# Patient Record
Sex: FEMALE | Race: WHITE | NOT HISPANIC OR LATINO | Employment: FULL TIME | URBAN - METROPOLITAN AREA
[De-identification: names, ages, dates, MRNs, and addresses within clinical notes are randomized per-mention and may not be internally consistent; named-entity substitution may affect disease eponyms.]

---

## 2017-01-09 ENCOUNTER — THERAPY VISIT (OUTPATIENT)
Dept: SLEEP MEDICINE | Facility: CLINIC | Age: 27
End: 2017-01-09
Payer: COMMERCIAL

## 2017-01-09 DIAGNOSIS — G47.10 ORGANIC HYPERSOMNIA: ICD-10-CM

## 2017-01-09 DIAGNOSIS — G47.33 OSA (OBSTRUCTIVE SLEEP APNEA): Primary | Chronic | ICD-10-CM

## 2017-01-09 PROCEDURE — 95810 POLYSOM 6/> YRS 4/> PARAM: CPT | Performed by: INTERNAL MEDICINE

## 2017-01-10 NOTE — PROGRESS NOTES
Patient arrived at [facility].  Diagnostic PSG completed per provider order.  Patient did not meet criteria for PAP therapy.

## 2017-01-11 NOTE — PROCEDURES
PHYSICIAN INTERPRETATION  Home Sleep Schedule-Actigraphy      Patient:  Alina Thompson  MRN:  [id number]    YOB: 1990  Referring Physician:  Aubrey Parkinson Md      Dates of recording: from 12/27/16 to 1/8/17                  Quality:good    Sleep diary:   correlates well for the most part. Large amount of activity on 1/3/17 when sleep is recorded    Sleep onset typically/or range at   10 pm -1 am        Sleep onset delay typically/or range <30 minutes    Sleep offset typically/or range at   7-10 am     Total sleep time estimated is 7.6 hrs, with shortest sleep period time of 3-4 and longest sleep period time of 10.    Sleep periods are more interrupted by movement during second week as opposed to first week. Most notable  Saturday 12/31/16, Sunday 1/1/17, and Tuesday 1/3/17    Light exposure periods are appropriately timed to sleep schedule    Napping is not noted.    Interpretation: Sleep wake pattern is consistent with       Mildly irregular sleep pattern with episodic insufficient sleep    Interpreted by:  Mack Segovia

## 2017-01-16 PROBLEM — G47.33 OSA (OBSTRUCTIVE SLEEP APNEA): Chronic | Status: ACTIVE | Noted: 2017-01-16

## 2017-01-16 NOTE — PROCEDURES
" SLEEP STUDY INTERPRETATION  POLYSOMNOGRAPHY REPORT      Patient: Alina Thompson  YOB: 1990  Study Date: 1/09/2017  MRN: [id number]  Ordering Provider: JAVID Gonzales      Indications for Polysomnography: The patient is a 26 y old Female who is 5' 11\" and weighs 300.0 lbs.  Her BMI is 42.0, McGregor sleepiness scale 6.0 and neck size is 44.0.  A diagnostic polysomnogram was performed to evaluate for fatigue, history of narcolepsy or idiopathic hypersomnolence..      Polysomnogram Data:  A full night polysomnogram recorded the standard physiologic parameters including EEG, EOG, EMG, ECG, nasal and oral airflow.  Respiratory parameters of chest and abdominal movements were recorded with respiratory inductance plethysmography.  Oxygen saturation was recorded by pulse oximetry.          Sleep Architecture:  The total recording time of the polysomnogram was 450.4 minutes.  The total sleep time was 403.0 minutes.  Sleep latency was normal at 11.4 minutes without the use of a sleep aid.  REM latency was 65.5 minutes.  Arousal index was 20.5 arousals per hour.  Sleep efficiency was normal at 89.5%.  Wake after sleep onset was 30.0 minutes.  The patient spent 6.7% of total sleep time in Stage N1, 53.3% in Stage N2, 15.5% in Stages N3, and 24.4% in REM.  Time in REM supine was 38.5 minutes.    Respiration:    Events - The polysomnogram revealed a presence of 7 obstructive, 2 central, and 0 mixed apneas resulting in an apnea index of 1.3 events per hour.  There were 73 hypopneas resulting in a hypopnea index of 10.9 events per hour.  The combined apnea/hypopnea index was 12.2 events per hour.  The REM AHI was 32.3 events per hour.  The supine AHI was 51.3 events per hour.  The RERA index was 2.1 events per hour.   The RDI was 14.3 events per hour.    Snoring - was reported as moderate and intermittent.    Respiratory rate and pattern - was notable for normal respiratory rate and pattern.    Sustained Sleep " Associated Hypoventilation - Transcutaneous carbon dioxide monitoring was not used    Sleep Associated Hypoxemia - (Greater than 5 minutes O2 sat below 89%) was not present.  Baseline oxygen saturation was 93.4%. Lowest oxygen saturation was 70.1%.  Time spent less than or equal to 88% was 4.7 minutes.  Time spent less than or equal to 89% was 6.8 minutes.  14.3 2.1 12.2     Movement Activity:     Periodic Limb Activity - There were 35 PLMs during the entire study. The PLM index was 5.2 movements per hour.  The PLM Arousal Index was 1.8 per hour.    REM EMG Activity - Excessive transient / sustained muscle activity was not present.    Nocturnal Behavior - Abnormal sleep related behaviors were not noted     Bruxism - None apparent.    Cardiac Summary: Arrhythmias were not noted.          Assessment:     Mild obstructive sleep apnea, principally supine-REM related when it is severe    Periodic Limb Movements of Sleep    Recommendations:    Based on the presence of mild obstructive sleep apnea and excessive daytime sleepiness, treatment could be empirically initiated with Auto-titrating PAP therapy with a range of 5 - 18 cmH2O. Recommend clinical follow up with sleep management team.    Positional therapy could be considered    Patient may be a candidate for dental appliance through referral to Sleep Dentistry for the treatment of obstructive sleep apnea and/or socially disruptive snoring.    If devices are not acceptable or effective, patient may benefit from evaluation of possible surgical options.  If she is interested, would recommend referral to specialized ENT-Sleep provider.    Advise regarding the risks of drowsy driving.    Suggest optimizing sleep schedule and avoiding sleep deprivation.    Weight management (if BMI > 30).    Pharmacologic therapy should be used for management of restless legs syndrome only if present and clinically indicated and not based on the presence of periodic limb movements  alone.        _____________________________________   Mack Svee, MD             Range(%) Time in range (min) Time in range (%) Time in or below range (min) Time in or below range (%)   0.0 - 89.0 6.8 1.5% 6.8 1.5%   0.0 - 88.0 4.7 1.0% 4.7 1.0%      12.2 51.3 32.3

## 2017-01-27 ENCOUNTER — OFFICE VISIT (OUTPATIENT)
Dept: SLEEP MEDICINE | Facility: CLINIC | Age: 27
End: 2017-01-27
Payer: COMMERCIAL

## 2017-01-27 VITALS
HEIGHT: 71 IN | SYSTOLIC BLOOD PRESSURE: 133 MMHG | OXYGEN SATURATION: 95 % | BODY MASS INDEX: 41.02 KG/M2 | WEIGHT: 293 LBS | HEART RATE: 84 BPM | DIASTOLIC BLOOD PRESSURE: 71 MMHG

## 2017-01-27 DIAGNOSIS — G47.33 OBSTRUCTIVE SLEEP APNEA: Primary | ICD-10-CM

## 2017-01-27 DIAGNOSIS — R53.83 MALAISE AND FATIGUE: ICD-10-CM

## 2017-01-27 DIAGNOSIS — R53.81 MALAISE AND FATIGUE: ICD-10-CM

## 2017-01-27 PROCEDURE — 99214 OFFICE O/P EST MOD 30 MIN: CPT | Performed by: PHYSICIAN ASSISTANT

## 2017-01-27 NOTE — PROGRESS NOTES
Sleep Study Follow-Up Visit:    Date on this visit: 1/27/2017    Alina Thompson comes in today for follow-up of her actigraphy and sleep study done on 1/9/2017 at the [facility] for for fatigue, history of narcolepsy or idiopathic hypersomnolence. Sleep studies interpreted by Dr. Segovia.     Actigraphy-  Dates of recording: from 12/27/16 to 1/8/17                 Quality:good  Sleep diary:   correlates well for the most part. Large amount of activity on 1/3/17 when sleep is recorded  Sleep onset typically/or range at   10 pm -1 am      Sleep onset delay typically/or range <30 minutes  Sleep offset typically/or range at   7-10 am   Total sleep time estimated is 7.6 hrs, with shortest sleep period time of 3-4 and longest sleep period time of 10.  Sleep periods are more interrupted by movement during second week as opposed to first week. Most notable  Saturday 12/31/16, Sunday 1/1/17, and Tuesday 1/3/17  Light exposure periods are appropriately timed to sleep schedule  Napping is not noted.  Interpretation: Sleep wake pattern is consistent with Mildly irregular sleep pattern with episodic insufficient sleep    Polysomnogram-  Sleep Architecture:  The total recording time of the polysomnogram was 450.4 minutes.  The total sleep time was 403.0 minutes.  Sleep latency was normal at 11.4 minutes without the use of a sleep aid.  REM latency was 65.5 minutes.  Arousal index was 20.5 arousals per hour.  Sleep efficiency was normal at 89.5%.  Wake after sleep onset was 30.0 minutes.  The patient spent 6.7% of total sleep time in Stage N1, 53.3% in Stage N2, 15.5% in Stages N3, and 24.4% in REM.  Time in REM supine was 38.5 minutes.  Respiration:    Events - The polysomnogram revealed a presence of 7 obstructive, 2 central, and 0 mixed apneas resulting in an apnea index of 1.3 events per hour.  There were 73 hypopneas resulting in a hypopnea index of 10.9 events per hour.  The combined apnea/hypopnea  index was 12.2 events per hour.  The REM AHI was 32.3 events per hour.  The supine AHI was 51.3 events per hour.  The RERA index was 2.1 events per hour.   The RDI was 14.3 events per hour.    Snoring - was reported as moderate and intermittent.    Respiratory rate and pattern - was notable for normal respiratory rate and pattern.    Sustained Sleep Associated Hypoventilation - Transcutaneous carbon dioxide monitoring was not used  Sleep Associated Hypoxemia - (Greater than 5 minutes O2 sat below 89%) was not present.  Baseline oxygen saturation was 93.4%. Lowest oxygen saturation was 70.1%.  Time spent less than or equal to 88% was 4.7 minutes.  Time spent less than or equal to 89% was 6.8 minutes.  Movement Activity:     Periodic Limb Activity - There were 35 PLMs during the entire study. The PLM index was 5.2 movements per hour.  The PLM Arousal Index was 1.8 per hour.    REM EMG Activity - Excessive transient / sustained muscle activity was not present.    Nocturnal Behavior - Abnormal sleep related behaviors were not noted      Bruxism - None apparent.    Cardiac Summary: Arrhythmias were not noted.    These findings were reviewed with patient. A copy of the sleep study is given to the patient for her records.     Past medical/surgical history, family history, social history, medications and allergies were reviewed.      Problem List:  Patient Active Problem List    Diagnosis Date Noted     SANDRA (obstructive sleep apnea)- mild (AHI 12) 01/16/2017     Priority: Medium     Study Date: 1/09/2017- (300.0 lbs) apnea/hypopnea index was 12.2 events per hour.  The REM AHI was 32.3 events per hour.  The supine AHI was 51.3 events per hour.  The RERA index was 2.1 events per hour.   The RDI was 14.3 events per hour. . Lowest oxygen saturation was 70.1%.  Time spent less than or equal to 88% was 4.7 minutes. The PLM index was 5.2 movements per hour.        Morbid obesity due to excess calories (H) 11/14/2016     Priority:  "Medium     Idiopathic hypersomnia 11/11/2016     Priority: Medium     2007 MSL of 10.4 with REM present on 3 of the naps  10/8/2012. She was off all stimulant medications. Actigraphy x1 week- Adequate total sleep. Polysomnogram-Total sleep time 434. Sleep efficiency was 79.7%. Latency to sleep 13.5 minutes. Latency to .0 minutes. No significant sleep disordered breathing with only rare hypopneas, RDI was 0.7 per hour, oxygen remained above 92%, No CO2 retention.   MSLT revealed a mean latency of 8.75 minutes on the first 4 naps with sleep present in nap 1, 2 and 4 and REM sleep present on nap 4. The fifth nap was 20 minutes and was not included in the MSL.      /71 mmHg  Pulse 84  Ht 1.803 m (5' 11\")  Wt 139.164 kg (306 lb 12.8 oz)  BMI 42.81 kg/m2  SpO2 95%    Impression/Plan:    1. Mild obstructive sleep apnea, principally supine-REM related when it is severe-  Counseled the patient that mild sleep apnea is not felt to significantly increase long-term cardiovascular risk, but can contribute to excessive daytime sleepiness.    Treatment options discussed today including  auto-CPAP oral appliance therapy or polysomnography with full night PAP titration.  Elected treatment of sleep apnea and excessive daytime sleep apnea with CPAP 5-15 cm    2.Patient had elevated periodic limp movements during the study. The majority of these were not associated with cortical arousal. Patient denies wakeful motor restlessness. Will monitor    3. Patient is interested in FMLA for 10-15 minute tardiness ~3/5 days due to difficulty waking up in the mornings. Will look raina it.     She will follow up with me in about 7 week(s).     Twenty-five minutes spent with patient, all of which were spent face-to-face counseling, consulting, coordinating plan of care.      Ryan Ortiz PA-C    CC: Other Clinic, [facility]       "

## 2017-01-27 NOTE — NURSING NOTE
"Chief Complaint   Patient presents with     RECHECK     PSG results       Initial /71 mmHg  Pulse 84  Ht 1.803 m (5' 11\")  Wt 139.164 kg (306 lb 12.8 oz)  BMI 42.81 kg/m2  SpO2 95% Estimated body mass index is 42.81 kg/(m^2) as calculated from the following:    Height as of this encounter: 1.803 m (5' 11\").    Weight as of this encounter: 139.164 kg (306 lb 12.8 oz).  BP completed using cuff size: john paul Linda CMA      "

## 2017-01-27 NOTE — MR AVS SNAPSHOT
After Visit Summary   1/27/2017    Alina Thompson    MRN: [id number]           Patient Information     Date Of Birth          1990        Visit Information        Provider Department      1/27/2017 2:00 PM Ryan Ortiz PA [facility]        Today's Diagnoses     Obstructive sleep apnea    -  1     Malaise and fatigue           Care Instructions      Your BMI is Body mass index is 42.81 kg/(m^2).  Weight management is a personal decision.  If you are interested in exploring weight loss strategies, the following discussion covers the approaches that may be successful. Body mass index (BMI) is one way to tell whether you are at a healthy weight, overweight, or obese. It measures your weight in relation to your height.  A BMI of 18.5 to 24.9 is in the healthy range. A person with a BMI of 25 to 29.9 is considered overweight, and someone with a BMI of 30 or greater is considered obese. More than two-thirds of American adults are considered overweight or obese.  Being overweight or obese increases the risk for further weight gain. Excess weight may lead to heart disease and diabetes.  Creating and following plans for healthy eating and physical activity may help you improve your health.  Weight control is part of healthy lifestyle and includes exercise, emotional health, and healthy eating habits. Careful eating habits lifelong are the mainstay of weight control. Though there are significant health benefits from weight loss, long-term weight loss with diet alone may be very difficult to achieve- studies show long-term success with dietary management in less than 10% of people. Attaining a healthy weight may be especially difficult to achieve in those with severe obesity. In some cases, medications, devices and surgical management might be considered.  What can you do?  If you are overweight or obese and are interested in methods for weight loss, you should discuss this with your  provider.     Consider reducing daily calorie intake by 500 calories.     Keep a food journal.     Avoiding skipping meals, consider cutting portions instead.    Diet combined with exercise helps maintain muscle while optimizing fat loss. Strength training is particularly important for building and maintaining muscle mass. Exercise helps reduce stress, increase energy, and improves fitness. Increasing exercise without diet control, however, may not burn enough calories to loose weight.       Start walking three days a week 10-20 minutes at a time    Work towards walking thirty minutes five days a week     Eventually, increase the speed of your walking for 1-2 minutes at time    In addition, we recommend that you review healthy lifestyles and methods for weight loss available through the National Institutes of Health patient information sites:  http://win.niddk.nih.gov/publications/index.htm    And look into health and wellness programs that may be available through your health insurance provider, employer, local community center, or [employer]weta club.    Weight management plan: Patient was referred to their PCP to discuss a diet and exercise plan.            Follow-ups after your visit        Follow-up notes from your care team     Return in about 7 weeks (around 3/17/2017).      Your next 10 appointments already scheduled     Feb 03, 2017  1:00 PM   PAP SETUP with JUSTINE SHEA   [facility]    [address] [place] [id number]   [phone]            Mar 24, 2017  9:00 AM   Return Sleep Patient with JAVID Romero   [facility]    [address] [place] [id number]   [phone]              Who to contact     If you have questions or need follow up information about today's clinic visit or your schedule please contact [facility] directly  "at [phone].  Normal or non-critical lab and imaging results will be communicated to you by MyChart, letter or phone within 4 business days after the clinic has received the results. If you do not hear from us within 7 days, please contact the clinic through [employer] or phone. If you have a critical or abnormal lab result, we will notify you by phone as soon as possible.  Submit refill requests through [employer] or call your pharmacy and they will forward the refill request to us. Please allow 3 business days for your refill to be completed.          Additional Information About Your Visit        [employer] Information     [employer] lets you send messages to your doctor, view your test results, renew your prescriptions, schedule appointments and more. To sign up, go to [email or web address] . Click on \"Log in\" on the left side of the screen, which will take you to the Welcome page. Then click on \"Sign up Now\" on the right side of the page.     You will be asked to enter the access code listed below, as well as some personal information. Please follow the directions to create your username and password.     Your access code is: [email or web address]  Expires: 2017  2:14 PM     Your access code will  in 90 days. If you need help or a new code, please call your [place] clinic or [phone].        Care EveryWhere ID     This is your Care EveryWhere ID. This could be used by other organizations to access your [place] medical records  [id number]        Your Vitals Were     Pulse Height BMI (Body Mass Index) Pulse Oximetry          84 1.803 m (5' 11\") 42.81 kg/m2 95%         Blood Pressure from Last 3 Encounters:   17 133/71   16 121/82   11 134/90    Weight from Last 3 Encounters:   17 139.164 kg (306 lb 12.8 oz)   16 136.079 kg (300 lb)   11 116.393 kg (256 lb 9.6 oz)              We Performed the Following     Comprehensive DME        Primary Care Provider    [facility]    "             Thank you!     Thank you for choosing [facility]  for your care. Our goal is always to provide you with excellent care. Hearing back from our patients is one way we can continue to improve our services. Please take a few minutes to complete the written survey that you may receive in the mail after your visit with us. Thank you!             Your Updated Medication List - Protect others around you: Learn how to safely use, store and throw away your medicines at www.disposemymeds.org.          This list is accurate as of: 1/27/17  2:39 PM.  Always use your most recent med list.                   Brand Name Dispense Instructions for use    fluconazole 150 MG tablet    DIFLUCAN     Take by mouth once a week       NASACORT AQ NA          NEXPLANON 68 MG Impl   Generic drug:  etonogestrel      1 each by Subdermal route once       TYLENOL PO

## 2017-01-27 NOTE — PATIENT INSTRUCTIONS

## 2017-02-03 ENCOUNTER — DOCUMENTATION ONLY (OUTPATIENT)
Dept: SLEEP MEDICINE | Facility: CLINIC | Age: 27
End: 2017-02-03
Payer: COMMERCIAL

## 2017-02-03 DIAGNOSIS — G47.33 OSA (OBSTRUCTIVE SLEEP APNEA): Primary | Chronic | ICD-10-CM

## 2017-02-03 DIAGNOSIS — G47.19 EXCESSIVE DAYTIME SLEEPINESS: ICD-10-CM

## 2017-02-03 PROCEDURE — 99207 ZZC NO BILLABLE SERVICE THIS VISIT: CPT

## 2017-02-03 NOTE — PROGRESS NOTES
Patient was offered choice of vendor and chose [facility].  Patient Alina Thompson was set up at [place] on February 3, 2017. Patient received a Resmed AirSense 10 Auto. Pressures were set at 5-15 cm H2O.   Patient s ramp is 5 cm H2O for Off and FLEX/EPR is EPR, 2.  Patient received a Resmed Mask name: Quattro Air  Full Face mask Size Large, heated tubing and heated humidifier.  Patient is enrolled in the STM Program and does not need to meet compliance. Patient has a follow up on 3/24/17 with JAVID Craig.    Dena Solo

## 2017-02-06 ENCOUNTER — DOCUMENTATION ONLY (OUTPATIENT)
Dept: SLEEP MEDICINE | Facility: CLINIC | Age: 27
End: 2017-02-06

## 2017-02-06 NOTE — PROGRESS NOTES
3 DAY STM VISIT    Patient contacted for 3 day STM visit  Message left for patient to return call    Current settings:  EPAP Min Auto CPAP: 5.0 (The minimum allowable pressure in cmH2O)       EPAP Max Auto CPAP: 15.0 (The maximum allowable pressure in cmH2O)       Assessment:  Pt is using nightly  Action plan: Pt to have f/u 14 day  STM visit.

## 2017-02-20 ENCOUNTER — DOCUMENTATION ONLY (OUTPATIENT)
Dept: SLEEP MEDICINE | Facility: CLINIC | Age: 27
End: 2017-02-20

## 2017-02-20 NOTE — PROGRESS NOTES
14 DAY STM VISIT    Subjective measures:  Pt states that she stopped using her CPAP because the mask that she has caused blisters on her nose.  She would like to try a different mask.  She will check her schedule and call to set-up a mask fit appointment.    Assessment: Pt not meeting objective benchmarks for compliance  Patient failing following subjective benchmarks: mask discomfort  Action plan: Schedule mask fit appointment and Pt to have 30 day STM visit.   Device settings:    EPAP Min Auto CPAP: 5.0 (The minimum allowable pressure in cmH2O)   EPAP Max Auto CPAP: 15.0 (The maximum allowable pressure in cmH2O)      Objective measures: 14 day rolling measure     No use since 1/30/17      Compliance   (Goal >70%)  --% compliance greater than four hours rolling average 14 days: 14.2 %      Leak  (Goal < 24 lpm)  --No Data Recorded lpm last data upload         AHI  (Goal < 5)  --No Data Recorded  last data upload         Usage  (Goal >240)  --Time mask on face 14 day average: 61 min

## 2017-02-28 ENCOUNTER — DOCUMENTATION ONLY (OUTPATIENT)
Dept: SLEEP MEDICINE | Facility: CLINIC | Age: 27
End: 2017-02-28

## 2017-02-28 NOTE — PROGRESS NOTES
Pt came in because she's struggling to use her CPAP.  Her FFM caused a blister on her nose so she stopped using. She was fit with a MirNetLex Fx standard nasal mask today and she thought this mask was much more comfortable.  She will call and let us know if this mask is better.  If she's not able to wear CPAP, she is interested in alternative therapy.

## 2017-03-06 NOTE — PROGRESS NOTES
30 DAY STM VISIT    Message left for patient to return call     Assessment: Pt not meeting objective benchmarks for compliance     Action plan: Waiting for patient to return call and 2 week STM recheck appt scheduled  Patient has a follow up visit with JAVID Craig on 3/24/17.   Device settings:    EPAP Min Auto CPAP: 5.0 (The minimum allowable pressure in cmH2O)    EPAP Max Auto CPAP: 15.0 (The maximum allowable pressure in cmH2O)    Target IPAP (95% of Target) 14 day average (Resmed): 5.1cm H20    Objective measures: 14 day rolling measures     Compliance   (Goal >70%)  % compliance greater than four hours rolling average 14 days: 14.2%      Leak   (Goal < 24 lpm) 95% OF Leak in litres Rolling Average 14 Days: 1.2 lpm  last data upload      AHI  (Goal < 5)  AHI Rolling Average 14 Day: 0.13 last data upload        Usage  (Goal >240)  Time mask on face 14 day average: 37 min

## 2017-03-07 ENCOUNTER — DOCUMENTATION ONLY (OUTPATIENT)
Dept: SLEEP MEDICINE | Facility: CLINIC | Age: 27
End: 2017-03-07

## 2017-03-24 ENCOUNTER — OFFICE VISIT (OUTPATIENT)
Dept: SLEEP MEDICINE | Facility: CLINIC | Age: 27
End: 2017-03-24
Payer: COMMERCIAL

## 2017-03-24 VITALS
BODY MASS INDEX: 41.02 KG/M2 | SYSTOLIC BLOOD PRESSURE: 133 MMHG | HEART RATE: 84 BPM | WEIGHT: 293 LBS | HEIGHT: 71 IN | OXYGEN SATURATION: 92 % | DIASTOLIC BLOOD PRESSURE: 85 MMHG

## 2017-03-24 DIAGNOSIS — G47.33 OBSTRUCTIVE SLEEP APNEA: Primary | ICD-10-CM

## 2017-03-24 PROCEDURE — 99214 OFFICE O/P EST MOD 30 MIN: CPT | Performed by: PHYSICIAN ASSISTANT

## 2017-03-24 NOTE — PROGRESS NOTES
Obstructive Sleep Apnea- PAP Follow-Up Visit:    Chief Complaint   Patient presents with     RECHECK     CPAP f/u       Alina Thompson comes in today for follow-up of their mild sleep apnea, managed with CPAP.     Overall, the patient rates their experience with PAP as 5 (0 poor, 10 great). The mask is comfortable. The mask is leaking, 2 nights per week. They are not snoring with the mask on. They are having gasp arousals.  They are not having significant oral/nasal dryness. The pressure settings are not comfortable and feel low.     Patient uses nasal mask.    Bedtime is typically 10 pm. Usually it takes about <5 minutes to fall asleep with the mask on. Wake time is typically 6:30 am.  Patient is using PAP therapy 1-2 hours per night. The patient is usually getting 8 hours of sleep per night.    Patient does not feel rested in the morning.    Douglass Sleepiness Scale: 11/24    ResMed   Auto-PAP 5-15 cmH2O download:  5 total days of use. 25 nonuse days. 7% days with >4 hours use.  Average use 3 hours 8 minutes per day. Median Leak 0.0 L/min. 95%ile Leak 0.7 L/min. CPAP 95% pressure 6.1cm. AHI 0.1    Current problems with PAP use include:  1. Mask issues, resolved.  2. Feeling of not getting enough air.       Past medical/surgical history, family history, social history, medications and allergies were reviewed.      Problem List:  Patient Active Problem List    Diagnosis Date Noted     SANDRA (obstructive sleep apnea)- mild (AHI 12) 01/16/2017     Priority: Medium     Study Date: 1/09/2017- (300.0 lbs) apnea/hypopnea index was 12.2 events per hour.  The REM AHI was 32.3 events per hour.  The supine AHI was 51.3 events per hour.  The RERA index was 2.1 events per hour.   The RDI was 14.3 events per hour. . Lowest oxygen saturation was 70.1%.  Time spent less than or equal to 88% was 4.7 minutes. The PLM index was 5.2 movements per hour.        Morbid obesity due to excess calories (H) 11/14/2016     Priority: Medium      "Idiopathic hypersomnia 11/11/2016     Priority: Medium     2007 MSL of 10.4 with REM present on 3 of the naps  10/8/2012. She was off all stimulant medications. Actigraphy x1 week- Adequate total sleep. Polysomnogram-Total sleep time 434. Sleep efficiency was 79.7%. Latency to sleep 13.5 minutes. Latency to .0 minutes. No significant sleep disordered breathing with only rare hypopneas, RDI was 0.7 per hour, oxygen remained above 92%, No CO2 retention.   MSLT revealed a mean latency of 8.75 minutes on the first 4 naps with sleep present in nap 1, 2 and 4 and REM sleep present on nap 4. The fifth nap was 20 minutes and was not included in the MSL.          /85  Pulse 84  Ht 1.803 m (5' 11\")  Wt (!) 136.9 kg (301 lb 12.8 oz)  SpO2 92%  BMI 42.09 kg/m2        Impression/Plan:  1. Mild Obstructive sleep apnea-  The patient is showing a low degree of compliance and is not receiving a reduction in their daytime sleepiness at this point in time.   Increase CPAP pressures to 7-15 cm/H20  Re-enroll into [facility]   Patient reminded of risks associated with untreated SANDRA. The importance of weight management/loss discussed. Reminded not drive or engage in potentially dangerous activities if feeling sleepy.  Follow up in about 6 months.      Twenty-five minutes spent with patient, all of which were spent face-to-face counseling, consulting, coordinating plan of care regarding SANDRA.      Ryan Ortiz PA-C      CC:  [facility]    "

## 2017-03-24 NOTE — PATIENT INSTRUCTIONS

## 2017-03-24 NOTE — MR AVS SNAPSHOT
After Visit Summary   3/24/2017    Alina Thompson    MRN: [id number]           Patient Information     Date Of Birth          1990        Visit Information        Provider Department      3/24/2017 9:00 AM Ryan Ortiz PA [facility]        Today's Diagnoses     Obstructive sleep apnea    -  1      Care Instructions      Your BMI is Body mass index is 42.09 kg/(m^2).  Weight management is a personal decision.  If you are interested in exploring weight loss strategies, the following discussion covers the approaches that may be successful. Body mass index (BMI) is one way to tell whether you are at a healthy weight, overweight, or obese. It measures your weight in relation to your height.  A BMI of 18.5 to 24.9 is in the healthy range. A person with a BMI of 25 to 29.9 is considered overweight, and someone with a BMI of 30 or greater is considered obese. More than two-thirds of American adults are considered overweight or obese.  Being overweight or obese increases the risk for further weight gain. Excess weight may lead to heart disease and diabetes.  Creating and following plans for healthy eating and physical activity may help you improve your health.  Weight control is part of healthy lifestyle and includes exercise, emotional health, and healthy eating habits. Careful eating habits lifelong are the mainstay of weight control. Though there are significant health benefits from weight loss, long-term weight loss with diet alone may be very difficult to achieve- studies show long-term success with dietary management in less than 10% of people. Attaining a healthy weight may be especially difficult to achieve in those with severe obesity. In some cases, medications, devices and surgical management might be considered.  What can you do?  If you are overweight or obese and are interested in methods for weight loss, you should discuss this with your provider.     Consider reducing  daily calorie intake by 500 calories.     Keep a food journal.     Avoiding skipping meals, consider cutting portions instead.    Diet combined with exercise helps maintain muscle while optimizing fat loss. Strength training is particularly important for building and maintaining muscle mass. Exercise helps reduce stress, increase energy, and improves fitness. Increasing exercise without diet control, however, may not burn enough calories to loose weight.       Start walking three days a week 10-20 minutes at a time    Work towards walking thirty minutes five days a week     Eventually, increase the speed of your walking for 1-2 minutes at time    In addition, we recommend that you review healthy lifestyles and methods for weight loss available through the National Institutes of Health patient information sites:  http://win.niddk.nih.gov/publications/index.htm    And look into health and wellness programs that may be available through your health insurance provider, employer, local community center, or shweta club.    Weight management plan: Patient was referred to their PCP to discuss a diet and exercise plan.            Follow-ups after your visit        Follow-up notes from your care team     Return in about 6 months (around 9/24/2017).      Who to contact     If you have questions or need follow up information about today's clinic visit or your schedule please contact [facility] directly at [phone].  Normal or non-critical lab and imaging results will be communicated to you by MyChart, letter or phone within 4 business days after the clinic has received the results. If you do not hear from us within 7 days, please contact the clinic through MyChart or phone. If you have a critical or abnormal lab result, we will notify you by phone as soon as possible.  Submit refill requests through [employer] or call your pharmacy and they will forward the refill request to us. Please allow 3 business days for  "your refill to be completed.          Additional Information About Your Visit        [employer] Information     [facility] lets you send messages to your doctor, view your test results, renew your prescriptions, schedule appointments and more. To sign up, go to [email or web address] . Click on \"Log in\" on the left side of the screen, which will take you to the Welcome page. Then click on \"Sign up Now\" on the right side of the page.     You will be asked to enter the access code listed below, as well as some personal information. Please follow the directions to create your username and password.     Your access code is: [email or web address]  Expires: 2017  9:44 AM     Your access code will  in 90 days. If you need help or a new code, please call your [place] clinic or [phone].        Care EveryWhere ID     This is your Care EveryWhere ID. This could be used by other organizations to access your [place] medical records  [id number]        Your Vitals Were     Pulse Height Pulse Oximetry BMI (Body Mass Index)          84 1.803 m (5' 11\") 92% 42.09 kg/m2         Blood Pressure from Last 3 Encounters:   17 133/85   17 133/71   16 121/82    Weight from Last 3 Encounters:   17 (!) 136.9 kg (301 lb 12.8 oz)   17 (!) 139.2 kg (306 lb 12.8 oz)   16 136.1 kg (300 lb)              We Performed the Following     Comprehensive DME        Primary Care Provider    [facility]                Thank you!     Thank you for choosing [facility]  for your care. Our goal is always to provide you with excellent care. Hearing back from our patients is one way we can continue to improve our services. Please take a few minutes to complete the written survey that you may receive in the mail after your visit with us. Thank you!             Your Updated Medication List - Protect others around you: Learn how to safely use, store and throw away your medicines at www.disposemymeds.org. "          This list is accurate as of: 3/24/17  9:46 AM.  Always use your most recent med list.                   Brand Name Dispense Instructions for use    NEXPLANON 68 MG Impl   Generic drug:  etonogestrel      1 each by Subdermal route once       order for DME      Equipment ordered: RESMED Auto PAP Mask type: Full face  Settings: 5-15 cm H2O       TYLENOL PO

## 2017-03-24 NOTE — NURSING NOTE
"Chief Complaint   Patient presents with     RECHECK     CPAP f/u       Initial /85  Pulse 84  Ht 1.803 m (5' 11\")  Wt (!) 136.9 kg (301 lb 12.8 oz)  SpO2 92%  BMI 42.09 kg/m2 Estimated body mass index is 42.09 kg/(m^2) as calculated from the following:    Height as of this encounter: 1.803 m (5' 11\").    Weight as of this encounter: 136.9 kg (301 lb 12.8 oz).  Medication Reconciliation: complete   ESS 11  Felicity Linda, CONI      "

## 2017-08-23 ENCOUNTER — RECORDS - HEALTHEAST (OUTPATIENT)
Dept: LAB | Facility: CLINIC | Age: 27
End: 2017-08-23

## 2017-08-23 LAB — HIV 1+2 AB+HIV1 P24 AG SERPL QL IA: NEGATIVE

## 2017-08-24 LAB — SYPHILIS RPR SCREEN - HISTORICAL: NORMAL

## 2017-09-22 ENCOUNTER — OFFICE VISIT (OUTPATIENT)
Dept: SLEEP MEDICINE | Facility: CLINIC | Age: 27
End: 2017-09-22
Payer: COMMERCIAL

## 2017-09-22 VITALS
DIASTOLIC BLOOD PRESSURE: 89 MMHG | HEIGHT: 71 IN | OXYGEN SATURATION: 98 % | SYSTOLIC BLOOD PRESSURE: 129 MMHG | HEART RATE: 92 BPM | WEIGHT: 293 LBS | BODY MASS INDEX: 41.02 KG/M2

## 2017-09-22 DIAGNOSIS — G47.10 HYPERSOMNIA: ICD-10-CM

## 2017-09-22 DIAGNOSIS — G47.33 OSA (OBSTRUCTIVE SLEEP APNEA): Primary | ICD-10-CM

## 2017-09-22 PROCEDURE — 99214 OFFICE O/P EST MOD 30 MIN: CPT | Performed by: PHYSICIAN ASSISTANT

## 2017-09-22 NOTE — MR AVS SNAPSHOT
After Visit Summary   9/22/2017    Alina Thompson    MRN: [id number]           Patient Information     Date Of Birth          1990        Visit Information        Provider Department      9/22/2017 1:00 PM Ryan Ortiz PA [facility]        Today's Diagnoses     SANDRA (obstructive sleep apnea)    -  1    Hypersomnia          Care Instructions      Your BMI is Body mass index is 42.26 kg/(m^2).  Weight management is a personal decision.  If you are interested in exploring weight loss strategies, the following discussion covers the approaches that may be successful. Body mass index (BMI) is one way to tell whether you are at a healthy weight, overweight, or obese. It measures your weight in relation to your height.  A BMI of 18.5 to 24.9 is in the healthy range. A person with a BMI of 25 to 29.9 is considered overweight, and someone with a BMI of 30 or greater is considered obese. More than two-thirds of American adults are considered overweight or obese.  Being overweight or obese increases the risk for further weight gain. Excess weight may lead to heart disease and diabetes.  Creating and following plans for healthy eating and physical activity may help you improve your health.  Weight control is part of healthy lifestyle and includes exercise, emotional health, and healthy eating habits. Careful eating habits lifelong are the mainstay of weight control. Though there are significant health benefits from weight loss, long-term weight loss with diet alone may be very difficult to achieve- studies show long-term success with dietary management in less than 10% of people. Attaining a healthy weight may be especially difficult to achieve in those with severe obesity. In some cases, medications, devices and surgical management might be considered.  What can you do?  If you are overweight or obese and are interested in methods for weight loss, you should discuss this with your  provider.     Consider reducing daily calorie intake by 500 calories.     Keep a food journal.     Avoiding skipping meals, consider cutting portions instead.    Diet combined with exercise helps maintain muscle while optimizing fat loss. Strength training is particularly important for building and maintaining muscle mass. Exercise helps reduce stress, increase energy, and improves fitness. Increasing exercise without diet control, however, may not burn enough calories to loose weight.       Start walking three days a week 10-20 minutes at a time    Work towards walking thirty minutes five days a week     Eventually, increase the speed of your walking for 1-2 minutes at time    In addition, we recommend that you review healthy lifestyles and methods for weight loss available through the National Institutes of Health patient information sites:  http://win.niddk.nih.gov/publications/index.htm    And look into health and wellness programs that may be available through your health insurance provider, employer, local community center, or shweta club.    Weight management plan: Patient was referred to their PCP to discuss a diet and exercise plan.        Your Body mass index is 42.26 kg/(m^2).  Weight management is a personal decision.  If you are interested in exploring weight loss strategies, the following discussion covers the approaches that may be successful. Body mass index (BMI) is one way to tell whether you are at a healthy weight, overweight, or obese. It measures your weight in relation to your height.  A BMI of 18.5 to 24.9 is in the healthy range. A person with a BMI of 25 to 29.9 is considered overweight, and someone with a BMI of 30 or greater is considered obese. More than two-thirds of American adults are considered overweight or obese.  Being overweight or obese increases the risk for further weight gain. Excess weight may lead to heart disease and diabetes.  Creating and following plans for healthy  eating and physical activity may help you improve your health.  Weight control is part of healthy lifestyle and includes exercise, emotional health, and healthy eating habits. Careful eating habits lifelong are the mainstay of weight control. Though there are significant health benefits from weight loss, long-term weight loss with diet alone may be very difficult to achieve- studies show long-term success with dietary management in less than 10% of people. Attaining a healthy weight may be especially difficult to achieve in those with severe obesity. In some cases, medications, devices and surgical management might be considered.  What can you do?  If you are overweight or obese and are interested in methods for weight loss, you should discuss this with your provider.     Consider reducing daily calorie intake by 500 calories.     Keep a food journal.     Avoiding skipping meals, consider cutting portions instead.    Diet combined with exercise helps maintain muscle while optimizing fat loss. Strength training is particularly important for building and maintaining muscle mass. Exercise helps reduce stress, increase energy, and improves fitness. Increasing exercise without diet control, however, may not burn enough calories to loose weight.       Start walking three days a week 10-20 minutes at a time    Work towards walking thirty minutes five days a week     Eventually, increase the speed of your walking for 1-2 minutes at time    In addition, we recommend that you review healthy lifestyles and methods for weight loss available through the National Institutes of Health patient information sites:  http://win.niddk.nih.gov/publications/index.htm    And look into health and wellness programs that may be available through your health insurance provider, employer, local community center, or shweta club.    Weight management plan: Patient was referred to their PCP to discuss a diet and exercise plan.            Follow-ups  after your visit        Additional Services     SLEEP DENTAL REFERRAL       Dental appliance resources recommended by [facility]     Below is a list of dental appliance resources recommended by [facility]   If you wish to choose your own dental sleep dentist, you may identify a provider close to you: [email or web address]    [facility]   Sleep Medicine [facility]   Real Lam DDS, MS   [facility]  [address] [place] [address] [address]  [place] [address]   Appointments: [phone]  Fax: [phone]   Email: [email or web address]   Dental and Oral Surgery Clinic   Christian Che, LESLEY Acosta DDS   [address]   [place] [address]   Appointments: [phone]   Website: [email or web address]    Snoring and Sleep Apnea   [facility]   BANDAR Crawford DDS  [address]   [place] [address]   Appointments: [phone]   Fax: [phone]   Email: [email or web address]  Website: [employer]     [facility]   Office 1   Zaid Prakash DDS - [DME Medicare]  [address], [address]   [place] [address]  Appointments: [phone]  Fax: [phone]  Website: [employer]    [facility]   Office 2  Zaid Prakash DDS - [DME Medicare]  [address], [address]  [place] [address]  Appointments: [phone]  Fax: [phone]  Website: [employer]    [facility]   [facility]  Adan Kirk DDS  [address] [place] [address]  Appointments: [phone]    [facility]   [place] Office   Luis Acosta DDS   [facility]   [address]   [place] [address]   Appointments: [phone]   Fax: [phone]   Website: [employer]     [place]  [facility]   [place] Office   Keyon Young DDS, MS - [DME Medicare]  [facility]   [address], [address]   [place] [address]   Appointments: [phone]   Fax: [phone]   Website: [facility]     [facility]  Davis Grace, DMD, MSD - [DME Medicare]  [address], [address]  [place] [address]  Appointments [phone]  Fax: [phone]  Website: [email or web address]    [facility]   [place] Office   Leesa Webber DDS, PhD, [facility]   [address], [address]   [place] [address]   Appointments: [phone]   Fax: [phone]   Website: [employer]     [facility]   [place] Office   Leesa Webber DDS, PhD, MS   [facility] [address]   [place] [address]   Appointments: [phone]   Fax: [phone]   Website: [employer]     [facility]  Emma Epps DDS  [facility]  [address] [place] [address]  Appointments: [phone]   Fax: [phone]    If you wish to choose your own dental sleep dentist, you may identify a provider close to you: [email or web address]      AHI: 12 PSG DATE: 1/19/2017     Return to clinic in 3 months for Home Sleep Test to confirm adequacy of Treatment.    Other information regarding this referral: Mandibular repositioning appliance for SANDRA. If your insurance asks for a CPT code, it is .    Please be aware that coverage of these services is subject to the terms and limitations of your health insurance plan.  Call member services at your health plan with any benefit or coverage questions.      Please bring the following to your appointment:    >>   List of current medications   >>   This referral request   >>   Any documents/labs given to you for this referral                  Who to contact     If  "you have questions or need follow up information about today's clinic visit or your schedule please contact [facility] directly at [phone].  Normal or non-critical lab and imaging results will be communicated to you by MyChart, letter or phone within 4 business days after the clinic has received the results. If you do not hear from us within 7 days, please contact the clinic through [employer] or phone. If you have a critical or abnormal lab result, we will notify you by phone as soon as possible.  Submit refill requests through [employer] or call your pharmacy and they will forward the refill request to us. Please allow 3 business days for your refill to be completed.          Additional Information About Your Visit        Tagoodieshar[employer]teryon Information     [employer] lets you send messages to your doctor, view your test results, renew your prescriptions, schedule appointments and more. To sign up, go to [email or web address] . Click on \"Log in\" on the left side of the screen, which will take you to the Welcome page. Then click on \"Sign up Now\" on the right side of the page.     You will be asked to enter the access code listed below, as well as some personal information. Please follow the directions to create your username and password.     Your access code is: [email or web address]  Expires: 2017  1:24 PM     Your access code will  in 90 days. If you need help or a new code, please call your [place] clinic or [phone].        Care EveryWhere ID     This is your Care EveryWhere ID. This could be used by other organizations to access your [place] medical records  [id number]        Your Vitals Were     Pulse Height Pulse Oximetry BMI (Body Mass Index)          92 1.803 m (5' 11\") 98% 42.26 kg/m2         Blood Pressure from Last 3 Encounters:   17 129/89   17 133/85   17 133/71    Weight from Last 3 Encounters:   17 (!) 137.4 kg (303 lb)   17 (!) 136.9 kg (301 lb 12.8 oz) "   01/27/17 (!) 139.2 kg (306 lb 12.8 oz)              We Performed the Following     SLEEP DENTAL REFERRAL        Primary Care Provider    None Specified       No primary provider on file.        Equal Access to Services     MODESTA MCCLOUD : Hadii aad ku hadlucibrendon Kamara, truda bunnyqadaha, susanta kashahriarda leonor, lenore duartein hayaagina multanijuancho lei beltran ochoa. So [facility] [phone].    ATENCIÓN: Si habla español, tiene a sarmiento disposición servicios gratuitos de asistencia lingüística. Llame al [phone].    We comply with applicable federal civil rights laws and [place] laws. We do not discriminate on the basis of race, color, national origin, age, disability sex, sexual orientation or gender identity.            Thank you!     Thank you for choosing [facility]  for your care. Our goal is always to provide you with excellent care. Hearing back from our patients is one way we can continue to improve our services. Please take a few minutes to complete the written survey that you may receive in the mail after your visit with us. Thank you!             Your Updated Medication List - Protect others around you: Learn how to safely use, store and throw away your medicines at www.disposemymeds.org.          This list is accurate as of: 9/22/17  1:24 PM.  Always use your most recent med list.                   Brand Name Dispense Instructions for use Diagnosis    NEXPLANON 68 MG Impl   Generic drug:  etonogestrel      1 each by Subdermal route once        OMEPRAZOLE PO           order for DME      Equipment ordered: RESMED Auto PAP Mask type: Full face  Settings: 5-15 cm H2O        TYLENOL PO

## 2017-09-22 NOTE — NURSING NOTE
"Chief Complaint   Patient presents with     CPAP Follow Up       Initial There were no vitals taken for this visit. Estimated body mass index is 42.09 kg/(m^2) as calculated from the following:    Height as of 3/24/17: 1.803 m (5' 11\").    Weight as of 3/24/17: 136.9 kg (301 lb 12.8 oz).  Medication Reconciliation: complete    "

## 2017-09-22 NOTE — PROGRESS NOTES
"Obstructive Sleep Apnea- PAP Follow-Up Visit:    Chief Complaint   Patient presents with     CPAP Follow Up       Alina Thompson comes in today for follow-up of their mild sleep apnea, managed with CPAP.     In review:   Patient reports that she was initially diagnosed with narcolepsy by Dr. Persaud at [facility] after repeatedly falling sleep in class. Interpretation of the study (done in 2007) was acquired and showed no significant sleep disordered breathing. MSLT the following day showed MSL of 10.4 with REM present on 3 of the naps.      Alina had a repeat sleep study on 10/8/2012. She was off all stimulant medications.   Summary of the sleep study interpreted by Dr. Torey Persaud:  Weight: 257#  Actigraphy x1 week-  Adequate total sleep prior to PSG  Polysomnogram-  Total sleep time 434. Sleep efficiency was 79.7%. Latency to sleep 13.5 minutes. Latency to .0 minutes. N1 was 6.2%, N2 64.3%, N3 7.6%, N4 21.9%. No significant sleep disordered breathing with only rare hypopneas, RDI was 0.7 per hour, oxygen remained above 92%, No CO2 retention.   MSLT revealed a mean latency of 8.75 minutes on the first 4 naps with sleep present in nap1, 2 and 4 and REM sleep present on nap 4. The fifth nap was 20 minutes and was not included in the MSL.      The patient has been on Methylphenidate 10 mg and Concerta 18 and 54 mg both in the morning. Both helpful but left her with increased anxiety. She stopped taking stimulants in 2014.    Study Date: 1/09/2017- (300.0 lbs) apnea/hypopnea index was 12.2 events per hour.  The REM AHI was 32.3 events per hour.  The supine AHI was 51.3 events per hour.  The RERA index was 2.1 events per hour.   The RDI was 14.3 events per hour. . Lowest oxygen saturation was 70.1%.  Time spent less than or equal to 88% was 4.7 minutes. The PLM index was 5.2 movements per hour.    Today:  She stopped using CPAP, because she \"hates it\". She is interested in an alternative therapy. She " "is not interested in stimulants.     Bedtime is typically 10 PM. Usually it takes about 5 minutes to fall asleep. Wake time is typically 6:30 AM.  The patient is usually getting 7.5-8 hours of sleep per night.    She does not feel rested in the morning.    Total score - [place]: 11 (3/24/2017  9:00 AM). No difficulty with driving.     Past medical/surgical history, family history, social history, medications and allergies were reviewed.      Problem List:  Patient Active Problem List    Diagnosis Date Noted     SANDRA (obstructive sleep apnea)- mild (AHI 12) 01/16/2017     Priority: Medium     Study Date: 1/09/2017- (300.0 lbs) apnea/hypopnea index was 12.2 events per hour.  The REM AHI was 32.3 events per hour.  The supine AHI was 51.3 events per hour.  The RERA index was 2.1 events per hour.   The RDI was 14.3 events per hour. . Lowest oxygen saturation was 70.1%.  Time spent less than or equal to 88% was 4.7 minutes. The PLM index was 5.2 movements per hour.        Morbid obesity due to excess calories (H) 11/14/2016     Priority: Medium     Idiopathic hypersomnia 11/11/2016     Priority: Medium     2007 MSL of 10.4 with REM present on 3 of the naps  10/8/2012. She was off all stimulant medications. Actigraphy x1 week- Adequate total sleep. Polysomnogram-Total sleep time 434. Sleep efficiency was 79.7%. Latency to sleep 13.5 minutes. Latency to .0 minutes. No significant sleep disordered breathing with only rare hypopneas, RDI was 0.7 per hour, oxygen remained above 92%, No CO2 retention.   MSLT revealed a mean latency of 8.75 minutes on the first 4 naps with sleep present in nap 1, 2 and 4 and REM sleep present on nap 4. The fifth nap was 20 minutes and was not included in the MSL.          /89  Pulse 92  Ht 1.803 m (5' 11\")  Wt (!) 137.4 kg (303 lb)  SpO2 98%  BMI 42.26 kg/m2        Impression/Plan:    1. Mild Sleep apnea-   Stopped using CPAP.   Initial concerns of excessive daytime " sleepiness.  We discussed treatment options for mild obstructive sleep apnea with daytime sleepiness. She is interested in mandibular advancement device and sleep dental referral placed.     2. Patient is interested in FMLA for 10-15 minute tardiness 1-3 days/week due to difficulty waking up in the mornings. Forms completed.     Alina Thompson will follow up after mandibular advancement device is constructed so that we may reassess SANDRA and daytime sleepiness.     Twenty-five minutes spent with patient, all of which were spent face-to-face counseling, consulting, coordinating plan of care regarding SANDRA/hypersomnia.      Ryan Ortiz PA-C      CC:  No primary care provider on file.

## 2017-09-22 NOTE — PATIENT INSTRUCTIONS

## 2017-09-27 ENCOUNTER — RECORDS - HEALTHEAST (OUTPATIENT)
Dept: LAB | Facility: CLINIC | Age: 27
End: 2017-09-27

## 2017-09-27 LAB
CHOLEST SERPL-MCNC: 167 MG/DL
FASTING STATUS PATIENT QL REPORTED: NO
HDLC SERPL-MCNC: 48 MG/DL
HIV 1+2 AB+HIV1 P24 AG SERPL QL IA: NEGATIVE
LDLC SERPL CALC-MCNC: 101 MG/DL
TRIGL SERPL-MCNC: 89 MG/DL

## 2018-02-19 ENCOUNTER — RECORDS - HEALTHEAST (OUTPATIENT)
Dept: LAB | Facility: CLINIC | Age: 28
End: 2018-02-19

## 2018-02-19 LAB
ALBUMIN SERPL-MCNC: 3.3 G/DL (ref 3.5–5)
ALP SERPL-CCNC: 86 U/L (ref 45–120)
ALT SERPL W P-5'-P-CCNC: 16 U/L (ref 0–45)
ANION GAP SERPL CALCULATED.3IONS-SCNC: 9 MMOL/L (ref 5–18)
AST SERPL W P-5'-P-CCNC: 11 U/L (ref 0–40)
BILIRUB SERPL-MCNC: 0.3 MG/DL (ref 0–1)
BUN SERPL-MCNC: 8 MG/DL (ref 8–22)
CALCIUM SERPL-MCNC: 9.1 MG/DL (ref 8.5–10.5)
CHLORIDE BLD-SCNC: 107 MMOL/L (ref 98–107)
CHOLEST SERPL-MCNC: 161 MG/DL
CO2 SERPL-SCNC: 22 MMOL/L (ref 22–31)
CREAT SERPL-MCNC: 0.66 MG/DL (ref 0.6–1.1)
FASTING STATUS PATIENT QL REPORTED: NO
GFR SERPL CREATININE-BSD FRML MDRD: >60 ML/MIN/1.73M2
GLUCOSE BLD-MCNC: 227 MG/DL (ref 70–125)
HDLC SERPL-MCNC: 52 MG/DL
LDLC SERPL CALC-MCNC: 91 MG/DL
POTASSIUM BLD-SCNC: 3.8 MMOL/L (ref 3.5–5)
PROT SERPL-MCNC: 6.5 G/DL (ref 6–8)
SODIUM SERPL-SCNC: 138 MMOL/L (ref 136–145)
TRIGL SERPL-MCNC: 88 MG/DL
TSH SERPL DL<=0.005 MIU/L-ACNC: 2.19 UIU/ML (ref 0.3–5)

## 2018-05-24 ENCOUNTER — TRANSFERRED RECORDS (OUTPATIENT)
Dept: HEALTH INFORMATION MANAGEMENT | Facility: CLINIC | Age: 28
End: 2018-05-24

## 2018-05-31 ENCOUNTER — OFFICE VISIT (OUTPATIENT)
Dept: SLEEP MEDICINE | Facility: CLINIC | Age: 28
End: 2018-05-31
Payer: COMMERCIAL

## 2018-05-31 VITALS
HEART RATE: 93 BPM | DIASTOLIC BLOOD PRESSURE: 88 MMHG | WEIGHT: 280 LBS | SYSTOLIC BLOOD PRESSURE: 130 MMHG | HEIGHT: 71 IN | BODY MASS INDEX: 39.2 KG/M2 | OXYGEN SATURATION: 96 %

## 2018-05-31 DIAGNOSIS — G47.33 OSA (OBSTRUCTIVE SLEEP APNEA): Primary | ICD-10-CM

## 2018-05-31 PROCEDURE — 99214 OFFICE O/P EST MOD 30 MIN: CPT | Performed by: PHYSICIAN ASSISTANT

## 2018-05-31 RX ORDER — CETIRIZINE HYDROCHLORIDE 10 MG/1
10 TABLET ORAL DAILY
COMMUNITY
End: 2019-08-01

## 2018-05-31 RX ORDER — DESOGESTREL AND ETHINYL ESTRADIOL 0.15-0.03
1 KIT ORAL DAILY
Refills: 3 | COMMUNITY
Start: 2018-04-03 | End: 2019-03-22

## 2018-05-31 NOTE — MR AVS SNAPSHOT
After Visit Summary   5/31/2018    Alina Thompson    MRN: [id number]           Patient Information     Date Of Birth          1990        Visit Information        Provider Department      5/31/2018 10:00 AM Ryan Ortiz PA [facility]        Care Instructions      Your BMI is Body mass index is 39.05 kg/(m^2).  Weight management is a personal decision.  If you are interested in exploring weight loss strategies, the following discussion covers the approaches that may be successful. Body mass index (BMI) is one way to tell whether you are at a healthy weight, overweight, or obese. It measures your weight in relation to your height.  A BMI of 18.5 to 24.9 is in the healthy range. A person with a BMI of 25 to 29.9 is considered overweight, and someone with a BMI of 30 or greater is considered obese. More than two-thirds of American adults are considered overweight or obese.  Being overweight or obese increases the risk for further weight gain. Excess weight may lead to heart disease and diabetes.  Creating and following plans for healthy eating and physical activity may help you improve your health.  Weight control is part of healthy lifestyle and includes exercise, emotional health, and healthy eating habits. Careful eating habits lifelong are the mainstay of weight control. Though there are significant health benefits from weight loss, long-term weight loss with diet alone may be very difficult to achieve- studies show long-term success with dietary management in less than 10% of people. Attaining a healthy weight may be especially difficult to achieve in those with severe obesity. In some cases, medications, devices and surgical management might be considered.  What can you do?  If you are overweight or obese and are interested in methods for weight loss, you should discuss this with your provider.     Consider reducing daily calorie intake by 500 calories.     Keep a food  journal.     Avoiding skipping meals, consider cutting portions instead.    Diet combined with exercise helps maintain muscle while optimizing fat loss. Strength training is particularly important for building and maintaining muscle mass. Exercise helps reduce stress, increase energy, and improves fitness. Increasing exercise without diet control, however, may not burn enough calories to loose weight.       Start walking three days a week 10-20 minutes at a time    Work towards walking thirty minutes five days a week     Eventually, increase the speed of your walking for 1-2 minutes at time    In addition, we recommend that you review healthy lifestyles and methods for weight loss available through the National Institutes of Health patient information sites:  http://win.niddk.nih.gov/publications/index.htm    And look into health and wellness programs that may be available through your health insurance provider, employer, local community center, or shweta club.    Weight management plan: Patient was referred to their PCP to discuss a diet and exercise plan.              Follow-ups after your visit        Follow-up notes from your care team     Return in about 6 months (around 11/30/2018).      Your next 10 appointments already scheduled     Nov 30, 2018  9:30 AM CST   Return Sleep Patient with JAVID Romero   [facility] ([facility] [place]    [address] [place] [id number] [phone]              Who to contact     If you have questions or need follow up information about today's clinic visit or your schedule please contact [facility] directly at [phone].  Normal or non-critical lab and imaging results will be communicated to you by MyChart, letter or phone within 4 business days after the clinic has received the results. If you do not hear from us within 7 days, please contact the clinic through MyChart or phone. If you  "have a critical or abnormal lab result, we will notify you by phone as soon as possible.  Submit refill requests through Know[employer] or call your pharmacy and they will forward the refill request to us. Please allow 3 business days for your refill to be completed.          Additional Information About Your Visit        Zadyhart Information     Knowmia lets you send messages to your doctor, view your test results, renew your prescriptions, schedule appointments and more. To sign up, go to [email or web address] . Click on \"Log in\" on the left side of the screen, which will take you to the Welcome page. Then click on \"Sign up Now\" on the right side of the page.     You will be asked to enter the access code listed below, as well as some personal information. Please follow the directions to create your username and password.     Your access code is: [email or web address]  Expires: 2018 10:36 AM     Your access code will  in 90 days. If you need help or a new code, please call your [facility] clinic or [phone].        Care EveryWhere ID     This is your Care EveryWhere ID. This could be used by other organizations to access your [facility] medical records  [id number]        Your Vitals Were     Pulse Height Pulse Oximetry BMI (Body Mass Index)          93 1.803 m (5' 11\") 96% 39.05 kg/m2         Blood Pressure from Last 3 Encounters:   18 (!) 145/100   17 129/89   17 133/85    Weight from Last 3 Encounters:   18 127 kg (280 lb)   17 (!) 137.4 kg (303 lb)   17 (!) 136.9 kg (301 lb 12.8 oz)              Today, you had the following     No orders found for display       Primary Care Provider Fax #    Physician No Ref-Primary [phone]       No address on file        Equal Access to Services     MODESTA MCCLOUD : Eric Kamara, adilia norman, lenore cope . [facility] [phone].    ATENCIÓN: Si sharri melvin " a sarmiento disposición servicios gratuitos de asistencia lingüística. Linda jaeger [phone].    We comply with applicable federal civil rights laws and [place] laws. We do not discriminate on the basis of race, color, national origin, age, disability, sex, sexual orientation, or gender identity.            Thank you!     Thank you for choosing [facility]  for your care. Our goal is always to provide you with excellent care. Hearing back from our patients is one way we can continue to improve our services. Please take a few minutes to complete the written survey that you may receive in the mail after your visit with us. Thank you!             Your Updated Medication List - Protect others around you: Learn how to safely use, store and throw away your medicines at www.disposemymeds.org.          This list is accurate as of 5/31/18 10:36 AM.  Always use your most recent med list.                   Brand Name Dispense Instructions for use Diagnosis    APRI 0.15-30 MG-MCG per tablet   Generic drug:  desogestrel-ethinyl estradiol      Take 1 tablet by mouth daily        cetirizine 10 MG tablet    zyrTEC     Take 10 mg by mouth daily        NASACORT ALLERGY 24HR NA           OMEPRAZOLE PO           order for DME      Equipment ordered: RESMED Auto PAP Mask type: Full face  Settings: 5-15 cm H2O        TYLENOL PO

## 2018-05-31 NOTE — PATIENT INSTRUCTIONS

## 2018-05-31 NOTE — PROGRESS NOTES
Obstructive Sleep Apnea- PAP Follow-Up Visit:    Chief Complaint   Patient presents with     RECHECK     FMLA paperwork       Alina Thompson comes in today for follow-up of their mild sleep apnea, managed with mandibular advancement device.     In review:   Patient reports that she was initially diagnosed with narcolepsy by Dr. Persaud at [facility] after repeatedly falling sleep in class. Interpretation of the study (done in 2007) was acquired and showed no significant sleep disordered breathing. MSLT the following day showed MSL of 10.4 with REM present on 3 of the naps.      Alina had a repeat sleep study on 10/8/2012. She was off all stimulant medications.   Summary of the sleep study interpreted by Dr. Torey Persaud:  Weight: 257#  Actigraphy x1 week-  Adequate total sleep prior to PSG  Polysomnogram-  Total sleep time 434. Sleep efficiency was 79.7%. Latency to sleep 13.5 minutes. Latency to .0 minutes. N1 was 6.2%, N2 64.3%, N3 7.6%, N4 21.9%. No significant sleep disordered breathing with only rare hypopneas, RDI was 0.7 per hour, oxygen remained above 92%, No CO2 retention.   MSLT revealed a mean latency of 8.75 minutes on the first 4 naps with sleep present in nap1, 2 and 4 and REM sleep present on nap 4. The fifth nap was 20 minutes and was not included in the MSL.      The patient has been on Methylphenidate 10 mg and Concerta 18 and 54 mg both in the morning. Both helpful but left her with increased anxiety. She stopped taking stimulants in 2014.    Study Date: 1/09/2017- (300.0 lbs) apnea/hypopnea index was 12.2 events per hour.  The REM AHI was 32.3 events per hour.  The supine AHI was 51.3 events per hour.  The RERA index was 2.1 events per hour.   The RDI was 14.3 events per hour. . Lowest oxygen saturation was 70.1%.  Time spent less than or equal to 88% was 4.7 minutes. The PLM index was 5.2 movements per hour.     She tried CPAP and hated it and was referred for construction of  "mandibular advancement device. She is in the process of having one made.     Bedtime is typically 9-11 PM. Usually it takes about 10 minutes to fall asleep. Wake time is typically 6:30-7 AM. The patient is usually getting 8+ hours of sleep per night.    She does not feel rested in the morning.    Total score - [place]: 12 (5/31/2018 10:13 AM). No difficulty driving.           Past medical/surgical history, family history, social history, medications and allergies were reviewed.      Problem List:  Patient Active Problem List    Diagnosis Date Noted     SANDRA (obstructive sleep apnea)- mild (AHI 12) 01/16/2017     Priority: Medium     Study Date: 1/09/2017- (300.0 lbs) apnea/hypopnea index was 12.2 events per hour.  The REM AHI was 32.3 events per hour.  The supine AHI was 51.3 events per hour.  The RERA index was 2.1 events per hour.   The RDI was 14.3 events per hour. . Lowest oxygen saturation was 70.1%.  Time spent less than or equal to 88% was 4.7 minutes. The PLM index was 5.2 movements per hour.        Morbid obesity due to excess calories (H) 11/14/2016     Priority: Medium     Idiopathic hypersomnia 11/11/2016     Priority: Medium     2007 MSL of 10.4 with REM present on 3 of the naps  10/8/2012. She was off all stimulant medications. Actigraphy x1 week- Adequate total sleep. Polysomnogram-Total sleep time 434. Sleep efficiency was 79.7%. Latency to sleep 13.5 minutes. Latency to .0 minutes. No significant sleep disordered breathing with only rare hypopneas, RDI was 0.7 per hour, oxygen remained above 92%, No CO2 retention.   MSLT revealed a mean latency of 8.75 minutes on the first 4 naps with sleep present in nap 1, 2 and 4 and REM sleep present on nap 4. The fifth nap was 20 minutes and was not included in the MSL.          /88  Pulse 93  Ht 1.803 m (5' 11\")  Wt 127 kg (280 lb)  SpO2 96%  BMI 39.05 kg/m2    Impression/Plan:  1. Mild Sleep apnea.   In the process of construction of " mandibular advancement device.  ENMA almanza( late 15 minutes 2 days a week) completed.          Alina SANJAY Thompson will follow up in about 6 months to evaluate daytime sleepiness.     Twenty-five minutes spent with patient, all of which were spent face-to-face counseling, consulting, coordinating plan of care regarding SANDRA and hypersoomnia.      Ryan Ortiz PA-C      CC:  No Ref-Primary, Physician

## 2018-05-31 NOTE — NURSING NOTE
"Chief Complaint   Patient presents with     RECHECK     FMLA paperwork       Initial BP (!) 145/100  Pulse 93  Ht 1.803 m (5' 11\")  Wt 127 kg (280 lb)  SpO2 96%  BMI 39.05 kg/m2 Estimated body mass index is 39.05 kg/(m^2) as calculated from the following:    Height as of this encounter: 1.803 m (5' 11\").    Weight as of this encounter: 127 kg (280 lb).    Medication Reconciliation: complete      "

## 2019-03-22 ENCOUNTER — OFFICE VISIT (OUTPATIENT)
Dept: URGENT CARE | Facility: URGENT CARE | Age: 29
End: 2019-03-22
Payer: COMMERCIAL

## 2019-03-22 VITALS
SYSTOLIC BLOOD PRESSURE: 132 MMHG | DIASTOLIC BLOOD PRESSURE: 80 MMHG | TEMPERATURE: 98.1 F | OXYGEN SATURATION: 99 % | HEART RATE: 84 BPM | WEIGHT: 293 LBS | BODY MASS INDEX: 41.7 KG/M2

## 2019-03-22 DIAGNOSIS — N89.8 VAGINAL DISCHARGE: Primary | ICD-10-CM

## 2019-03-22 DIAGNOSIS — R35.0 URINARY FREQUENCY: ICD-10-CM

## 2019-03-22 DIAGNOSIS — B37.31 CANDIDIASIS OF VAGINA: ICD-10-CM

## 2019-03-22 LAB
ALBUMIN UR-MCNC: NEGATIVE MG/DL
APPEARANCE UR: ABNORMAL
BACTERIA #/AREA URNS HPF: ABNORMAL /HPF
BILIRUB UR QL STRIP: NEGATIVE
COLOR UR AUTO: YELLOW
GLUCOSE UR STRIP-MCNC: NEGATIVE MG/DL
HGB UR QL STRIP: NEGATIVE
KETONES UR STRIP-MCNC: NEGATIVE MG/DL
LEUKOCYTE ESTERASE UR QL STRIP: ABNORMAL
NITRATE UR QL: NEGATIVE
NON-SQ EPI CELLS #/AREA URNS LPF: ABNORMAL /LPF
PH UR STRIP: 6.5 PH (ref 5–7)
RBC #/AREA URNS AUTO: ABNORMAL /HPF
SOURCE: ABNORMAL
SP GR UR STRIP: 1.02 (ref 1–1.03)
SPECIMEN SOURCE: ABNORMAL
UROBILINOGEN UR STRIP-ACNC: 0.2 EU/DL (ref 0.2–1)
WBC #/AREA URNS AUTO: ABNORMAL /HPF
WET PREP SPEC: ABNORMAL

## 2019-03-22 PROCEDURE — 87210 SMEAR WET MOUNT SALINE/INK: CPT | Performed by: NURSE PRACTITIONER

## 2019-03-22 PROCEDURE — 99203 OFFICE O/P NEW LOW 30 MIN: CPT | Performed by: NURSE PRACTITIONER

## 2019-03-22 PROCEDURE — 81001 URINALYSIS AUTO W/SCOPE: CPT | Performed by: NURSE PRACTITIONER

## 2019-03-22 RX ORDER — FLUCONAZOLE 150 MG/1
150 TABLET ORAL ONCE
Qty: 2 TABLET | Refills: 0 | Status: SHIPPED | OUTPATIENT
Start: 2019-03-22 | End: 2019-04-05

## 2019-03-22 ASSESSMENT — ENCOUNTER SYMPTOMS
NEUROLOGICAL NEGATIVE: 1
DYSURIA: 1
FREQUENCY: 1
VOMITING: 0
CONSTITUTIONAL NEGATIVE: 1
CARDIOVASCULAR NEGATIVE: 1
ABDOMINAL PAIN: 0
GASTROINTESTINAL NEGATIVE: 1
NAUSEA: 0

## 2019-03-23 NOTE — PROGRESS NOTES
HPI  Alina Thompson 28 year old presents with CHIEF COMPLAINT of vaginal discharge and irritation. Also notes some urinary frequency w/o hematuria.     Past Medical History:   Diagnosis Date     DM (diabetes mellitus), type 2 (H)      Obesity         Patient Active Problem List   Diagnosis     Idiopathic hypersomnia     Morbid obesity due to excess calories (H)     SANDRA (obstructive sleep apnea)- mild (AHI 12)      No past surgical history on file.  Allergies   Allergen Reactions     Sulfa Drugs      Wellbutrin [Bupropion] Other (See Comments)     Dizziness     Current Outpatient Medications   Medication     Acetaminophen (TYLENOL PO)     cetirizine (ZYRTEC) 10 MG tablet     fluconazole (DIFLUCAN) 150 MG tablet     OMEPRAZOLE PO     order for DME     Triamcinolone Acetonide (NASACORT ALLERGY 24HR NA)     No current facility-administered medications for this visit.          Review of Systems   Constitutional: Negative.    Cardiovascular: Negative.    Gastrointestinal: Negative.  Negative for abdominal pain, nausea and vomiting.   Genitourinary: Positive for dysuria and frequency.        Vaginal discharge     Skin: Negative for rash.   Neurological: Negative.    Endo/Heme/Allergies: Negative.    All other systems reviewed and are negative.        Physical Exam   Constitutional: She is oriented to person, place, and time. She appears well-developed. No distress.   /80   Pulse 84   Temp 98.1  F (36.7  C) (Oral)   Wt 135.6 kg (299 lb)   SpO2 99%   BMI 41.70 kg/m       HENT:   Head: Normocephalic.   Cardiovascular: Normal rate.   Pulmonary/Chest: Effort normal.   Abdominal: Soft. There is no tenderness. There is no CVA tenderness.   Genitourinary:   Genitourinary Comments: Patient report of vaginal discharge. Self-collect wet prep.    Musculoskeletal: Normal range of motion.   Neurological: She is alert and oriented to person, place, and time.   Skin: Skin is warm and dry.   Nursing note and vitals  reviewed.    Results for orders placed or performed in visit on 03/22/19   UA reflex to Microscopic and Culture   Result Value Ref Range    Color Urine Yellow     Appearance Urine Slightly Cloudy     Glucose Urine Negative NEG^Negative mg/dL    Bilirubin Urine Negative NEG^Negative    Ketones Urine Negative NEG^Negative mg/dL    Specific Gravity Urine 1.020 1.003 - 1.035    Blood Urine Negative NEG^Negative    pH Urine 6.5 5.0 - 7.0 pH    Protein Albumin Urine Negative NEG^Negative mg/dL    Urobilinogen Urine 0.2 0.2 - 1.0 EU/dL    Nitrite Urine Negative NEG^Negative    Leukocyte Esterase Urine Small (A) NEG^Negative    Source Midstream Urine    Urine Microscopic   Result Value Ref Range    WBC Urine 0 - 5 OTO5^0 - 5 /HPF    RBC Urine O - 2 OTO2^O - 2 /HPF    Squamous Epithelial /LPF Urine Few FEW^Few /LPF    Bacteria Urine Few (A) NEG^Negative /HPF   Wet prep   Result Value Ref Range    Specimen Description Vagina     Wet Prep Yeast seen (A)     Wet Prep No Trichomonas seen     Wet Prep No clue cells seen      Assessment:  1. Vaginal discharge    2. Urinary frequency    3. Candidiasis of vagina        Plan:  Orders Placed This Encounter     UA reflex to Microscopic and Culture     Urine Microscopic     fluconazole (DIFLUCAN) 150 MG tablet     Instructions regarding self-care of patient/child reviewed.   Written instructions provided in after visit summary and reviewed.  Patient instructed to see primary care provider for new or persistent symptoms.   Red flag symptoms reviewed and patient has been instructed to seek emergent   Care at the closest emergency room for evaluation and treatment.   Please contact pharmacy for medication questions.  Patient instructed to take medications as directed on package.        Breanna Abrams, APRN, CNP

## 2019-03-25 ENCOUNTER — TELEPHONE (OUTPATIENT)
Dept: SURGERY | Facility: CLINIC | Age: 29
End: 2019-03-25

## 2019-03-25 NOTE — TELEPHONE ENCOUNTER
Reason for call:  Other   Patient called regarding (reason for call): call back  Additional comments: Patient would like a call back to set up her initial bariatric consult. She said she has already attended an information seminar.    Phone number to reach patient:  Home number on file [phone] (home)    Best Time:  Anytime    Can we leave a detailed message on this number?  YES

## 2019-03-26 NOTE — TELEPHONE ENCOUNTER
I called the patient back and received her voicemail. I left her a message to call me to set up appointments.

## 2019-04-04 ENCOUNTER — MYC MEDICAL ADVICE (OUTPATIENT)
Dept: OBGYN | Facility: CLINIC | Age: 29
End: 2019-04-04

## 2019-04-05 ENCOUNTER — OFFICE VISIT (OUTPATIENT)
Dept: OBGYN | Facility: CLINIC | Age: 29
End: 2019-04-05
Payer: COMMERCIAL

## 2019-04-05 VITALS
BODY MASS INDEX: 41.02 KG/M2 | DIASTOLIC BLOOD PRESSURE: 93 MMHG | SYSTOLIC BLOOD PRESSURE: 152 MMHG | HEART RATE: 92 BPM | OXYGEN SATURATION: 96 % | TEMPERATURE: 98.3 F | WEIGHT: 293 LBS | HEIGHT: 71 IN

## 2019-04-05 DIAGNOSIS — E11.9 TYPE 2 DIABETES MELLITUS WITHOUT COMPLICATION, WITHOUT LONG-TERM CURRENT USE OF INSULIN (H): ICD-10-CM

## 2019-04-05 DIAGNOSIS — N76.0 RECURRENT VAGINITIS: ICD-10-CM

## 2019-04-05 DIAGNOSIS — Z13.6 CARDIOVASCULAR SCREENING; LDL GOAL LESS THAN 130: ICD-10-CM

## 2019-04-05 DIAGNOSIS — Z01.419 ENCOUNTER FOR GYNECOLOGICAL EXAMINATION WITHOUT ABNORMAL FINDING: Primary | ICD-10-CM

## 2019-04-05 DIAGNOSIS — Z11.3 SCREEN FOR STD (SEXUALLY TRANSMITTED DISEASE): ICD-10-CM

## 2019-04-05 DIAGNOSIS — Z30.09 BIRTH CONTROL COUNSELING: ICD-10-CM

## 2019-04-05 LAB
ALBUMIN SERPL-MCNC: 4 G/DL (ref 3.4–5)
ALP SERPL-CCNC: 107 U/L (ref 40–150)
ALT SERPL W P-5'-P-CCNC: 51 U/L (ref 0–50)
ANION GAP SERPL CALCULATED.3IONS-SCNC: 9 MMOL/L (ref 3–14)
AST SERPL W P-5'-P-CCNC: 21 U/L (ref 0–45)
BILIRUB SERPL-MCNC: 0.2 MG/DL (ref 0.2–1.3)
BUN SERPL-MCNC: 10 MG/DL (ref 7–30)
CALCIUM SERPL-MCNC: 9.5 MG/DL (ref 8.5–10.1)
CHLORIDE SERPL-SCNC: 102 MMOL/L (ref 94–109)
CO2 SERPL-SCNC: 27 MMOL/L (ref 20–32)
CREAT SERPL-MCNC: 0.59 MG/DL (ref 0.52–1.04)
GFR SERPL CREATININE-BSD FRML MDRD: >90 ML/MIN/{1.73_M2}
GLUCOSE SERPL-MCNC: 182 MG/DL (ref 70–99)
HBA1C MFR BLD: 7.6 % (ref 0–5.6)
POTASSIUM SERPL-SCNC: 3.9 MMOL/L (ref 3.4–5.3)
PROT SERPL-MCNC: 7.8 G/DL (ref 6.8–8.8)
SODIUM SERPL-SCNC: 138 MMOL/L (ref 133–144)
SPECIMEN SOURCE: NORMAL
WET PREP SPEC: NORMAL

## 2019-04-05 PROCEDURE — 87210 SMEAR WET MOUNT SALINE/INK: CPT | Performed by: NURSE PRACTITIONER

## 2019-04-05 PROCEDURE — 0064U ANTB TP TOTAL&RPR IA QUAL: CPT | Performed by: NURSE PRACTITIONER

## 2019-04-05 PROCEDURE — 87389 HIV-1 AG W/HIV-1&-2 AB AG IA: CPT | Performed by: NURSE PRACTITIONER

## 2019-04-05 PROCEDURE — 87340 HEPATITIS B SURFACE AG IA: CPT | Performed by: NURSE PRACTITIONER

## 2019-04-05 PROCEDURE — 99385 PREV VISIT NEW AGE 18-39: CPT | Performed by: NURSE PRACTITIONER

## 2019-04-05 PROCEDURE — 80053 COMPREHEN METABOLIC PANEL: CPT | Performed by: NURSE PRACTITIONER

## 2019-04-05 PROCEDURE — 87491 CHLMYD TRACH DNA AMP PROBE: CPT | Performed by: NURSE PRACTITIONER

## 2019-04-05 PROCEDURE — 86803 HEPATITIS C AB TEST: CPT | Performed by: NURSE PRACTITIONER

## 2019-04-05 PROCEDURE — G0145 SCR C/V CYTO,THINLAYER,RESCR: HCPCS | Performed by: NURSE PRACTITIONER

## 2019-04-05 PROCEDURE — 83036 HEMOGLOBIN GLYCOSYLATED A1C: CPT | Performed by: NURSE PRACTITIONER

## 2019-04-05 PROCEDURE — 87591 N.GONORRHOEAE DNA AMP PROB: CPT | Performed by: NURSE PRACTITIONER

## 2019-04-05 PROCEDURE — 36415 COLL VENOUS BLD VENIPUNCTURE: CPT | Performed by: NURSE PRACTITIONER

## 2019-04-05 RX ORDER — FLUTICASONE PROPIONATE 50 MCG
2 SPRAY, SUSPENSION (ML) NASAL
COMMUNITY
End: 2019-05-06

## 2019-04-05 RX ORDER — DESOGESTREL AND ETHINYL ESTRADIOL 0.15-0.03
1 KIT ORAL DAILY
Refills: 1 | Status: CANCELLED | OUTPATIENT
Start: 2019-04-05

## 2019-04-05 RX ORDER — DESOGESTREL AND ETHINYL ESTRADIOL 0.15-0.03
1 KIT ORAL DAILY
Refills: 1 | COMMUNITY
Start: 2018-11-08 | End: 2019-04-05

## 2019-04-05 ASSESSMENT — MIFFLIN-ST. JEOR: SCORE: 2154.02

## 2019-04-05 ASSESSMENT — PAIN SCALES - GENERAL: PAINLEVEL: NO PAIN (0)
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
## 2019-04-05 NOTE — Clinical Note
Please abstract the following data from this visit with this patient into the appropriate field in Epic:Pap smear done on this date: 10/24/16 (approximately), by this group: [facility], results were normal.

## 2019-04-06 LAB — T PALLIDUM AB SER QL: NONREACTIVE

## 2019-04-07 LAB
C TRACH DNA SPEC QL NAA+PROBE: NEGATIVE
N GONORRHOEA DNA SPEC QL NAA+PROBE: NEGATIVE
SPECIMEN SOURCE: NORMAL
SPECIMEN SOURCE: NORMAL

## 2019-04-08 LAB
HBV SURFACE AG SERPL QL IA: NONREACTIVE
HCV AB SERPL QL IA: NONREACTIVE
HIV 1+2 AB+HIV1 P24 AG SERPL QL IA: NONREACTIVE

## 2019-04-10 LAB
COPATH REPORT: NORMAL
PAP: NORMAL

## 2019-04-12 ENCOUNTER — OFFICE VISIT (OUTPATIENT)
Dept: SURGERY | Facility: CLINIC | Age: 29
End: 2019-04-12
Payer: COMMERCIAL

## 2019-04-12 VITALS
SYSTOLIC BLOOD PRESSURE: 132 MMHG | WEIGHT: 293 LBS | HEART RATE: 86 BPM | OXYGEN SATURATION: 96 % | BODY MASS INDEX: 41.02 KG/M2 | DIASTOLIC BLOOD PRESSURE: 90 MMHG | HEIGHT: 71 IN

## 2019-04-12 VITALS — BODY MASS INDEX: 41.02 KG/M2 | HEIGHT: 71 IN | WEIGHT: 293 LBS

## 2019-04-12 DIAGNOSIS — E66.01 MORBID OBESITY DUE TO EXCESS CALORIES (H): Chronic | ICD-10-CM

## 2019-04-12 DIAGNOSIS — E55.9 VITAMIN D DEFICIENCY: ICD-10-CM

## 2019-04-12 DIAGNOSIS — E66.01 MORBID OBESITY WITH BMI OF 40.0-44.9, ADULT (H): Primary | ICD-10-CM

## 2019-04-12 DIAGNOSIS — Z86.79 HISTORY OF ELEVATED BLOOD PRESSURE WHILE IN HOSPITAL: ICD-10-CM

## 2019-04-12 DIAGNOSIS — Z13.0 SCREENING FOR IRON DEFICIENCY ANEMIA: ICD-10-CM

## 2019-04-12 DIAGNOSIS — E11.8 TYPE 2 DIABETES MELLITUS WITH COMPLICATION, WITHOUT LONG-TERM CURRENT USE OF INSULIN (H): ICD-10-CM

## 2019-04-12 DIAGNOSIS — G47.33 OSA (OBSTRUCTIVE SLEEP APNEA): Chronic | ICD-10-CM

## 2019-04-12 DIAGNOSIS — R12 HEARTBURN: ICD-10-CM

## 2019-04-12 PROBLEM — E11.9 TYPE 2 DIABETES MELLITUS, WITHOUT LONG-TERM CURRENT USE OF INSULIN (H): Status: ACTIVE | Noted: 2019-04-12

## 2019-04-12 PROCEDURE — 99204 OFFICE O/P NEW MOD 45 MIN: CPT | Performed by: PHYSICIAN ASSISTANT

## 2019-04-12 PROCEDURE — 97802 MEDICAL NUTRITION INDIV IN: CPT | Performed by: DIETITIAN, REGISTERED

## 2019-04-12 ASSESSMENT — MIFFLIN-ST. JEOR
SCORE: 2173.76
SCORE: 2173.76

## 2019-04-12 NOTE — PROGRESS NOTES
"New Bariatric Nutrition Consultation Note    Reason For Visit: Nutrition Assessment    Alina Thompson is a 28 year old presenting today for new bariatric nutrition consult.  Pt is interested in laparoscopic sleeve gastrectomy.  Patient is accompanied by self.    Support System Reviewed With Patient 3/29/2019   Who do you have in your support network that can be available to help you for the first 2 weeks after surgery? My boyfriend   Who can you count on for support throughout your weight loss surgery journey? My boyfriend, my mother, and other family and friends       ANTHROPOMETRICS:  Estimated body mass index is 41.59 kg/m  as calculated from the following:    Height as of this encounter: 1.803 m (5' 11\").    Weight as of this encounter: 135.3 kg (298 lb 3.2 oz).    Required weight loss goal pre-op: 5 lbs from initial consult weight (goal weight 293.2 lbs or less before surgery)       3/29/2019   I have tried the following methods to lose weight Watching portions or calories, Exercise, Weight Watchers, Atkins type diet (low carb/high protein)       Weight Loss Questions Reviewed With Patient 3/29/2019   How long have you been overweight? Since early childhood         NUTRITION HISTORY:  Recall Diet Questions Reviewed With Patient 3/29/2019   Describe what you typically consume for breakfast (typical or most recent): yogurt, eggs, granola bar   Describe what you typically consume for lunch (typical or most recent): leftovers, mostly chicken, soup, chili, pizza   Describe what you typically consume for supper (typical or most recent): pizza, chicken, eat out a lot   Describe what you typically consume as snacks (typical or most recent): chips, cookies, packaged cakes   How many ounces of water, or other low calorie drinks, do you drink daily (8 oz=1 glass)? 16 oz   How many ounces of caffeine (coffee, tea, pop) do you drink daily (8 oz=1 glass)? 8 oz   How many ounces of carbonated (pop, beer, sparkling water) " drinks do you drinky daily (8 oz=1 glass)? 8 oz   How many ounces of juice, pop, sweet tea, sports drinks, protein drinks, other sweetened drinks, do you drink daily (8 oz=1 glass)? 8 oz   How often do you drink alcohol? Monthly or less   If you do drink alcohol, how many drinks might you have in a day? (one drink = 5 oz. wine, 1 can/bottle of beer, 1 shot liquor) 1 or 2       Eating Habits 3/29/2019   Do you have any dietary restrictions? No   Do you currently binge eat (eat a large amount of food in a short time)? No   Are you an emotional eater? Yes   Do you get up to eat after falling asleep? No   What foods do you crave? salty and sweet, cheese       ADDITIONAL INFORMATION:  Pt's mother had surgery many years ago. Pt had many appropriate questions today. Believes biggest challenge will be avoiding sweets.     Dining Out History Reviewed With Patient 3/29/2019   How often do you dine out? A couple of times a week.   Where do you dine out? (select all that apply) sit-down restaurants, fast food chains, take out   What types of food do you order when you dine out? chinese, Greenlandic, gyros       Physical Activity Reviewed With Patient 3/29/2019   How often do you exercise? Less than 1 time per week   What is the duration of your exercise (in minutes)? I do not exercise.   What types of exercise do you do? I don't exercise   What keeps you from being more active?  I should be more active but I just have not gotten around to it, Lack of Time, Too tired       NUTRITION DIAGNOSIS:  Obesity r/t long history of self-monitoring deficit and excessive energy intake aeb BMI >30 kg/m2.    INTERVENTION:  Intervention Provided/Education Provided on post-op diet guidelines, vitamins/minerals essential post-operatively, GI anatomy of bariatric surgeries, ways to help prepare for post-op diet guidelines pre-operatively, portion/calorie-control, mindful eating.  Provided pt with list of goals RD contact information.      Questions  Reviewed With Patient 3/29/2019   How ready are you to make changes regarding your weight? Number 1 = Not ready at all to make changes up to 10 = very ready. 10   How confident are you that you can change? 1 = Not confident that you will be successful making changes up to 10 = very confident. 9       Patient Understanding: good  Expected Compliance: good    GOALS:  Begin taking multivitamin, calcium and vitamin D  Limit pop to every other day  Continue to take 20-30 minutes to eat your meals + chew to applesauce consistency    Follow-Up:   Recommend 2-3 follow up visits to assist with lifestyle changes or per insurance.      Time spent with patient: 55 minutes.    Diya Najera RD, LD  Clinical Dietitian

## 2019-04-12 NOTE — PROGRESS NOTES
"New Bariatric Surgery Consultation Note    2019    RE: Alina Thompson  MR#: [id number]  : 1990      Referring provider:       3/29/2019   Who referred you? Francheska Sampson       Chief Complaint/Reason for visit: evaluation for possible weight loss surgery    Dear No Ref-Primary, Physician (General),    I had the pleasure of seeing your patient, Alina Thompson, to evaluate her obesity and consider her for possible weight loss surgery. As you know, Alina Thompson is 28 year old.  She has a height of 5' 11\", a weight of 298 lbs 3.2 oz, and calculated Body mass index is 41.59 kg/m .        HISTORY OF PRESENT ILLNESS:  Weight Loss History Reviewed with Patient 3/29/2019   How long have you been overweight? Since early childhood   What is the most that you have ever weighed? 306   What is the most weight you have lost? 30   I have tried the following methods to lose weight Watching portions or calories, Exercise, Weight Watchers, Atkins type diet (low carb/high protein)   I have tried the following weight loss medications? (Check all that apply) None   Have you ever had weight loss surgery? No       CO-MORBIDITIES OF OBESITY INCLUDE:     3/29/2019   I have the following co-morbidities associated with obesity: Type II Diabetes, Sleep Apnea, GERD (Reflux)   What was your most recent hemoglobin a1c  5.8   How many years have you had diabetes? 1.5   Do you use a CPAP? No   Are you taking daily medication for heartburn, acid reflux, or GERD (acid reflux disease)? No     Diagnosed over a year ago with Diabetes II.  Has never used medications.  Last A1C7.6 3/2019.    Has SANDRA - mild that only shows up when she sleeps on her back.  Went to sleep and snoring clinic in [place] and has a mandibular.        PAST MEDICAL HISTORY:  Past Medical History:   Diagnosis Date     Depression      DM (diabetes mellitus), type 2 (H)      Obesity      Recurrent vaginitis     BV and Yeast     Sleep apnea      Was diagnosed " with idiopathic hypersomnia as a teenager. Not on meds    PAST SURGICAL HISTORY:  Past Surgical History:   Procedure Laterality Date     CHOLECYSTECTOMY       ENT SURGERY       TONSILLECTOMY       No personal or family history of blood clots or anesthesia concerns      FAMILY HISTORY:   Family History   Problem Relation Age of Onset     Sleep Apnea Other         Grandmother     Obesity Mother   Bypass over 20 years ago     Obesity Father      Diabetes Maternal Grandmother      Heart Disease Maternal Grandmother      Obesity Maternal Grandmother      Obesity Maternal Grandfather      Diabetes Paternal Grandmother      Genetic Disorder Paternal Grandmother      Obesity Paternal Grandmother      Liver Cancer Paternal Grandfather      Obesity Sister        SOCIAL HISTORY:   Social History Questions Reviewed With Patient 3/29/2019   Which best describes your employment status (select all that apply) I work full-time   If you work, what is your occupation?    Which best describes your marital status: in a relationship   Do you have children? No   Who do you have in your support network that can be available to help you for the first 2 weeks after surgery? My boyfriend   Who can you count on for support throughout your weight loss surgery journey? My boyfriend, my mother, and other family and friends   Can you afford 3 meals a day?  Yes   Can you afford 50-60 dollars a month for vitamins? Yes       HABITS:     3/29/2019   How often do you drink alcohol? Monthly or less   If you do drink alcohol, how many drinks might you have in a day? (one drink = 5 oz. wine, 1 can/bottle of beer, 1 shot liquor) 1 or 2   Have you or are you currently using street drugs or prescription strength medication for which you do not have a prescription for? No   Do you have a history of chemical dependency (alcohol or drug abuse)? No       PSYCHOLOGICAL HISTORY:   Psychological History Reviewed With Patient 3/29/2019   Have you  "ever attempted suicide? Never.   Have you had thoughts of suicide in the past year? No   Have you ever been hospitalized for mental illness or a suicide attempt? Never.   Do you have a history of chronic pain? No   Have you ever been diagnosed with fibromyalgia? No   Are you currently seeing a therapist or counselor?  No   Are you currently seeing a psychiatrist? No     More than 5 years ago had a short stent of depression.  Took medications for a short time.      ROS:     3/29/2019   Skin:  None of the above   HEENT: Headaches   Musculoskeletal: Back pain   Cardiovascular: Shortness of breath with activity   Pulmonary: Shortness of breath with activity, Snoring, Excessively sleep during the day   Gastrointestinal: Heartburn, Diarrhea   Genitourinary: None of the above   Hematological: None of the above   Neurological: Migraine headaches   Female only: None of the above     Gets headaches often but manageable.  Might take excedrin, tylenol or ibuprofen    EATING BEHAVIORS:     3/29/2019   Have you or anyone else thought that you had an eating disorder? No   Do you currently binge eat (eat a large amount of food in a short time)? No   Are you an emotional eater? Yes   Do you get up to eat after falling asleep? No       EXERCISE:     3/29/2019   How often do you exercise? Less than 1 time per week   What is the duration of your exercise (in minutes)? I do not exercise.   What types of exercise do you do? I don't exercise   What keeps you from being more active?  I should be more active but I just have not gotten around to it, Lack of Time, Too tired     Patient feels \"Lazy\" and tired so does not work out.  She has new job in the past 6 months.    MEDICATIONS:  Current Outpatient Medications   Medication Sig Dispense Refill     Acetaminophen (TYLENOL PO)        cetirizine (ZYRTEC) 10 MG tablet Take 10 mg by mouth daily       diphenhydrAMINE HCl (BENADRYL ALLERGY PO)        Triamcinolone Acetonide (NASACORT ALLERGY 24HR " "NA)        fluticasone (FLONASE) 50 MCG/ACT nasal spray Spray 2 sprays in nostril         ALLERGIES:  Allergies   Allergen Reactions     Sulfa Drugs      Wellbutrin [Bupropion] Other (See Comments)     Dizziness       LABS/IMAGING/MEDICAL RECORDS REVIEW: CMP and A1C done 4/2019.  Hemoglobin A1C = 7.6    PHYSICAL EXAM:  /90   Pulse 86   Ht 5' 11\" (1.803 m)   Wt 298 lb 3.2 oz (135.3 kg)   LMP 04/01/2019 (Exact Date)   SpO2 96%   BMI 41.59 kg/m      GENERAL:  Good development and normal affect in no acute distress. Patient is alert and orientated x 3 and answers all questions appropriately.  HEENT: Head is normocephalic, nontraumatic. Pupils equal and round without conjunctival injection. Neck is supple without lymphadenopathy, thyroidmegaly, or mass. Trachea midline. Dentition WNL. No missing teeth  CARDIOVASCULAR:  Regular rate and rhythm without murmurs, rubs, or gallops.  RESPIRATORY: Lungs are clear to auscultation bilaterally with good breath sounds.  GASTROINTESTINAL: Abdomen is obese, nondistended, soft, nontender, without organomegaly or masses. No bruits.  LOWER EXTREMITIES: No LE edema bilaterally, no cyanosis, ulceration, or chronic venous stasis noted.  MUSCULOSKELETAL:  Moves all 4 extremities equal and strong. Has a normal gait.   NEUROLOGIC: Cranial nerves II-XII grossly intact.  SKIN: No intertriginous irritation or rash.       In summary, Alina Thompson has Morbid obesity who's Body mass index is 41.59 kg/m .  and the comorbidities stated above. She completed an informational seminar and is a candidate for the laparoscopic gastric sleeve.  She will have to complete the following pre-requisites:    Lose 5 lbs prior to surgery.  Goal Weight: 293 lbs  Have preoperative laboratory tests drawn. - Reviewed vitamin D and CBC. Ordered Vitamin D  Psychological Evaluation with MMPI and clearance for weight loss surgery.   Achieve clearance from dietitian to see surgeon.  Establish with primary " who must discuss blood pressure and diabetes  Letter of compliance from sleep center about mandibular device     NEXT MONTH WILL DISCUSS BIRTH CONTROL     Today in the office we discussed patients medical history.  She asked really good questions and is considering the gastric sleeve surgery.  Today preoperative, perioperative, and postoperative processes, management, and follow up were reviewed  Next month she will return to discuss the gastric sleeve along with its risks and benefits. All questions were answered.  A goal sheet and support group handout were given to the patient.        Sincerely,     ePlon Miller PA-C    I spent a total of 45 minutes face to face with the patient during today's office visit. Over 50% of this time was spent counseling the patient and/or coordinating care.

## 2019-04-12 NOTE — LETTER
Alina Thompson  April 12, 2019        Bariatric Task List      Lose 5 lbs prior to surgery.  Goal Weight: 293 lbs    Have preoperative laboratory tests drawn. - Order vitamin D and CBC    Psychological Evaluation with MMPI and clearance for weight loss surgery.     Achieve clearance from dietitian to see surgeon.    Establish with primary who must discuss blood pressure and diabetes    Letter of compliance from sleep center about mandibular device

## 2019-05-06 ENCOUNTER — OFFICE VISIT (OUTPATIENT)
Dept: FAMILY MEDICINE | Facility: CLINIC | Age: 29
End: 2019-05-06
Payer: COMMERCIAL

## 2019-05-06 VITALS
TEMPERATURE: 98.1 F | DIASTOLIC BLOOD PRESSURE: 88 MMHG | HEART RATE: 85 BPM | WEIGHT: 293 LBS | HEIGHT: 71 IN | SYSTOLIC BLOOD PRESSURE: 130 MMHG | RESPIRATION RATE: 20 BRPM | OXYGEN SATURATION: 97 % | BODY MASS INDEX: 41.02 KG/M2

## 2019-05-06 DIAGNOSIS — E66.01 MORBID OBESITY WITH BMI OF 40.0-44.9, ADULT (H): ICD-10-CM

## 2019-05-06 DIAGNOSIS — Z13.0 SCREENING FOR IRON DEFICIENCY ANEMIA: ICD-10-CM

## 2019-05-06 DIAGNOSIS — Z13.6 CARDIOVASCULAR SCREENING; LDL GOAL LESS THAN 100: ICD-10-CM

## 2019-05-06 DIAGNOSIS — E66.01 MORBID OBESITY DUE TO EXCESS CALORIES (H): Primary | Chronic | ICD-10-CM

## 2019-05-06 DIAGNOSIS — Z13.21 ENCOUNTER FOR VITAMIN DEFICIENCY SCREENING: ICD-10-CM

## 2019-05-06 DIAGNOSIS — E11.9 TYPE 2 DIABETES MELLITUS WITHOUT COMPLICATION, WITHOUT LONG-TERM CURRENT USE OF INSULIN (H): Primary | ICD-10-CM

## 2019-05-06 DIAGNOSIS — E66.01 MORBID OBESITY DUE TO EXCESS CALORIES (H): ICD-10-CM

## 2019-05-06 LAB
CHOLEST SERPL-MCNC: 180 MG/DL
CREAT UR-MCNC: 198 MG/DL
DEPRECATED CALCIDIOL+CALCIFEROL SERPL-MC: 15 UG/L (ref 20–75)
ERYTHROCYTE [DISTWIDTH] IN BLOOD BY AUTOMATED COUNT: 12.6 % (ref 10–15)
HCT VFR BLD AUTO: 44.6 % (ref 35–47)
HDLC SERPL-MCNC: 56 MG/DL
HGB BLD-MCNC: 15.2 G/DL (ref 11.7–15.7)
LDLC SERPL CALC-MCNC: 106 MG/DL
MCH RBC QN AUTO: 30.4 PG (ref 26.5–33)
MCHC RBC AUTO-ENTMCNC: 34.1 G/DL (ref 31.5–36.5)
MCV RBC AUTO: 89 FL (ref 78–100)
MICROALBUMIN UR-MCNC: 14 MG/L
MICROALBUMIN/CREAT UR: 6.92 MG/G CR (ref 0–25)
NONHDLC SERPL-MCNC: 124 MG/DL
PLATELET # BLD AUTO: 309 10E9/L (ref 150–450)
RBC # BLD AUTO: 5 10E12/L (ref 3.8–5.2)
TRIGL SERPL-MCNC: 92 MG/DL
WBC # BLD AUTO: 8.9 10E9/L (ref 4–11)

## 2019-05-06 PROCEDURE — 82306 VITAMIN D 25 HYDROXY: CPT | Performed by: PHYSICIAN ASSISTANT

## 2019-05-06 PROCEDURE — 82043 UR ALBUMIN QUANTITATIVE: CPT | Performed by: FAMILY MEDICINE

## 2019-05-06 PROCEDURE — 80061 LIPID PANEL: CPT | Performed by: FAMILY MEDICINE

## 2019-05-06 PROCEDURE — 85027 COMPLETE CBC AUTOMATED: CPT | Performed by: PHYSICIAN ASSISTANT

## 2019-05-06 PROCEDURE — 36415 COLL VENOUS BLD VENIPUNCTURE: CPT | Performed by: FAMILY MEDICINE

## 2019-05-06 PROCEDURE — 99214 OFFICE O/P EST MOD 30 MIN: CPT | Performed by: FAMILY MEDICINE

## 2019-05-06 ASSESSMENT — MIFFLIN-ST. JEOR: SCORE: 2168.31

## 2019-05-06 NOTE — NURSING NOTE
"Chief Complaint   Patient presents with     Establish Care     Diabetes       Initial /88   Pulse 85   Temp 98.1  F (36.7  C) (Oral)   Resp 20   Ht 1.803 m (5' 11\")   Wt 134.7 kg (297 lb)   LMP 04/27/2019 (Exact Date)   SpO2 97%   BMI 41.42 kg/m   Estimated body mass index is 41.42 kg/m  as calculated from the following:    Height as of this encounter: 1.803 m (5' 11\").    Weight as of this encounter: 134.7 kg (297 lb).    Celi Martínez, [facility]    "

## 2019-05-06 NOTE — PROGRESS NOTES
"SUBJECTIVE:  Alina Thompson, a 28 year old female scheduled an appointment to discuss the following issues:  History dm/morbid obesity and htn. Recent hgba1c =7.6.  Seen dm ed in past.   No metformin. Patient interested bariatic surgery. Family history of dm.   Needs more exercise - doing p.t..   No chest pain or shortness of breath.   No blood pressure meds in past and not interested.   Not interested in kids at this point. Emotionally doing.   Dieting for bariatic. No weight loss meds in past.   Eye exam in past year - ok. No feet changes.   Fasting. Normal tsh last year.    Medical, social, surgical, and family histories reviewed.    ROS:  All other ROS negative.     OBJECTIVE:  /88   Pulse 85   Temp 98.1  F (36.7  C) (Oral)   Resp 20   Ht 1.803 m (5' 11\")   Wt 134.7 kg (297 lb)   LMP 04/27/2019 (Exact Date)   SpO2 97%   BMI 41.42 kg/m    EXAM:  GENERAL APPEARANCE: healthy, alert and no distress  EYES: EOMI,  PERRL  NECK: no adenopathy, no asymmetry, masses, or scars and thyroid normal to palpation  RESP: lungs clear to auscultation - no rales, rhonchi or wheezes  CV: regular rates and rhythm, normal S1 S2, no S3 or S4 and no murmur, click or rub -  ABDOMEN:  soft, nontender, no HSM or masses and bowel sounds normal  MS: extremities normal- no gross deformities noted, no evidence of inflammation in joints, FROM in all extremities.  NEURO: Normal strength and tone, sensory exam grossly normal, mentation intact and speech normal  PSYCH: mentation appears normal and affect normal/bright    ASSESSMENT / PLAN:  (E11.9) Type 2 diabetes mellitus without complication, without long-term current use of insulin (H)  (primary encounter diagnosis)  Comment: needs help  Plan: Lipid Profile (Chol, Trig, HDL, LDL calc),         Albumin Random Urine Quantitative with Creat         Ratio        Patient not interested in medication because likely getting bariatric surgery in summer. Metformin discussed. Continue " diet/exercise and weight loss. Recheck in 6 months      (Z13.6) CARDIOVASCULAR SCREENING; LDL GOAL LESS THAN 100  Comment: fasting  Plan: Lipid Profile (Chol, Trig, HDL, LDL calc)        Patient not interested in cholesterol or blood pressure medications. Continue diet/exercise and weight loss. Recheck in 6 months  Chest pain or shortness of breath to er    (E66.01) Morbid obesity due to excess calories (H)  Plan: per bariatric surgery. Patient not interested in medications.     Emiliano Becerra MD

## 2019-05-07 ENCOUNTER — MYC MEDICAL ADVICE (OUTPATIENT)
Dept: FAMILY MEDICINE | Facility: CLINIC | Age: 29
End: 2019-05-07

## 2019-05-07 DIAGNOSIS — R06.83 SNORING: ICD-10-CM

## 2019-05-07 DIAGNOSIS — R68.84 JAW PAIN: ICD-10-CM

## 2019-05-07 DIAGNOSIS — E66.01 MORBID OBESITY DUE TO EXCESS CALORIES (H): Chronic | ICD-10-CM

## 2019-05-07 DIAGNOSIS — E11.8 TYPE 2 DIABETES MELLITUS WITH COMPLICATION, WITHOUT LONG-TERM CURRENT USE OF INSULIN (H): Primary | ICD-10-CM

## 2019-05-07 NOTE — TELEPHONE ENCOUNTER
Please advise patient I'm already overwhelmed with the most diabetes in the clinic and I'd suggest seeing internal medicine. Office visits can only address so many issues at once. Ok for referrals - please help cue up an I'll sign. Emiliano Becerra MD

## 2019-05-27 ENCOUNTER — NURSE TRIAGE (OUTPATIENT)
Dept: NURSING | Facility: CLINIC | Age: 29
End: 2019-05-27

## 2019-06-19 ENCOUNTER — MYC MEDICAL ADVICE (OUTPATIENT)
Dept: FAMILY MEDICINE | Facility: CLINIC | Age: 29
End: 2019-06-19

## 2019-06-19 DIAGNOSIS — E11.8 TYPE 2 DIABETES MELLITUS WITH COMPLICATION, WITHOUT LONG-TERM CURRENT USE OF INSULIN (H): Primary | ICD-10-CM

## 2019-07-10 ENCOUNTER — OFFICE VISIT (OUTPATIENT)
Dept: OBGYN | Facility: CLINIC | Age: 29
End: 2019-07-10
Payer: COMMERCIAL

## 2019-07-10 VITALS
SYSTOLIC BLOOD PRESSURE: 135 MMHG | BODY MASS INDEX: 40.32 KG/M2 | DIASTOLIC BLOOD PRESSURE: 84 MMHG | HEART RATE: 74 BPM | WEIGHT: 289.13 LBS

## 2019-07-10 DIAGNOSIS — Z30.41 SURVEILLANCE OF PREVIOUSLY PRESCRIBED CONTRACEPTIVE PILL: ICD-10-CM

## 2019-07-10 DIAGNOSIS — R82.90 NONSPECIFIC FINDING ON EXAMINATION OF URINE: ICD-10-CM

## 2019-07-10 DIAGNOSIS — R30.0 DYSURIA: Primary | ICD-10-CM

## 2019-07-10 LAB
ALBUMIN UR-MCNC: 100 MG/DL
APPEARANCE UR: ABNORMAL
BACTERIA #/AREA URNS HPF: ABNORMAL /HPF
BILIRUB UR QL STRIP: ABNORMAL
COLOR UR AUTO: YELLOW
GLUCOSE UR STRIP-MCNC: NEGATIVE MG/DL
HGB UR QL STRIP: ABNORMAL
KETONES UR STRIP-MCNC: ABNORMAL MG/DL
LEUKOCYTE ESTERASE UR QL STRIP: ABNORMAL
NITRATE UR QL: NEGATIVE
NON-SQ EPI CELLS #/AREA URNS LPF: ABNORMAL /LPF
PH UR STRIP: 6 PH (ref 5–7)
RBC #/AREA URNS AUTO: ABNORMAL /HPF
SOURCE: ABNORMAL
SP GR UR STRIP: 1.02 (ref 1–1.03)
UROBILINOGEN UR STRIP-ACNC: 0.2 EU/DL (ref 0.2–1)
WBC #/AREA URNS AUTO: ABNORMAL /HPF

## 2019-07-10 PROCEDURE — 81001 URINALYSIS AUTO W/SCOPE: CPT | Performed by: ADVANCED PRACTICE MIDWIFE

## 2019-07-10 PROCEDURE — 99213 OFFICE O/P EST LOW 20 MIN: CPT | Performed by: ADVANCED PRACTICE MIDWIFE

## 2019-07-10 PROCEDURE — 87086 URINE CULTURE/COLONY COUNT: CPT | Performed by: ADVANCED PRACTICE MIDWIFE

## 2019-07-10 PROCEDURE — 87186 SC STD MICRODIL/AGAR DIL: CPT | Performed by: ADVANCED PRACTICE MIDWIFE

## 2019-07-10 PROCEDURE — 87088 URINE BACTERIA CULTURE: CPT | Performed by: ADVANCED PRACTICE MIDWIFE

## 2019-07-10 RX ORDER — NITROFURANTOIN 25; 75 MG/1; MG/1
100 CAPSULE ORAL 2 TIMES DAILY
Qty: 10 CAPSULE | Refills: 0 | Status: SHIPPED | OUTPATIENT
Start: 2019-07-10 | End: 2019-08-01

## 2019-07-10 RX ORDER — DESOGESTREL AND ETHINYL ESTRADIOL 0.15-0.03
1 KIT ORAL DAILY
Qty: 84 TABLET | Refills: 3 | Status: SHIPPED | OUTPATIENT
Start: 2019-07-10 | End: 2019-09-17

## 2019-07-10 RX ORDER — DESOGESTREL AND ETHINYL ESTRADIOL 0.15-0.03
1 KIT ORAL DAILY
COMMUNITY
End: 2019-07-10

## 2019-07-10 NOTE — NURSING NOTE
"Chief Complaint   Patient presents with     Contraception     renew birth control     Dysuria     check for UTI       Initial /84 (BP Location: Right arm, Patient Position: Sitting, Cuff Size: Adult Large)   Pulse 74   Wt 131.1 kg (289 lb 2 oz)   LMP 06/27/2019 (Exact Date)   BMI 40.32 kg/m   Estimated body mass index is 40.32 kg/m  as calculated from the following:    Height as of 5/6/19: 1.803 m (5' 11\").    Weight as of this encounter: 131.1 kg (289 lb 2 oz).  Medication Reconciliation: complete    Alexa Kapoor CMA    "

## 2019-07-10 NOTE — PROGRESS NOTES
SUBJECTIVE:    Alina Thompson is a 28 year old female who presents today with 2 day(s) of dysuria, frequency and hesitancy.     UTI HX: frequent. But not for several years  ADDITIONAL concerns:  Needs a refill of her birth control    Patient Active Problem List   Diagnosis     Idiopathic hypersomnia     Morbid obesity due to excess calories (H)     SANDRA (obstructive sleep apnea)- mild (AHI 12)     History of elevated blood pressure while in hospital     Type 2 diabetes mellitus, without long-term current use of insulin (H)     Past Medical History:   Diagnosis Date     Depression      DM (diabetes mellitus), type 2 (H)      Obesity      Recurrent vaginitis     BV and Yeast     Sleep apnea      Past Surgical History:   Procedure Laterality Date     CHOLECYSTECTOMY       ENT SURGERY       TONSILLECTOMY       Current Outpatient Medications   Medication Sig Dispense Refill     Acetaminophen (TYLENOL PO)        cetirizine (ZYRTEC) 10 MG tablet Take 10 mg by mouth daily       desogestrel-ethinyl estradiol (APRI) 0.15-30 MG-MCG tablet Take 1 tablet by mouth daily 84 tablet 3     diphenhydrAMINE HCl (BENADRYL ALLERGY PO)        nitroFURantoin macrocrystal-monohydrate (MACROBID) 100 MG capsule Take 1 capsule (100 mg) by mouth 2 times daily for 5 days 10 capsule 0     Triamcinolone Acetonide (NASACORT ALLERGY 24HR NA)        Allergies   Allergen Reactions     Sulfa Drugs      Wellbutrin [Bupropion] Other (See Comments)     Dizziness         ROS:   ROS: 10 point ROS neg other than the symptoms noted above in the HPI.    EXAM  /84 (BP Location: Right arm, Patient Position: Sitting, Cuff Size: Adult Large)   Pulse 74   Wt 131.1 kg (289 lb 2 oz)   LMP 06/27/2019 (Exact Date)   BMI 40.32 kg/m    Patient appears well, afebrile.   .   Urine dip shows   Color Urine (no units)   Date Value   07/10/2019 Yellow     Appearance Urine (no units)   Date Value   07/10/2019 Slightly Cloudy     Glucose Urine (mg/dL)   Date Value    07/10/2019 Negative     Bilirubin Urine (no units)   Date Value   07/10/2019 Small (A)     Ketones Urine (mg/dL)   Date Value   07/10/2019 Trace (A)     Specific Gravity Urine (no units)   Date Value   07/10/2019 1.025     pH Urine (pH)   Date Value   07/10/2019 6.0     Protein Albumin Urine (mg/dL)   Date Value   07/10/2019 100 (A)     Urobilinogen Urine (EU/dL)   Date Value   07/10/2019 0.2     Nitrite Urine (no units)   Date Value   07/10/2019 Negative     Leukocyte Esterase Urine (no units)   Date Value   07/10/2019 Moderate (A)     microscopic exam: 10-25 WBC's per HPF, 25-50 RBC's per HPF, moderate Epi cells/HPF and few+ bacteria.      ASSESSMENT: uncomplicated UTI    PLAN:   (R30.0) Dysuria  (primary encounter diagnosis)  Comment:   Plan: *UA reflex to Microscopic and Culture ([place]         and [facility] (except [place] and         [place]), Urine Microscopic, nitroFURantoin         macrocrystal-monohydrate (MACROBID) 100 MG         capsule            (Z30.41) Surveillance of previously prescribed contraceptive pill  Comment:   Plan: desogestrel-ethinyl estradiol (APRI) 0.15-30         MG-MCG tablet            (R82.90) Nonspecific finding on examination of urine  Comment:   Plan: Urine Culture Aerobic Bacterial              Urine was sent for culture.   Push fluids    Medications per orders in [facility].  May use Uristat or other OTC med for dysuria  Reinforced the importance of completing this course of antibiotics   RTC with continued symptoms or concerns.  Reviewed methods to decrease incidence of UTI      The use of the oral contraceptive pill has been fully discussed with the patient. This includes the proper method to initiate  and continue the pill, the need for regular compliance to ensure adequate contraceptive effect, the physiology which makes the pill effective, the instructions for what to do in event of a missed pill, and warnings about anticipated minor side effects such as  breakthrough spotting, nausea, breast tenderness, weight changes, acne, headaches, etc. She was informed of the irregular bleeding pattern that can occur when the pill is first started or a new form is changed over for the first 2-3 months.  She has been told of the more serious potential side effects such as MI, stroke, and deep vein thrombosis, all of which are very unlikely.  She has been asked to report any signs of such serious problems immediately.   She understands and wishes to take the medication as prescribed.

## 2019-07-13 LAB
BACTERIA SPEC CULT: ABNORMAL
BACTERIA SPEC CULT: ABNORMAL
SPECIMEN SOURCE: ABNORMAL

## 2019-07-16 ENCOUNTER — TRANSFERRED RECORDS (OUTPATIENT)
Dept: HEALTH INFORMATION MANAGEMENT | Facility: CLINIC | Age: 29
End: 2019-07-16

## 2019-08-01 ENCOUNTER — OFFICE VISIT (OUTPATIENT)
Dept: FAMILY MEDICINE | Facility: CLINIC | Age: 29
End: 2019-08-01
Payer: COMMERCIAL

## 2019-08-01 VITALS
SYSTOLIC BLOOD PRESSURE: 149 MMHG | HEART RATE: 82 BPM | WEIGHT: 286 LBS | OXYGEN SATURATION: 98 % | BODY MASS INDEX: 39.89 KG/M2 | TEMPERATURE: 99.2 F | DIASTOLIC BLOOD PRESSURE: 101 MMHG

## 2019-08-01 DIAGNOSIS — J06.9 UPPER RESPIRATORY TRACT INFECTION, UNSPECIFIED TYPE: ICD-10-CM

## 2019-08-01 DIAGNOSIS — R07.0 THROAT PAIN: Primary | ICD-10-CM

## 2019-08-01 LAB
DEPRECATED S PYO AG THROAT QL EIA: NORMAL
SPECIMEN SOURCE: NORMAL

## 2019-08-01 PROCEDURE — 87081 CULTURE SCREEN ONLY: CPT | Performed by: FAMILY MEDICINE

## 2019-08-01 PROCEDURE — 99213 OFFICE O/P EST LOW 20 MIN: CPT | Performed by: FAMILY MEDICINE

## 2019-08-01 PROCEDURE — 87880 STREP A ASSAY W/OPTIC: CPT | Performed by: FAMILY MEDICINE

## 2019-08-01 RX ORDER — BENZONATATE 200 MG/1
200 CAPSULE ORAL 3 TIMES DAILY PRN
Qty: 21 CAPSULE | Refills: 0 | Status: SHIPPED | OUTPATIENT
Start: 2019-08-01 | End: 2019-09-11

## 2019-08-01 NOTE — PATIENT INSTRUCTIONS
1. The rapid strep test is negative. If the culture is positive, you will be contacted about treatment with antibiotics.    2. Otherwise I think you have a viral illness which should get better with time.    3. Tessalon for cough as needed - don't leave in mouth too long as it can cause mouth numbness.    4. If your symptoms persist or get worse, return to the clinic for re-evaluation.    5. Your blood pressure is high today - please follow with primary provider for that.

## 2019-08-01 NOTE — PROGRESS NOTES
HPI:    Alina Thompson is an 28 year old female who presents for evaluation of:    ENT and Respiratory symptoms - present since 7/24/19. Symptoms are runny nose, congestion, cough productive of non bloody sputum without shortness of breath. Sore throat and ear discomfort started 2 days ago. No fevers. The patient is a non smoker.  Evaluation and treatment:   Strep test is negative.    I believe the dx is a viral URI.   I explained this should be self limited and does not require antibiotics.   Tessalon prn for cough.   We discussed symptoms that would warrant repeat clinic visit or ED visit.    I asked her to see her PCP for high BP.    ROS:    Per HPI    Exam:    BP (!) 149/101 (Cuff Size: Adult Large)   Pulse 82   Temp 99.2  F (37.3  C) (Oral)   Wt 129.7 kg (286 lb)   SpO2 98%   Breastfeeding? No   BMI 39.89 kg/m      Gen: Healthy appearing female in no acute distress  ENT: TM's retracted with loss of landmarks. Otherwise clear. Oropharynx with erythema but no tonsillar enlargement or exudates. Oral mucosa moist without lesions.  Eyes: Conjunctiva and sclera normal. Pupils react normally to light. No nystagmus.  Neck: No enlarged lymph nodes, thyromegally or other masses.  Lungs: Good air movement and otherwise clear.  CV: Heart RRR with no murmurs.     Assessment and Plan - Decision Making    1. Throat pain    Per HPI    - Strep, Rapid Screen  - Beta strep group A culture    2. Upper respiratory tract infection, unspecified type    Per HPI    - benzonatate (TESSALON) 200 MG capsule; Take 1 capsule (200 mg) by mouth 3 times daily as needed for cough  Dispense: 21 capsule; Refill: 0      Written instructions given as follows:    Patient Instructions   1. The rapid strep test is negative. If the culture is positive, you will be contacted about treatment with antibiotics.    2. Otherwise I think you have a viral illness which should get better with time.    3. Tessalon for cough as needed - don't leave in  mouth too long as it can cause mouth numbness.    4. If your symptoms persist or get worse, return to the clinic for re-evaluation.    5. Your blood pressure is high today - please follow with primary provider for that.

## 2019-08-02 LAB
BACTERIA SPEC CULT: NORMAL
SPECIMEN SOURCE: NORMAL

## 2019-08-26 ENCOUNTER — TELEPHONE (OUTPATIENT)
Dept: SURGERY | Facility: CLINIC | Age: 29
End: 2019-08-26

## 2019-08-26 ENCOUNTER — TELEPHONE (OUTPATIENT)
Dept: FAMILY MEDICINE | Facility: CLINIC | Age: 29
End: 2019-08-26

## 2019-08-26 NOTE — TELEPHONE ENCOUNTER
Reason for Call: Request for an order or referral:    Order or referral being requested: patient is calling to speak with TC that works with Dr. Becerra the referrals were mailed to her home but insurance still hasn't gotten these. PT that should go to [facility] for [facility], sleep eval where she sees Dr. Ortiz, the one that goes Dr. Deleon at [facility] in [place] and the referral for her to change pcp to Dr. Genaro Vu are the ones that need to be sent to insurance.     Date needed: as soon as possible    Has the patient been seen by the PCP for this problem? YES    Additional comments:     Phone number Patient can be reached at:  [phone]    Best Time:      Can we leave a detailed message on this number?  YES    Call taken on 8/26/2019 at 1:30 PM by Eileen Campos

## 2019-08-26 NOTE — TELEPHONE ENCOUNTER
Reason for call:  Other   Patient called regarding (reason for call): call back  Additional comments: PT WOULD LIKE TO SPEAK WITH ISMAEL ABOUT CONTINUING THE OUMAR PROGRAM    Phone number to reach patient:  Cell number on file:    Telephone Information:   Mobile [phone]       Best Time:  ANYTIME    Can we leave a detailed message on this number?  YES

## 2019-08-26 NOTE — TELEPHONE ENCOUNTER
Referral department. Can you please help with these? These were done as far as I can tell.Nemo Barrow MA/LORENA

## 2019-08-27 NOTE — TELEPHONE ENCOUNTER
I called home and left message for patient to call back for updates on insurance referrals. For clarification:    [facility] - Approved by [employer] for dates 05/01/19-12/31/19 x99    Sleep Eval for Dr. Ortiz - no insurance referral needed, since this is internal with [facility]    [facility] Dr. Deleon - insurance referral submitted today to [facility] for 05/1/19-12/31/19 x99, waiting for response    Dr. Genaro Vu - no insurance referral needed, since this is internal with [facility]    If patient would like to change her primary provider she will have to do that with her insurance.

## 2019-09-11 ENCOUNTER — OFFICE VISIT (OUTPATIENT)
Dept: INTERNAL MEDICINE | Facility: CLINIC | Age: 29
End: 2019-09-11
Payer: COMMERCIAL

## 2019-09-11 VITALS
TEMPERATURE: 98 F | WEIGHT: 280 LBS | DIASTOLIC BLOOD PRESSURE: 89 MMHG | SYSTOLIC BLOOD PRESSURE: 132 MMHG | RESPIRATION RATE: 16 BRPM | BODY MASS INDEX: 39.2 KG/M2 | HEIGHT: 71 IN

## 2019-09-11 DIAGNOSIS — G47.33 OSA (OBSTRUCTIVE SLEEP APNEA): Chronic | ICD-10-CM

## 2019-09-11 DIAGNOSIS — E66.01 MORBID OBESITY DUE TO EXCESS CALORIES (H): Primary | Chronic | ICD-10-CM

## 2019-09-11 DIAGNOSIS — E11.9 TYPE 2 DIABETES MELLITUS WITHOUT COMPLICATION, WITHOUT LONG-TERM CURRENT USE OF INSULIN (H): ICD-10-CM

## 2019-09-11 LAB — HBA1C MFR BLD: 6.6 % (ref 0–5.6)

## 2019-09-11 PROCEDURE — 84443 ASSAY THYROID STIM HORMONE: CPT | Performed by: INTERNAL MEDICINE

## 2019-09-11 PROCEDURE — 36415 COLL VENOUS BLD VENIPUNCTURE: CPT | Performed by: INTERNAL MEDICINE

## 2019-09-11 PROCEDURE — 99215 OFFICE O/P EST HI 40 MIN: CPT | Performed by: INTERNAL MEDICINE

## 2019-09-11 PROCEDURE — 83036 HEMOGLOBIN GLYCOSYLATED A1C: CPT | Performed by: INTERNAL MEDICINE

## 2019-09-11 SDOH — HEALTH STABILITY: MENTAL HEALTH: HOW OFTEN DO YOU HAVE A DRINK CONTAINING ALCOHOL?: 2-4 TIMES A MONTH

## 2019-09-11 SDOH — HEALTH STABILITY: MENTAL HEALTH: HOW MANY STANDARD DRINKS CONTAINING ALCOHOL DO YOU HAVE ON A TYPICAL DAY?: 1 OR 2

## 2019-09-11 ASSESSMENT — MIFFLIN-ST. JEOR: SCORE: 2091.2

## 2019-09-11 NOTE — PROGRESS NOTES
Subjective     Alina Thompson is a 28 year old female who presents to clinic today for the following health issues:    HPI   New Patient/Transfer of Care    Social History     Social History Narrative    From [place]    Mother in [place]    Works at [employer] in [place] [facility]    Enjoys movies, outdoor activities, animal rescue, board games       Diabetes Follow-up      How often are you checking your blood sugar? Not at all    What time of day are you checking your blood sugars (select all that apply)?  NA        What symptoms do you notice when your blood sugar is low?  None    What concerns do you have today about your diabetes? None stated but is concerned that she has diabetes     Do you have any of these symptoms? (Select all that apply)  No numbness or tingling in feet.  No redness, sores or blisters on feet.  No complaints of excessive thirst.  No reports of blurry vision.  No significant changes to weight.     Have you had a diabetic eye exam in the last 12 months? No    BP Readings from Last 2 Encounters:   09/11/19 132/89   08/01/19 (!) 149/101     Hemoglobin A1C (%)   Date Value   04/05/2019 7.6 (H)     LDL Cholesterol Calculated (mg/dL)   Date Value   05/06/2019 106 (H)       Diabetes Management Resources      How many servings of fruits and vegetables do you eat daily?  0-1    On average, how many sweetened beverages do you drink each day (soda, juice, sweet tea, etc)?   1    How many days per week do you miss taking your medication? 0        Concern - weight problem  Onset: ongoing    Description:   Diagnosed with DM 3 years ago, severe heartburn.  Heaviest 306 and has been down to 270.  Sleep apnea and hypersomnia and IBS.  Also having chest pain usually when very stressed and ongoing diarrhea    Intensity: moderate    Progression of Symptoms:  improving    Previous history of similar problem:   Has been consulting with [facility] weight loss clinic.      Therapies  "Tried and outcome: has tried different diets      1. Morbid obesity due to excess calories (H)    2. Type 2 diabetes mellitus without complication, without long-term current use of insulin (H)    3. SANDRA (obstructive sleep apnea)- mild (AHI 12)        PMH: Updated and/or reviewed in chart.    ROS:  Constitutional, HEENT, cardiovascular, pulmonary, gi and gu systems are otherwise negative.    Objective   OBJECTIVE:                                                    /89   Temp 98  F (36.7  C) (Tympanic)   Resp 16   Ht 1.803 m (5' 11\")   Wt 127 kg (280 lb)   LMP 08/28/2019 (Approximate)   BMI 39.05 kg/m      GEN: No acute distress, morbidly obese woman  EYES: No conjunctival injection or icterus, EOMI grossly intact  RESP: Unlabored, regular  NEURO: Normal gait, MAEx4, light touch sensation grossly intact  PSYCH: Normal mood and affect      Results for orders placed or performed in visit on 09/11/19   TSH WITH FREE T4 REFLEX   Result Value Ref Range    TSH 1.86 0.40 - 4.00 mU/L   Hemoglobin A1c   Result Value Ref Range    Hemoglobin A1C 6.6 (H) 0 - 5.6 %      Assessment & Plan   ASSESSMENT/PLAN:                                                    1. Morbid obesity due to excess calories (H)  2. Type 2 diabetes mellitus without complication, without long-term current use of insulin (H)  High lifestyle modification motivation especially from concerning family medical history -- with well controlled diabetes at present, excellent opportunity to not proceed with weight management surgery but intensify nutrition education and possibly medical weight loss (GLP1, Belviq?).  Significant counseling provided.    - TSH WITH FREE T4 REFLEX  - Hemoglobin A1c    3. SANDRA (obstructive sleep apnea)- mild (AHI 12)  Expect to improve with weight       Orders Placed This Encounter     TSH WITH FREE T4 REFLEX     Hemoglobin A1c      I spent a total of 40 minutes with the patient of which greater than 50% were devoted to counseling " and coordination of care: morbid obesity.      Return in about 3 months (around 12/11/2019) for Diabetes follow-up.    Genaro Vu MD

## 2019-09-11 NOTE — PROGRESS NOTES
"Subjective     Alina Thompson is a 28 year old female who presents to clinic today for the following health issues:    HPI   STD screening    New partner since her last screening. No known exposure of specific concern. Denies abnormal urinary symptoms, vaginal symptoms, pelvic pain, fever, nausea or visible lesions. No other concerns today.    Patient Active Problem List   Diagnosis     Idiopathic hypersomnia     Morbid obesity due to excess calories (H)     SANDRA (obstructive sleep apnea)- mild (AHI 12)     History of elevated blood pressure while in hospital     Type 2 diabetes mellitus, without long-term current use of insulin (H)     Past Surgical History:   Procedure Laterality Date     CHOLECYSTECTOMY       ENT SURGERY      ear tubes     TONSILLECTOMY         Social History     Tobacco Use     Smoking status: Never Smoker     Smokeless tobacco: Never Used   Substance Use Topics     Alcohol use: Yes     Frequency: 2-4 times a month     Drinks per session: 1 or 2     Family History   Problem Relation Age of Onset     Sleep Apnea Other         Grandmother     Obesity Mother      Obesity Father      Diabetes Maternal Grandmother      Heart Disease Maternal Grandmother      Obesity Maternal Grandmother      Obesity Maternal Grandfather      Diabetes Paternal Grandmother      Genetic Disorder Paternal Grandmother      Obesity Paternal Grandmother      Liver Cancer Paternal Grandfather      Obesity Sister          Reviewed and updated as needed this visit by Provider         Review of Systems   ROS COMP: Constitutional, HEENT, cardiovascular, pulmonary, gi and gu systems are negative, except as otherwise noted.      Objective    BP (!) 130/90 (BP Location: Right arm, Patient Position: Sitting, Cuff Size: Adult Large)   Pulse 89   Temp 98.6  F (37  C) (Oral)   Ht 1.803 m (5' 11\")   Wt 127.1 kg (280 lb 3.2 oz)   LMP 08/28/2019 (Approximate)   SpO2 99%   BMI 39.08 kg/m    Body mass index is 39.08 " kg/m .  Physical Exam   GENERAL: healthy, alert and no distress  ABDOMEN: soft, nontender, no hepatosplenomegaly, no masses and bowel sounds normal   (female): normal female external genitalia, normal urethral meatus, vaginal mucosa, normal cervix/adnexa/uterus without masses or discharge  MS: no gross musculoskeletal defects noted, no edema  SKIN: no suspicious lesions or rashes  PSYCH: mentation appears normal, affect normal/bright    Assessment & Plan     1. Screen for STD (sexually transmitted disease)  Reviewed available testing and patient would like the below labs. Encouraged safe sexual practices. Will follow up with patient when results available.  - Chlamydia trachomatis PCR  - Neisseria gonorrhoeae PCR  - Hepatitis B surface antigen  - Hepatitis C antibody  - HIV Antigen Antibody Combo  - Treponema Abs w Reflex to RPR and Titer     COLIN [facility]

## 2019-09-12 LAB — TSH SERPL DL<=0.005 MIU/L-ACNC: 1.86 MU/L (ref 0.4–4)

## 2019-09-13 ENCOUNTER — OFFICE VISIT (OUTPATIENT)
Dept: OBGYN | Facility: CLINIC | Age: 29
End: 2019-09-13
Payer: COMMERCIAL

## 2019-09-13 VITALS
WEIGHT: 280.2 LBS | BODY MASS INDEX: 39.23 KG/M2 | SYSTOLIC BLOOD PRESSURE: 130 MMHG | HEIGHT: 71 IN | DIASTOLIC BLOOD PRESSURE: 90 MMHG | TEMPERATURE: 98.6 F | HEART RATE: 89 BPM | OXYGEN SATURATION: 99 %

## 2019-09-13 DIAGNOSIS — Z11.3 SCREEN FOR STD (SEXUALLY TRANSMITTED DISEASE): Primary | ICD-10-CM

## 2019-09-13 PROCEDURE — 87389 HIV-1 AG W/HIV-1&-2 AB AG IA: CPT | Performed by: NURSE PRACTITIONER

## 2019-09-13 PROCEDURE — 86803 HEPATITIS C AB TEST: CPT | Performed by: NURSE PRACTITIONER

## 2019-09-13 PROCEDURE — 99213 OFFICE O/P EST LOW 20 MIN: CPT | Performed by: NURSE PRACTITIONER

## 2019-09-13 PROCEDURE — 87340 HEPATITIS B SURFACE AG IA: CPT | Performed by: NURSE PRACTITIONER

## 2019-09-13 PROCEDURE — 36415 COLL VENOUS BLD VENIPUNCTURE: CPT | Performed by: NURSE PRACTITIONER

## 2019-09-13 PROCEDURE — 87591 N.GONORRHOEAE DNA AMP PROB: CPT | Performed by: NURSE PRACTITIONER

## 2019-09-13 PROCEDURE — 87491 CHLMYD TRACH DNA AMP PROBE: CPT | Performed by: NURSE PRACTITIONER

## 2019-09-13 PROCEDURE — 86780 TREPONEMA PALLIDUM: CPT | Performed by: NURSE PRACTITIONER

## 2019-09-13 ASSESSMENT — MIFFLIN-ST. JEOR: SCORE: 2092.11

## 2019-09-13 ASSESSMENT — PAIN SCALES - GENERAL: PAINLEVEL: NO PAIN (0)

## 2019-09-14 LAB — T PALLIDUM AB SER QL: NONREACTIVE

## 2019-09-17 ENCOUNTER — OFFICE VISIT (OUTPATIENT)
Dept: OBGYN | Facility: CLINIC | Age: 29
End: 2019-09-17
Payer: COMMERCIAL

## 2019-09-17 ENCOUNTER — OFFICE VISIT (OUTPATIENT)
Dept: SLEEP MEDICINE | Facility: CLINIC | Age: 29
End: 2019-09-17
Payer: COMMERCIAL

## 2019-09-17 VITALS
HEIGHT: 71 IN | DIASTOLIC BLOOD PRESSURE: 76 MMHG | OXYGEN SATURATION: 98 % | WEIGHT: 279 LBS | HEART RATE: 68 BPM | BODY MASS INDEX: 39.06 KG/M2 | SYSTOLIC BLOOD PRESSURE: 124 MMHG

## 2019-09-17 VITALS
WEIGHT: 280.2 LBS | SYSTOLIC BLOOD PRESSURE: 130 MMHG | TEMPERATURE: 98.7 F | OXYGEN SATURATION: 96 % | DIASTOLIC BLOOD PRESSURE: 80 MMHG | HEIGHT: 71 IN | BODY MASS INDEX: 39.23 KG/M2 | HEART RATE: 85 BPM

## 2019-09-17 DIAGNOSIS — N30.00 ACUTE CYSTITIS WITHOUT HEMATURIA: ICD-10-CM

## 2019-09-17 DIAGNOSIS — R30.0 DYSURIA: Primary | ICD-10-CM

## 2019-09-17 DIAGNOSIS — G47.33 OBSTRUCTIVE SLEEP APNEA: Primary | ICD-10-CM

## 2019-09-17 DIAGNOSIS — Z30.41 SURVEILLANCE OF PREVIOUSLY PRESCRIBED CONTRACEPTIVE PILL: ICD-10-CM

## 2019-09-17 LAB
ALBUMIN UR-MCNC: ABNORMAL MG/DL
APPEARANCE UR: CLEAR
BACTERIA #/AREA URNS HPF: ABNORMAL /HPF
BILIRUB UR QL STRIP: ABNORMAL
COLOR UR AUTO: YELLOW
GLUCOSE UR STRIP-MCNC: NEGATIVE MG/DL
HGB UR QL STRIP: NEGATIVE
KETONES UR STRIP-MCNC: ABNORMAL MG/DL
LEUKOCYTE ESTERASE UR QL STRIP: ABNORMAL
NITRATE UR QL: NEGATIVE
NON-SQ EPI CELLS #/AREA URNS LPF: ABNORMAL /LPF
PH UR STRIP: 5.5 PH (ref 5–7)
RBC #/AREA URNS AUTO: ABNORMAL /HPF
SOURCE: ABNORMAL
SP GR UR STRIP: >1.03 (ref 1–1.03)
UROBILINOGEN UR STRIP-ACNC: 1 EU/DL (ref 0.2–1)
WBC #/AREA URNS AUTO: ABNORMAL /HPF

## 2019-09-17 PROCEDURE — 99213 OFFICE O/P EST LOW 20 MIN: CPT | Performed by: NURSE PRACTITIONER

## 2019-09-17 PROCEDURE — 81001 URINALYSIS AUTO W/SCOPE: CPT | Performed by: NURSE PRACTITIONER

## 2019-09-17 PROCEDURE — 87086 URINE CULTURE/COLONY COUNT: CPT | Performed by: NURSE PRACTITIONER

## 2019-09-17 PROCEDURE — 99214 OFFICE O/P EST MOD 30 MIN: CPT | Performed by: PHYSICIAN ASSISTANT

## 2019-09-17 RX ORDER — DESOGESTREL AND ETHINYL ESTRADIOL 0.15-0.03
1 KIT ORAL DAILY
Qty: 84 TABLET | Refills: 2 | Status: SHIPPED | OUTPATIENT
Start: 2019-09-17 | End: 2020-08-10

## 2019-09-17 RX ORDER — NITROFURANTOIN 25; 75 MG/1; MG/1
100 CAPSULE ORAL 2 TIMES DAILY
Qty: 10 CAPSULE | Refills: 0 | Status: SHIPPED | OUTPATIENT
Start: 2019-09-17 | End: 2019-10-15

## 2019-09-17 ASSESSMENT — MIFFLIN-ST. JEOR
SCORE: 2092.11
SCORE: 2086.67

## 2019-09-17 ASSESSMENT — PAIN SCALES - GENERAL: PAINLEVEL: NO PAIN (0)

## 2019-09-17 NOTE — PROGRESS NOTES
Subjective     Alina Thompson is a 28 year old female who presents to clinic today for the following health issues:    HPI   URINARY TRACT SYMPTOMS  Onset: 09/16/2019    Description:   Painful urination (Dysuria): YES           Frequency: YES  Blood in urine (Hematuria): no   Delay in urine (Hesitency): YES    Intensity: severe    Progression of Symptoms:  worsening    Accompanying Signs & Symptoms:  Fever/chills: YES  Flank pain YES  Nausea and vomiting: no   Any vaginal symptoms: none  Abdominal/Pelvic Pain: no     History:   History of frequent UTI's: YES  History of kidney stones: no   Sexually Active: YES  Possibility of pregnancy: No    Precipitating factors:   none    Therapies Tried and outcome: none    Patient with a history of urinary tract infections. Symptoms began yesterday. Includes: dysuria, urinary frequency, urgency, feeling of incomplete emptying and darker color. Denies fever, nausea, pelvic pain, hematuria or low back pain. Was seen last week for STI screening and labs negative. No changes in products. Denies abnormal vaginal symptoms. Was sexually active over the weekend, questions if this triggered.    Patient Active Problem List   Diagnosis     Idiopathic hypersomnia     Morbid obesity due to excess calories (H)     SANDRA (obstructive sleep apnea)- mild (AHI 12)     History of elevated blood pressure while in hospital     Type 2 diabetes mellitus, without long-term current use of insulin (H)     Past Surgical History:   Procedure Laterality Date     CHOLECYSTECTOMY       ENT SURGERY      ear tubes     TONSILLECTOMY         Social History     Tobacco Use     Smoking status: Never Smoker     Smokeless tobacco: Never Used   Substance Use Topics     Alcohol use: Yes     Frequency: 2-4 times a month     Drinks per session: 1 or 2     Family History   Problem Relation Age of Onset     Sleep Apnea Other         Grandmother     Obesity Mother      Obesity Father      Diabetes Maternal Grandmother   "    Heart Disease Maternal Grandmother      Obesity Maternal Grandmother      Obesity Maternal Grandfather      Diabetes Paternal Grandmother      Genetic Disorder Paternal Grandmother      Obesity Paternal Grandmother      Liver Cancer Paternal Grandfather      Obesity Sister          Reviewed and updated as needed this visit by Provider         Review of Systems   ROS COMP: Constitutional, HEENT, cardiovascular, pulmonary, gi and gu systems are negative, except as otherwise noted.      Objective    /80 (BP Location: Right arm, Patient Position: Sitting, Cuff Size: Adult Large)   Pulse 85   Temp 98.7  F (37.1  C) (Oral)   Ht 1.803 m (5' 11\")   Wt 127.1 kg (280 lb 3.2 oz)   LMP 08/28/2019 (Approximate)   SpO2 96%   BMI 39.08 kg/m    Body mass index is 39.08 kg/m .  Physical Exam   GENERAL: healthy, alert and no distress  ABDOMEN: soft, nontender, no hepatosplenomegaly, no masses and bowel sounds normal  MS: no gross musculoskeletal defects noted, no edema  SKIN: no suspicious lesions or rashes  BACK: no CVA tenderness, no paralumbar tenderness  PSYCH: mentation appears normal, affect normal/bright    Diagnostic Test Results:  Labs reviewed in Epic  Results for orders placed or performed in visit on 09/17/19 (from the past 24 hour(s))   UA with Microscopic   Result Value Ref Range    Color Urine Yellow     Appearance Urine Clear     Glucose Urine Negative NEG^Negative mg/dL    Bilirubin Urine Small (A) NEG^Negative    Ketones Urine Trace (A) NEG^Negative mg/dL    Specific Gravity Urine >1.030 1.003 - 1.035    pH Urine 5.5 5.0 - 7.0 pH    Protein Albumin Urine Trace (A) NEG^Negative mg/dL    Urobilinogen Urine 1.0 0.2 - 1.0 EU/dL    Nitrite Urine Negative NEG^Negative    Blood Urine Negative NEG^Negative    Leukocyte Esterase Urine Moderate (A) NEG^Negative    Source Midstream Urine     WBC Urine 10-25 (A) OTO5^0 - 5 /HPF    RBC Urine O - 2 OTO2^O - 2 /HPF    Squamous Epithelial /LPF Urine Many (A) " FEW^Few /LPF    Bacteria Urine Few (A) NEG^Negative /HPF           Assessment & Plan     1. Dysuria  - Urine Culture Aerobic Bacterial  - UA with Microscopic    2. Acute cystitis without hematuria  Patient is given prescription for Macrobid x 5 days and we will notify her of her urine culture results when available and if a change in medication is needed. Discussed antibiotics may lower effectiveness of her contraception and back up method discussed. Also push fluids and avoid bladder irritants.  Call or return to clinic prn if these symptoms worsen or fail to improve as anticipated.  - nitroFURantoin macrocrystal-monohydrate (MACROBID) 100 MG capsule; Take 1 capsule (100 mg) by mouth 2 times daily  Dispense: 10 capsule; Refill: 0    3. Surveillance of previously prescribed contraceptive pill  Patient needing refill of her oral contraceptive pill, instead of going to other pharmacy, patient requests new prescription be sent to clinic pharmacy to  while here and this is done.  - desogestrel-ethinyl estradiol (APRI) 0.15-30 MG-MCG tablet; Take 1 tablet by mouth daily  Dispense: 84 tablet; Refill: 2     COLIN [facility]

## 2019-09-17 NOTE — PATIENT INSTRUCTIONS
Your BMI is Body mass index is 38.91 kg/m .  Weight management is a personal decision.  If you are interested in exploring weight loss strategies, the following discussion covers the approaches that may be successful. Body mass index (BMI) is one way to tell whether you are at a healthy weight, overweight, or obese. It measures your weight in relation to your height.  A BMI of 18.5 to 24.9 is in the healthy range. A person with a BMI of 25 to 29.9 is considered overweight, and someone with a BMI of 30 or greater is considered obese. More than two-thirds of American adults are considered overweight or obese.  Being overweight or obese increases the risk for further weight gain. Excess weight may lead to heart disease and diabetes.  Creating and following plans for healthy eating and physical activity may help you improve your health.  Weight control is part of healthy lifestyle and includes exercise, emotional health, and healthy eating habits. Careful eating habits lifelong are the mainstay of weight control. Though there are significant health benefits from weight loss, long-term weight loss with diet alone may be very difficult to achieve- studies show long-term success with dietary management in less than 10% of people. Attaining a healthy weight may be especially difficult to achieve in those with severe obesity. In some cases, medications, devices and surgical management might be considered.  What can you do?  If you are overweight or obese and are interested in methods for weight loss, you should discuss this with your provider.     Consider reducing daily calorie intake by 500 calories.     Keep a food journal.     Avoiding skipping meals, consider cutting portions instead.    Diet combined with exercise helps maintain muscle while optimizing fat loss. Strength training is particularly important for building and maintaining muscle mass. Exercise helps reduce stress, increase energy, and improves fitness.  Increasing exercise without diet control, however, may not burn enough calories to loose weight.       Start walking three days a week 10-20 minutes at a time    Work towards walking thirty minutes five days a week     Eventually, increase the speed of your walking for 1-2 minutes at time    In addition, we recommend that you review healthy lifestyles and methods for weight loss available through the National Institutes of Health patient information sites:  http://win.niddk.nih.gov/publications/index.htm    And look into health and wellness programs that may be available through your health insurance provider, employer, local community center, or [employer] club.    Weight management plan: Patient was referred to their PCP to discuss a diet and exercise plan.

## 2019-09-17 NOTE — NURSING NOTE
"Chief Complaint   Patient presents with     Sleep Problem     f/u mouth guard       Initial /76   Pulse 68   Ht 1.803 m (5' 11\")   Wt 126.6 kg (279 lb)   LMP 08/28/2019 (Approximate)   SpO2 98%   BMI 38.91 kg/m   Estimated body mass index is 38.91 kg/m  as calculated from the following:    Height as of this encounter: 1.803 m (5' 11\").    Weight as of this encounter: 126.6 kg (279 lb).    Medication Reconciliation: complete      "

## 2019-09-17 NOTE — PROGRESS NOTES
Obstructive Sleep Apnea - Follow-Up Visit:    Chief Complaint   Patient presents with     Sleep Problem     f/u mouth guard       Alina Thompson comes in today for follow-up of their mild sleep apnea, managed with mandibular advancement device. She does not use MAD due to jaw pain. Patient is contemplating weight loss surgery.     Patient reports that she was initially diagnosed with narcolepsy by Dr. Persaud at [facility] after repeatedly falling sleep in class. Interpretation of the study (done in 2007) was acquired and showed no significant sleep disordered breathing. MSLT the following day showed MSL of 10.4 with REM present on 3 of the naps.      Alina had a repeat sleep study on 10/8/2012. She was off all stimulant medications.   Summary of the sleep study interpreted by Dr. Torey Persaud:  Weight: 257#  Actigraphy x1 week-  Adequate total sleep prior to PSG  Polysomnogram-  Total sleep time 434. Sleep efficiency was 79.7%. Latency to sleep 13.5 minutes. Latency to .0 minutes. N1 was 6.2%, N2 64.3%, N3 7.6%, N4 21.9%. No significant sleep disordered breathing with only rare hypopneas, RDI was 0.7 per hour, oxygen remained above 92%, No CO2 retention.   MSLT revealed a mean latency of 8.75 minutes on the first 4 naps with sleep present in nap1, 2 and 4 and REM sleep present on nap 4. The fifth nap was 20 minutes and was not included in the MSL.      The patient has been on Methylphenidate 10 mg and Concerta 18 and 54 mg both in the morning. Both helpful but left her with increased anxiety. She stopped taking stimulants in 2014.    Study Date: 1/09/2017- (300.0 lbs) apnea/hypopnea index was 12.2 events per hour. The REM AHI was 32.3 events per hour. The supine AHI was 51.3 events per hour. The RERA index was 2.1 events per hour. The RDI was 14.3 events per hour. . Lowest oxygen saturation was 70.1%. Time spent less than or equal to 88% was 4.7 minutes. The PLM index was 5.2 movements per  "hour.    She tried CPAP and hated it and was referred for construction of mandibular advancement device.    She has lost ~20 lbs since her last sleep study.    She does not feel rested in the morning.    Total score - [place]: 8 (9/17/2019  9:00 AM)    ANNIE Total Score: 7    Past medical/surgical history, family history, social history, medications and allergies were reviewed.      Problem List:  Patient Active Problem List    Diagnosis Date Noted     History of elevated blood pressure while in hospital 04/12/2019     Priority: Medium     Type 2 diabetes mellitus, without long-term current use of insulin (H) 04/12/2019     Priority: Medium     SANDRA (obstructive sleep apnea)- mild (AHI 12) 01/16/2017     Priority: Medium     Study Date: 1/09/2017- (300.0 lbs) apnea/hypopnea index was 12.2 events per hour.  The REM AHI was 32.3 events per hour.  The supine AHI was 51.3 events per hour.  The RERA index was 2.1 events per hour.   The RDI was 14.3 events per hour. . Lowest oxygen saturation was 70.1%.  Time spent less than or equal to 88% was 4.7 minutes. The PLM index was 5.2 movements per hour.        Morbid obesity due to excess calories (H) 11/14/2016     Priority: Medium     Idiopathic hypersomnia 11/11/2016     Priority: Medium     2007 MSL of 10.4 with REM present on 3 of the naps  10/8/2012. She was off all stimulant medications. Actigraphy x1 week- Adequate total sleep. Polysomnogram-Total sleep time 434. Sleep efficiency was 79.7%. Latency to sleep 13.5 minutes. Latency to .0 minutes. No significant sleep disordered breathing with only rare hypopneas, RDI was 0.7 per hour, oxygen remained above 92%, No CO2 retention.   MSLT revealed a mean latency of 8.75 minutes on the first 4 naps with sleep present in nap 1, 2 and 4 and REM sleep present on nap 4. The fifth nap was 20 minutes and was not included in the MSL.          /76   Pulse 68   Ht 1.803 m (5' 11\")   Wt 126.6 kg (279 lb)   LMP 08/28/2019 " (Approximate)   SpO2 98%   BMI 38.91 kg/m      Impression/Plan:  Mild Sleep apnea. Not tolerating mandibular advancement device well. Daytime symptoms are stable.   Options reviewed.  Will re-initiate auto-CPAP 6-15 cm/H20. Mask fitting and coaching.     Alina Thompson will follow up in about 6 weeks    Twenty-five minutes spent with patient, all of which were spent face-to-face counseling, consulting, coordinating plan of care regarding SANDRA.      Ryan Ortiz PA-C

## 2019-09-18 LAB
BACTERIA SPEC CULT: NORMAL
SPECIMEN SOURCE: NORMAL

## 2019-09-20 ENCOUNTER — OFFICE VISIT (OUTPATIENT)
Dept: SURGERY | Facility: CLINIC | Age: 29
End: 2019-09-20
Payer: COMMERCIAL

## 2019-09-20 VITALS
WEIGHT: 280.2 LBS | BODY MASS INDEX: 39.23 KG/M2 | OXYGEN SATURATION: 98 % | HEART RATE: 80 BPM | DIASTOLIC BLOOD PRESSURE: 84 MMHG | HEIGHT: 71 IN | SYSTOLIC BLOOD PRESSURE: 129 MMHG

## 2019-09-20 DIAGNOSIS — Z71.89 ENCOUNTER FOR PRE-BARIATRIC SURGERY COUNSELING AND EDUCATION: ICD-10-CM

## 2019-09-20 DIAGNOSIS — E11.9 TYPE 2 DIABETES MELLITUS WITHOUT COMPLICATION, WITHOUT LONG-TERM CURRENT USE OF INSULIN (H): ICD-10-CM

## 2019-09-20 DIAGNOSIS — G47.33 OSA (OBSTRUCTIVE SLEEP APNEA): Chronic | ICD-10-CM

## 2019-09-20 DIAGNOSIS — E66.812 CLASS 2 SEVERE OBESITY DUE TO EXCESS CALORIES WITH SERIOUS COMORBIDITY AND BODY MASS INDEX (BMI) OF 39.0 TO 39.9 IN ADULT (H): Primary | ICD-10-CM

## 2019-09-20 DIAGNOSIS — E66.01 CLASS 2 SEVERE OBESITY DUE TO EXCESS CALORIES WITH SERIOUS COMORBIDITY AND BODY MASS INDEX (BMI) OF 39.0 TO 39.9 IN ADULT (H): Primary | ICD-10-CM

## 2019-09-20 PROCEDURE — 99214 OFFICE O/P EST MOD 30 MIN: CPT | Performed by: PHYSICIAN ASSISTANT

## 2019-09-20 ASSESSMENT — MIFFLIN-ST. JEOR: SCORE: 2092.11

## 2019-09-20 NOTE — PROGRESS NOTES
"Pre OP Bariatric Surgery Consultation Note       2019       RE: Alina Thompson  MR#: [id number]  : 1990        Patient here today to restart the process for bariatric surgery.  She was initially seen on 2019.  Life circumstances happened - she is no longer with her boyfriend -and she is here today ready to continue.  First reviewed her visit notes from April  Then reviewed task list:      Lose 5 lbs prior to surgery.  Goal Weight: 293 lbs - Completed - today she is 280 lbs  Have preoperative laboratory tests drawn. - Reviewed vitamin D and CBC - On 2019 A1C=6.6. Reordered Vitamin D  Psychological Evaluation with MMPI and clearance for weight loss surgery. - Cleared by Yadira 2019  Achieve clearance from dietitian to see surgeon.  Establish with primary who must discuss blood pressure and diabetes - Dr Vu 2019  Letter of compliance from sleep center about mandibular device - Saw sleep center 2019.  Will re-initiate CPAP.  Has not done so     NEXT MONTH WILL DISCUSS BIRTH CONTROL - Taking Apri      Vital signs:      BP: 129/84 Pulse: 80     SpO2: 98 %     Height: 5' 11\" (180.3 cm) Weight: 280 lb 3.2 oz (127.1 kg)  Estimated body mass index is 39.08 kg/m  as calculated from the following:    Height as of this encounter: 5' 11\" (1.803 m).    Weight as of this encounter: 280 lb 3.2 oz (127.1 kg).    GENERAL:  Good development and normal affect in no acute distress. Patient is alert and orientated x 3 and answers all questions appropriately.  HEENT: Head is normocephalic, nontraumatic. Pupils equal and round without conjunctival injection. Neck is supple without lymphadenopathy, thyroidmegaly, or mass. Trachea midline. Dentition WNL. No missing teeth  CARDIOVASCULAR:  Regular rate and rhythm without murmurs, rubs, or gallops.  RESPIRATORY: Lungs are clear to auscultation bilaterally with good breath sounds.  GASTROINTESTINAL: Abdomen is obese, nondistended, soft, " nontender, without organomegaly or masses. No bruits.  LOWER EXTREMITIES: No LE edema bilaterally, no cyanosis, ulceration, or chronic venous stasis noted.  MUSCULOSKELETAL:  Moves all 4 extremities equal and strong. Has a normal gait.       Today reviewed patients medical history.  Reviewed task list items.  Patient has continued working on task list even when she was not active in the clinic.  Reviewed preoperative, perioperative and postoperative processes and follow up.  Patient is interested in the gastric sleeve procedure.  She will return next month to review the surgery as well as risks and benefits and lifestyle changes.  She appears to be a motivated candidate.    Pelon Miller MS, PA-C    This was a 25 minute visit with greater than 50% spent on counseling.

## 2019-09-24 ENCOUNTER — TELEPHONE (OUTPATIENT)
Dept: SLEEP MEDICINE | Facility: CLINIC | Age: 29
End: 2019-09-24

## 2019-09-24 NOTE — TELEPHONE ENCOUNTER
Called and spoke with patient to let her know that RX received and pressures have been updated. I also let her know that we have RX for supplies and asked if she would like to order at this time. Per patient, she is not currently using her machine and was told to make a mask fitting appointment. I let her know I could schedule the appt for her, but she stated she was at work and did not have time right now. She has the phone number and will call back to make mask fitting appt.

## 2019-09-27 ENCOUNTER — TELEPHONE (OUTPATIENT)
Dept: INTERNAL MEDICINE | Facility: CLINIC | Age: 29
End: 2019-09-27

## 2019-10-02 ENCOUNTER — OFFICE VISIT (OUTPATIENT)
Dept: SURGERY | Facility: CLINIC | Age: 29
End: 2019-10-02
Payer: COMMERCIAL

## 2019-10-02 VITALS
SYSTOLIC BLOOD PRESSURE: 132 MMHG | DIASTOLIC BLOOD PRESSURE: 88 MMHG | HEART RATE: 85 BPM | BODY MASS INDEX: 38.92 KG/M2 | WEIGHT: 278 LBS | HEIGHT: 71 IN | OXYGEN SATURATION: 97 %

## 2019-10-02 DIAGNOSIS — E66.812 CLASS 2 DRUG-INDUCED OBESITY WITH SERIOUS COMORBIDITY AND BODY MASS INDEX (BMI) OF 38.0 TO 38.9 IN ADULT: Primary | ICD-10-CM

## 2019-10-02 DIAGNOSIS — G47.33 OSA (OBSTRUCTIVE SLEEP APNEA): Chronic | ICD-10-CM

## 2019-10-02 DIAGNOSIS — E66.1 CLASS 2 DRUG-INDUCED OBESITY WITH SERIOUS COMORBIDITY AND BODY MASS INDEX (BMI) OF 38.0 TO 38.9 IN ADULT: Primary | ICD-10-CM

## 2019-10-02 DIAGNOSIS — E11.9 TYPE 2 DIABETES MELLITUS WITHOUT COMPLICATION, WITHOUT LONG-TERM CURRENT USE OF INSULIN (H): ICD-10-CM

## 2019-10-02 PROCEDURE — 97803 MED NUTRITION INDIV SUBSEQ: CPT | Performed by: DIETITIAN, REGISTERED

## 2019-10-02 PROCEDURE — 99213 OFFICE O/P EST LOW 20 MIN: CPT | Performed by: PHYSICIAN ASSISTANT

## 2019-10-02 ASSESSMENT — MIFFLIN-ST. JEOR: SCORE: 2082.13

## 2019-10-02 NOTE — PROGRESS NOTES
"   Pre OP Bariatric Surgery Consultation Note        2019          RE: Alina Thompson  MR#: [id number]  : 1990           Patient here today for continued bariatric education. She was initially seen on 2019 and restarted the program 2019.  She has continued to do well.  Her weight is still coming down due to changes in her lifestyle.  Then reviewed task list:        Lose 5 lbs prior to surgery.  Goal Weight: 293 lbs - Completed - today she is 278 lbs  Have preoperative laboratory tests drawn. - Reviewed vitamin D and CBC - On 2019 A1C=6.6. Reordered Vitamin D  Psychological Evaluation with MMPI and clearance for weight loss surgery. - Cleared by [place] 2019  Achieve clearance from dietitian to see surgeon.  Establish with primary who must discuss blood pressure and diabetes - Dr Vu 2019  Letter of compliance from sleep center about mandibular device - Saw sleep center 2019. Patient needs to get fitted for a new mask.  Sleep Center will not clear until she is wearing with new mask   BIRTH CONTROL - Taking Apri        /88 (BP Location: Left arm, Patient Position: Sitting, Cuff Size: Adult Large)   Pulse 85   Ht 5' 11\" (1.803 m)   Wt 278 lb (126.1 kg)   SpO2 97%   BMI 38.77 kg/m        Today in the office we discussed gastric sleeve surgery.  Risks and benefits were outlined including the risk of death, PE, DVT, ulcer, worsening GERD, N/V, stricture, hernia, wound infection, weight regain, and vitamin deficiencies. I emphasized exercise and activity along with appropriate food choice as the main foundation for weight loss with surgery providing surgical reinforcement of this.  A goal sheet and support group handout were given to the patient. Patient contract was signed.    Patient is very motivated.  Understands the sleep center letter might take a few months.  Otherwise she is close to clearance for surgeon consult.       Pelon Miller MS, PA-  This was a " 20 minute visit with greater than 50% spent on counseling.

## 2019-10-02 NOTE — PROGRESS NOTES
"PRE SURGICAL WEIGHT LOSS NUTRITION APPOINTMENT    Alina Thompson  1990  female  [id number]  28 year old    ASSESSMENT    Desired Surgical Procedure: gastric sleeve    REASON FOR VISIT:  Alina Thompson is a 28 year old year old female presents today for a pre-surgical weight loss follow-up appointment. Patient accompanied by self.    DIAGNOSIS:  Weight Status Obesity Grade II BMI 35-39.9    ANTHROPOMETRICS:  Height: 5'11\"   Initial Weight: 298.2 lb     Current weight:  278 lb  BMI: 38.77   kg/(m^2).    VITAMINS AND MINERALS:  1 Multivitamin with Minerals-morning  1 (not sure of dose) International units Vitamin D      NUTRITION HISTORY:  Breakfast: Greek yogurt  Lunch: Asian noodle soup with pork or peanut butter and jelly sandwich  Supper: Going out to eat most nights-3 slices of pizza   Snacks: trail mix or Cheese [employer]'s or grapes- at work  Fluids Consumed: Water, coffee + 1/2 and 1/2 + sugar, sweet tea, 1 can of diet Coke per week, ETOH-2 drinks per month  Eating slower: Yes  Chewing foods thoroughly: Yes  Take 20-30 minutes to consume each meal: Yes  Fluids and meals separate by at least 30 minutes: Yes  Carbonation: yes  Caffeine: yes  Additional Information: Last RD visit was ~ 5.5 month ago. Pt shared she broke up with her s/o of 9 years, sold a house and purchased a house (moved in 4 weeks ago). For ~ 9 years s/o was doing all of the cooking. Patient says she knows how to cook, but just need to get back into the habit of cooking.    PHYSICAL ACTIVITY:  Type: none X 1      DIAGNOSIS:  Previous Nutrition Diagnosis: Obesity related to long history of self- monitoring deficit and excessive energy intake evidenced by BMI of 41.59 kg/m2  No change, modified below    Previous goals: (April 2019)  Begin taking multivitamin, calcium and vitamin D-partially met/ did not start calcium  Limit pop to every other day-exceeded  Continue to take 20-30 minutes to eat your meals + Chew to applesauce " consistency-met       Current Nutrition Diagnosis: Obesity related to long history of self-monitoring deficit and excessive energy intake as evidenced by BMI of 38.77 kg/m2.    INTERVENTION:  Nutrition Prescription: Recommended energy/nutrient modification.    GOALS:  Start 600 mg calcium citrate BID (pt prefers twice daily dosing)  Verify amount of vitamin D  Exercise 2X per week for 40 minutes    Intervention:  -Congratulated pt on weight loss  - Discussed progress towards previous goals.  - Reinforced importance of making behavior changes in preparation for bariatric surgery.   - Assessed learning needs and learning preferences       NUTRITION MONITORING AND EVALUATION:  Anticipated compliance: fair-good  Patient demonstrated fair-good understanding.   *Patient has met pre bariatric surgery diet requirements    Follow up: Continue to monitor patient closely regarding weight loss and diet.  # of visits needed: 1-2  Cleared by RD: No     TIME SPENT WITH PATIENT: 30 minutes  Rufino Saldana RD, LD  [facility]  [phone]

## 2019-10-15 ENCOUNTER — OFFICE VISIT (OUTPATIENT)
Dept: FAMILY MEDICINE | Facility: CLINIC | Age: 29
End: 2019-10-15
Payer: COMMERCIAL

## 2019-10-15 VITALS
RESPIRATION RATE: 18 BRPM | HEART RATE: 76 BPM | DIASTOLIC BLOOD PRESSURE: 80 MMHG | BODY MASS INDEX: 39.19 KG/M2 | TEMPERATURE: 98.4 F | WEIGHT: 281 LBS | OXYGEN SATURATION: 98 % | SYSTOLIC BLOOD PRESSURE: 128 MMHG

## 2019-10-15 DIAGNOSIS — N30.00 ACUTE CYSTITIS WITHOUT HEMATURIA: Primary | ICD-10-CM

## 2019-10-15 DIAGNOSIS — R30.0 DYSURIA: ICD-10-CM

## 2019-10-15 LAB
ALBUMIN UR-MCNC: NEGATIVE MG/DL
APPEARANCE UR: CLEAR
BACTERIA #/AREA URNS HPF: ABNORMAL /HPF
BILIRUB UR QL STRIP: NEGATIVE
COLOR UR AUTO: YELLOW
GLUCOSE UR STRIP-MCNC: NEGATIVE MG/DL
HGB UR QL STRIP: NEGATIVE
KETONES UR STRIP-MCNC: NEGATIVE MG/DL
LEUKOCYTE ESTERASE UR QL STRIP: ABNORMAL
NITRATE UR QL: NEGATIVE
NON-SQ EPI CELLS #/AREA URNS LPF: ABNORMAL /LPF
PH UR STRIP: 5.5 PH (ref 5–7)
RBC #/AREA URNS AUTO: ABNORMAL /HPF
SOURCE: ABNORMAL
SP GR UR STRIP: 1.02 (ref 1–1.03)
UROBILINOGEN UR STRIP-ACNC: 0.2 EU/DL (ref 0.2–1)
WBC #/AREA URNS AUTO: ABNORMAL /HPF

## 2019-10-15 PROCEDURE — 99213 OFFICE O/P EST LOW 20 MIN: CPT | Performed by: FAMILY MEDICINE

## 2019-10-15 PROCEDURE — 87086 URINE CULTURE/COLONY COUNT: CPT | Performed by: FAMILY MEDICINE

## 2019-10-15 PROCEDURE — 81001 URINALYSIS AUTO W/SCOPE: CPT | Performed by: FAMILY MEDICINE

## 2019-10-15 RX ORDER — CIPROFLOXACIN 500 MG/1
500 TABLET, FILM COATED ORAL 2 TIMES DAILY
Qty: 6 TABLET | Refills: 0 | Status: SHIPPED | OUTPATIENT
Start: 2019-10-15 | End: 2020-01-21

## 2019-10-15 RX ORDER — CIPROFLOXACIN 500 MG/1
500 TABLET, FILM COATED ORAL 2 TIMES DAILY
Qty: 6 TABLET | Refills: 0 | Status: SHIPPED | OUTPATIENT
Start: 2019-10-15 | End: 2019-10-15

## 2019-10-15 RX ORDER — NITROFURANTOIN 25; 75 MG/1; MG/1
100 CAPSULE ORAL 2 TIMES DAILY
Qty: 14 CAPSULE | Refills: 0 | Status: CANCELLED | OUTPATIENT
Start: 2019-10-15 | End: 2019-10-22

## 2019-10-15 NOTE — PATIENT INSTRUCTIONS
Patient Education     Understanding Urinary Tract Infections (UTIs)  Most UTIs are caused by bacteria, although they may also be caused by viruses or fungi. Bacteria from the bowel are the most common source of infection. The infection may start because of any of the following:    Sexual activity. During sex, bacteria can travel from the penis, vagina, or rectum into the urethra.     Bacteria on the skin outside the rectum may travel into the urethra. This is more common in women since the rectum and urethra are closer to each other than in men. Wiping from front to back after using the toilet and keeping the area clean can help prevent germs from getting to the urethra.    Blockage of urine flow through the urinary tract. If urine sits too long, germs may start to grow out of control.      Parts of the urinary tract  The infection can occur in any part of the urinary tract.    The kidneys collect and store urine.    The ureters carry urine from the kidneys to the bladder.    The bladder holds urine until you are ready to let it out.    The urethra carries urine from the bladder out of the body. It is shorter in women, so bacteria can move through it more easily. The urethra is longer in men, so a UTI is less likely to reach the bladder or kidneys in men.  Date Last Reviewed: 1/1/2017 2000-2018 [employer]. [address] [place] [address], [place] [address]. All rights reserved. This information is not intended as a substitute for professional medical care. Always follow your healthcare professional's instructions.

## 2019-10-15 NOTE — PROGRESS NOTES
Subjective     Alina Thompson is a 28 year old female who presents to clinic today for the following health issues:    HPI       URINARY TRACT SYMPTOMS  Onset: today    Description:   Painful urination (Dysuria): YES.  Burns  after urination            Frequency: YES  Blood in urine (Hematuria): no   Delay in urine (Hesitency): no     Intensity: moderate    Progression of Symptoms:  same    Accompanying Signs & Symptoms:  Fever/chills: no   Flank pain no   Nausea and vomiting: no   Any vaginal symptoms: none  Abdominal/Pelvic Pain: no     History:   History of frequent UTI's: YES  History of kidney stones: no   Sexually Active: YES  Possibility of pregnancy: No    Precipitating factors:   none    Therapies Tried and outcome: none      Reports that she recently had UTI-like symptoms last month, urine culture was unremarkable.  Treated with a 5 day course of Macrobid.     Possibility of pregnancy: No- states that her last menstrual period was a couple of weeks ago and is currently on OCPs.    Patient Active Problem List   Diagnosis     Idiopathic hypersomnia     Morbid obesity due to excess calories (H)     SANDRA (obstructive sleep apnea)- mild (AHI 12)     History of elevated blood pressure while in hospital     Type 2 diabetes mellitus, without long-term current use of insulin (H)     Past Surgical History:   Procedure Laterality Date     CHOLECYSTECTOMY       ENT SURGERY      ear tubes     TONSILLECTOMY         Social History     Tobacco Use     Smoking status: Never Smoker     Smokeless tobacco: Never Used   Substance Use Topics     Alcohol use: Yes     Frequency: 2-4 times a month     Drinks per session: 1 or 2     Family History   Problem Relation Age of Onset     Sleep Apnea Other         Grandmother     Obesity Mother      Obesity Father      Diabetes Maternal Grandmother      Heart Disease Maternal Grandmother      Obesity Maternal Grandmother      Obesity Maternal Grandfather      Diabetes Paternal  Grandmother      Genetic Disorder Paternal Grandmother      Obesity Paternal Grandmother      Liver Cancer Paternal Grandfather      Obesity Sister          Current Outpatient Medications   Medication Sig Dispense Refill     ciprofloxacin (CIPRO) 500 MG tablet Take 1 tablet (500 mg) by mouth 2 times daily 6 tablet 0     desogestrel-ethinyl estradiol (APRI) 0.15-30 MG-MCG tablet Take 1 tablet by mouth daily 84 tablet 2     Allergies   Allergen Reactions     Sulfa Drugs      Wellbutrin [Bupropion] Other (See Comments)     Dizziness         Reviewed and updated as needed this visit by Provider         Review of Systems   ROS COMP: Constitutional, HEENT, cardiovascular, pulmonary, gi and gu systems are negative, except as otherwise noted.      Objective    /80   Pulse 76   Temp 98.4  F (36.9  C) (Tympanic)   Resp 18   Wt 127.5 kg (281 lb)   SpO2 98%   Breastfeeding? No   BMI 39.19 kg/m    Body mass index is 39.19 kg/m .  Physical Exam   GENERAL: healthy, alert and no distress  ABDOMEN: soft, suprapubic tenderness, no CVA tenderness, no masses and bowel sounds normal  PSYCH: mentation appears normal, affect normal/bright    Diagnostic Test Results:  Reviewed and discussed with patient prior to discharge.  Results for orders placed or performed in visit on 10/15/19   UA reflex to Microscopic and Culture   Result Value Ref Range    Color Urine Yellow     Appearance Urine Clear     Glucose Urine Negative NEG^Negative mg/dL    Bilirubin Urine Negative NEG^Negative    Ketones Urine Negative NEG^Negative mg/dL    Specific Gravity Urine 1.025 1.003 - 1.035    Blood Urine Negative NEG^Negative    pH Urine 5.5 5.0 - 7.0 pH    Protein Albumin Urine Negative NEG^Negative mg/dL    Urobilinogen Urine 0.2 0.2 - 1.0 EU/dL    Nitrite Urine Negative NEG^Negative    Leukocyte Esterase Urine Trace (A) NEG^Negative    Source Midstream Urine    Urine Microscopic   Result Value Ref Range    WBC Urine 10-25 (A) OTO5^0 - 5 /HPF     RBC Urine O - 2 OTO2^O - 2 /HPF    Squamous Epithelial /LPF Urine Few FEW^Few /LPF    Bacteria Urine Few (A) NEG^Negative /HPF             Assessment & Plan     Alina was seen today for uti.    Diagnoses and all orders for this visit:    Acute cystitis without hematuria  -     Urine Culture Aerobic Bacterial  -     ciprofloxacin (CIPRO) 500 MG tablet; Take 1 tablet (500 mg) by mouth 2 times daily    Dysuria  -     UA reflex to Microscopic and Culture  -     Urine Microscopic  -     Encouraged to increase fluid intake.        Patient education and Handout with home care instructions given. See AVS for details.      Return in about 1 week (around 10/22/2019), or if symptoms worsen or fail to improve. Patient verbalized understanding and was agreeable with the plan.      Danelle Huang MD  [facility]

## 2019-10-16 LAB
BACTERIA SPEC CULT: NORMAL
SPECIMEN SOURCE: NORMAL

## 2019-11-05 ENCOUNTER — HEALTH MAINTENANCE LETTER (OUTPATIENT)
Age: 29
End: 2019-11-05

## 2019-12-11 NOTE — PROGRESS NOTES
Subjective     Alina Thompson is a 29 year old female who presents to clinic today for the following health issues:    HPI   Diabetes Follow-up      How often are you checking your blood sugar? Not at all    What concerns do you have today about your diabetes? Wondering if migraines once a month could be due to diabetes     Do you have any of these symptoms? (Select all that apply)  No numbness or tingling in feet.  No redness, sores or blisters on feet.  No complaints of excessive thirst.  No reports of blurry vision.  No significant changes to weight.    Have you had a diabetic eye exam in the last 12 months? No        BP Readings from Last 2 Encounters:   01/21/20 130/84   10/15/19 128/80     Hemoglobin A1C (%)   Date Value   09/11/2019 6.6 (H)   04/05/2019 7.6 (H)     LDL Cholesterol Calculated (mg/dL)   Date Value   05/06/2019 106 (H)         How many servings of fruits and vegetables do you eat daily?  0-1    On average, how many sweetened beverages do you drink each day (Examples: soda, juice, sweet tea, etc.  Do NOT count diet or artificially sweetened beverages)?   0    How many days per week do you exercise enough to make your heart beat faster? 3 or less    How many minutes a day do you exercise enough to make your heart beat faster? 30 - 60    How many days per week do you miss taking your medication? 0      1. Type 2 diabetes mellitus with complication, without long-term current use of insulin (H)    2. Class 2 severe obesity due to excess calories with serious comorbidity and body mass index (BMI) of 37.0 to 37.9 in adult (H)    3. Migraine with aura and without status migrainosus, not intractable      Migraines with aura ('gasoline waves in periphery') about 3 times monthly since resuming birth control about 6 months ago.  Has stayed home from work due to headache symptoms but no nausea and vomiting like when she had migraines as a younger woman.    PMH: Updated and/or reviewed in  "chart.    ROS:  Constitutional, neuro, HEENT, cardiovascular, pulmonary, gi and gu systems are otherwise negative.    Objective   OBJECTIVE:                                                    /84   Temp 97.6  F (36.4  C) (Tympanic)   Resp 16   Ht 1.803 m (5' 11\")   Wt 122 kg (269 lb)   BMI 37.52 kg/m      GEN: No acute distress  EYES: No conjunctival injection or icterus, EOMI grossly intact  RESP: Unlabored, regular  NEURO: Normal gait, MAEx4, light touch sensation grossly intact  PSYCH: Normal mood and affect    No results found for any visits on 01/21/20.   Assessment & Plan   ASSESSMENT/PLAN:                                                    I spent a total of 35 minutes with the patient of which greater than 50% were devoted to counseling and coordination of care of:    1. Type 2 diabetes mellitus with complication, without long-term current use of insulin (H)  2. Class 2 severe obesity due to excess calories with serious comorbidity and body mass index (BMI) of 37.0 to 37.9 in adult (H)  Significant lifestyle modification improvement with 10 lbs weight loss.  Expect hemoglobin A1c improvement.  Follows with bariatric surgery to maintain 'last resort' option.  Clinical insignificant or improving obstructive sleep apnea for which she does not wish to pursue another sleep study.  Continue accountability check ins with additional supports below.  - Hemoglobin A1c  - HEALTH  REFERRAL  - MENTAL HEALTH REFERRAL  - Adult; Outpatient Treatment; Individual/Couples/Family/Group Therapy/Health Psychology; [facility]: [facility] [phone]; We will contact you to schedule the appointment or please call with any questions    Discussed she needs to follow-up with OB/GYN for birth control options / migraine evaluation given risk of stroke associated with aura and estrogen-containing birth control.  She does not smoke.  No other immediately obvious triggers identified today.      Orders Placed " This Encounter     Hemoglobin A1c     HEALTH  REFERRAL     MENTAL HEALTH REFERRAL  - Adult; Outpatient Treatment; Individual/Couples/Family/Group Therapy/Health Psychology; [facility]: [facility] [phone]; We will contact you to schedule the appointment or please call with any questions        Return in about 3 months (around 4/21/2020) for Follow-up.    Genaro Vu MD

## 2020-01-21 ENCOUNTER — OFFICE VISIT (OUTPATIENT)
Dept: INTERNAL MEDICINE | Facility: CLINIC | Age: 30
End: 2020-01-21
Payer: COMMERCIAL

## 2020-01-21 VITALS
TEMPERATURE: 97.6 F | WEIGHT: 269 LBS | DIASTOLIC BLOOD PRESSURE: 84 MMHG | BODY MASS INDEX: 37.66 KG/M2 | RESPIRATION RATE: 16 BRPM | HEIGHT: 71 IN | SYSTOLIC BLOOD PRESSURE: 130 MMHG

## 2020-01-21 DIAGNOSIS — E11.8 TYPE 2 DIABETES MELLITUS WITH COMPLICATION, WITHOUT LONG-TERM CURRENT USE OF INSULIN (H): Primary | ICD-10-CM

## 2020-01-21 DIAGNOSIS — G43.109 MIGRAINE WITH AURA AND WITHOUT STATUS MIGRAINOSUS, NOT INTRACTABLE: ICD-10-CM

## 2020-01-21 DIAGNOSIS — E66.01 CLASS 2 SEVERE OBESITY DUE TO EXCESS CALORIES WITH SERIOUS COMORBIDITY AND BODY MASS INDEX (BMI) OF 37.0 TO 37.9 IN ADULT (H): ICD-10-CM

## 2020-01-21 DIAGNOSIS — E66.812 CLASS 2 SEVERE OBESITY DUE TO EXCESS CALORIES WITH SERIOUS COMORBIDITY AND BODY MASS INDEX (BMI) OF 37.0 TO 37.9 IN ADULT (H): ICD-10-CM

## 2020-01-21 PROBLEM — M54.2 CERVICAL SPINE PAIN: Status: ACTIVE | Noted: 2019-04-17

## 2020-01-21 LAB — HBA1C MFR BLD: 5.9 % (ref 0–5.6)

## 2020-01-21 PROCEDURE — 36415 COLL VENOUS BLD VENIPUNCTURE: CPT | Performed by: INTERNAL MEDICINE

## 2020-01-21 PROCEDURE — 83036 HEMOGLOBIN GLYCOSYLATED A1C: CPT | Performed by: INTERNAL MEDICINE

## 2020-01-21 PROCEDURE — 99214 OFFICE O/P EST MOD 30 MIN: CPT | Performed by: INTERNAL MEDICINE

## 2020-01-21 PROCEDURE — 82306 VITAMIN D 25 HYDROXY: CPT | Performed by: INTERNAL MEDICINE

## 2020-01-21 ASSESSMENT — MIFFLIN-ST. JEOR: SCORE: 2036.31

## 2020-01-22 LAB — DEPRECATED CALCIDIOL+CALCIFEROL SERPL-MC: 15 UG/L (ref 20–75)

## 2020-01-29 NOTE — PROGRESS NOTES
Subjective     Alina Thompson is a 29 year old female who presents to clinic today for the following health issues:    HPI   STD screening    Patient presents for routine STI screening, usually prefers to have screening every few months. No specific STI concerns, declines HSV screening. In the last 1 week, has noticed an increase in vaginal odor-mostly only while in the shower. No increase in vaginal discharge, itching. Denies abnormal urinary symptoms, fever, pelvic pain, lesions.    Additionally, patient recently having migraines and her IM provider has encouraged her to look into alternate progesterone or non hormonal options. Not ready for change yet, but has questions related to IUD. Has had Nexplanon in the past and not interested. Also, really trying to lose weight on her own and concerned about issues with IUD use and weight.    Patient Active Problem List   Diagnosis     Idiopathic hypersomnia     Morbid obesity due to excess calories (H)     SANDRA (obstructive sleep apnea)- mild (AHI 12)     History of elevated blood pressure while in hospital     Type 2 diabetes mellitus, without long-term current use of insulin (H)     Cervical spine pain     Migraine with aura and without status migrainosus, not intractable     Past Surgical History:   Procedure Laterality Date     CHOLECYSTECTOMY       ENT SURGERY      ear tubes     TONSILLECTOMY         Social History     Tobacco Use     Smoking status: Never Smoker     Smokeless tobacco: Never Used   Substance Use Topics     Alcohol use: Yes     Frequency: 2-4 times a month     Drinks per session: 1 or 2     Family History   Problem Relation Age of Onset     Sleep Apnea Other         Grandmother     Obesity Mother      Obesity Father      Diabetes Maternal Grandmother      Heart Disease Maternal Grandmother      Obesity Maternal Grandmother      Obesity Maternal Grandfather      Diabetes Paternal Grandmother      Genetic Disorder Paternal Grandmother      Obesity  "Paternal Grandmother      Liver Cancer Paternal Grandfather      Obesity Sister          Reviewed and updated as needed this visit by Provider  Allergies  Meds  Problems         Review of Systems   ROS COMP: Constitutional, HEENT, cardiovascular, pulmonary, gi and gu systems are negative, except as otherwise noted.      Objective    /84 (BP Location: Right arm, Patient Position: Sitting, Cuff Size: Adult Large)   Pulse 72   Temp 97.3  F (36.3  C) (Oral)   Ht 1.797 m (5' 10.75\")   Wt 122 kg (269 lb)   LMP 01/16/2020 (Exact Date)   SpO2 100%   BMI 37.78 kg/m    Body mass index is 37.78 kg/m .  Physical Exam   GENERAL: healthy, alert and no distress  ABDOMEN: soft, nontender, no hepatosplenomegaly, no masses and bowel sounds normal   (female): normal female external genitalia, normal urethral meatus , vaginal mucosa pink, moist, well rugated, vaginal discharge - scant and yellow and normal cervix, adnexae, and uterus without masses.  MS: no gross musculoskeletal defects noted, no edema  SKIN: no suspicious lesions or rashes  PSYCH: mentation appears normal, affect normal/bright    Diagnostic Test Results:  Labs reviewed in Epic  Results for orders placed or performed in visit on 01/31/20 (from the past 24 hour(s))   Wet prep   Result Value Ref Range    Specimen Description Vagina     Wet Prep No Trichomonas seen     Wet Prep No clue cells seen     Wet Prep No yeast seen     Wet Prep Moderate  WBC'S seen              Assessment & Plan     1. Screen for STD (sexually transmitted disease)  Aware of negative wet prep, will notify of remaining results once available. Encouraged to continue safe sexual practices.  - Chlamydia trachomatis PCR  - Neisseria gonorrhoeae PCR  - Hepatitis B surface antigen  - Hepatitis C antibody  - HIV Antigen Antibody Combo  - Treponema Abs w Reflex to RPR and Titer  - Wet prep    2. Birth control counseling  We discussed her history and contraception options. She is likely " interested in IUD, but hesitant on the progesterone IUDs due to her weight concerns, has been working hard to lose weight and does not want to plateau and this is discussed. Possibly considering copper IUD and this is discussed as well. We discussed insertion, removal, possible side effects, menstrual changes. Reviewed risk of uterine perforation with insertion and discussed possible need for surgical removal. Patient advised to take 600 mg Ibuprofen prior to insertion. Planning change to IUD soon, will decide her preference on type and schedule insertion during menses.     COLIN [facility]

## 2020-01-31 ENCOUNTER — OFFICE VISIT (OUTPATIENT)
Dept: OBGYN | Facility: CLINIC | Age: 30
End: 2020-01-31
Payer: COMMERCIAL

## 2020-01-31 VITALS
WEIGHT: 269 LBS | SYSTOLIC BLOOD PRESSURE: 122 MMHG | DIASTOLIC BLOOD PRESSURE: 84 MMHG | HEIGHT: 71 IN | TEMPERATURE: 97.3 F | HEART RATE: 72 BPM | BODY MASS INDEX: 37.66 KG/M2 | OXYGEN SATURATION: 100 %

## 2020-01-31 DIAGNOSIS — Z30.09 BIRTH CONTROL COUNSELING: ICD-10-CM

## 2020-01-31 DIAGNOSIS — Z11.3 SCREEN FOR STD (SEXUALLY TRANSMITTED DISEASE): Primary | ICD-10-CM

## 2020-01-31 LAB
SPECIMEN SOURCE: NORMAL
WET PREP SPEC: NORMAL

## 2020-01-31 PROCEDURE — 99213 OFFICE O/P EST LOW 20 MIN: CPT | Performed by: NURSE PRACTITIONER

## 2020-01-31 PROCEDURE — 86780 TREPONEMA PALLIDUM: CPT | Performed by: NURSE PRACTITIONER

## 2020-01-31 PROCEDURE — 87210 SMEAR WET MOUNT SALINE/INK: CPT | Performed by: NURSE PRACTITIONER

## 2020-01-31 PROCEDURE — 87340 HEPATITIS B SURFACE AG IA: CPT | Performed by: NURSE PRACTITIONER

## 2020-01-31 PROCEDURE — 87591 N.GONORRHOEAE DNA AMP PROB: CPT | Performed by: NURSE PRACTITIONER

## 2020-01-31 PROCEDURE — 87389 HIV-1 AG W/HIV-1&-2 AB AG IA: CPT | Performed by: NURSE PRACTITIONER

## 2020-01-31 PROCEDURE — 36415 COLL VENOUS BLD VENIPUNCTURE: CPT | Performed by: NURSE PRACTITIONER

## 2020-01-31 PROCEDURE — 87491 CHLMYD TRACH DNA AMP PROBE: CPT | Performed by: NURSE PRACTITIONER

## 2020-01-31 PROCEDURE — 86803 HEPATITIS C AB TEST: CPT | Performed by: NURSE PRACTITIONER

## 2020-01-31 ASSESSMENT — MIFFLIN-ST. JEOR: SCORE: 2032.34

## 2020-01-31 ASSESSMENT — PAIN SCALES - GENERAL: PAINLEVEL: NO PAIN (0)

## 2020-02-01 LAB — T PALLIDUM AB SER QL: NONREACTIVE

## 2020-02-02 LAB
C TRACH DNA SPEC QL NAA+PROBE: NEGATIVE
HBV SURFACE AG SERPL QL IA: NONREACTIVE
HCV AB SERPL QL IA: NONREACTIVE
HIV 1+2 AB+HIV1 P24 AG SERPL QL IA: NONREACTIVE
N GONORRHOEA DNA SPEC QL NAA+PROBE: NEGATIVE
SPECIMEN SOURCE: NORMAL
SPECIMEN SOURCE: NORMAL

## 2020-03-03 ENCOUNTER — MYC MEDICAL ADVICE (OUTPATIENT)
Dept: INTERNAL MEDICINE | Facility: CLINIC | Age: 30
End: 2020-03-03

## 2020-03-04 NOTE — TELEPHONE ENCOUNTER
Please obtain patient reported dates of and reason(s) for absence and draft letter.    Neurology evaluation is referral and share with her order details.  She can be seen anywhere, if she prefers.

## 2020-03-06 ENCOUNTER — OFFICE VISIT (OUTPATIENT)
Dept: INTERNAL MEDICINE | Facility: CLINIC | Age: 30
End: 2020-03-06
Payer: COMMERCIAL

## 2020-03-06 VITALS
SYSTOLIC BLOOD PRESSURE: 128 MMHG | HEART RATE: 99 BPM | TEMPERATURE: 98.1 F | HEIGHT: 71 IN | OXYGEN SATURATION: 98 % | WEIGHT: 270.13 LBS | BODY MASS INDEX: 37.82 KG/M2 | RESPIRATION RATE: 20 BRPM | DIASTOLIC BLOOD PRESSURE: 80 MMHG

## 2020-03-06 DIAGNOSIS — G43.109 MIGRAINE WITH AURA AND WITHOUT STATUS MIGRAINOSUS, NOT INTRACTABLE: Primary | ICD-10-CM

## 2020-03-06 PROCEDURE — 99214 OFFICE O/P EST MOD 30 MIN: CPT | Performed by: INTERNAL MEDICINE

## 2020-03-06 RX ORDER — SUMATRIPTAN 25 MG/1
25 TABLET, FILM COATED ORAL
Qty: 30 TABLET | Refills: 0 | Status: SHIPPED | OUTPATIENT
Start: 2020-03-06 | End: 2024-04-10

## 2020-03-06 ASSESSMENT — MIFFLIN-ST. JEOR: SCORE: 2041.41

## 2020-03-06 NOTE — PROGRESS NOTES
"Subjective     Alina Thompson is a 29 year old female who presents to clinic today for the following health issues:    HPI   Forms  FMLA forms -Migraines     3 episodes per month over last 2 months -- having headache and migraines at different times    Also complains of orthostatic hypotension symptoms at times.    1. Migraine with aura and without status migrainosus, not intractable        PMH: Updated and/or reviewed in chart.    ROS:  Constitutional, neuro, endocrine, pulmonary, cardiac, gastrointestinal, genitourinary, musculoskeletal, integument and psychiatric systems are otherwise negative.    Objective   OBJECTIVE:                                                    /80   Pulse 99   Temp 98.1  F (36.7  C) (Tympanic)   Resp 20   Ht 1.803 m (5' 11\")   Wt 122.5 kg (270 lb 2 oz)   LMP 02/12/2020   SpO2 98%   Breastfeeding No   BMI 37.67 kg/m      GEN: No acute distress  EYES: No conjunctival injection or icterus, EOMI grossly intact  RESP: Unlabored, regular  NEURO: Normal gait, MAEx4, light touch sensation grossly intact  PSYCH: Normal mood and affect    No results found for any visits on 03/06/20.   Assessment & Plan   ASSESSMENT/PLAN:                                                    I spent a total of 25 minutes with the patient of which greater than 50% were devoted to counseling and coordination of care: FMLA forms and migraine.      1. Migraine with aura and without status migrainosus, not intractable  Hold off on neurology consult until able to trial triptan, trigger recording and avoidance.  Work with GYN to eliminate estrogen, as able.    - SUMAtriptan (IMITREX) 25 MG tablet; Take 1 tablet (25 mg) by mouth at onset of headache for migraine May repeat in 2 hours. Max 8 tablets/24 hours.  Dispense: 30 tablet; Refill: 0      Orders Placed This Encounter     SUMAtriptan (IMITREX) 25 MG tablet        Return in about 3 months (around 6/6/2020).    Genaro Vu MD     "

## 2020-03-09 ENCOUNTER — E-VISIT (OUTPATIENT)
Dept: INTERNAL MEDICINE | Facility: CLINIC | Age: 30
End: 2020-03-09
Payer: COMMERCIAL

## 2020-03-09 DIAGNOSIS — R50.9 FEVER, UNSPECIFIED FEVER CAUSE: Primary | ICD-10-CM

## 2020-03-09 PROCEDURE — 99207 ZZC NON-BILLABLE SERV PER CHARTING: CPT | Performed by: INTERNAL MEDICINE

## 2020-03-10 ENCOUNTER — OFFICE VISIT (OUTPATIENT)
Dept: FAMILY MEDICINE | Facility: CLINIC | Age: 30
End: 2020-03-10
Payer: COMMERCIAL

## 2020-03-10 VITALS
HEART RATE: 98 BPM | BODY MASS INDEX: 37.15 KG/M2 | OXYGEN SATURATION: 98 % | RESPIRATION RATE: 18 BRPM | WEIGHT: 265.38 LBS | HEIGHT: 71 IN | SYSTOLIC BLOOD PRESSURE: 123 MMHG | TEMPERATURE: 98 F | DIASTOLIC BLOOD PRESSURE: 80 MMHG

## 2020-03-10 DIAGNOSIS — E11.9 TYPE 2 DIABETES MELLITUS WITHOUT COMPLICATION, WITHOUT LONG-TERM CURRENT USE OF INSULIN (H): ICD-10-CM

## 2020-03-10 DIAGNOSIS — B34.9 VIRAL SYNDROME: Primary | ICD-10-CM

## 2020-03-10 DIAGNOSIS — R52 GENERALIZED BODY ACHES: ICD-10-CM

## 2020-03-10 LAB
FLUAV+FLUBV AG SPEC QL: NEGATIVE
FLUAV+FLUBV AG SPEC QL: NEGATIVE
SPECIMEN SOURCE: NORMAL

## 2020-03-10 PROCEDURE — 99213 OFFICE O/P EST LOW 20 MIN: CPT | Performed by: NURSE PRACTITIONER

## 2020-03-10 PROCEDURE — 87804 INFLUENZA ASSAY W/OPTIC: CPT | Performed by: NURSE PRACTITIONER

## 2020-03-10 ASSESSMENT — MIFFLIN-ST. JEOR: SCORE: 2019.86

## 2020-03-10 NOTE — PATIENT INSTRUCTIONS
"Reminders:     Please remember to arrive 5-10 minutes early for your appointments. If you are late you may need to reschedule your appointment.    If you have mychart please be aware your results and communications will be sent within your mychart. Unless results are critical and/or urgent value.       Patient Education     Viral Syndrome (Adult)  A viral illness may cause a number of symptoms such as fever. Other symptoms depend on the part of the body that the virus affects. If it settles in your nose, throat, and lungs, it may cause cough, sore throat, congestion, runny nose, headache, earache and other ear symptoms, or shortness of breath. If it settles in your stomach and intestinal tract, it may cause nausea, vomiting, cramping, and diarrhea. Sometimes it causes generalized symptoms like \"aching all over,\" feeling tired, loss of energy, or loss of appetite.  A viral illness usually lasts anywhere from several days to several weeks, but sometimes it lasts longer. In some cases, a more serious infection can look like a viral syndrome in the first few days of the illness. You may need another exam and additional tests to know the difference. Watch for the warning signs listed below for when to seek medical advice.  Home care  Follow these guidelines for taking care of yourself at home:    If symptoms are severe, rest at home for the first 2 to 3 days.    Stay away from cigarette smoke - both your smoke and the smoke from others.    You may use over-the-counter acetaminophen or ibuprofen for fever, muscle aching, and headache, unless another medicine was prescribed for this. If you have chronic liver or kidney disease or ever had a stomach ulcer or gastrointestinal bleeding, talk with your healthcare provider before using these medicines. No one who is younger than 18 and ill with a fever should take aspirin. It may cause severe disease or death.    Your appetite may be poor, so a light diet is fine. Avoid " dehydration by drinking 8 to 12, 8-ounce glasses of fluids each day. This may include water; orange juice; lemonade; apple, grape, and cranberry juice; clear fruit drinks; electrolyte replacement and sports drinks; and decaffeinated teas and coffee. If you have been diagnosed with a kidney disease, ask your healthcare provider how much and what types of fluids you should drink to prevent dehydration. If you have kidney disease, drinking too much fluid can cause it build up in the your body and be dangerous to your health.    Over-the-counter remedies won't shorten the length of the illness but may be helpful for symptoms such as cough, sore throat, nasal and sinus congestion, or diarrhea. Don't use decongestants if you have high blood pressure.  Follow-up care  Follow up with your healthcare provider if you do not improve over the next week.  Call 911  Call 911 if any of the following occur:    Convulsion    Feeling weak, dizzy, or like you are going to faint    Chest pain, or more than mild shortness of breath  When to seek medical advice  Call your healthcare provider right away if any of these occur:    Cough with lots of colored sputum (mucus) or blood in your sputum    Chest pain, shortness of breath, wheezing, or trouble breathing    Severe headache; face, neck, or ear pain    Severe, constant pain in the lower right side of your belly (abdominal)    Continued vomiting (can t keep liquids down)    Frequent diarrhea (more than 5 times a day); blood (red or black color) or mucus in diarrhea    Feeling weak, dizzy, or like you are going to faint    Extreme thirst    Fever of 100.4 F (38 C) or higher, or as directed by your healthcare provider  Date Last Reviewed: 4/1/2018 2000-2019 [employer]. [address] [place] [address], [place] [address]. All rights reserved. This information is not intended as a substitute for professional medical care. Always follow your healthcare professional's  instructions.

## 2020-03-10 NOTE — PROGRESS NOTES
SUBJECTIVE:  Alina Thompson  is a 29 year old  female  who presents with the following concerns;    Symptom duration:  3 days   Sympom severity:  moderate   Treatments tried:  OTC   Contacts:  none              Symptoms: Present Comment   Fever/Chills     Fatigue x    Muscle Aches x    Eye Irritation     Sneezing     Nasal Bonifacio/Drg x    Sinus Pressure/Pain     Loss of smell     Dental pain x    Sore Throat     Swollen Glands     Ear Pain/Fullness     Cough x    Wheeze     Chest Pain     Shortness of breath x    Rash x    Headache     Stomach/GI issues     Other       Medications updated and reviewed.  Past, family and surgical history is updated and reviewed in the record.  Patient Active Problem List    Diagnosis Date Noted     Migraine with aura and without status migrainosus, not intractable 01/21/2020     Priority: Medium     Cervical spine pain 04/17/2019     Priority: Medium     History of elevated blood pressure while in hospital 04/12/2019     Priority: Medium     Type 2 diabetes mellitus, without long-term current use of insulin (H) 04/12/2019     Priority: Medium     SANDRA (obstructive sleep apnea)- mild (AHI 12) 01/16/2017     Priority: Medium     Study Date: 1/09/2017- (300.0 lbs) apnea/hypopnea index was 12.2 events per hour.  The REM AHI was 32.3 events per hour.  The supine AHI was 51.3 events per hour.  The RERA index was 2.1 events per hour.   The RDI was 14.3 events per hour. . Lowest oxygen saturation was 70.1%.  Time spent less than or equal to 88% was 4.7 minutes. The PLM index was 5.2 movements per hour.        Morbid obesity due to excess calories (H) 11/14/2016     Priority: Medium     Idiopathic hypersomnia 11/11/2016     Priority: Medium     2007 MSL of 10.4 with REM present on 3 of the naps  10/8/2012. She was off all stimulant medications. Actigraphy x1 week- Adequate total sleep. Polysomnogram-Total sleep time 434. Sleep efficiency was 79.7%. Latency to sleep 13.5 minutes. Latency to  .0 minutes. No significant sleep disordered breathing with only rare hypopneas, RDI was 0.7 per hour, oxygen remained above 92%, No CO2 retention.   MSLT revealed a mean latency of 8.75 minutes on the first 4 naps with sleep present in nap 1, 2 and 4 and REM sleep present on nap 4. The fifth nap was 20 minutes and was not included in the MSL.       Past Medical History:   Diagnosis Date     Depression      DM (diabetes mellitus), type 2 (H)      Obesity      Recurrent vaginitis     BV and Yeast     Sleep apnea       Family History   Problem Relation Age of Onset     Sleep Apnea Other         Grandmother     Obesity Mother      Obesity Father      Diabetes Maternal Grandmother      Heart Disease Maternal Grandmother      Obesity Maternal Grandmother      Obesity Maternal Grandfather      Diabetes Paternal Grandmother      Genetic Disorder Paternal Grandmother      Obesity Paternal Grandmother      Liver Cancer Paternal Grandfather      Obesity Sister      ROS:  Other than noted above, general, HEENT, respiratory, cardiac and gastrointestinal systems are negative.    OBJECTIVE:  GENERAL:  Alert, no acute distress  HEENT:  Ears and TMs normal, oral mucosa and posterior oropharynx normal  RESP:  Lungs clear to auscultation.  CV:  Normal rate, regular rhythm, no murmur or gallop.  LYMPHATICS:  No cervical, supraclavicular adenopathy  SKIN:  No suspicious rashes.    Assessment/Plan:     ICD-10-CM    1. Viral syndrome  B34.9    2. Generalized body aches  R52 Influenza A/B antigen   3. Type 2 diabetes mellitus without complication, without long-term current use of insulin (H)  E11.9 OPHTHALMOLOGY ADULT REFERRAL     CANCELED: BASIC METABOLIC PANEL        See patient instructions: discussed follow up as needed for any persistent or worsening symptoms.     Isela Cornelius, COLIN, FNP-BC

## 2020-03-16 ENCOUNTER — TELEPHONE (OUTPATIENT)
Dept: INTERNAL MEDICINE | Facility: CLINIC | Age: 30
End: 2020-03-16

## 2020-03-16 DIAGNOSIS — E11.9 TYPE 2 DIABETES MELLITUS WITHOUT COMPLICATION, WITHOUT LONG-TERM CURRENT USE OF INSULIN (H): Primary | ICD-10-CM

## 2020-03-16 NOTE — TELEPHONE ENCOUNTER
Notify patient of follow-up labs ordered for laboratory only appointment sometime in late April or early May.

## 2020-03-16 NOTE — TELEPHONE ENCOUNTER
Panel Management Review  Summary:    Patient is due/failing the following:   STATIN -diabetic patient failing statin      Health Maintenance Due   Topic Date Due     DIABETIC FOOT EXAM  1989     EYE EXAM  1989     PNEUMOCOCCAL IMMUNIZATION 19-64 MEDIUM RISK (1 of 1 - PPSV23) 12/10/2008     PREVENTIVE CARE VISIT  04/05/2020     BMP  04/05/2020        Type of outreach:    None, routed to provider for review.                                                                                                                   Alivia Fisher    Chart routed to Provider .

## 2020-04-14 ENCOUNTER — VIRTUAL VISIT (OUTPATIENT)
Dept: OBGYN | Facility: CLINIC | Age: 30
End: 2020-04-14
Payer: COMMERCIAL

## 2020-04-14 DIAGNOSIS — N30.00 ACUTE CYSTITIS WITHOUT HEMATURIA: Primary | ICD-10-CM

## 2020-04-14 PROCEDURE — 99213 OFFICE O/P EST LOW 20 MIN: CPT | Mod: TEL | Performed by: OBSTETRICS & GYNECOLOGY

## 2020-04-14 RX ORDER — NITROFURANTOIN 25; 75 MG/1; MG/1
100 CAPSULE ORAL 2 TIMES DAILY
Qty: 14 CAPSULE | Refills: 0 | Status: SHIPPED | OUTPATIENT
Start: 2020-04-14 | End: 2020-08-10

## 2020-04-14 NOTE — PROGRESS NOTES
"Alina Thompson is a 29 year old female who is being evaluated via a billable telephone visit.      The patient has been notified of following:     \"This telephone visit will be conducted via a call between you and your physician/provider. We have found that certain health care needs can be provided without the need for a physical exam.  This service lets us provide the care you need with a short phone conversation.  If a prescription is necessary we can send it directly to your pharmacy.  If lab work is needed we can place an order for that and you can then stop by our lab to have the test done at a later time.    Telephone visits are billed at different rates depending on your insurance coverage. During this emergency period, for some insurers they may be billed the same as an in-person visit.  Please reach out to your insurance provider with any questions.    If during the course of the call the physician/provider feels a telephone visit is not appropriate, you will not be charged for this service.\"    Patient has given verbal consent for Telephone visit?  Yes    How would you like to obtain your AVS? Maria Chartate    Additional provider notes:   OB-GYN Problem-Oriented Visit or Consultation      Alina Thompson is a 29 year old year old P 0 who presents with a chief complaint of bladder infection.  Referred by self.  No LMP recorded.    HPI:     History of recurrent UTI in the past but less often now. Has urinary frequency in small amounts with dysuria x 1-2d without hematuria, back pain or fever. Pushing fluids. Recent sex with one long term partner. LMP now. Menses q 28-30 days x 4-5 days.  Contraceptive method is OC. No history of kidney stones.     Past medical, obstetrical, surgical, family and social history reviewed and as noted or updated in chart.     Allergies, meds and supplements are as noted or updated in chart.      ROS:   Systems reviewed include:  constitutional, gastrointestinal, genitourinary, " hematologic/lymphatic and endocrine.    These systems were negative for significant symptoms except for the following additional: none; see HPI.    EXAM:  VS as noted. There were no vitals taken for this visit.  Constitutionally normal.     Exam not repeated at this time.    Assessment:   Encounter Diagnoses   Name Primary?     Acute cystitis without hematuria Yes       Plan and Recommendations:   Total encounter time (physician together with patient) = 15min. Direct counseling, education and care coordination time (physician together with patient) = 10min. This included the following:   I reviewed the condition, causes, differential diagnosis, prognosis, evaluation and management considerations and options.  Questions answered and information given. See orders.     Suspect simple cystitis. Advise Macrobid x 7d. May add Pyridium x 2d.   Clinic evaluation if not better.   Medications and prescriptions given as noted.  I reviewed side effects, risks, benefits and instructions on proper use.  Discussed preventive measures and possible use of postcoital abx.       A/P:  Alina was seen today for uti.    Diagnoses and all orders for this visit:    Acute cystitis without hematuria  -     nitroFURantoin macrocrystal-monohydrate (MACROBID) 100 MG capsule; Take 1 capsule (100 mg) by mouth 2 times daily        Juan Lr MD          Phone call duration: 15 minutes    Juan Lr MD
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
## 2020-08-10 ENCOUNTER — OFFICE VISIT (OUTPATIENT)
Dept: OBGYN | Facility: CLINIC | Age: 30
End: 2020-08-10
Payer: COMMERCIAL

## 2020-08-10 VITALS
OXYGEN SATURATION: 95 % | SYSTOLIC BLOOD PRESSURE: 135 MMHG | HEIGHT: 71 IN | WEIGHT: 272.6 LBS | HEART RATE: 90 BPM | DIASTOLIC BLOOD PRESSURE: 84 MMHG | TEMPERATURE: 97.5 F | BODY MASS INDEX: 38.16 KG/M2

## 2020-08-10 DIAGNOSIS — Z30.41 SURVEILLANCE OF PREVIOUSLY PRESCRIBED CONTRACEPTIVE PILL: ICD-10-CM

## 2020-08-10 DIAGNOSIS — Z01.419 ENCOUNTER FOR GYNECOLOGICAL EXAMINATION WITHOUT ABNORMAL FINDING: Primary | ICD-10-CM

## 2020-08-10 PROCEDURE — 99395 PREV VISIT EST AGE 18-39: CPT | Performed by: NURSE PRACTITIONER

## 2020-08-10 RX ORDER — DESOGESTREL AND ETHINYL ESTRADIOL 0.15-0.03
1 KIT ORAL DAILY
Qty: 84 TABLET | Refills: 3 | Status: SHIPPED | OUTPATIENT
Start: 2020-08-10 | End: 2021-04-26

## 2020-08-10 ASSESSMENT — PAIN SCALES - GENERAL: PAINLEVEL: NO PAIN (0)

## 2020-08-10 ASSESSMENT — MIFFLIN-ST. JEOR: SCORE: 2048.67

## 2020-08-11 ENCOUNTER — MYC MEDICAL ADVICE (OUTPATIENT)
Dept: OBGYN | Facility: CLINIC | Age: 30
End: 2020-08-11

## 2020-08-11 DIAGNOSIS — Z01.00 EXAMINATION OF EYES AND VISION: Primary | ICD-10-CM

## 2020-11-05 ENCOUNTER — TELEPHONE (OUTPATIENT)
Dept: OBGYN | Facility: CLINIC | Age: 30
End: 2020-11-05

## 2020-11-05 NOTE — TELEPHONE ENCOUNTER
"Pt is asking if she can get a wet prep done if on Menstrual cycle, Please advise..     \"Now I've been told before that it doesn't matter if I'm on my cycle.  I have had swabs done in the past while on my period.  So what is the actual correct answer?\"      Isidra Lopez CMA 11/5/2020 8:30 AM            "

## 2020-11-05 NOTE — TELEPHONE ENCOUNTER
To get the most accurate test result, I recommend waiting until after her cycle is done for testing. I see she had also desired STI screening-if she does schedule an appointment for her vaginal symptoms, please inform her that right now due to a national shortage of test supply kits for gonorrhea and chlamydia, routine screening is not being done for those 2 STIs. We can certainly screen for others, but we are in a holding pattern right now and hope to have more supplies available in the upcoming weeks. Lidia SHAW CNP

## 2020-11-22 ENCOUNTER — HEALTH MAINTENANCE LETTER (OUTPATIENT)
Age: 30
End: 2020-11-22

## 2021-01-04 ENCOUNTER — MYC MEDICAL ADVICE (OUTPATIENT)
Dept: INTERNAL MEDICINE | Facility: CLINIC | Age: 31
End: 2021-01-04

## 2021-01-04 NOTE — TELEPHONE ENCOUNTER
Patient last saw Dr. Vu 3/6/20 for migraines and FMLA forms.       She saw Isela Cornelius 3/10/20 for viral syndrome.       She has future lab orders per Dr. Vu dated 3/16/20.  Needs fasting labs (FLP ordered).    [facility], Dr. Ibarra advised he could do her FMLA paper work if she faxed the new form and did a virtual visit with him.      Routed to Dr. Ibarra to advise on what provider might be most appropriate for this patient and her many concerns (including skin tag removal, see her [employer] message).    Lesly Nieto RN  [facility]

## 2021-01-05 NOTE — TELEPHONE ENCOUNTER
Please see MyChart message.  Patient has a lot of concerns which she would like to address as well as [facility] paperwork.  Please offer 1 hour appointment with me on 1/14/21 at [phone].  Another option is schedule multiple separate appointments example (1/7/21 at 5 pm, 40 minutes).  I will unfortunately unable to remove skin tag at that appointment.

## 2021-01-08 NOTE — PROGRESS NOTES
"  Assessment & Plan     Vulvar pruritus  We discussed her symptoms. Exam today normal and we discussed possible etiologies. Reviewed symptom relief measures to try and when follow up in clinic is needed. Will follow up with patient once remaining results available.  - Wet prep  - Neisseria gonorrhoeae PCR  - Chlamydia trachomatis PCR    Screen for STD (sexually transmitted disease)  Will follow up with patient when results available.  - Hepatitis C antibody  - Treponema Abs w Reflex to RPR and Titer  - HIV Antigen Antibody Combo  - Hepatitis B surface antigen    COLIN Castle CNP  [facility]     Alina is a 30 year old who presents to clinic today for the following health issues:  HPI       Vaginal Rash    Intermittently for the last 4-5 months has been noticing an area of discomfort/itching with a possible pimple like bump. It is not painful, only on the right side, no other areas. No visible open sore/lesions. Denies abnormal vaginal discharge, odor, urinary symptoms, pelvic pain, fever, nausea. No onset of body aches prior to symptoms beginning. Resolves spontaneously. Same partner close to a year, would like STI screening. Unsure if there is anything present now. No changes to soaps or other products she is using.     Review of Systems   Constitutional, HEENT, cardiovascular, pulmonary, gi and gu systems are negative, except as otherwise noted.      Objective    BP (!) 145/104 (BP Location: Right arm, Patient Position: Sitting, Cuff Size: Adult Large)   Pulse 86   Temp 98  F (36.7  C) (Tympanic)   Ht 1.803 m (5' 11\")   Wt 123.6 kg (272 lb 6.4 oz)   SpO2 99%   BMI 37.99 kg/m    Body mass index is 37.99 kg/m .  Physical Exam   GENERAL: healthy, alert and no distress   (female): normal female external genitalia-patient points out area of concern and exam reveals normal tissue, normal urethral meatus  and vaginal mucosa pink, moist, well rugated  MS: no " gross musculoskeletal defects noted, no edema  SKIN: no suspicious lesions or rashes  NEURO: mentation intact and speech normal  PSYCH: mentation appears normal, affect normal/bright    Results for orders placed or performed in visit on 01/11/21   Wet prep     Status: None    Specimen: Vagina   Result Value Ref Range    Specimen Description Vagina     Wet Prep No Trichomonas seen     Wet Prep No clue cells seen     Wet Prep No yeast seen     Wet Prep Few  WBC'S seen

## 2021-01-11 ENCOUNTER — OFFICE VISIT (OUTPATIENT)
Dept: OBGYN | Facility: CLINIC | Age: 31
End: 2021-01-11
Payer: COMMERCIAL

## 2021-01-11 VITALS
BODY MASS INDEX: 38.14 KG/M2 | DIASTOLIC BLOOD PRESSURE: 104 MMHG | WEIGHT: 272.4 LBS | HEIGHT: 71 IN | TEMPERATURE: 98 F | HEART RATE: 86 BPM | SYSTOLIC BLOOD PRESSURE: 145 MMHG | OXYGEN SATURATION: 99 %

## 2021-01-11 DIAGNOSIS — Z11.3 SCREEN FOR STD (SEXUALLY TRANSMITTED DISEASE): ICD-10-CM

## 2021-01-11 DIAGNOSIS — L29.2 VULVAR PRURITUS: Primary | ICD-10-CM

## 2021-01-11 LAB
SPECIMEN SOURCE: NORMAL
WET PREP SPEC: NORMAL

## 2021-01-11 PROCEDURE — 87210 SMEAR WET MOUNT SALINE/INK: CPT | Performed by: NURSE PRACTITIONER

## 2021-01-11 PROCEDURE — 86780 TREPONEMA PALLIDUM: CPT | Mod: 90 | Performed by: NURSE PRACTITIONER

## 2021-01-11 PROCEDURE — 87389 HIV-1 AG W/HIV-1&-2 AB AG IA: CPT | Performed by: NURSE PRACTITIONER

## 2021-01-11 PROCEDURE — 87591 N.GONORRHOEAE DNA AMP PROB: CPT | Performed by: NURSE PRACTITIONER

## 2021-01-11 PROCEDURE — 87340 HEPATITIS B SURFACE AG IA: CPT | Performed by: NURSE PRACTITIONER

## 2021-01-11 PROCEDURE — 86803 HEPATITIS C AB TEST: CPT | Performed by: NURSE PRACTITIONER

## 2021-01-11 PROCEDURE — 87491 CHLMYD TRACH DNA AMP PROBE: CPT | Performed by: NURSE PRACTITIONER

## 2021-01-11 PROCEDURE — 99213 OFFICE O/P EST LOW 20 MIN: CPT | Performed by: NURSE PRACTITIONER

## 2021-01-11 PROCEDURE — 99000 SPECIMEN HANDLING OFFICE-LAB: CPT | Performed by: NURSE PRACTITIONER

## 2021-01-11 PROCEDURE — 36415 COLL VENOUS BLD VENIPUNCTURE: CPT | Performed by: NURSE PRACTITIONER

## 2021-01-11 ASSESSMENT — MIFFLIN-ST. JEOR: SCORE: 2046.73

## 2021-01-11 ASSESSMENT — PAIN SCALES - GENERAL: PAINLEVEL: NO PAIN (0)

## 2021-01-12 LAB
C TRACH DNA SPEC QL NAA+PROBE: NEGATIVE
HBV SURFACE AG SERPL QL IA: NONREACTIVE
HCV AB SERPL QL IA: NONREACTIVE
HIV 1+2 AB+HIV1 P24 AG SERPL QL IA: NONREACTIVE
N GONORRHOEA DNA SPEC QL NAA+PROBE: NEGATIVE
SPECIMEN SOURCE: NORMAL
SPECIMEN SOURCE: NORMAL
T PALLIDUM AB SER QL: NONREACTIVE

## 2021-01-14 ENCOUNTER — OFFICE VISIT (OUTPATIENT)
Dept: FAMILY MEDICINE | Facility: CLINIC | Age: 31
End: 2021-01-14
Payer: COMMERCIAL

## 2021-01-14 VITALS
TEMPERATURE: 98.8 F | SYSTOLIC BLOOD PRESSURE: 128 MMHG | RESPIRATION RATE: 12 BRPM | HEIGHT: 71 IN | WEIGHT: 270 LBS | BODY MASS INDEX: 37.8 KG/M2 | OXYGEN SATURATION: 98 % | DIASTOLIC BLOOD PRESSURE: 78 MMHG | HEART RATE: 90 BPM

## 2021-01-14 DIAGNOSIS — E11.9 DIABETES MELLITUS TYPE II, NON INSULIN DEPENDENT (H): ICD-10-CM

## 2021-01-14 DIAGNOSIS — F41.1 GAD (GENERALIZED ANXIETY DISORDER): ICD-10-CM

## 2021-01-14 DIAGNOSIS — G43.109 MIGRAINE WITH AURA AND WITHOUT STATUS MIGRAINOSUS, NOT INTRACTABLE: Primary | ICD-10-CM

## 2021-01-14 DIAGNOSIS — Z23 NEED FOR VACCINATION: ICD-10-CM

## 2021-01-14 DIAGNOSIS — L30.9 ECZEMA, UNSPECIFIED TYPE: ICD-10-CM

## 2021-01-14 PROCEDURE — 90471 IMMUNIZATION ADMIN: CPT | Performed by: FAMILY MEDICINE

## 2021-01-14 PROCEDURE — 90686 IIV4 VACC NO PRSV 0.5 ML IM: CPT | Performed by: FAMILY MEDICINE

## 2021-01-14 PROCEDURE — 99214 OFFICE O/P EST MOD 30 MIN: CPT | Mod: 25 | Performed by: FAMILY MEDICINE

## 2021-01-14 PROCEDURE — 99207 PR FOOT EXAM NO CHARGE: CPT | Mod: 25 | Performed by: FAMILY MEDICINE

## 2021-01-14 RX ORDER — TRIAMCINOLONE ACETONIDE 1 MG/G
OINTMENT TOPICAL 2 TIMES DAILY PRN
Qty: 80 G | Refills: 1 | Status: SHIPPED | OUTPATIENT
Start: 2021-01-14 | End: 2024-04-10

## 2021-01-14 RX ORDER — FLUOXETINE 10 MG/1
10 CAPSULE ORAL DAILY
Qty: 30 CAPSULE | Refills: 0 | Status: SHIPPED | OUTPATIENT
Start: 2021-01-14 | End: 2021-06-01

## 2021-01-14 ASSESSMENT — ENCOUNTER SYMPTOMS
ARTHRALGIAS: 0
MYALGIAS: 0
DIZZINESS: 0
PALPITATIONS: 0
CONSTIPATION: 0
EYE PAIN: 0
COUGH: 0
HEADACHES: 0
WEAKNESS: 0
HEMATURIA: 0
FEVER: 0
SHORTNESS OF BREATH: 0
BREAST MASS: 0
HEMATOCHEZIA: 0
ABDOMINAL PAIN: 0
DYSURIA: 0
NAUSEA: 0
FREQUENCY: 0
JOINT SWELLING: 0
HEARTBURN: 0
SORE THROAT: 0
CHILLS: 0
NERVOUS/ANXIOUS: 1
DIARRHEA: 0
PARESTHESIAS: 0

## 2021-01-14 ASSESSMENT — ANXIETY QUESTIONNAIRES
2. NOT BEING ABLE TO STOP OR CONTROL WORRYING: SEVERAL DAYS
7. FEELING AFRAID AS IF SOMETHING AWFUL MIGHT HAPPEN: NOT AT ALL
5. BEING SO RESTLESS THAT IT IS HARD TO SIT STILL: NOT AT ALL
IF YOU CHECKED OFF ANY PROBLEMS ON THIS QUESTIONNAIRE, HOW DIFFICULT HAVE THESE PROBLEMS MADE IT FOR YOU TO DO YOUR WORK, TAKE CARE OF THINGS AT HOME, OR GET ALONG WITH OTHER PEOPLE: SOMEWHAT DIFFICULT
GAD7 TOTAL SCORE: 7
3. WORRYING TOO MUCH ABOUT DIFFERENT THINGS: MORE THAN HALF THE DAYS
1. FEELING NERVOUS, ANXIOUS, OR ON EDGE: SEVERAL DAYS
6. BECOMING EASILY ANNOYED OR IRRITABLE: MORE THAN HALF THE DAYS

## 2021-01-14 ASSESSMENT — MIFFLIN-ST. JEOR: SCORE: 2035.84

## 2021-01-14 ASSESSMENT — PATIENT HEALTH QUESTIONNAIRE - PHQ9
5. POOR APPETITE OR OVEREATING: SEVERAL DAYS
SUM OF ALL RESPONSES TO PHQ QUESTIONS 1-9: 5

## 2021-01-14 NOTE — PROGRESS NOTES
Assessment & Plan     Migraine with aura and without status migrainosus, not intractable  FMLA paperwork completed.  Continue with imitrex as needed.     Need for vaccination  - INFLUENZA VACCINE IM > 6 MONTHS VALENT IIV4 [52989]    Eczema, unspecified type  - triamcinolone (KENALOG) 0.1 % external ointment; Apply topically 2 times daily as needed for irritation    BINDU (generalized anxiety disorder)  Would like to start prozac 10 mg daily.   - FLUoxetine (PROZAC) 10 MG capsule; Take 1 capsule (10 mg) by mouth daily    Diabetes mellitus type II, non insulin dependent (H)  Currently diet controlled.   - FOOT EXAM    See Patient Instructions    Return in about 1 month (around 2/14/2021) for with me, in person, anxiety and skin tag removal.    Cameron Ibarra [facility] DARON Fuller is a 30 year old who presents to clinic today for the following health issues  HPI       *Declines diabetes management discussion until Dr. Vu returns- wants to check in March with his return    *Discuss anxiety medication    *Discuss FMLA forms    *Skin tag removal    Nhi Jaquez CMA    BP Readings from Last 2 Encounters:   01/14/21 128/78   01/11/21 (!) 145/104     Hemoglobin A1C (%)   Date Value   01/21/2020 5.9 (H)   09/11/2019 6.6 (H)     LDL Cholesterol Calculated (mg/dL)   Date Value   05/06/2019 106 (H)               Social History     Tobacco Use     Smoking status: Never Smoker     Smokeless tobacco: Never Used   Substance Use Topics     Alcohol use: Yes     Frequency: 2-4 times a month     Drinks per session: 1 or 2     Drug use: No     1. Migraines: Patient would like FMLA paper work.  Has aura.  Associated with nausea and vomiting.  Occurs once every 2 weeks.  Patient does not need a refill on imitrex.     2. Anxiety f/u: Currently seen by a therapist.  Ongoing for the past couple years.  Stomach in knots and anxious (not knowing why).   Excessive worry about small things.  Cries of  "little things.  Feels overwhelmed.  Patient occasionally feel flushed.  Denies severe panic attacks.  States of family history of anxiety.  Patient was previously on medication for depression in the past.  Felt foggy with wellbutrin.     3. History of eczema: She would like a refill of triamcinolone.  Rash underneath breast and fingers.  Can be itchy in nature.     Review of Systems   Constitutional: Negative for chills and fever.   HENT: Negative for congestion, ear pain, hearing loss and sore throat.    Eyes: Negative for pain and visual disturbance.   Respiratory: Negative for cough and shortness of breath.    Cardiovascular: Negative for chest pain, palpitations and peripheral edema.   Gastrointestinal: Negative for abdominal pain, constipation, diarrhea, heartburn, hematochezia and nausea.   Breasts:  Negative for tenderness, breast mass and discharge.   Genitourinary: Negative for dysuria, frequency, genital sores, hematuria, pelvic pain, urgency, vaginal bleeding and vaginal discharge.   Musculoskeletal: Negative for arthralgias, joint swelling and myalgias.   Skin: Negative for rash.   Neurological: Negative for dizziness, weakness, headaches and paresthesias.   Psychiatric/Behavioral: Negative for mood changes. The patient is nervous/anxious.          Objective    /78   Pulse 90   Temp 98.8  F (37.1  C) (Tympanic)   Resp 12   Ht 1.803 m (5' 11\")   Wt 122.5 kg (270 lb)   SpO2 98%   Breastfeeding No   BMI 37.66 kg/m    Body mass index is 37.66 kg/m .  Physical Exam  Constitutional:       General: She is not in acute distress.     Appearance: She is well-developed.   HENT:      Head: Normocephalic and atraumatic.      Nose: Nose normal.   Eyes:      Conjunctiva/sclera: Conjunctivae normal.   Neck:      Musculoskeletal: Normal range of motion.      Trachea: No tracheal deviation.   Musculoskeletal: Normal range of motion.   Skin:     General: Skin is warm.      Comments: Diabetic foot exam: normal " DP and PT pulses, no trophic changes or ulcerative lesions, normal sensory exam and normal monofilament exam     Neurological:      Mental Status: She is alert and oriented to person, place, and time.   Psychiatric:      Comments: Anxious appearing        Component      Latest Ref Rng & Units 4/5/2019 5/6/2019 9/11/2019 1/21/2020   Cholesterol      <200 mg/dL  180     Triglycerides      <150 mg/dL  92     HDL Cholesterol      >49 mg/dL  56     LDL Cholesterol Calculated      <100 mg/dL  106 (H)     Non HDL Cholesterol      <130 mg/dL  124     Hemoglobin A1C      0 - 5.6 % 7.6 (H)  6.6 (H) 5.9 (H)

## 2021-01-14 NOTE — PATIENT INSTRUCTIONS
Amadou Fuller,    Thank you for allowing [facility] to manage your care.    I sent your prescriptions to your pharmacy.    For your anxiety, I am starting you on prozac 20 mg daily.     If you have any questions or concerns, please feel free to call us at [phone].    Sincerely,    Dr. Ibarra    Did you know?      You can schedule a video visit for follow-up appointments as well as future appointments for certain conditions.  Please see the below link.     [email or web address]    If you have not already done so,  I encourage you to sign up for Wave - Private Location Appt ([email or web address]).  This will allow you to review your results, securely communicate with a provider, and schedule virtual visits as well.

## 2021-01-15 ASSESSMENT — ANXIETY QUESTIONNAIRES: GAD7 TOTAL SCORE: 7

## 2021-01-19 ENCOUNTER — MYC MEDICAL ADVICE (OUTPATIENT)
Dept: OBGYN | Facility: CLINIC | Age: 31
End: 2021-01-19

## 2021-01-20 NOTE — TELEPHONE ENCOUNTER
Pt was last seen on 1/11 with COLIN Ellison CNP.  BP was 145/104 at this visit.    Will route pt's mychart request to Lidia.    Kimberly Bingham RN

## 2021-03-24 ENCOUNTER — IMMUNIZATION (OUTPATIENT)
Dept: NURSING | Facility: CLINIC | Age: 31
End: 2021-03-24
Payer: COMMERCIAL

## 2021-03-24 PROCEDURE — 0001A PR COVID VAC PFIZER DIL RECON 30 MCG/0.3 ML IM: CPT

## 2021-03-24 PROCEDURE — 91300 PR COVID VAC PFIZER DIL RECON 30 MCG/0.3 ML IM: CPT

## 2021-04-14 ENCOUNTER — OFFICE VISIT (OUTPATIENT)
Dept: NURSING | Facility: CLINIC | Age: 31
End: 2021-04-14
Attending: INTERNAL MEDICINE
Payer: COMMERCIAL

## 2021-04-14 PROCEDURE — 91300 PR COVID VAC PFIZER DIL RECON 30 MCG/0.3 ML IM: CPT

## 2021-04-14 PROCEDURE — 0002A PR COVID VAC PFIZER DIL RECON 30 MCG/0.3 ML IM: CPT

## 2021-04-16 DIAGNOSIS — Z30.41 SURVEILLANCE OF PREVIOUSLY PRESCRIBED CONTRACEPTIVE PILL: ICD-10-CM

## 2021-04-16 NOTE — TELEPHONE ENCOUNTER
RN routing to provider to close medication as RN is unable to close medication without signing.    Pt is not in need of a refill.    Shelley Fu RN on 4/16/2021 at 3:56 PM

## 2021-04-16 NOTE — TELEPHONE ENCOUNTER
"Requested Prescriptions   Pending Prescriptions Disp Refills     APRI 0.15-30 MG-MCG tablet [Pharmacy Med Name: APRI 28 DAY TABLET] 84 tablet 1     Sig: TAKE 1 TABLET BY MOUTH EVERY DAY       Contraceptives Protocol Passed - 4/16/2021  2:28 PM        Passed - Patient is not a current smoker if age is 35 or older        Passed - Recent (12 mo) or future (30 days) visit within the authorizing provider's specialty     Patient has had an office visit with the authorizing provider or a provider within the authorizing providers department within the previous 12 mos or has a future within next 30 days. See \"Patient Info\" tab in inbasket, or \"Choose Columns\" in Meds & Orders section of the refill encounter.              Passed - Medication is active on med list        Passed - No active pregnancy on record        Passed - No positive pregnancy test in past 12 months           Last seen on 1/11/21 for vulvar pruritus.  Last prescribed on 8/10/2020, 84 tablets with 3 refills.    Refills remain at patient's pharmacy. Refill not appropriate at this time, because there are additional refills remaining on current prescription.    RN called pt who states her pharmacy doesn't have refills online. Pt has 2 weeks left of current RX.  RN spoke with Anderson at [facility]. Pt had her RX transferred to several locations, the last being in [place] (1 mos supply). [facility] in  will transfer her RX back to them to fill. Pharmacy will contact pt when RX is ready for .    Pt does NOT need another RX order at this time.    SHILA BassN RN                          "

## 2021-04-19 RX ORDER — DESOGESTREL AND ETHINYL ESTRADIOL 0.15-0.03
KIT ORAL
OUTPATIENT
Start: 2021-04-19

## 2021-04-24 ENCOUNTER — MYC MEDICAL ADVICE (OUTPATIENT)
Dept: OBGYN | Facility: CLINIC | Age: 31
End: 2021-04-24

## 2021-04-24 DIAGNOSIS — Z30.41 SURVEILLANCE OF PREVIOUSLY PRESCRIBED CONTRACEPTIVE PILL: ICD-10-CM

## 2021-04-26 RX ORDER — DESOGESTREL AND ETHINYL ESTRADIOL 0.15-0.03
1 KIT ORAL DAILY
Qty: 84 TABLET | Refills: 0 | Status: SHIPPED | OUTPATIENT
Start: 2021-04-26 | End: 2021-08-09

## 2021-04-26 NOTE — TELEPHONE ENCOUNTER
"  Requested Prescriptions   Pending Prescriptions Disp Refills     desogestrel-ethinyl estradiol (APRI) 0.15-30 MG-MCG tablet 84 tablet 3     Sig: Take 1 tablet by mouth daily       Contraceptives Protocol Passed - 4/26/2021  8:07 AM        Passed - Patient is not a current smoker if age is 35 or older        Passed - Recent (12 mo) or future (30 days) visit within the authorizing provider's specialty     Patient has had an office visit with the authorizing provider or a provider within the authorizing providers department within the previous 12 mos or has a future within next 30 days. See \"Patient Info\" tab in inbasket, or \"Choose Columns\" in Meds & Orders section of the refill encounter.              Passed - Medication is active on med list        Passed - No active pregnancy on record        Passed - No positive pregnancy test in past 12 months           Pt last seen 1/11/2021 for vulvar pruritus, pt due for annual on 8/10/2021    RN called pharmacy and they state pt only has 1 pack (1 month remaining). Pt takes pills continuously.    Prescription last sent on 8/10/2020 for 84 tablets with 3 refills.    Prescription approved per [facility] Refill Protocol.    RN sending refills to get pt to annual exam.    Shelley Fu RN on 4/26/2021 at 9:45 AM    "

## 2021-05-18 ENCOUNTER — TELEPHONE (OUTPATIENT)
Dept: FAMILY MEDICINE | Facility: CLINIC | Age: 31
End: 2021-05-18

## 2021-05-18 DIAGNOSIS — E11.9 DIABETES MELLITUS TYPE II, NON INSULIN DEPENDENT (H): Primary | ICD-10-CM

## 2021-05-18 DIAGNOSIS — Z13.220 LIPID SCREENING: ICD-10-CM

## 2021-05-18 NOTE — TELEPHONE ENCOUNTER
.  Patient Quality Outreach      Summary:        Patient is due/failing the following:   A1C, LDL (Fasting), Eye Exam and Microalbumin    Type of outreach:    Sent [employer] message.    Questions for provider review:    None                                                                                                                                     Flor Gill CMA on 5/18/2021 at 3:07 PM       Chart routed to none.

## 2021-05-19 ENCOUNTER — MYC MEDICAL ADVICE (OUTPATIENT)
Dept: OBGYN | Facility: CLINIC | Age: 31
End: 2021-05-19

## 2021-05-26 ENCOUNTER — MYC MEDICAL ADVICE (OUTPATIENT)
Dept: FAMILY MEDICINE | Facility: CLINIC | Age: 31
End: 2021-05-26

## 2021-05-26 DIAGNOSIS — Z13.220 LIPID SCREENING: ICD-10-CM

## 2021-05-26 DIAGNOSIS — E11.9 DIABETES MELLITUS TYPE 2, NONINSULIN DEPENDENT (H): ICD-10-CM

## 2021-05-26 DIAGNOSIS — E11.9 DIABETES MELLITUS TYPE II, NON INSULIN DEPENDENT (H): ICD-10-CM

## 2021-05-26 LAB
ANION GAP SERPL CALCULATED.3IONS-SCNC: 9 MMOL/L (ref 3–14)
BUN SERPL-MCNC: 9 MG/DL (ref 7–30)
CALCIUM SERPL-MCNC: 9.3 MG/DL (ref 8.5–10.1)
CHLORIDE SERPL-SCNC: 106 MMOL/L (ref 94–109)
CHOLEST SERPL-MCNC: 188 MG/DL
CO2 SERPL-SCNC: 22 MMOL/L (ref 20–32)
CREAT SERPL-MCNC: 0.63 MG/DL (ref 0.52–1.04)
CREAT UR-MCNC: 226 MG/DL
GFR SERPL CREATININE-BSD FRML MDRD: >90 ML/MIN/{1.73_M2}
GLUCOSE SERPL-MCNC: 156 MG/DL (ref 70–99)
HDLC SERPL-MCNC: 60 MG/DL
LDLC SERPL CALC-MCNC: 103 MG/DL
MICROALBUMIN UR-MCNC: 51 MG/L
MICROALBUMIN/CREAT UR: 22.48 MG/G CR (ref 0–25)
NONHDLC SERPL-MCNC: 128 MG/DL
POTASSIUM SERPL-SCNC: 4.2 MMOL/L (ref 3.4–5.3)
SODIUM SERPL-SCNC: 137 MMOL/L (ref 133–144)
TRIGL SERPL-MCNC: 127 MG/DL

## 2021-05-26 PROCEDURE — 36415 COLL VENOUS BLD VENIPUNCTURE: CPT | Performed by: FAMILY MEDICINE

## 2021-05-26 PROCEDURE — 80048 BASIC METABOLIC PNL TOTAL CA: CPT | Performed by: FAMILY MEDICINE

## 2021-05-26 PROCEDURE — 80061 LIPID PANEL: CPT | Performed by: FAMILY MEDICINE

## 2021-05-26 PROCEDURE — 82043 UR ALBUMIN QUANTITATIVE: CPT | Performed by: FAMILY MEDICINE

## 2021-05-26 NOTE — TELEPHONE ENCOUNTER
Referral requests were sent by patient (via My Chart) on 5/18/21.     1.  Weight Loss Surgery (Video visit with    Bariatric Dept tomorrow 5/27/21)   2. Sleep Apnea management  3. Eye Doctor     Patient was last seen by Dr. Cameron Ibarra last on 1/14/21 with the following instructions: Return in about 1 month (around 2/14/2021) for with me, in person, anxiety and skin tag removal.      Jun 01, 2021 10:20 AM  Office Visit with Cameron Ibarra DO  [facility] [place] ([facility] ) [address]  [place] [id number] [phone]             Please review and advise.     Johnna Veras RN BSN  [facility]

## 2021-05-27 ENCOUNTER — MYC MEDICAL ADVICE (OUTPATIENT)
Dept: SURGERY | Facility: CLINIC | Age: 31
End: 2021-05-27

## 2021-05-27 ENCOUNTER — VIRTUAL VISIT (OUTPATIENT)
Dept: SURGERY | Facility: CLINIC | Age: 31
End: 2021-05-27
Payer: COMMERCIAL

## 2021-05-27 VITALS — BODY MASS INDEX: 38.22 KG/M2 | WEIGHT: 273 LBS | HEIGHT: 71 IN

## 2021-05-27 DIAGNOSIS — E11.9 TYPE 2 DIABETES MELLITUS WITHOUT COMPLICATION, WITHOUT LONG-TERM CURRENT USE OF INSULIN (H): ICD-10-CM

## 2021-05-27 DIAGNOSIS — Z13.29 SCREENING FOR ENDOCRINE, NUTRITIONAL, METABOLIC AND IMMUNITY DISORDER: ICD-10-CM

## 2021-05-27 DIAGNOSIS — Z13.0 SCREENING FOR ENDOCRINE, NUTRITIONAL, METABOLIC AND IMMUNITY DISORDER: ICD-10-CM

## 2021-05-27 DIAGNOSIS — E66.09 CLASS 2 OBESITY DUE TO EXCESS CALORIES WITH BODY MASS INDEX (BMI) OF 38.0 TO 38.9 IN ADULT, UNSPECIFIED WHETHER SERIOUS COMORBIDITY PRESENT: Primary | ICD-10-CM

## 2021-05-27 DIAGNOSIS — Z13.228 SCREENING FOR ENDOCRINE, NUTRITIONAL, METABOLIC AND IMMUNITY DISORDER: ICD-10-CM

## 2021-05-27 DIAGNOSIS — G47.33 OSA (OBSTRUCTIVE SLEEP APNEA): Chronic | ICD-10-CM

## 2021-05-27 DIAGNOSIS — Z13.21 SCREENING FOR ENDOCRINE, NUTRITIONAL, METABOLIC AND IMMUNITY DISORDER: ICD-10-CM

## 2021-05-27 DIAGNOSIS — Z13.0 SCREENING FOR IRON DEFICIENCY ANEMIA: ICD-10-CM

## 2021-05-27 DIAGNOSIS — E66.09 CLASS 2 OBESITY DUE TO EXCESS CALORIES WITH BODY MASS INDEX (BMI) OF 38.0 TO 38.9 IN ADULT, UNSPECIFIED WHETHER SERIOUS COMORBIDITY PRESENT: ICD-10-CM

## 2021-05-27 DIAGNOSIS — E66.812 CLASS 2 OBESITY DUE TO EXCESS CALORIES WITH BODY MASS INDEX (BMI) OF 38.0 TO 38.9 IN ADULT, UNSPECIFIED WHETHER SERIOUS COMORBIDITY PRESENT: ICD-10-CM

## 2021-05-27 DIAGNOSIS — E66.812 CLASS 2 OBESITY DUE TO EXCESS CALORIES WITH BODY MASS INDEX (BMI) OF 38.0 TO 38.9 IN ADULT, UNSPECIFIED WHETHER SERIOUS COMORBIDITY PRESENT: Primary | ICD-10-CM

## 2021-05-27 PROCEDURE — 99215 OFFICE O/P EST HI 40 MIN: CPT | Mod: 95 | Performed by: PHYSICIAN ASSISTANT

## 2021-05-27 PROCEDURE — 97803 MED NUTRITION INDIV SUBSEQ: CPT | Mod: GT | Performed by: DIETITIAN, REGISTERED

## 2021-05-27 ASSESSMENT — MIFFLIN-ST. JEOR
SCORE: 2045.48
SCORE: 2045.48

## 2021-05-27 NOTE — PATIENT INSTRUCTIONS
It was great seeing you today.  Below is the updated task list items.  Please remember to come in next week and get your vitals taken!  Have a great holiday weekend             BARIATRIC TASK LIST      Lose 5 lbs prior to surgery.  Will update when patient comes into clinic June 1, 2021  Have preoperative laboratory tests drawn. - Will need updated  Psychological Evaluation with MMPI and clearance for weight loss surgery. - Will be updating with [facility]   Achieve clearance from dietitian to see surgeon.  Letter of compliance from sleep center about mandibular device - Meeting with sleep center soon  Currently on birth control    6 months structured due to insurance

## 2021-05-27 NOTE — Clinical Note
Please place her on my schedule June 1, for a 15 minute in clinic visit for a physical and vitals.  Thanks

## 2021-05-27 NOTE — PROGRESS NOTES
"    Assessment & Plan     1. Type 2 diabetes mellitus with complication, without long-term current use of insulin (H)  Currently diet controlled.  Stressed the importance of diet and exercise.   - Hemoglobin A1c; Future    2. Morbid obesity (H)  Patient is currently being followed by the weight loss clinic    3. BINDU (generalized anxiety disorder)  Would like to increase to 20 mg daily.   - FLUoxetine (PROZAC) 20 MG capsule; Take 1 capsules (20 mg) by mouth daily  Dispense: 30 capsule; Refill: 1    4. Skin lesion  Of right cheek region.  ABCDE of melanoma  - ADULT DERMATOLOGY REFERRAL; Future    5. Need for vaccination  - Pneumococcal vaccine 23 valent PPSV23  (Pneumovax) [66534]    6. Skin tag  Scheduled for skin tag removal next week.     BMI:   Estimated body mass index is 38.09 kg/m  as calculated from the following:    Height as of 5/27/21: 1.797 m (5' 10.75\").    Weight as of this encounter: 123 kg (271 lb 3.2 oz).   Weight management plan: Discussed healthy diet and exercise guidelines    See Patient Instructions    Return in about 1 week (around 6/8/2021) for in person, with me, skin tag removal (40 minutes).    Cameron Ibarra DO  [facility] DARON Fuller is a 30 year old who presents for the following health issues     HPI     Chief Complaint   Patient presents with     Multiple Concerns       Discuss labs, anxiety, diabetes, weight loss concerns, skin tags under both arms, pneumonia vaccine, seasonal allergies, dermatology referral, FMLA forms for migraines.    1. Diabetes f/u: Currently not on medications.  Patient attributes her increase HgA1c due to the pandemic.  Patient has not been exercising.      2. Weight loss: Patient is working with weight loss clinic in [place].  Patient is scheduled for psychiatry, dietician, weight loss, and compliant with CPAP (scheduled follow-up next week).  Patient plans to schedule for the sleeve procedure.    3. Skin tags: On right arm pit " and left upper arm.  Chronic in nature.  They do bother patient when shaving.     4. Health Maintenance: Patient pneumonia vaccination.     5. Suspicious skin lesion: right upper cheek and left temple region.  Ongoing for more 5 years.  Unsure if it has changed in size or coloration.  However, aunt thought it looked different.     6. Anxiety f/u: States of feeling more emotional.  Patient plans to move.  Patient is busy with school (Masters).      Review of Systems   Constitutional: Negative for chills and fever.   HENT: Negative for congestion, ear pain, hearing loss and sore throat.    Eyes: Negative for pain and visual disturbance.   Respiratory: Negative for cough and shortness of breath.    Cardiovascular: Negative for chest pain, palpitations and peripheral edema.   Gastrointestinal: Negative for abdominal pain, constipation, diarrhea, heartburn, hematochezia and nausea.   Breasts:  Negative for tenderness, breast mass and discharge.   Genitourinary: Negative for dysuria, frequency, genital sores, hematuria, pelvic pain, urgency, vaginal bleeding and vaginal discharge.   Musculoskeletal: Negative for arthralgias, joint swelling and myalgias.   Skin: Negative for rash.   Neurological: Negative for dizziness, weakness, headaches and paresthesias.   Psychiatric/Behavioral: Negative for mood changes. The patient is nervous/anxious.          Objective    BP (!) 134/92 (BP Location: Right arm, Patient Position: Sitting, Cuff Size: Adult Large)   Pulse 96   Temp 98.8  F (37.1  C) (Tympanic)   Resp 20   Wt 123 kg (271 lb 3.2 oz)   LMP 05/24/2021   BMI 38.09 kg/m    Body mass index is 38.09 kg/m .  Physical Exam  Constitutional:       General: She is not in acute distress.     Appearance: She is well-developed.   HENT:      Head: Normocephalic and atraumatic.      Nose: Nose normal.   Eyes:      Conjunctiva/sclera: Conjunctivae normal.   Neck:      Musculoskeletal: Normal range of motion.      Trachea: No  tracheal deviation.   Cardiovascular:      Rate and Rhythm: Normal rate and regular rhythm.      Heart sounds: Normal heart sounds.   Pulmonary:      Effort: Pulmonary effort is normal. No respiratory distress.      Breath sounds: Normal breath sounds.   Musculoskeletal: Normal range of motion.   Skin:     General: Skin is warm.      Comments: Approximately 1 cm pedunculated fleshy appearing skin tags involving the right axilla region and left upper arm.  Approximately 1 cm hyperpigmented light brown macule involving right cheek region   Neurological:      Mental Status: She is alert and oriented to person, place, and time.   Psychiatric:         Behavior: Behavior normal.

## 2021-05-27 NOTE — PROGRESS NOTES
"Alina is a 30 year old who is being evaluated via a billable video visit.      How would you like to obtain your AVS? MyChart  If the video visit is dropped, the invitation should be resent by: Text to cell phone: [phone]  Will anyone else be joining your video visit? No      Video Start Time: 12:58pm      Video-Visit Details    Type of service:  Video Visit    Video End Time: 1:33pm    Originating Location (pt. Location): Home    Distant Location (provider location):  [facility] [place]     Platform used for Video Visit: [employer]     New Bariatric Nutrition Consultation Note    Reason For Visit: Nutrition Assessment    Alina Thompson is a 30 year old presenting today for new bariatric nutrition consult.  Pt is interested in laparoscopic sleeve gastrectomy.  Patient is accompanied by self.    Support System Reviewed With Patient 5/25/2021   Who do you have in your support network that can be available to help you for the first 2 weeks after surgery? Mother, Sister   Who can you count on for support throughout your weight loss surgery journey? Mother, Sister, Friends       ANTHROPOMETRICS:  Estimated body mass index is 38.35 kg/m  as calculated from the following:    Height as of this encounter: 1.797 m (5' 10.75\").    Weight as of this encounter: 123.8 kg (273 lb).    Required weight loss goal pre-op: 5 lbs from initial consult weight (TBD next week at in-clinic physical exam)       5/25/2021   I have tried the following methods to lose weight Watching portions or calories, Exercise, Weight Watchers, Atkins type diet (low carb/high protein), Pre packaged meals ex: Nutrisystem       Weight Loss Questions Reviewed With Patient 5/25/2021   How long have you been overweight? Since early childhood         NUTRITION HISTORY:  Recall Diet Questions Reviewed With Patient 5/25/2021   Describe what you typically consume for breakfast (typical or most recent): yogurt, coffee, and a " fruit   Describe what you typically consume for lunch (typical or most recent): sandwich, snacks, takeout   Describe what you typically consume for supper (typical or most recent): meat, veggies, carbs   Describe what you typically consume as snacks (typical or most recent): pretzels, dairy,  nuts, peanut butter   How many ounces of water, or other low calorie drinks, do you drink daily (8 oz=1 glass)? 32 oz   How many ounces of caffeine (coffee, tea, pop) do you drink daily (8 oz=1 glass)? 8 oz   How many ounces of carbonated (pop, beer, sparkling water) drinks do you drinky daily (8 oz=1 glass)? 8 oz   How many ounces of juice, pop, sweet tea, sports drinks, protein drinks, other sweetened drinks, do you drink daily (8 oz=1 glass)? 32 oz   Please indicate the type of milk: 1%   How often do you drink alcohol? Monthly or less   If you do drink alcohol, how many drinks might you have in a day? (one drink = 5 oz. wine, 1 can/bottle of beer, 1 shot liquor) 1 or 2       Eating Habits 5/25/2021   Do you have any dietary restrictions? No   Do you currently binge eat (eat a large amount of food in a short time)? Yes   Are you an emotional eater? Yes   Do you get up to eat after falling asleep? No   What foods do you crave? Dairy, salt, and sometimes sugar       ADDITIONAL INFORMATION:  Pt has been considering surgery for many years. Started process 2 years ago but then PCP wanted pt to try to lose weight via diet and exercise for a bit longer before pursuing surgical intervention. Pt had many appropriate questions today and was familiar with all guidelines - had info from previous visits and had reviewed prior to visit. Did brief review of diet/beverage guidelines today and then discussed timeline to surgery and pre-op expectations for behavior change.     Dining Out History Reviewed With Patient 5/25/2021   How often do you dine out? A couple of times a week.   Where do you dine out? (select all that apply) sit-down  restaurants, fast food chains, take out   What types of food do you order when you dine out? Chinese, pastcarlos, garrick       Physical Activity Reviewed With Patient 5/25/2021   How often do you exercise? Less than 1 time per week   What is the duration of your exercise (in minutes)? I do not exercise.   What types of exercise do you do? I don't exercise   What keeps you from being more active?  I should be more active but I just have not gotten around to it       NUTRITION DIAGNOSIS:  Obesity r/t long history of self-monitoring deficit and excessive energy intake aeb BMI >30 kg/m2.    INTERVENTION:  Intervention Provided/Education Provided on post-op diet guidelines, vitamins/minerals essential post-operatively, GI anatomy of bariatric surgeries, ways to help prepare for post-op diet guidelines pre-operatively, portion/calorie-control, mindful eating.  Provided pt with list of goals RD contact information.      Questions Reviewed With Patient 5/25/2021   How ready are you to make changes regarding your weight? Number 1 = Not ready at all to make changes up to 10 = very ready. 10   How confident are you that you can change? 1 = Not confident that you will be successful making changes up to 10 = very confident. 10       Patient Understanding: good  Expected Compliance: good    GOALS:  Begin taking multivitamin, calcium and vitamin D per guidelines  Switch to decaf coffee  Begin walking 3x/week      Follow-Up:   Recommend 5 follow up visits to assist with lifestyle changes or per insurance.      Time spent with patient: 35 minutes.      Diya Najera RD, LD  Clinical Dietitian

## 2021-05-27 NOTE — PATIENT INSTRUCTIONS
Asif Fuller!    It was great chatting with you today! Here are some links to the information we discussed:      Vitamins/Minerals:  [email or web address]     Diet Guidelines:  [email or web address]         Here are the goals we discussed for this first month:  1. Begin taking a daily multivitamin , calcium, and vitamin D supplement - the doses/requirements for these are in the vitamin/mineral handout link above. The easiest schedule will be to take calcium 2-3x/ per day, and take everything else at bedtime.    2. Switch to decaf coffee  3. Begin walking 3x/week           Your next visit can be scheduled via the call center at  and should be in one month. Welcome to the program and let us know if any questions/concerns pop up!       Diya Najera RD, LD  Clinical Dietitian

## 2021-05-27 NOTE — TELEPHONE ENCOUNTER
Patient sent My Chart message stating she no longer needs the referrals ordered.     Johnna Veras RN BSN  [facility]

## 2021-05-27 NOTE — PROGRESS NOTES
"Alina is a 30 year old who is being evaluated via a billable video visit.      If the video visit is dropped, the invitation should be resent by: Text to cell phone: [phone]  Will anyone else be joining your video visit? No      Video-Visit Details    Type of service:  Video Visit    Video Start Time: 10:07    Video End Time: 10:53      57 minutes spent on the date of the encounter doing chart review, review of test results, patient visit and documentation     Originating Location (pt. Location): Home    Distant Location (provider location):  [facility] [place]     Platform used for Video Visit: [employer] Virtual Bariatric Surgery Consultation Note        May 27, 2021    RE: Alina Thompson  MR#: [id number]  : 1990      Referring provider:       3/29/2019   Who referred you? Francheska Sampson       Chief Complaint/Reason for visit: evaluation for possible weight loss surgery    Dear Genaro Vu MD (General),    I had the pleasure of seeing your patient, Alina Thompson, to evaluate her obesity and consider her for possible weight loss surgery. As you know, Alina Thompson is 30 year old.  She has a height of 5' 10.75\"[pt reported[, a weight of 273 lbs 0 oz, and calculated Body mass index is 38.35 kg/m .    Patient had started the process for bariatric surgery in 2019.  She ended up finding a new primary who encouraged her to try lifestyle and diet.  She was able to get down to 265 lbs weight but has gained around 7 lbs during the pandemic. She recently had labs and her hemoglobin A1C has gotten worse.        HISTORY OF PRESENT ILLNESS:  Weight Loss History Reviewed with Patient 2021   How long have you been overweight? Since early childhood   What is the most that you have ever weighed? 306   What is the most weight you have lost? 41   I have tried the following methods to lose weight Watching portions or calories, Exercise, Weight Watchers, Atkins " type diet (low carb/high protein), Pre packaged meals ex: Nutrisystem   I have tried the following weight loss medications? (Check all that apply) None   Have you ever had weight loss surgery? No       CO-MORBIDITIES OF OBESITY INCLUDE:     5/25/2021   I have the following health issues associated with obesity: Type II Diabetes, Sleep Apnea, GERD (Reflux)     Patient has mandibular device and not CPAP.  Meeting with sleep center next month     Intermittent heartburn.  No regular medications.       PAST MEDICAL HISTORY:  Past Medical History:   Diagnosis Date     Depression      DM (diabetes mellitus), type 2 (H)      Migraine with aura and without status migrainosus, not intractable 1/21/2020     Morbid obesity due to excess calories (H) 11/14/2016     Obesity      Recurrent UTI      Recurrent vaginitis     BV and Yeast     Sleep apnea        PAST SURGICAL HISTORY:  Past Surgical History:   Procedure Laterality Date     CHOLECYSTECTOMY       ENT SURGERY      ear tubes     TONSILLECTOMY       No personal or family history of blood clots or anesthesia concerns      FAMILY HISTORY:   Family History   Problem Relation Age of Onset     Sleep Apnea Other         Grandmother     Obesity Mother      Obesity Father      Diabetes Maternal Grandmother      Heart Disease Maternal Grandmother      Obesity Maternal Grandmother      Obesity Maternal Grandfather      Diabetes Paternal Grandmother      Genetic Disorder Paternal Grandmother      Obesity Paternal Grandmother      Liver Cancer Paternal Grandfather      Obesity Sister        SOCIAL HISTORY:   Social History Questions Reviewed With Patient 5/25/2021   Which best describes your employment status (select all that apply) I work full-time   If you work, what is your occupation? [employer]   Which best describes your marital status: single   Do you have children? No   Who do you have in your support network that can be available to help you for the first 2  weeks after surgery? Mother, Sister   Who can you count on for support throughout your weight loss surgery journey? Mother, Sister, Friends   Can you afford 3 meals a day?  Yes   Can you afford 50-60 dollars a month for vitamins? Yes       HABITS:     5/25/2021   How often do you drink alcohol? Monthly or less   If you do drink alcohol, how many drinks might you have in a day? (one drink = 5 oz. wine, 1 can/bottle of beer, 1 shot liquor) 1 or 2   Have you ever used any of the following nicotine products? No   Have you or are you currently using street drugs or prescription strength medication for which you do not have a prescription for? No   Do you have a history of chemical dependency (alcohol or drug abuse)? No       PSYCHOLOGICAL HISTORY:   Psychological History Reviewed With Patient 5/25/2021   Have you ever attempted suicide? Never.   Have you had thoughts of suicide in the past year? No   Have you ever been hospitalized for mental illness or a suicide attempt? Never.   Do you have a history of chronic pain? No   Have you ever been diagnosed with fibromyalgia? No   Are you currently being treated for any of the following? (select all that apply) Anxiety   Are you currently seeing a therapist or counselor?  No   Are you currently seeing a psychiatrist? No     Patient takes prn anxiety medication.      ROS:     5/25/2021   Skin:  None of the above   HEENT: Headaches   Musculoskeletal: None of the above   Cardiovascular: Chest pain, Shortness of breath with activity   Pulmonary: Shortness of breath with activity, Snoring   Gastrointestinal: Heartburn, Diarrhea   Genitourinary: None of the above   Hematological: None of the above   Neurological: Migraine headaches   Female only: Birth control       EATING BEHAVIORS:     5/25/2021   Have you or anyone else thought that you had an eating disorder? No   Do you currently binge eat (eat a large amount of food in a short time)? Yes   Are you an emotional eater? Yes   Do  "you get up to eat after falling asleep? No       EXERCISE:     5/25/2021   How often do you exercise? Less than 1 time per week   What is the duration of your exercise (in minutes)? I do not exercise.   What types of exercise do you do? I don't exercise   What keeps you from being more active?  I should be more active but I just have not gotten around to it       MEDICATIONS:  Current Outpatient Medications   Medication Sig Dispense Refill     desogestrel-ethinyl estradiol (APRI) 0.15-30 MG-MCG tablet Take 1 tablet by mouth daily 84 tablet 0     FLUoxetine (PROZAC) 10 MG capsule Take 1 capsule (10 mg) by mouth daily 30 capsule 0     SUMAtriptan (IMITREX) 25 MG tablet Take 1 tablet (25 mg) by mouth at onset of headache for migraine May repeat in 2 hours. Max 8 tablets/24 hours. 30 tablet 0     triamcinolone (KENALOG) 0.1 % external ointment Apply topically 2 times daily as needed for irritation 80 g 1       ALLERGIES:  Allergies   Allergen Reactions     Sulfa Drugs      Wellbutrin [Bupropion] Other (See Comments)     Dizziness       LABS/IMAGING/MEDICAL RECORDS REVIEW: [facility]    PHYSICAL EXAM:  Ht 5' 10.75\" (1.797 m)   Wt 273 lb (123.8 kg)   BMI 38.35 kg/m    GENERAL: Healthy, alert and no distress  EYES: Eyes grossly normal to inspection.  No discharge or erythema, or obvious scleral/conjunctival abnormalities.  RESP: No audible wheeze, cough, or visible cyanosis.  No visible retractions or increased work of breathing.    SKIN: Visible skin clear. No significant rash, abnormal pigmentation or lesions.  NEURO: Cranial nerves grossly intact.  Mentation and speech appropriate for age.  PSYCH: Mentation appears normal, affect normal/bright, judgement and insight intact, normal speech and appearance well-groomed.      In summary, Alina Thompson has Class II obesity with a Body mass index is 38.35 kg/m .  and the comorbidities stated above. She completed an informational seminar and is a candidate for the laparoscopic " gastric sleeve.  She will have to complete the following pre-requisites:      PATIENT WILL COME INTO CLINIC WEEK OF June 1, 2021 TO GET HEIGHT, WEIGHT, BLOOD PRESSURE AND PULSE      Lose 5 lbs prior to surgery.  Will update when patient comes into clinic June 1, 2021  Have preoperative laboratory tests drawn. - Will need updated  Psychological Evaluation with MMPI and clearance for weight loss surgery. - Will be updating with [facility]   Achieve clearance from dietitian to see surgeon.  Letter of compliance from sleep center about mandibular device - Meeting with sleep center soon  Currently on birth control  - Completed  6 months structured due to insurance       Today in the office we reviewed the gastric sleeve surgery as well as the preoperative, perioperative, and postoperative processes, management, and follow up were addressed.  Risks and benefits were also reviewed including the risk of death, staple line leak (1-2%), PE, DVT, ulcer, worsening GERD, N/V, stricture, hernia, wound infection, weight regain, and vitamin deficiencies. I emphasized exercise and activity along with appropriate food choice as the main foundation for weight loss with surgery providing surgical reinforcement of this.  All questions were answered.  A goal sheet and support group handout were given to the patient.      Sincerely,     Pelon Miller PA-C

## 2021-06-01 ENCOUNTER — OFFICE VISIT (OUTPATIENT)
Dept: SURGERY | Facility: CLINIC | Age: 31
End: 2021-06-01
Payer: COMMERCIAL

## 2021-06-01 ENCOUNTER — HOSPITAL ENCOUNTER (OUTPATIENT)
Dept: LAB | Facility: CLINIC | Age: 31
Discharge: HOME | End: 2021-06-01
Admitting: FAMILY MEDICINE
Payer: COMMERCIAL

## 2021-06-01 ENCOUNTER — OFFICE VISIT (OUTPATIENT)
Dept: FAMILY MEDICINE | Facility: CLINIC | Age: 31
End: 2021-06-01
Payer: COMMERCIAL

## 2021-06-01 ENCOUNTER — TELEPHONE (OUTPATIENT)
Dept: FAMILY MEDICINE | Facility: CLINIC | Age: 31
End: 2021-06-01

## 2021-06-01 VITALS
HEIGHT: 71 IN | SYSTOLIC BLOOD PRESSURE: 128 MMHG | WEIGHT: 269.9 LBS | HEART RATE: 85 BPM | BODY MASS INDEX: 37.78 KG/M2 | OXYGEN SATURATION: 98 % | DIASTOLIC BLOOD PRESSURE: 84 MMHG

## 2021-06-01 VITALS
TEMPERATURE: 98.8 F | WEIGHT: 271.2 LBS | RESPIRATION RATE: 20 BRPM | BODY MASS INDEX: 38.09 KG/M2 | SYSTOLIC BLOOD PRESSURE: 134 MMHG | DIASTOLIC BLOOD PRESSURE: 92 MMHG | HEART RATE: 96 BPM

## 2021-06-01 DIAGNOSIS — Z13.0 SCREENING FOR ENDOCRINE, NUTRITIONAL, METABOLIC AND IMMUNITY DISORDER: ICD-10-CM

## 2021-06-01 DIAGNOSIS — Z13.29 SCREENING FOR ENDOCRINE, NUTRITIONAL, METABOLIC AND IMMUNITY DISORDER: ICD-10-CM

## 2021-06-01 DIAGNOSIS — F41.1 GAD (GENERALIZED ANXIETY DISORDER): ICD-10-CM

## 2021-06-01 DIAGNOSIS — L98.9 SKIN LESION: ICD-10-CM

## 2021-06-01 DIAGNOSIS — G47.33 OSA (OBSTRUCTIVE SLEEP APNEA): Chronic | ICD-10-CM

## 2021-06-01 DIAGNOSIS — E11.8 TYPE 2 DIABETES MELLITUS WITH COMPLICATION, WITHOUT LONG-TERM CURRENT USE OF INSULIN (H): ICD-10-CM

## 2021-06-01 DIAGNOSIS — L91.8 SKIN TAG: ICD-10-CM

## 2021-06-01 DIAGNOSIS — E11.8 TYPE 2 DIABETES MELLITUS WITH COMPLICATION, WITHOUT LONG-TERM CURRENT USE OF INSULIN (H): Primary | ICD-10-CM

## 2021-06-01 DIAGNOSIS — Z13.0 SCREENING FOR IRON DEFICIENCY ANEMIA: ICD-10-CM

## 2021-06-01 DIAGNOSIS — E66.09 CLASS 2 OBESITY DUE TO EXCESS CALORIES WITH BODY MASS INDEX (BMI) OF 37.0 TO 37.9 IN ADULT, UNSPECIFIED WHETHER SERIOUS COMORBIDITY PRESENT: Primary | ICD-10-CM

## 2021-06-01 DIAGNOSIS — Z23 NEED FOR VACCINATION: ICD-10-CM

## 2021-06-01 DIAGNOSIS — E66.09 CLASS 2 OBESITY DUE TO EXCESS CALORIES WITH BODY MASS INDEX (BMI) OF 38.0 TO 38.9 IN ADULT, UNSPECIFIED WHETHER SERIOUS COMORBIDITY PRESENT: ICD-10-CM

## 2021-06-01 DIAGNOSIS — E66.01 MORBID OBESITY (H): ICD-10-CM

## 2021-06-01 DIAGNOSIS — E66.812 CLASS 2 OBESITY DUE TO EXCESS CALORIES WITH BODY MASS INDEX (BMI) OF 38.0 TO 38.9 IN ADULT, UNSPECIFIED WHETHER SERIOUS COMORBIDITY PRESENT: ICD-10-CM

## 2021-06-01 DIAGNOSIS — Z13.228 SCREENING FOR ENDOCRINE, NUTRITIONAL, METABOLIC AND IMMUNITY DISORDER: ICD-10-CM

## 2021-06-01 DIAGNOSIS — E66.812 CLASS 2 OBESITY DUE TO EXCESS CALORIES WITH BODY MASS INDEX (BMI) OF 37.0 TO 37.9 IN ADULT, UNSPECIFIED WHETHER SERIOUS COMORBIDITY PRESENT: Primary | ICD-10-CM

## 2021-06-01 DIAGNOSIS — Z13.21 SCREENING FOR ENDOCRINE, NUTRITIONAL, METABOLIC AND IMMUNITY DISORDER: ICD-10-CM

## 2021-06-01 LAB
ALBUMIN SERPL-MCNC: 3.7 G/DL (ref 3.4–5)
ALP SERPL-CCNC: 81 U/L (ref 40–150)
ALT SERPL W P-5'-P-CCNC: 48 U/L (ref 0–50)
AST SERPL W P-5'-P-CCNC: 37 U/L (ref 0–45)
BILIRUB DIRECT SERPL-MCNC: 0.1 MG/DL (ref 0–0.2)
BILIRUB SERPL-MCNC: 0.4 MG/DL (ref 0.2–1.3)
ERYTHROCYTE [DISTWIDTH] IN BLOOD BY AUTOMATED COUNT: 11.9 % (ref 10–15)
HBA1C MFR BLD: 6.7 % (ref 0–5.6)
HBA1C MFR BLD: NORMAL % (ref 0–5.6)
HCT VFR BLD AUTO: 40.2 % (ref 35–47)
HGB BLD-MCNC: 13.4 G/DL (ref 11.7–15.7)
MCH RBC QN AUTO: 29.2 PG (ref 26.5–33)
MCHC RBC AUTO-ENTMCNC: 33.3 G/DL (ref 31.5–36.5)
MCV RBC AUTO: 88 FL (ref 78–100)
PLATELET # BLD AUTO: 319 10E9/L (ref 150–450)
PROT SERPL-MCNC: 7.3 G/DL (ref 6.8–8.8)
RBC # BLD AUTO: 4.59 10E12/L (ref 3.8–5.2)
WBC # BLD AUTO: 12 10E9/L (ref 4–11)

## 2021-06-01 PROCEDURE — 90471 IMMUNIZATION ADMIN: CPT | Performed by: FAMILY MEDICINE

## 2021-06-01 PROCEDURE — 80076 HEPATIC FUNCTION PANEL: CPT | Performed by: FAMILY MEDICINE

## 2021-06-01 PROCEDURE — 90732 PPSV23 VACC 2 YRS+ SUBQ/IM: CPT | Performed by: FAMILY MEDICINE

## 2021-06-01 PROCEDURE — 96127 BRIEF EMOTIONAL/BEHAV ASSMT: CPT | Performed by: FAMILY MEDICINE

## 2021-06-01 PROCEDURE — 99214 OFFICE O/P EST MOD 30 MIN: CPT | Mod: 25 | Performed by: FAMILY MEDICINE

## 2021-06-01 PROCEDURE — 85027 COMPLETE CBC AUTOMATED: CPT | Performed by: FAMILY MEDICINE

## 2021-06-01 PROCEDURE — 36415 COLL VENOUS BLD VENIPUNCTURE: CPT | Performed by: FAMILY MEDICINE

## 2021-06-01 PROCEDURE — 83036 HEMOGLOBIN GLYCOSYLATED A1C: CPT | Performed by: FAMILY MEDICINE

## 2021-06-01 PROCEDURE — 99212 OFFICE O/P EST SF 10 MIN: CPT | Performed by: PHYSICIAN ASSISTANT

## 2021-06-01 PROCEDURE — 82306 VITAMIN D 25 HYDROXY: CPT | Performed by: FAMILY MEDICINE

## 2021-06-01 RX ORDER — FLUOXETINE 10 MG/1
20 CAPSULE ORAL DAILY
Qty: 30 CAPSULE | Refills: 1 | Status: SHIPPED | OUTPATIENT
Start: 2021-06-01 | End: 2021-06-03 | Stop reason: DRUGHIGH

## 2021-06-01 ASSESSMENT — ENCOUNTER SYMPTOMS
HEMATOCHEZIA: 0
MYALGIAS: 0
DYSURIA: 0
FEVER: 0
COUGH: 0
HEMATURIA: 0
BREAST MASS: 0
CHILLS: 0
NAUSEA: 0
WEAKNESS: 0
HEARTBURN: 0
NERVOUS/ANXIOUS: 1
SHORTNESS OF BREATH: 0
ABDOMINAL PAIN: 0
HEADACHES: 0
EYE PAIN: 0
ARTHRALGIAS: 0
SORE THROAT: 0
DIARRHEA: 0
DIZZINESS: 0
JOINT SWELLING: 0
PARESTHESIAS: 0
PALPITATIONS: 0
CONSTIPATION: 0
FREQUENCY: 0

## 2021-06-01 ASSESSMENT — ANXIETY QUESTIONNAIRES
5. BEING SO RESTLESS THAT IT IS HARD TO SIT STILL: NOT AT ALL
GAD7 TOTAL SCORE: 4
2. NOT BEING ABLE TO STOP OR CONTROL WORRYING: SEVERAL DAYS
7. FEELING AFRAID AS IF SOMETHING AWFUL MIGHT HAPPEN: NOT AT ALL
IF YOU CHECKED OFF ANY PROBLEMS ON THIS QUESTIONNAIRE, HOW DIFFICULT HAVE THESE PROBLEMS MADE IT FOR YOU TO DO YOUR WORK, TAKE CARE OF THINGS AT HOME, OR GET ALONG WITH OTHER PEOPLE: SOMEWHAT DIFFICULT
6. BECOMING EASILY ANNOYED OR IRRITABLE: NOT AT ALL
1. FEELING NERVOUS, ANXIOUS, OR ON EDGE: SEVERAL DAYS
3. WORRYING TOO MUCH ABOUT DIFFERENT THINGS: SEVERAL DAYS

## 2021-06-01 ASSESSMENT — PAIN SCALES - GENERAL: PAINLEVEL: NO PAIN (0)

## 2021-06-01 ASSESSMENT — MIFFLIN-ST. JEOR: SCORE: 2031.42

## 2021-06-01 ASSESSMENT — PATIENT HEALTH QUESTIONNAIRE - PHQ9
SUM OF ALL RESPONSES TO PHQ QUESTIONS 1-9: 4
5. POOR APPETITE OR OVEREATING: SEVERAL DAYS

## 2021-06-01 NOTE — PROGRESS NOTES
"Pre Op Bariatric Surgery Note           2021       RE: Alina Thompson  MR#: [id number]  : 1990      Patient in today for follow up to her visit last week. She is in to check vitals and have a physical exam done.        /84   Pulse 85   Ht 5' 10.75\" (1.797 m)   Wt 269 lb 14.4 oz (122.4 kg)   LMP 2021   SpO2 98%   BMI 37.91 kg/m    GENERAL:  Good development and normal affect in no acute distress. Patient is alert and orientated x 3 and answers all questions appropriately.  HEENT: Head is normocephalic, nontraumatic. Pupils equal and round without conjunctival injection. Neck is supple without lymphadenopathy, thyroidmegaly, or mass. Trachea midline. Dentition WNL.   CARDIOVASCULAR:  Regular rate and rhythm without murmurs, rubs, or gallops.  RESPIRATORY: Lungs are clear to auscultation bilaterally with good breath sounds.  GASTROINTESTINAL: Abdomen is obese, nondistended, soft, nontender, without organomegaly or masses. No bruits.  LOWER EXTREMITIES: No LE edema bilaterally, no cyanosis, ulceration, or chronic venous stasis noted.  MUSCULOSKELETAL:  Moves all 4 extremities equal and strong. Has a normal gait.   NEUROLOGIC: Cranial nerves II-XII grossly intact.  SKIN: No intertriginous irritation or rash.         Her weight loss goal was set at 264 lbs.      Pelon Miller MS, PA-C    10 minutes spent on the date of the encounter doing chart review, review of test results, patient visit and documentation       "

## 2021-06-01 NOTE — PATIENT INSTRUCTIONS
Amadou Fuller,    Thank you for allowing [facility] to manage your care.    I ordered some blood work in 3 months, please go to the laboratory to get your laboratory studies.    Please decrease carbohydrate intake (bread, potato, pasta, and sweets).    Encourage 2.5 hours (150 minutes) per week of cardiovascular activities (cycling, swimming, jogging, elliptical, or treadmill)    I sent your prescriptions to your pharmacy.    For your anxiety, I am increased your prozac 20 mg daily.     I made a dermatology referral, they will be calling in approximately 1 week to set up your appointment.  If you do not hear from them, please call the specialty number on your after visit.     For your convenience, test results are released as soon as they are available  Please allow 1-2 business days for me to send you a comment about your results.  If not done so, I encourage you to login into Version[employer] ([email or web address] to review your results in real time.     If you have any questions or concerns, please feel free to call us at [phone].    Sincerely,    Dr. Ibarra    Did you know?      You can schedule a video visit for follow-up appointments as well as future appointments for certain conditions.  Please see the below link.     [email or web address]    If you have not already done so,  I encourage you to sign up for Fashinating[employer] ([email or web address]).  This will allow you to review your results, securely communicate with a provider, and schedule virtual visits as well.

## 2021-06-02 LAB — DEPRECATED CALCIDIOL+CALCIFEROL SERPL-MC: 29 UG/L (ref 20–75)

## 2021-06-02 ASSESSMENT — ANXIETY QUESTIONNAIRES: GAD7 TOTAL SCORE: 4

## 2021-06-08 ENCOUNTER — TELEPHONE (OUTPATIENT)
Dept: SURGERY | Facility: CLINIC | Age: 31
End: 2021-06-08

## 2021-06-08 ENCOUNTER — VIRTUAL VISIT (OUTPATIENT)
Dept: SLEEP MEDICINE | Facility: CLINIC | Age: 31
End: 2021-06-08
Payer: COMMERCIAL

## 2021-06-08 VITALS — BODY MASS INDEX: 37.8 KG/M2 | WEIGHT: 270 LBS | HEIGHT: 71 IN

## 2021-06-08 DIAGNOSIS — G47.33 OSA (OBSTRUCTIVE SLEEP APNEA): Primary | ICD-10-CM

## 2021-06-08 DIAGNOSIS — E66.9 OBESITY (BMI 30-39.9): ICD-10-CM

## 2021-06-08 PROCEDURE — 99213 OFFICE O/P EST LOW 20 MIN: CPT | Mod: 95 | Performed by: NURSE PRACTITIONER

## 2021-06-08 ASSESSMENT — MIFFLIN-ST. JEOR: SCORE: 2035.84

## 2021-06-08 NOTE — PATIENT INSTRUCTIONS
Your BMI is Body mass index is 37.66 kg/m .  Weight management is a personal decision.  If you are interested in exploring weight loss strategies, the following discussion covers the approaches that may be successful. Body mass index (BMI) is one way to tell whether you are at a healthy weight, overweight, or obese. It measures your weight in relation to your height.  A BMI of 18.5 to 24.9 is in the healthy range. A person with a BMI of 25 to 29.9 is considered overweight, and someone with a BMI of 30 or greater is considered obese. More than two-thirds of American adults are considered overweight or obese.  Being overweight or obese increases the risk for further weight gain. Excess weight may lead to heart disease and diabetes.  Creating and following plans for healthy eating and physical activity may help you improve your health.  Weight control is part of healthy lifestyle and includes exercise, emotional health, and healthy eating habits. Careful eating habits lifelong are the mainstay of weight control. Though there are significant health benefits from weight loss, long-term weight loss with diet alone may be very difficult to achieve- studies show long-term success with dietary management in less than 10% of people. Attaining a healthy weight may be especially difficult to achieve in those with severe obesity. In some cases, medications, devices and surgical management might be considered.  What can you do?  If you are overweight or obese and are interested in methods for weight loss, you should discuss this with your provider.     Consider reducing daily calorie intake by 500 calories.     Keep a food journal.     Avoiding skipping meals, consider cutting portions instead.    Diet combined with exercise helps maintain muscle while optimizing fat loss. Strength training is particularly important for building and maintaining muscle mass. Exercise helps reduce stress, increase energy, and improves fitness.  Increasing exercise without diet control, however, may not burn enough calories to loose weight.       Start walking three days a week 10-20 minutes at a time    Work towards walking thirty minutes five days a week     Eventually, increase the speed of your walking for 1-2 minutes at time    In addition, we recommend that you review healthy lifestyles and methods for weight loss available through the National Institutes of Health patient information sites:  http://win.niddk.nih.gov/publications/index.htm    And look into health and wellness programs that may be available through your health insurance provider, employer, local community center, or shweta club.    Weight management plan: Patient was referred to their PCP to discuss a diet and exercise plan.    Patient Education     What Are Snoring and Obstructive Sleep Apnea?  If you ve ever had a stuffed-up nose, you know the feeling of trying to breathe through a very narrow passageway. This is what happens in your throat when you snore. While you sleep, structures in your throat partly block your air passage. This makes the passage narrow and hard to breathe through. In some cases, the entire passage may become blocked so you can t breathe at all. This is called obstructive sleep apnea.      Snoring       Obstructive sleep apnea      Snoring  If your throat structures are too large or the muscles relax too much during sleep, the air passage may be partly blocked for a brief moment. But the air eventually passes through. As air from the nose or mouth passes around this blockage, the throat structures vibrate. This causes the familiar sound of snoring. This snoring sound can be loud and may wake up others, or even themselves, during the night. Snoring gets worse as more and more of the air passage is blocked.   Obstructive sleep apnea  If the structures partly or completely block the throat, air can t flow to the lungs at all. This is called hypopnea (decreased  breathing) or apnea (meaning  no breathing ). The lungs aren t getting fresh air. So the brain tells the body to wake up just enough to tighten the muscles and unblock the air passage. With a loud gasp, breathing starts again. This process may be repeated over and over again during the night. This can make your sleep fragmented with lighter stages of sleep. You may not remember waking up many times during the night however due to lighter sleep you will most likely feel tired the next day. The lack of sleep and fresh air can also strain your lungs, heart, and other organs. This may lead to problems such as high blood pressure, diabetes, behavioral disorders, heart attack, or stroke.   Problems in the nose and jaw  Problems in the structure of the nose may block breathing. A crooked (deviated) septum or swollen turbinates can make snoring worse or lead to apnea. Also, a receding jaw may make the tongue sit too far back. Then it s more likely to block the airway when you re asleep.   [employer] last reviewed this educational content on 9/1/2019 2000-2021 [employer]. All rights reserved. This information is not intended as a substitute for professional medical care. Always follow your healthcare professional's instructions.

## 2021-06-08 NOTE — TELEPHONE ENCOUNTER
Called and left VM for patient to see if she is interested in the NashFlux Study being done at the [facility].  If so she is asked to all clinic back.     Pelon Miller MS, PAZAK

## 2021-06-08 NOTE — PROGRESS NOTES
Does patient have any form of state insurance? n    Do you have wifi? y  Do you have a smart phone? y  Can you download an yakelin on your phone comfortably with out assistance? y  Can you watch a Youtube video? y    Alina is a 30 year old who is being evaluated via a billable video visit.      How would you like to obtain your AVS? MyChart  If the video visit is dropped, the invitation should be resent by: Text to cell phone: [phone]  Will anyone else be joining your video visit? No      Yara Crenshaw CMA on 6/8/2021 at 11:04 AM      Video-Visit Details    Type of service:  Video Visit  Video Start Time: 11:33 AM  Video End Time: 11:49 AM    Originating Location (pt. Location): Home    Distant Location (provider location):  [facility] SLEEP [facility]     Platform used for Video Visit: Crono     CPAP Follow-Up Visit:    Date on this visit: 6/8/2021    Alina Thompson is a pleasant 30-year-old female with a past medical history pertinent for T2DM, migraine with aura, obesity, history of idiopathic hypersomnia, and mild SANDRA who presents via video visit in follow-up today to restart CPAP use for SANDRA. She was initially seen for hypersomnolence/possible narcolepsy. She is going to undergo bariatric surgery, gastric sleeve sometime in December 2021. She is currently snoring, but denies waking with gasping or choking. She reports weight loss of 30 pounds since her last sleep study. [place] Scale Score today is 9/24. Her Insomnia Severity Index score today was 6.  As part of her requirements for bariatric surgery, she is to restart CPAP therapy for treatment of her SANDRA. She tried CPAP previously and hated it and was referred for construction of mandibular advancement device.  Unfortunately, she did not like the dental appliance as it hurt her jaw and subsequently discontinued its use.  At this point, she is willing to restart CPAP use and would like reorientation with the device and new supplies  in order to get started in order to establish compliance for surgery.    Previous Study Results:   Study Date: 1/09/2017- (300.0 lbs) apnea/hypopnea index was 12.2 events per hour. The REM AHI was 32.3 events per hour. The supine AHI was 51.3 events per hour. The RERA index was 2.1 events per hour. The RDI was 14.3 events per hour. . Lowest oxygen saturation was 70.1%. Time spent less than or equal to 88% was 4.7 minutes. The PLM index was 5.2 movements per hour.     PAP machine: [employer] AutoSet Pressure settings: 7-15 cm     Interface:  Mask: Nasal mask    Weight change since sleep study: -30 lbs    Bedtime: 10:00 PM - 12:00 AM.  Wake time: 8:00 AM. Falls asleep in <10 minutes. Wakes 1-2 times per night for 5 minutes.  Naps: None    Caffeine use: drinks 1 cup of coffee/day  Tobacco use: None  Alcohol use: Rare, maybe 1-2 times/month       Past medical/surgical history, family history, social history, medications and allergies were reviewed.      Problem List:  Patient Active Problem List    Diagnosis Date Noted     Morbid obesity (H) 06/01/2021     Priority: Medium     Migraine with aura and without status migrainosus, not intractable 01/21/2020     Priority: Medium     Cervical spine pain 04/17/2019     Priority: Medium     History of elevated blood pressure while in hospital 04/12/2019     Priority: Medium     Type 2 diabetes mellitus with complication, without long-term current use of insulin (H) 04/12/2019     Priority: Medium     SANDRA (obstructive sleep apnea)- mild (AHI 12) 01/16/2017     Priority: Medium     Study Date: 1/09/2017- (300.0 lbs) apnea/hypopnea index was 12.2 events per hour.  The REM AHI was 32.3 events per hour.  The supine AHI was 51.3 events per hour.  The RERA index was 2.1 events per hour.   The RDI was 14.3 events per hour. . Lowest oxygen saturation was 70.1%.  Time spent less than or equal to 88% was 4.7 minutes. The PLM index was 5.2 movements per hour.        Class 2 obesity due to  excess calories with body mass index (BMI) of 37.0 to 37.9 in adult, unspecified whether serious comorbidity present 11/14/2016     Priority: Medium     Idiopathic hypersomnia 11/11/2016     Priority: Medium     2007 MSL of 10.4 with REM present on 3 of the naps  10/8/2012. She was off all stimulant medications. Actigraphy x1 week- Adequate total sleep. Polysomnogram-Total sleep time 434. Sleep efficiency was 79.7%. Latency to sleep 13.5 minutes. Latency to .0 minutes. No significant sleep disordered breathing with only rare hypopneas, RDI was 0.7 per hour, oxygen remained above 92%, No CO2 retention.   MSLT revealed a mean latency of 8.75 minutes on the first 4 naps with sleep present in nap 1, 2 and 4 and REM sleep present on nap 4. The fifth nap was 20 minutes and was not included in the MSL.        Current Outpatient Medications   Medication     desogestrel-ethinyl estradiol (APRI) 0.15-30 MG-MCG tablet     FLUoxetine (PROZAC) 20 MG capsule     SUMAtriptan (IMITREX) 25 MG tablet     triamcinolone (KENALOG) 0.1 % external ointment     No current facility-administered medications for this visit.      Past Medical History:   Diagnosis Date     Depression      DM (diabetes mellitus), type 2 (H)      Migraine with aura and without status migrainosus, not intractable 1/21/2020     Morbid obesity due to excess calories (H) 11/14/2016     Obesity      Recurrent UTI      Recurrent vaginitis     BV and Yeast     Sleep apnea      Past Surgical History:   Procedure Laterality Date     CHOLECYSTECTOMY       ENT SURGERY      ear tubes     TONSILLECTOMY       Social History     Socioeconomic History     Marital status: Single     Spouse name: Not on file     Number of children: 0     Years of education: Not on file     Highest education level: Not on file   Occupational History     Occupation: Academic  Adviser   Social Needs     Financial resource strain: Not on file     Food insecurity     Worry: Not on file      Inability: Not on file     Transportation needs     Medical: Not on file     Non-medical: Not on file   Tobacco Use     Smoking status: Never Smoker     Smokeless tobacco: Never Used   Substance and Sexual Activity     Alcohol use: Yes     Frequency: 2-4 times a month     Drinks per session: 1 or 2     Drug use: No     Sexual activity: Yes     Partners: Male     Birth control/protection: Pill   Lifestyle     Physical activity     Days per week: Not on file     Minutes per session: Not on file     Stress: Not on file   Relationships     Social connections     Talks on phone: Not on file     Gets together: Not on file     Attends Buddhist service: Not on file     Active member of club or organization: Not on file     Attends meetings of clubs or organizations: Not on file     Relationship status: Not on file     Intimate partner violence     Fear of current or ex partner: Not on file     Emotionally abused: Not on file     Physically abused: Not on file     Forced sexual activity: Not on file   Other Topics Concern     Parent/sibling w/ CABG, MI or angioplasty before 65F 55M? Not Asked   Social History Narrative    From [place]    Mother in [place]    Works at [employer] in [facility] [employer] [facility] [employer]    Enjoys movies, outdoor activities, animal rescue, board games         Impression/Plan:    1. SANDRA (obstructive sleep apnea)  2. Obesity (BMI 30-39.9)  - Comprehensive DME    This is a pleasant 30-year-old female with a past medical history pertinent for T2DM, migraine with aura, obesity, history of idiopathic hypersomnia, and mild SANDRA who presents in follow-up today to restart CPAP use for SANDRA. She was initially seen for hypersomnolence/possible narcolepsy. She is going to undergo bariatric surgery, gastric sleeve sometime in December 2021. She is currently snoring, but denies waking with gasping or choking. She reports weight loss of 30 pounds since her last sleep study.  Her previous AHI was 12.2/h,  with REM AHI 32.3/h, and supine AHI 51.3/h.  [place] Scale Score today is 9/24. Her Insomnia Severity Index score today was 6.  As part of her requirements for bariatric surgery, she is to restart CPAP therapy for treatment of her SANDRA. She tried CPAP previously and hated it and was referred for construction of mandibular advancement device.  Unfortunately, she did not like the dental appliance as it hurt her jaw and subsequently discontinued its use.  At this point, she is willing to restart CPAP use and would like reorientation with the device and new supplies in order to get started in order to establish compliance for surgery.    She will follow up with me in about 1 month(s) to review APAP download data and use/compliance.     25 minutes were spent on the date of the encounter doing chart review, history and exam, documentation and further activities as noted above.     COLIN Christensen CNP  Sleep Medicine    CC: No ref. provider found

## 2021-06-18 ENCOUNTER — TELEPHONE (OUTPATIENT)
Dept: SLEEP MEDICINE | Facility: CLINIC | Age: 31
End: 2021-06-18

## 2021-06-22 ENCOUNTER — VIRTUAL VISIT (OUTPATIENT)
Dept: SURGERY | Facility: CLINIC | Age: 31
End: 2021-06-22
Payer: COMMERCIAL

## 2021-06-22 ENCOUNTER — DOCUMENTATION ONLY (OUTPATIENT)
Dept: SLEEP MEDICINE | Facility: CLINIC | Age: 31
End: 2021-06-22

## 2021-06-22 VITALS — WEIGHT: 268 LBS | HEIGHT: 71 IN | BODY MASS INDEX: 37.52 KG/M2

## 2021-06-22 DIAGNOSIS — E66.09 CLASS 2 OBESITY DUE TO EXCESS CALORIES WITH BODY MASS INDEX (BMI) OF 37.0 TO 37.9 IN ADULT, UNSPECIFIED WHETHER SERIOUS COMORBIDITY PRESENT: ICD-10-CM

## 2021-06-22 DIAGNOSIS — E66.812 CLASS 2 OBESITY DUE TO EXCESS CALORIES WITH BODY MASS INDEX (BMI) OF 38.0 TO 38.9 IN ADULT, UNSPECIFIED WHETHER SERIOUS COMORBIDITY PRESENT: ICD-10-CM

## 2021-06-22 DIAGNOSIS — E66.01 MORBID OBESITY (H): ICD-10-CM

## 2021-06-22 DIAGNOSIS — E66.09 CLASS 2 OBESITY DUE TO EXCESS CALORIES WITH BODY MASS INDEX (BMI) OF 38.0 TO 38.9 IN ADULT, UNSPECIFIED WHETHER SERIOUS COMORBIDITY PRESENT: ICD-10-CM

## 2021-06-22 DIAGNOSIS — E66.812 CLASS 2 OBESITY DUE TO EXCESS CALORIES WITH BODY MASS INDEX (BMI) OF 37.0 TO 37.9 IN ADULT, UNSPECIFIED WHETHER SERIOUS COMORBIDITY PRESENT: ICD-10-CM

## 2021-06-22 PROCEDURE — 97803 MED NUTRITION INDIV SUBSEQ: CPT | Mod: GT | Performed by: DIETITIAN, REGISTERED

## 2021-06-22 ASSESSMENT — MIFFLIN-ST. JEOR: SCORE: 2022.8

## 2021-06-22 NOTE — PROGRESS NOTES
6/22/2021-- LEFT VM EXPLAINING THAT 6/23 APPT HAS BEEN CANCELED IN [facility] AND TO CALL [phone] TO RESCHEDULE.

## 2021-06-22 NOTE — PATIENT INSTRUCTIONS
Asif Fuller!      Great seeing you again this afternoon. Here's the goals we discussed:    1. Continue to explore options for protein drinks    2. Walk/exercise 3x/week    3. Continue to reduce caffeine and carbonation    4. Chew all food to an applesauce consistency.         Plan on following up in 1 month. This can be scheduled via our call center at . Keep up the good work and have a great week!      Diya Najera RD, LD  Clinical Dietitian

## 2021-06-22 NOTE — PROGRESS NOTES
"Alina is a 30 year old who is being evaluated via a billable video visit.      How would you like to obtain your AVS? MyChart  If the video visit is dropped, the invitation should be resent by: Text to cell phone: [phone]  Will anyone else be joining your video visit? No      Video Start Time: 12:56pm      Video-Visit Details    Type of service:  Video Visit    Video End Time: 1:13pm    Originating Location (pt. Location): work    Distant Location (provider location): Provider Remote Setting    Platform used for Video Visit: [facility]     PRE SURGICAL WEIGHT LOSS NUTRITION APPOINTMENT    Alina Thompson  1990  female  [id number]  30 year old    ASSESSMENT    Desired Surgical Procedure: gastric sleeve    REASON FOR VISIT:  Alina Thompson is a 30 year old year old female presents today for a pre-surgical weight loss follow-up appointment. Patient accompanied by self.    DIAGNOSIS:  Weight Status Obesity Grade II BMI 35-39.9    ANTHROPOMETRICS:  Height: 70.75\"    Initial Weight: 273 lbs   Weight last visit: 269.9 lbs (in clinic; goal weight = 264 lbs)    Current weight: 268 lbs   BMI: Body mass index is 37.64 kg/m .    VITAMINS AND MINERALS:  1 Multivitamin with Minerals (HS)  600 mg Calcium with Vitamin D (AM + lunch)  5000 International units Vitamin D (HS)      NUTRITION HISTORY:  Breakfast: yogurt   Lunch: peanut butter + banana sandwich +/- fruit   Supper: chicken breast + broccoli  pizza  steak  cheese stick + fruit + sliced turkey  Snacks: evenings - pretzels, fruit + whipped cream, cheese + crackers, popcorn    Fluids Consumed: Water, diet soda (every other day), decaf coffee, sparkling water   Eating slower: Yes  Chewing foods thoroughly: No  Take 20-30 minutes to consume each meal: Yes  Fluids and meals separate by at least 30 minutes: No  Carbonation: reducing  Caffeine: reducing  Additional Information: Pt successful in reducing caffeine and carbonation. Tried Premier Protein but did not " enjoy taste; will continue to experiment with alternative protein supplements to find something more palatable. 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    PHYSICAL ACTIVITY:  None/inconsistent    DIAGNOSIS:  Previous Nutrition Diagnosis: Obesity related to long history of self- monitoring deficit and excessive energy intake evidenced by BMI of 38.35 kg/m2  No change, modified below    Previous goals:   Begin taking multivitamin, calcium and vitamin D per guidelines - met  Switch to decaf coffee - met  Begin walking 3x/week - not met    Current Nutrition Diagnosis: Obesity related to long history of self-monitoring deficit and excessive energy intake as evidenced by BMI of 37.64 kg/m2.    INTERVENTION:  Nutrition Prescription: Recommended energy/nutrient modification.    GOALS:  Continue to explore options for protein drinks  Walk/exercise 3x/week  Continue to reduce caffeine/carbonation  Chew food to applesauce consistency    Intervention:  - Discussed progress towards previous goals.  - Reinforced importance of making behavior changes in preparation for bariatric surgery.   - Assessed learning needs and learning preferences       NUTRITION MONITORING AND EVALUATION:  Anticipated compliance: fair-good  Patient demonstrated good understanding.     Follow up: Continue to monitor patient closely regarding weight loss and diet.  # of visits needed: 4  Cleared by RD: No     TIME SPENT WITH PATIENT: 17 minutes      Diya Najrea RD, LD  Clinical Dietitian

## 2021-06-30 NOTE — PROGRESS NOTES
Alina is a 30 year old who is being evaluated via a billable video visit.      How would you like to obtain your AVS? MyChart  If the video visit is dropped, the invitation should be resent by: Text to cell phone: [phone]  Will anyone else be joining your video visit? No  Video Start Time: 1040    Assessment & Plan     BINDU (generalized anxiety disorder)  Would like to keep the same dosage for now.  Encourage exercise as part of the treatment plan.   - FLUoxetine (PROZAC) 20 MG capsule; Take 1 capsule (20 mg) by mouth daily     See Patient Instructions    No follow-ups on file.    Cameron Ibarra DO  [facility]    Subjective   Alina is a 30 year old who presents for the following health issues     HPI     Anxiety Follow-Up    How are you doing with your anxiety since your last visit? Improved     Are you having other symptoms that might be associated with anxiety? Yes:  daytime sleepiness    Have you had a significant life event? OTHER: moving     Are you feeling depressed? No    Do you have any concerns with your use of alcohol or other drugs? No    Social History     Tobacco Use     Smoking status: Never Smoker     Smokeless tobacco: Never Used   Substance Use Topics     Alcohol use: Yes     Frequency: 2-4 times a month     Drinks per session: 1 or 2     Drug use: No     BINDU-7 SCORE 1/14/2021 6/1/2021 7/1/2021   Total Score 7 4 3     PHQ 1/14/2021 6/1/2021 7/1/2021   PHQ-9 Total Score 5 4 6   Q9: Thoughts of better off dead/self-harm past 2 weeks Not at all Not at all Not at all     Last PHQ-9 7/1/2021   1.  Little interest or pleasure in doing things 0   2.  Feeling down, depressed, or hopeless 0   3.  Trouble falling or staying asleep, or sleeping too much 2   4.  Feeling tired or having little energy 2   5.  Poor appetite or overeating 2   6.  Feeling bad about yourself 0   7.  Trouble concentrating 0   8.  Moving slowly or restless 0   Q9: Thoughts of better off dead/self-harm  past 2 weeks 0   PHQ-9 Total Score 6   Difficulty at work, home, or with people Not difficult at all     BINDU-7  7/1/2021   1. Feeling nervous, anxious, or on edge 1   2. Not being able to stop or control worrying 1   3. Worrying too much about different things 1   4. Trouble relaxing 0   5. Being so restless that it is hard to sit still 0   6. Becoming easily annoyed or irritable 0   7. Feeling afraid, as if something awful might happen 0   BINDU-7 Total Score 3   If you checked any problems, how difficult have they made it for you to do your work, take care of things at home, or get along with other people? Not difficult at all         How many servings of fruits and vegetables do you eat daily?  2-3    On average, how many sweetened beverages do you drink each day (Examples: soda, juice, sweet tea, etc.  Do NOT count diet or artificially sweetened beverages)?   1    How many days per week do you exercise enough to make your heart beat faster? 3 or less    How many minutes a day do you exercise enough to make your heart beat faster? 9 or less    How many days per week do you miss taking your medication? 0    1. Anxiety f/u: Currently on prozac.  Patient had her prozac increased to 20 mg daily.  States of somatic symptoms has improved.  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     Review of Systems   Constitutional: Negative for chills and fever.   HENT: Negative for congestion, ear pain, hearing loss and sore throat.    Eyes: Negative for pain and visual disturbance.   Respiratory: Negative for cough and shortness of breath.    Cardiovascular: Negative for chest pain, palpitations and peripheral edema.   Gastrointestinal: Negative for abdominal pain, constipation, diarrhea, heartburn, hematochezia and nausea.   Breasts:  Negative for tenderness, breast mass and discharge.   Genitourinary: Negative for dysuria, frequency, genital sores,  hematuria, pelvic pain, urgency, vaginal bleeding and vaginal discharge.   Musculoskeletal: Negative for arthralgias, joint swelling and myalgias.   Skin: Negative for rash.   Neurological: Negative for dizziness, weakness, headaches and paresthesias.   Psychiatric/Behavioral: Negative for mood changes. The patient is not nervous/anxious.           Objective           Vitals:  No vitals were obtained today due to virtual visit.    Physical Exam   GENERAL: Healthy, alert and no distress  EYES: Eyes grossly normal to inspection.  No discharge or erythema, or obvious scleral/conjunctival abnormalities.  RESP: No audible wheeze, cough, or visible cyanosis.  No visible retractions or increased work of breathing.    SKIN: Visible skin clear. No significant rash, abnormal pigmentation or lesions.  NEURO: Cranial nerves grossly intact.  Mentation and speech appropriate for age.  PSYCH: Mentation appears normal, affect normal/bright, judgement and insight intact, normal speech and appearance well-groomed.    Video-Visit Details    Type of service:  Video Visit    Video End Time:1100    Originating Location (pt. Location): Home    Distant Location (provider location):  [facility]     Platform used for Video Visit: [employer]

## 2021-07-01 ENCOUNTER — VIRTUAL VISIT (OUTPATIENT)
Dept: FAMILY MEDICINE | Facility: CLINIC | Age: 31
End: 2021-07-01
Payer: COMMERCIAL

## 2021-07-01 ENCOUNTER — MYC MEDICAL ADVICE (OUTPATIENT)
Dept: FAMILY MEDICINE | Facility: CLINIC | Age: 31
End: 2021-07-01

## 2021-07-01 DIAGNOSIS — F41.1 GAD (GENERALIZED ANXIETY DISORDER): ICD-10-CM

## 2021-07-01 PROCEDURE — 99213 OFFICE O/P EST LOW 20 MIN: CPT | Mod: 95 | Performed by: FAMILY MEDICINE

## 2021-07-01 ASSESSMENT — ANXIETY QUESTIONNAIRES
1. FEELING NERVOUS, ANXIOUS, OR ON EDGE: SEVERAL DAYS
GAD7 TOTAL SCORE: 3
7. FEELING AFRAID AS IF SOMETHING AWFUL MIGHT HAPPEN: NOT AT ALL
IF YOU CHECKED OFF ANY PROBLEMS ON THIS QUESTIONNAIRE, HOW DIFFICULT HAVE THESE PROBLEMS MADE IT FOR YOU TO DO YOUR WORK, TAKE CARE OF THINGS AT HOME, OR GET ALONG WITH OTHER PEOPLE: NOT DIFFICULT AT ALL
6. BECOMING EASILY ANNOYED OR IRRITABLE: NOT AT ALL
5. BEING SO RESTLESS THAT IT IS HARD TO SIT STILL: NOT AT ALL
2. NOT BEING ABLE TO STOP OR CONTROL WORRYING: SEVERAL DAYS
3. WORRYING TOO MUCH ABOUT DIFFERENT THINGS: SEVERAL DAYS

## 2021-07-01 ASSESSMENT — ENCOUNTER SYMPTOMS
EYE PAIN: 0
NERVOUS/ANXIOUS: 0
FEVER: 0
JOINT SWELLING: 0
CHILLS: 0
WEAKNESS: 0
HEMATURIA: 0
ARTHRALGIAS: 0
BREAST MASS: 0
DYSURIA: 0
ABDOMINAL PAIN: 0
SORE THROAT: 0
SHORTNESS OF BREATH: 0
HEARTBURN: 0
HEADACHES: 0
HEMATOCHEZIA: 0
MYALGIAS: 0
DIZZINESS: 0
DIARRHEA: 0
NAUSEA: 0
PALPITATIONS: 0
PARESTHESIAS: 0
CONSTIPATION: 0
COUGH: 0
FREQUENCY: 0

## 2021-07-01 ASSESSMENT — PATIENT HEALTH QUESTIONNAIRE - PHQ9
SUM OF ALL RESPONSES TO PHQ QUESTIONS 1-9: 6
5. POOR APPETITE OR OVEREATING: NOT AT ALL

## 2021-07-01 NOTE — PATIENT INSTRUCTIONS
Amadou Fuller,    Thank you for allowing [facility] to manage your care.    I sent your prescriptions to your pharmacy.    If you have any questions or concerns, please feel free to call us at [phone].    Sincerely,    Dr. Ibarra    Did you know?      You can schedule a video visit for follow-up appointments as well as future appointments for certain conditions.  Please see the below link.     [email or web address]    If you have not already done so,  I encourage you to sign up for [employer] ([email or web address]).  This will allow you to review your results, securely communicate with a provider, and schedule virtual visits as well.

## 2021-07-01 NOTE — TELEPHONE ENCOUNTER
I contacted patient by phone. She had her annual physical exam last on 8/10/20 with Lidia Riley CNP in OB/gyn.  Patient is also scheduled for for another physical with Lidia in OB/gyn on 8/9/21.     I instructed patient to contact her insurance to discuss if another physical would be covered on 9/7/21. I told patient I didn't want her to have to pay out of pocket if her insurance would not cover it.     As far as the Anxiety follow up, I told patient that a Virtual visit would most likely be appropriate.     Patient has another visit with Dr. Ibarra on 7/6/21 (skin tag removal). I encouraged Alina to call her insurance and also talk more with Dr. Ibarra that day regarding her follow up visits.     Patient verbalized a good understanding and agreed with this plan.     Johnna Veras RN BSN  [facility]

## 2021-07-02 ASSESSMENT — ANXIETY QUESTIONNAIRES: GAD7 TOTAL SCORE: 3

## 2021-07-07 ENCOUNTER — MYC MEDICAL ADVICE (OUTPATIENT)
Dept: DERMATOLOGY | Facility: CLINIC | Age: 31
End: 2021-07-07

## 2021-07-13 ENCOUNTER — OFFICE VISIT (OUTPATIENT)
Dept: DERMATOLOGY | Facility: CLINIC | Age: 31
End: 2021-07-13
Attending: FAMILY MEDICINE
Payer: COMMERCIAL

## 2021-07-13 DIAGNOSIS — L57.8 SUN-DAMAGED SKIN: Primary | ICD-10-CM

## 2021-07-13 DIAGNOSIS — L72.0 MILIA: ICD-10-CM

## 2021-07-13 DIAGNOSIS — D18.01 CHERRY ANGIOMA: ICD-10-CM

## 2021-07-13 DIAGNOSIS — D22.9 MULTIPLE BENIGN NEVI: ICD-10-CM

## 2021-07-13 DIAGNOSIS — L91.8 SKIN TAG: ICD-10-CM

## 2021-07-13 DIAGNOSIS — L81.4 SOLAR LENTIGO: ICD-10-CM

## 2021-07-13 PROCEDURE — 99203 OFFICE O/P NEW LOW 30 MIN: CPT | Performed by: DERMATOLOGY

## 2021-07-13 NOTE — PROGRESS NOTES
[facility] Dermatology Note  Encounter Date: Jul 13, 2021  Office Visit     Dermatology Problem List:  Last skin check 7/13/21  1. 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  Family history: Negative for skin cancer.  ____________________________________________    Assessment & Plan:     # Sun damaged skin with solar lentigines: Chronic, stable.  - Recommend sunscreens SPF #30 or greater, protective clothing and avoidance of tanning beds.  - One is a little larger on the right cheek, but no atypia on dermoscopy. Patient to monitor.    # Benign skin findings including: cherry angioma, milia, skin tag - minor, self limited problem  - No further intervention required. Patient to report changes.   - Patient reassured of the benign nature of these lesions.    # Multiple clinically benign nevi: Chronic, stable  - No further intervention required. Patient to report changes.   - Patient reassured of the benign nature of these lesions.    Procedures Performed:   None.    Follow-up: 1 year in-person for spot check of solar lentigines, or earlier for new or changing lesions    Staff and Scribe:     Scribe Disclosure:   I, Lucia Bonilla, am serving as a scribe to document services personally performed by this physician, Dr. Whitley Causey, based on data collection and the provider's statements to me.     Provider Disclosure:   The documentation recorded by the scribe accurately reflects the services I personally performed and the decisions made by me.    Whitley Causey MD    Department of Dermatology  [facility]: Phone: [phone], Fax:[phone]  [facility]: Phone: [phone], Fax: [phone]    ____________________________________________    CC: Skin Check (Spots of  concern on right cheek. No previous skin check- no personal history of skin cancer. No known family history of skin cancer.)    HPI:  Ms. Alina Thompson is a(n) 30 year old female who presents today as a new patient for skin check.    She is new to our department. She has seen a dermatologist prior (in high school) for acne. She has no history of skin cancer, atypical moles, or precancerous lesions to her knowledge.    Referred by PCP on 6/1/21 for skin lesion. Note reviewed. Skin lesion of concern was located on the right cheek per assessment and plan, HPI also mentions spot on the left temple. Carminet previous mentions two sun spots on the right cheek and questions about general skin care.    Today, patient notes concerns about lesions on the right cheek. Patient's aunt said it looks different.    Patient is otherwise feeling well, without additional skin concerns.     Labs Reviewed:  N/A    Physical Exam:  Vitals: There were no vitals taken for this visit.  SKIN: Total skin excluding the undergarment areas was performed. The exam included the head/face, neck, both arms, chest, back, abdomen, buttocks, both legs, digits and/or nails. Declined genital exam.  - Phillips Type II  - Scattered brown macules on sun exposed areas, including the right cheek.  - There are dome shaped bright red papules on the trunk.   - Multiple regular brown pigmented macules and papules are identified on the trunk and extremities. Less than 20 nevi in all. None are atypical.  - There are white 1-2 mm papules on the left temple.   - There are skin colored pedunculated papules on the bilateral axillae.   - No other lesions of concern on areas examined.       Medications:  Current Outpatient Medications   Medication     desogestrel-ethinyl estradiol (APRI) 0.15-30 MG-MCG tablet     FLUoxetine (PROZAC) 20 MG capsule     SUMAtriptan (IMITREX) 25 MG tablet     triamcinolone (KENALOG) 0.1 % external ointment     No current  facility-administered medications for this visit.      Past Medical History:   Patient Active Problem List   Diagnosis     Idiopathic hypersomnia     Class 2 obesity due to excess calories with body mass index (BMI) of 37.0 to 37.9 in adult, unspecified whether serious comorbidity present     SANDRA (obstructive sleep apnea)- mild (AHI 12)     History of elevated blood pressure while in hospital     Type 2 diabetes mellitus with complication, without long-term current use of insulin (H)     Cervical spine pain     Migraine with aura and without status migrainosus, not intractable     Morbid obesity (H)     Past Medical History:   Diagnosis Date     Depression      DM (diabetes mellitus), type 2 (H)      Migraine with aura and without status migrainosus, not intractable 1/21/2020     Morbid obesity due to excess calories (H) 11/14/2016     Obesity      Recurrent UTI      Recurrent vaginitis     BV and Yeast     Sleep apnea        CC Cameron Ibarra DO  [address]  [place] [address] on close of this encounter.

## 2021-07-13 NOTE — LETTER
7/13/2021         RE: Alina Thompson  [address]  [place] [address]        Dear Colleague,    Thank you for referring your patient, Alina Thompson, to the [facility]. Please see a copy of my visit note below.    [facility] Dermatology Note  Encounter Date: Jul 13, 2021  Office Visit     Dermatology Problem List:  Last skin check 7/13/21  1. 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  Family history: Negative for skin cancer.  ____________________________________________    Assessment & Plan:     # Sun damaged skin with solar lentigines: Chronic, stable.  - Recommend sunscreens SPF #30 or greater, protective clothing and avoidance of tanning beds.  - One is a little larger on the right cheek, but no atypia on dermoscopy. Patient to monitor.    # Benign skin findings including: cherry angioma, milia, skin tag - minor, self limited problem  - No further intervention required. Patient to report changes.   - Patient reassured of the benign nature of these lesions.    # Multiple clinically benign nevi: Chronic, stable  - No further intervention required. Patient to report changes.   - Patient reassured of the benign nature of these lesions.    Procedures Performed:   None.    Follow-up: 1 year in-person for spot check of solar lentigines, or earlier for new or changing lesions    Staff and Scribe:     Scribe Disclosure:   I, Lucia Bonilla, am serving as a scribe to document services personally performed by this physician, Dr. Whitley Causey, based on data collection and the provider's statements to me.     Provider Disclosure:   The documentation recorded by the scribe accurately reflects the services I personally performed and the decisions made by me.    Whitley Causey MD    Department of  Dermatology  [facility]: Phone: [phone], Fax:[phone]  [facility]: Phone: [phone], Fax: [phone]    ____________________________________________    CC: Skin Check (Spots of concern on right cheek. No previous skin check- no personal history of skin cancer. No known family history of skin cancer.)    HPI:  Ms. Alina Thompson is a(n) 30 year old female who presents today as a new patient for skin check.    She is new to our department. She has seen a dermatologist prior (in high school) for acne. She has no history of skin cancer, atypical moles, or precancerous lesions to her knowledge.    Referred by PCP on 6/1/21 for skin lesion. Note reviewed. Skin lesion of concern was located on the right cheek per assessment and plan, HPI also mentions spot on the left temple. Mychart previous mentions two sun spots on the right cheek and questions about general skin care.    Today, patient notes concerns about lesions on the right cheek. Patient's aunt said it looks different.    Patient is otherwise feeling well, without additional skin concerns.     Labs Reviewed:  N/A    Physical Exam:  Vitals: There were no vitals taken for this visit.  SKIN: Total skin excluding the undergarment areas was performed. The exam included the head/face, neck, both arms, chest, back, abdomen, buttocks, both legs, digits and/or nails. Declined genital exam.  - Phillips Type II  - Scattered brown macules on sun exposed areas, including the right cheek.  - There are dome shaped bright red papules on the trunk.   - Multiple regular brown pigmented macules and papules are identified on the trunk and extremities. Less than 20 nevi in all. None are atypical.  - There are white 1-2 mm papules on the left temple.   - There are skin colored pedunculated papules on the bilateral axillae.   - No other lesions of concern on  areas examined.       Medications:  Current Outpatient Medications   Medication     desogestrel-ethinyl estradiol (APRI) 0.15-30 MG-MCG tablet     FLUoxetine (PROZAC) 20 MG capsule     SUMAtriptan (IMITREX) 25 MG tablet     triamcinolone (KENALOG) 0.1 % external ointment     No current facility-administered medications for this visit.      Past Medical History:   Patient Active Problem List   Diagnosis     Idiopathic hypersomnia     Class 2 obesity due to excess calories with body mass index (BMI) of 37.0 to 37.9 in adult, unspecified whether serious comorbidity present     SANDRA (obstructive sleep apnea)- mild (AHI 12)     History of elevated blood pressure while in hospital     Type 2 diabetes mellitus with complication, without long-term current use of insulin (H)     Cervical spine pain     Migraine with aura and without status migrainosus, not intractable     Morbid obesity (H)     Past Medical History:   Diagnosis Date     Depression      DM (diabetes mellitus), type 2 (H)      Migraine with aura and without status migrainosus, not intractable 1/21/2020     Morbid obesity due to excess calories (H) 11/14/2016     Obesity      Recurrent UTI      Recurrent vaginitis     BV and Yeast     Sleep apnea        CC Cameron Ibarra DO  [address]  [place] [address] on close of this encounter.        Again, thank you for allowing me to participate in the care of your patient.        Sincerely,        Whitley Causey MD

## 2021-07-13 NOTE — NURSING NOTE
Alina Thompson's goals for this visit include:   Chief Complaint   Patient presents with     Skin Check     Spots of concern on right cheek. No previous skin check- no personal history of skin cancer. No known family history of skin cancer.       She requests these members of her care team be copied on today's visit information:     PCP: Genaro Vu    Referring Provider:  Cameron Ibarra DO  [address] [address]  [place] [address]    There were no vitals taken for this visit.    Do you need any medication refills at today's visit? No  Swetha Casanova, CONI

## 2021-07-19 ENCOUNTER — MYC MEDICAL ADVICE (OUTPATIENT)
Dept: FAMILY MEDICINE | Facility: CLINIC | Age: 31
End: 2021-07-19

## 2021-07-19 ENCOUNTER — VIRTUAL VISIT (OUTPATIENT)
Dept: SURGERY | Facility: CLINIC | Age: 31
End: 2021-07-19
Payer: COMMERCIAL

## 2021-07-19 VITALS — HEIGHT: 71 IN | BODY MASS INDEX: 36.54 KG/M2 | WEIGHT: 261 LBS

## 2021-07-19 DIAGNOSIS — E66.09 CLASS 2 OBESITY DUE TO EXCESS CALORIES WITH BODY MASS INDEX (BMI) OF 37.0 TO 37.9 IN ADULT, UNSPECIFIED WHETHER SERIOUS COMORBIDITY PRESENT: ICD-10-CM

## 2021-07-19 DIAGNOSIS — E66.812 CLASS 2 OBESITY DUE TO EXCESS CALORIES WITH BODY MASS INDEX (BMI) OF 37.0 TO 37.9 IN ADULT, UNSPECIFIED WHETHER SERIOUS COMORBIDITY PRESENT: ICD-10-CM

## 2021-07-19 PROCEDURE — 97803 MED NUTRITION INDIV SUBSEQ: CPT | Mod: GT | Performed by: DIETITIAN, REGISTERED

## 2021-07-19 ASSESSMENT — MIFFLIN-ST. JEOR: SCORE: 1991.05

## 2021-07-19 NOTE — PROGRESS NOTES
"Alina is a 30 year old who is being evaluated via a billable video visit.      How would you like to obtain your AVS? MyChart  If the video visit is dropped, the invitation should be resent by: Text to cell phone: [phone]  Will anyone else be joining your video visit? No      Video Start Time: 11:18am      Video-Visit Details    Type of service:  Video Visit    Video End Time: 11:34am    Originating Location (pt. Location): Home    Distant Location (provider location): Provider Remote Setting    Platform used for Video Visit: [facility]     PRE SURGICAL WEIGHT LOSS NUTRITION APPOINTMENT    Alina Thompson  1990  female  [id number]  30 year old    ASSESSMENT    Desired Surgical Procedure: gastric sleeve    REASON FOR VISIT:  Alina Thompson is a 30 year old year old female presents today for a pre-surgical weight loss follow-up appointment. Patient accompanied by self.    DIAGNOSIS:  Weight Status Obesity Grade II BMI 35-39.9    ANTHROPOMETRICS:  Height: 70.75\"    Initial Weight: 273 lbs     Weight last visit: 269.9 lbs    Current weight: 261 lbs 0 oz    BMI: Body mass index is 36.66 kg/m .    VITAMINS AND MINERALS:  1 Multivitamin with Minerals (HS)  600 mg Calcium with Vitamin D (AM + lunch)  5000 International units Vitamin D (HS)      NUTRITION HISTORY:  Breakfast: yogurt   Lunch: 3 rice cakes + avocado + trail mix  peanut butter and banana sandwich   Supper: chicken + vegetables or edamame  steak + vegetables   Snacks: sometimes - edamame, trail mix  Fluids Consumed: Water, carbonated water (sometimes; when craving soda), decaf coffee (1 cup, w/creamer)  Eating slower: Yes  Chewing foods thoroughly: Yes/usually  Take 20-30 minutes to consume each meal: Yes  Fluids and meals separate by at least 30 minutes: No  Carbonation: reducing  Caffeine: none  Additional Information: Pt successful in starting to increase exercise. Ongoing challenges with implementing major changes d/t stress of " moving/closing on new home. 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     PHYSICAL ACTIVITY:  Type: walking  Frequency: 1 (days per week)  Duration: 30(minutes)     DIAGNOSIS:  Previous Nutrition Diagnosis: Obesity related to long history of self- monitoring deficit and excessive energy intake evidenced by BMI of 37.64 kg/m2  No change, modified below    Previous goals:   Continue to explore options for protein drinks - not met  Walk/exercise 3x/week - not met (1x/week)  Continue to reduce caffeine/carbonation - met/ongoing  Chew food to applesauce consistency - met    Current Nutrition Diagnosis: Obesity related to long history of self-monitoring deficit and excessive energy intake as evidenced by BMI of 36.66 kg/m2.    INTERVENTION:  Nutrition Prescription: Recommended energy/nutrient modification.    GOALS:  Increase walking to 2x/week  Once moved into new home, meal plan 1x/week  Explore alternative protein sources  Separate fluids and meals by 30 minutes    Intervention:  - Discussed progress towards previous goals.  - Reinforced importance of making behavior changes in preparation for bariatric surgery.   - Assessed learning needs and learning preferences       NUTRITION MONITORING AND EVALUATION:  Anticipated compliance: fair-good  Patient demonstrated good understanding.     Follow up: Continue to monitor patient closely regarding weight loss and diet.  # of visits needed: 3  Cleared by RD: No     TIME SPENT WITH PATIENT: 16 minutes      Diya Najera RD, LD  Clinical Dietitian

## 2021-07-19 NOTE — PATIENT INSTRUCTIONS
Asif Fuller!      Here's the goals we discussed for this next month:    1. Increase walking to 2x/week    2. Once weekly, plan meals for the week (once settled into new home)    3. Explore options for new proteins:  [email or web address]     4. Separate fluids and meals by 30 minutes          Otherwise, keep up the good work. Plan on following up in 1 month - this can be scheduled via our call center at . Have a great week!      Diya Najera RD, LD  Clinical Dietitian

## 2021-07-20 NOTE — TELEPHONE ENCOUNTER
Information below sent to patient per karen.    Felipa, RN  Triage Nurse  [facility]: [facility]

## 2021-07-20 NOTE — TELEPHONE ENCOUNTER
Keep appointment with me on 9/7/21 for anxiety follow-up (may schedule as a virtual appointment).  Most insurance covers one physical exam per year, if she has one scheduled with OB/Gyn, no need to schedule a physical exam with me.  In regards to Dr. Vu's status, he continues to be on medical leave and his return status is unknown at this time.

## 2021-07-21 DIAGNOSIS — E11.8 TYPE 2 DIABETES MELLITUS WITH COMPLICATION, WITHOUT LONG-TERM CURRENT USE OF INSULIN (H): Primary | ICD-10-CM

## 2021-07-27 ENCOUNTER — TRANSFERRED RECORDS (OUTPATIENT)
Dept: HEALTH INFORMATION MANAGEMENT | Facility: CLINIC | Age: 31
End: 2021-07-27

## 2021-07-30 DIAGNOSIS — F41.1 GAD (GENERALIZED ANXIETY DISORDER): ICD-10-CM

## 2021-08-07 ENCOUNTER — MYC MEDICAL ADVICE (OUTPATIENT)
Dept: FAMILY MEDICINE | Facility: CLINIC | Age: 31
End: 2021-08-07

## 2021-08-07 DIAGNOSIS — F41.1 GAD (GENERALIZED ANXIETY DISORDER): ICD-10-CM

## 2021-08-09 ENCOUNTER — OFFICE VISIT (OUTPATIENT)
Dept: OBGYN | Facility: CLINIC | Age: 31
End: 2021-08-09
Payer: COMMERCIAL

## 2021-08-09 VITALS
DIASTOLIC BLOOD PRESSURE: 74 MMHG | SYSTOLIC BLOOD PRESSURE: 132 MMHG | WEIGHT: 264 LBS | BODY MASS INDEX: 36.96 KG/M2 | HEIGHT: 71 IN | HEART RATE: 74 BPM

## 2021-08-09 DIAGNOSIS — Z30.41 SURVEILLANCE OF PREVIOUSLY PRESCRIBED CONTRACEPTIVE PILL: ICD-10-CM

## 2021-08-09 DIAGNOSIS — Z01.419 ENCOUNTER FOR GYNECOLOGICAL EXAMINATION WITHOUT ABNORMAL FINDING: Primary | ICD-10-CM

## 2021-08-09 PROCEDURE — 99395 PREV VISIT EST AGE 18-39: CPT | Performed by: NURSE PRACTITIONER

## 2021-08-09 RX ORDER — DESOGESTREL AND ETHINYL ESTRADIOL 0.15-0.03
1 KIT ORAL DAILY
Qty: 112 TABLET | Refills: 3 | Status: SHIPPED | OUTPATIENT
Start: 2021-08-09 | End: 2022-08-26

## 2021-08-09 ASSESSMENT — MIFFLIN-ST. JEOR: SCORE: 2005.24
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
## 2021-08-23 ENCOUNTER — VIRTUAL VISIT (OUTPATIENT)
Dept: SURGERY | Facility: CLINIC | Age: 31
End: 2021-08-23
Payer: COMMERCIAL

## 2021-08-23 VITALS — WEIGHT: 264 LBS | HEIGHT: 71 IN | BODY MASS INDEX: 36.96 KG/M2

## 2021-08-23 DIAGNOSIS — E66.812 CLASS 2 OBESITY DUE TO EXCESS CALORIES WITH BODY MASS INDEX (BMI) OF 37.0 TO 37.9 IN ADULT, UNSPECIFIED WHETHER SERIOUS COMORBIDITY PRESENT: ICD-10-CM

## 2021-08-23 DIAGNOSIS — E66.01 MORBID OBESITY (H): ICD-10-CM

## 2021-08-23 DIAGNOSIS — E66.09 CLASS 2 OBESITY DUE TO EXCESS CALORIES WITH BODY MASS INDEX (BMI) OF 37.0 TO 37.9 IN ADULT, UNSPECIFIED WHETHER SERIOUS COMORBIDITY PRESENT: ICD-10-CM

## 2021-08-23 PROCEDURE — 97803 MED NUTRITION INDIV SUBSEQ: CPT | Mod: GT | Performed by: DIETITIAN, REGISTERED

## 2021-08-23 ASSESSMENT — MIFFLIN-ST. JEOR: SCORE: 2004.66

## 2021-08-23 NOTE — PROGRESS NOTES
"Alina is a 30 year old who is being evaluated via a billable video visit.      How would you like to obtain your AVS? MyChart  If the video visit is dropped, the invitation should be resent by: Text to cell phone: [phone]  Will anyone else be joining your video visit? No      Start Time: 1:27pm        Video-Visit Details    Type of service:  Video Visit    End Time:2:00pm    Originating Location (pt. Location): Home    Distant Location (provider location):  [facility] [place]     Platform used for Video Visit: Phone - unable to connect via video     PRE SURGICAL WEIGHT LOSS NUTRITION APPOINTMENT    Alina Thompson  1990  female  [id number]  30 year old    ASSESSMENT    Desired Surgical Procedure: gastric sleeve    REASON FOR VISIT:  Alina Thompson is a 30 year old year old female presents today for a pre-surgical weight loss follow-up appointment. Patient accompanied by self.    DIAGNOSIS:  Weight Status Obesity Grade II BMI 35-39.9    ANTHROPOMETRICS:  Height: 70.75\"    Initial Weight: 273 lbs     Weight last visit: 261 lbs    Current weight: 264 lbs 0 oz  BMI: Body mass index is 37.08 kg/m .    VITAMINS AND MINERALS:  1 Multivitamin with Minerals (HS)  600 mg Calcium with Vitamin D (AM + lunch)  5000 International units Vitamin D (HS)    NUTRITION HISTORY:  Breakfast: yogurt +/- granola   Lunch: tuna sandwich (open-faced, w/crespo)  avocado toast  trail mix + fruit + string cheese  protein shake   Supper: steak + vegetables or edamame  Pb&J sandwich  Snacks: dried seaweed   Fluids Consumed: water, premier protein, coffee (occasional), SF sports drinks, milk (w/cereal)   Eating slower: Yes  Chewing foods thoroughly: Yes  Take 20-30 minutes to consume each meal: Yes  Fluids and meals separate by at least 30 minutes: Usually  Carbonation: none  Caffeine: rare  Additional Information: Pt successful in identifying protein shake with acceptable palatability. " Improvements in  fluids/meals by 30 minutes. Moved into new home and now wants to focus on more structured meal planning. Has upcoming trial at [facility] and plans to utilize group classes, pool and walking track    PHYSICAL ACTIVITY:  Type: walking  Frequency: 1 (days per week)  Duration: 30 (minutes)     DIAGNOSIS:  Previous Nutrition Diagnosis: Obesity related to long history of self- monitoring deficit and excessive energy intake evidenced by BMI of 36.66 kg/m2  No change, modified below    Previous goals:   Increase walking to 2x/week - not met   Once moved into new home, meal plan 1x/week - not met  Explore alternative protein sources - met  Separate fluids and meals by 30 minutes - improving    Current Nutrition Diagnosis: Obesity related to long history of self-monitoring deficit and excessive energy intake as evidenced by BMI of 37.08 kg/m2.    INTERVENTION:  Nutrition Prescription: Recommended energy/nutrient modification.    GOALS:  Use CPAP consistently  Separate fluids and meals by 30 minutes  Meal plan/prep food ahead of time    Intervention:  - Discussed progress towards previous goals.  - Reinforced importance of making behavior changes in preparation for bariatric surgery.   - Assessed learning needs and learning preferences       NUTRITION MONITORING AND EVALUATION:  Anticipated compliance: good  Patient demonstrated good understanding.     Follow up: Continue to monitor patient closely regarding weight loss and diet.  # of visits needed: 2  Cleared by RD: No     TIME SPENT WITH PATIENT: 33 minutes      Diya Najera RD, LD  Clinical Dietitian

## 2021-08-23 NOTE — PATIENT INSTRUCTIONS
"Asif Fuller!      It was great chatting with you again today! Here's a summary of the goals we discussed:    1. Use CPAP consistently    2. Separate fluids and meals by 30 minutes  - Try keeping beverages out of arm's reach on table    3. Plan/prep meals ahead of time  - Try typing \"myplate examples\" into [employer] or [employer] to get ideas  - [employer] can be helpful for lunches      Plan on following up in 1 month. This can be scheduled via our call center at . Reach out sooner with any questions or concerns. Have a great day!      Diya Najera RD, LD  Clinical Dietitian   "

## 2021-09-01 ENCOUNTER — TRANSFERRED RECORDS (OUTPATIENT)
Dept: HEALTH INFORMATION MANAGEMENT | Facility: CLINIC | Age: 31
End: 2021-09-01

## 2021-09-01 LAB — RETINOPATHY: NORMAL

## 2021-09-15 ENCOUNTER — LAB (OUTPATIENT)
Dept: LAB | Facility: CLINIC | Age: 31
End: 2021-09-15
Payer: COMMERCIAL

## 2021-09-15 DIAGNOSIS — E11.8 TYPE 2 DIABETES MELLITUS WITH COMPLICATION, WITHOUT LONG-TERM CURRENT USE OF INSULIN (H): ICD-10-CM

## 2021-09-15 PROBLEM — E08.8: Status: ACTIVE | Noted: 2021-09-15

## 2021-09-15 LAB — HBA1C MFR BLD: 6.3 % (ref 0–5.6)

## 2021-09-15 PROCEDURE — 83036 HEMOGLOBIN GLYCOSYLATED A1C: CPT

## 2021-09-15 PROCEDURE — 36415 COLL VENOUS BLD VENIPUNCTURE: CPT

## 2021-09-17 ENCOUNTER — OFFICE VISIT (OUTPATIENT)
Dept: FAMILY MEDICINE | Facility: CLINIC | Age: 31
End: 2021-09-17
Payer: COMMERCIAL

## 2021-09-17 VITALS
OXYGEN SATURATION: 98 % | SYSTOLIC BLOOD PRESSURE: 126 MMHG | BODY MASS INDEX: 36.88 KG/M2 | TEMPERATURE: 98.8 F | DIASTOLIC BLOOD PRESSURE: 76 MMHG | HEIGHT: 71 IN | HEART RATE: 83 BPM | WEIGHT: 263.4 LBS

## 2021-09-17 DIAGNOSIS — L91.8 SKIN TAG: ICD-10-CM

## 2021-09-17 DIAGNOSIS — Z71.89 ADVANCED DIRECTIVES, COUNSELING/DISCUSSION: ICD-10-CM

## 2021-09-17 DIAGNOSIS — Z00.00 ROUTINE GENERAL MEDICAL EXAMINATION AT A HEALTH CARE FACILITY: Primary | ICD-10-CM

## 2021-09-17 DIAGNOSIS — F41.1 GAD (GENERALIZED ANXIETY DISORDER): ICD-10-CM

## 2021-09-17 DIAGNOSIS — J30.2 SEASONAL ALLERGIC RHINITIS, UNSPECIFIED TRIGGER: ICD-10-CM

## 2021-09-17 PROCEDURE — 90471 IMMUNIZATION ADMIN: CPT | Performed by: FAMILY MEDICINE

## 2021-09-17 PROCEDURE — 96127 BRIEF EMOTIONAL/BEHAV ASSMT: CPT | Performed by: FAMILY MEDICINE

## 2021-09-17 PROCEDURE — 99395 PREV VISIT EST AGE 18-39: CPT | Mod: 25 | Performed by: FAMILY MEDICINE

## 2021-09-17 PROCEDURE — 11200 RMVL SKIN TAGS UP TO&INC 15: CPT | Performed by: FAMILY MEDICINE

## 2021-09-17 PROCEDURE — 90686 IIV4 VACC NO PRSV 0.5 ML IM: CPT | Performed by: FAMILY MEDICINE

## 2021-09-17 RX ORDER — FLUTICASONE PROPIONATE 50 MCG
1 SPRAY, SUSPENSION (ML) NASAL DAILY
Qty: 16 G | Refills: 3 | Status: SHIPPED | OUTPATIENT
Start: 2021-09-17

## 2021-09-17 ASSESSMENT — ENCOUNTER SYMPTOMS
JOINT SWELLING: 0
CONSTIPATION: 0
HEARTBURN: 0
NERVOUS/ANXIOUS: 1
FEVER: 0
PARESTHESIAS: 0
SORE THROAT: 0
EYE PAIN: 0
ARTHRALGIAS: 0
MYALGIAS: 0
PALPITATIONS: 0
HEMATOCHEZIA: 0
NAUSEA: 0
SHORTNESS OF BREATH: 0
CHILLS: 0
DIZZINESS: 0
DIARRHEA: 0
HEADACHES: 1
COUGH: 0
BREAST MASS: 0
ABDOMINAL PAIN: 0
FREQUENCY: 0
WEAKNESS: 0
HEMATURIA: 0

## 2021-09-17 ASSESSMENT — PATIENT HEALTH QUESTIONNAIRE - PHQ9
SUM OF ALL RESPONSES TO PHQ QUESTIONS 1-9: 2
5. POOR APPETITE OR OVEREATING: NOT AT ALL

## 2021-09-17 ASSESSMENT — ANXIETY QUESTIONNAIRES
IF YOU CHECKED OFF ANY PROBLEMS ON THIS QUESTIONNAIRE, HOW DIFFICULT HAVE THESE PROBLEMS MADE IT FOR YOU TO DO YOUR WORK, TAKE CARE OF THINGS AT HOME, OR GET ALONG WITH OTHER PEOPLE: SOMEWHAT DIFFICULT
GAD7 TOTAL SCORE: 2
7. FEELING AFRAID AS IF SOMETHING AWFUL MIGHT HAPPEN: NOT AT ALL
5. BEING SO RESTLESS THAT IT IS HARD TO SIT STILL: NOT AT ALL
1. FEELING NERVOUS, ANXIOUS, OR ON EDGE: SEVERAL DAYS
2. NOT BEING ABLE TO STOP OR CONTROL WORRYING: NOT AT ALL
6. BECOMING EASILY ANNOYED OR IRRITABLE: NOT AT ALL
3. WORRYING TOO MUCH ABOUT DIFFERENT THINGS: SEVERAL DAYS

## 2021-09-17 ASSESSMENT — MIFFLIN-ST. JEOR: SCORE: 1997.96

## 2021-09-17 NOTE — PATIENT INSTRUCTIONS
Amadou Fuller,    Thank you for allowing [facility] to manage your care.    I sent your prescriptions to your pharmacy.    For your convenience, test results are released as soon as they are available  Please allow 1-2 business days for me to send you a comment about your results.  If not done so, I encourage you to login into [employer] ([email or web address] to review your results in real time.     If you have any questions or concerns, please feel free to call us at [phone].    Sincerely,    Dr. Ibarra    Did you know?      You can schedule a video visit for follow-up appointments as well as future appointments for certain conditions.  Please see the below link.     [email or web address]    If you have not already done so,  I encourage you to sign up for [employer] ([email or web address]).  This will allow you to review your results, securely communicate with a provider, and schedule virtual visits as well.      Preventive Health Recommendations  Female Ages 26 - 39  Yearly exam:   See your health care provider every year in order to    Review health changes.     Discuss preventive care.      Review your medicines if you your doctor has prescribed any.    Until age 30: Get a Pap test every three years (more often if you have had an abnormal result).    After age 30: Talk to your doctor about whether you should have a Pap test every 3 years or have a Pap test with HPV screening every 5 years.   You do not need a Pap test if your uterus was removed (hysterectomy) and you have not had cancer.  You should be tested each year for STDs (sexually transmitted diseases), if you're at risk.   Talk to your provider about how often to have your cholesterol checked.  If you are at risk for diabetes, you should have a diabetes test (fasting glucose).  Shots: Get a flu shot each year. Get a tetanus shot every 10 years.   Nutrition:     Eat at least 5  servings of fruits and vegetables each day.    Eat whole-grain bread, whole-wheat pasta and brown rice instead of white grains and rice.    Get adequate Calcium and Vitamin D.     Lifestyle    Exercise at least 150 minutes a week (30 minutes a day, 5 days of the week). This will help you control your weight and prevent disease.    Limit alcohol to one drink per day.    No smoking.     Wear sunscreen to prevent skin cancer.    See your dentist every six months for an exam and cleaning.
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
## 2021-09-18 ASSESSMENT — ANXIETY QUESTIONNAIRES: GAD7 TOTAL SCORE: 2

## 2021-09-22 NOTE — PROGRESS NOTES
"Alina is a 30 year old who is being evaluated via a billable video visit.      How would you like to obtain your AVS? MyChart  If the video visit is dropped, the invitation should be resent by: Text to cell phone: [phone]  Will anyone else be joining your video visit? No    Video Start Time: 1:25        Video-Visit Details    Type of service:  Video Visit    Video End Time: 1:48    Originating Location (pt. Location): Home    Distant Location (provider location):  [facility] [place] location     Platform used for Video Visit: [facility]     PRE SURGICAL WEIGHT LOSS NUTRITION APPOINTMENT    Alina Thompson  1990  female  [id number]  30 year old    ASSESSMENT    Desired Surgical Procedure: gastric sleeve    REASON FOR VISIT:  Alina Thompson is a 30 year old year old female presents today for a pre-surgical weight loss follow-up appointment. Patient accompanied by self.    DIAGNOSIS:  Weight Status Obesity Grade II BMI 35-39.9    ANTHROPOMETRICS:  Height: 70.75\"   Initial Weight: 273 lbs     Weight last visit: 264 lbs    Current weight: 263.4 lb    BMI: 37.26  Wt Readings from Last 5 Encounters:   09/17/21 119.5 kg (263 lb 6.4 oz)   08/23/21 119.7 kg (264 lb)   08/09/21 119.7 kg (264 lb)   07/19/21 118.4 kg (261 lb)   06/22/21 121.6 kg (268 lb)       VITAMINS AND MINERALS:  1 Multivitamin with Minerals (HS)  600 mg Calcium with Vitamin D (AM + lunch)  5000 International units Vitamin D (HS)    NUTRITION HISTORY:  Breakfast: Greek yogurt   Lunch: tuna salad, 1 slice bread, steamer veggie, string cheese or eggs/ toast  Supper: fish or steak,edamame or  broccoli  Snacks: pickles or sea weed snacks or cheese stick  Fluids Consumed: Water, decaf coffee, Body [place]  Eating slower: Yes  Chewing foods thoroughly: Yes  Take 20-30 minutes to consume each meal: Yes  Fluids and meals separate by at least 30 minutes: most of the   Carbonation: no  Caffeine: no  Additional " Information: Struggles the most with meal planning/ prep. She is using [employer] yakelin. Discussed use of low calorie frozen entrees as options for lunches. Returns to work next week.     PHYSICAL ACTIVITY:  Type: Experience-joined this month  Frequency: 1-2 (days per week)  Duration: 50 (minutes)     DIAGNOSIS:  Previous Nutrition Diagnosis: Obesity related to long history of self- monitoring deficit and excessive energy intake evidenced by BMI of 37.08 kg/m2  No change, modified below    Previous goals:   Use CPAP consistently - met  Separate fluids and meals by 30 minutes - improving  Meal plan/prep food ahead of time - improving    Current Nutrition Diagnosis: Obesity related to long history of self-monitoring deficit and excessive energy intake as evidenced by BMI of 37.26g/m2.    INTERVENTION:  Nutrition Prescription: Recommended energy/nutrient modification.    GOALS:  Set alerts on phone to remember to stop drinking 30 minutes before and after visits  Explore bariatric apps (offered suggestions)  Increase exercise to 3X per week  Replace snacks with a protein drink    Intervention:  - Discussed progress towards previous goals.  - Reinforced importance of making behavior changes in preparation for bariatric surgery.   - Assessed learning needs and learning preferences       NUTRITION MONITORING AND EVALUATION:  Anticipated compliance: fair-good  Patient demonstrated good understanding.     Follow up: Continue to monitor patient closely regarding weight loss and diet.  # of visits needed: 1  Cleared by RD: No     TIME SPENT WITH PATIENT: 22 minutes      Rufino Saldana RD, [facility]

## 2021-09-23 ENCOUNTER — VIRTUAL VISIT (OUTPATIENT)
Dept: SURGERY | Facility: CLINIC | Age: 31
End: 2021-09-23
Payer: COMMERCIAL

## 2021-09-23 VITALS — WEIGHT: 263.4 LBS | BODY MASS INDEX: 37.26 KG/M2

## 2021-09-23 DIAGNOSIS — E66.812 CLASS 2 OBESITY DUE TO EXCESS CALORIES WITH BODY MASS INDEX (BMI) OF 37.0 TO 37.9 IN ADULT, UNSPECIFIED WHETHER SERIOUS COMORBIDITY PRESENT: ICD-10-CM

## 2021-09-23 DIAGNOSIS — E66.09 CLASS 2 OBESITY DUE TO EXCESS CALORIES WITH BODY MASS INDEX (BMI) OF 37.0 TO 37.9 IN ADULT, UNSPECIFIED WHETHER SERIOUS COMORBIDITY PRESENT: ICD-10-CM

## 2021-09-23 PROCEDURE — 97803 MED NUTRITION INDIV SUBSEQ: CPT | Mod: GT | Performed by: DIETITIAN, REGISTERED

## 2021-09-23 NOTE — PATIENT INSTRUCTIONS
Asif Fuller-   It was great to visit with you and learn about your progress! Below are the goals we discussed.    GOALS:  Set alerts on phone to remember to stop drinking 30 minutes before and after visits  Explore bariatric apps (example-Elian Pal or [employer])  Increase exercise to 3X per week  Replace snacks with a protein drink    Please call [phone] to schedule your next visit with a Dietitian in 1 month.  Thanks!  Rufino Saldana RD, EVELYN  [facility] [place] [facility]

## 2021-10-22 ENCOUNTER — VIRTUAL VISIT (OUTPATIENT)
Dept: SURGERY | Facility: CLINIC | Age: 31
End: 2021-10-22
Payer: COMMERCIAL

## 2021-10-22 VITALS — WEIGHT: 256 LBS | HEIGHT: 71 IN | BODY MASS INDEX: 35.84 KG/M2

## 2021-10-22 DIAGNOSIS — E66.812 CLASS 2 OBESITY DUE TO EXCESS CALORIES WITH BODY MASS INDEX (BMI) OF 37.0 TO 37.9 IN ADULT, UNSPECIFIED WHETHER SERIOUS COMORBIDITY PRESENT: ICD-10-CM

## 2021-10-22 DIAGNOSIS — E66.09 CLASS 2 OBESITY DUE TO EXCESS CALORIES WITH BODY MASS INDEX (BMI) OF 37.0 TO 37.9 IN ADULT, UNSPECIFIED WHETHER SERIOUS COMORBIDITY PRESENT: ICD-10-CM

## 2021-10-22 DIAGNOSIS — E66.01 MORBID OBESITY (H): ICD-10-CM

## 2021-10-22 PROCEDURE — 97803 MED NUTRITION INDIV SUBSEQ: CPT | Mod: GT | Performed by: DIETITIAN, REGISTERED

## 2021-10-22 ASSESSMENT — MIFFLIN-ST. JEOR: SCORE: 1964.4

## 2021-10-22 NOTE — PROGRESS NOTES
"    How would you like to obtain your AVS? Maria Chart  If the video visit is dropped, the invitation should be resent by: Text to cell phone: [phone]  Will anyone else be joining your video visit? No     Video Start Time: 12:59pm         Video-Visit Details     Type of service:  Video Visit     Video End Time: 1:16pm    Originating Location (pt. Location): Home     Distant Location (provider location):  [facility] [place]     Platform used for Video Visit: [employer]       PRE SURGICAL WEIGHT LOSS NUTRITION APPOINTMENT    Alina Thompson  1990  female  [id number]  30 year old    ASSESSMENT    Desired Surgical Procedure: gastric sleeve    REASON FOR VISIT:  Alina Thompson is a 30 year old year old female presents today for a pre-surgical weight loss follow-up appointment. Patient accompanied by self.    DIAGNOSIS:  Weight Status Obesity Grade II BMI 35-39.9    ANTHROPOMETRICS:  Height: 5' 10.5\"    Initial Weight: 273 lbs     Weight last visit: 263.4 lbs    Current weight: 256 lbs 0 oz    BMI: Body mass index is 36.21 kg/m .    VITAMINS AND MINERALS:  2 Multivitamin with Minerals - HS  600 mg Calcium with Vitamin D BID - morning/lunch  5000 International units Vitamin D      NUTRITION HISTORY:  Breakfast: yogurt  egg  Lunch: low-tricia frozen meals  Supper: recipes from [employer] website  Snacks: seaweed, string cheese, fruit, protein drink  Fluids Consumed: Water, decaf coffee (w/creamer), protein drinks  Eating slower: Yes  Chewing foods thoroughly: Yes  Take 20-30 minutes to consume each meal: Yes  Fluids and meals separate by at least 30 minutes: Yes  Carbonation: none  Caffeine: none  Additional Information: Pt feeling confident in guidelines. Reviewed post-op diet advancement and vitamin/mineral changes    PHYSICAL ACTIVITY:  Type: walking, [employer] membership - dance classes  Frequency: 3 (days per week)  Duration: 30-60(minutes)     DIAGNOSIS:  Previous Nutrition Diagnosis: " Obesity related to long history of self- monitoring deficit and excessive energy intake evidenced by BMI of 37.26 kg/m2  No change, modified below    Previous goals:   Set alerts on phone to remember to stop drinking 30 minutes before and after visits - met  Explore bariatric apps (offered suggestions) - met  Increase exercise to 3X per week - met  Replace snacks with a protein drink - met    Current Nutrition Diagnosis: Obesity related to long history of self-monitoring deficit and excessive energy intake as evidenced by BMI of 36.21 kg/m2.    INTERVENTION:  Nutrition Prescription: Recommended energy/nutrient modification.    GOALS:  Continue to practice all pre-op guidelines    Intervention:  - Discussed progress towards previous goals.  - Reinforced importance of making behavior changes in preparation for bariatric surgery.   - Assessed learning needs and learning preferences       NUTRITION MONITORING AND EVALUATION:  Anticipated compliance: good  Patient demonstrated good understanding.   Patient has met pre bariatric surgery diet requirements    Follow up: Continue to monitor patient closely regarding weight loss and diet.  # of visits needed: 0  Cleared by RD: Yes     TIME SPENT WITH PATIENT: 17 minutes      Diya Najera RD, LD  Clinical Dietitian

## 2021-11-01 ASSESSMENT — SLEEP AND FATIGUE QUESTIONNAIRES
HOW LIKELY ARE YOU TO NOD OFF OR FALL ASLEEP WHILE WATCHING TV: SLIGHT CHANCE OF DOZING
HOW LIKELY ARE YOU TO NOD OFF OR FALL ASLEEP WHILE SITTING AND READING: SLIGHT CHANCE OF DOZING
HOW LIKELY ARE YOU TO NOD OFF OR FALL ASLEEP WHILE SITTING INACTIVE IN A PUBLIC PLACE: WOULD NEVER DOZE
HOW LIKELY ARE YOU TO NOD OFF OR FALL ASLEEP IN A CAR, WHILE STOPPED FOR A FEW MINUTES IN TRAFFIC: WOULD NEVER DOZE
HOW LIKELY ARE YOU TO NOD OFF OR FALL ASLEEP WHILE SITTING QUIETLY AFTER LUNCH WITHOUT ALCOHOL: SLIGHT CHANCE OF DOZING
HOW LIKELY ARE YOU TO NOD OFF OR FALL ASLEEP WHEN YOU ARE A PASSENGER IN A CAR FOR AN HOUR WITHOUT A BREAK: SLIGHT CHANCE OF DOZING
HOW LIKELY ARE YOU TO NOD OFF OR FALL ASLEEP WHILE LYING DOWN TO REST IN THE AFTERNOON WHEN CIRCUMSTANCES PERMIT: SLIGHT CHANCE OF DOZING
HOW LIKELY ARE YOU TO NOD OFF OR FALL ASLEEP WHILE SITTING AND TALKING TO SOMEONE: WOULD NEVER DOZE

## 2021-11-02 NOTE — PATIENT INSTRUCTIONS

## 2021-11-02 NOTE — PROGRESS NOTES
Alina is a 30 year old who is being evaluated via a billable video visit.      Are you in the state of [place] for this visit? Yes  How would you like to obtain your AVS? MyChart  If the video visit is dropped, the invitation should be resent by: Text to cell phone: [phone]  Will anyone else be joining your video visit? No      Video Start Time: 2:32 PM  Video-Visit Details    Type of service:  Video Visit    Video End Time:2:32 PM    Originating Location (pt. Location): Home    Distant Location (provider location):  [facility]     Platform used for Video Visit: [employer]     Obstructive Sleep Apnea - PAP Follow-Up Visit:    Chief Complaint   Patient presents with     CPAP Follow Up       Alina Thompson comes in today for follow-up of their mild sleep apnea, managed with CPAP.  She anticipates bariatric surgery in the next few months.     Patient reports that she was initially diagnosed with narcolepsy by Dr. Persaud at [facility] after repeatedly falling sleep in class. Interpretation of the study (done in 2007) was acquired and showed no significant sleep disordered breathing. MSLT the following day showed MSL of 10.4 with REM present on 3 of the naps.      Alina had a repeat sleep study on 10/8/2012. She was off all stimulant medications.   Summary of the sleep study interpreted by Dr. Torey Persaud:  Weight: 257#  Actigraphy x1 week-  Adequate total sleep prior to PSG  Polysomnogram-  Total sleep time 434. Sleep efficiency was 79.7%. Latency to sleep 13.5 minutes. Latency to .0 minutes. N1 was 6.2%, N2 64.3%, N3 7.6%, N4 21.9%. No significant sleep disordered breathing with only rare hypopneas, RDI was 0.7 per hour, oxygen remained above 92%, No CO2 retention.   MSLT revealed a mean latency of 8.75 minutes on the first 4 naps with sleep present in nap1, 2 and 4 and REM sleep present on nap 4. The fifth nap was 20 minutes and was not included in the MSL.       The patient has been on Methylphenidate 10 mg and Concerta 18 and 54 mg both in the morning. Both helpful but left her with increased anxiety. She stopped taking stimulants in 2014.    Study Date: 1/09/2017- (300.0 lbs) apnea/hypopnea index was 12.2 events per hour. The REM AHI was 32.3 events per hour. The supine AHI was 51.3 events per hour. The RERA index was 2.1 events per hour. The RDI was 14.3 events per hour. . Lowest oxygen saturation was 70.1%. Time spent less than or equal to 88% was 4.7 minutes. The PLM index was 5.2 movements per hour.    She tried CPAP and hated it and was referred for construction of mandibular advancement device. She reinitiated CPAP therapy in October to under bariatric surgery.     Overall, the patient rates her experience with PAP as poor (0 poor, 10 great). The mask is comfortable to use. The pressure settings are comfortable     Her PAP interface is Nasal Mask.    Bedtime is typically 9-11 PM. Usually it takes about 5-10 minutes to fall asleep with the mask on. Wake time is typically 7-8:30 AM.  Patient is using PAP therapy - hours per night. The patient is usually getting 8 hours of sleep per night.    Total score - [place]: 5 (11/1/2021  5:06 PM)    ResMed   Auto-PAP 7.0 - 15.0 cmH2O 30 day usage data:    60% of days with > 4 hours of use. 9/30 days with no use.   Average use 202 minutes per day.   95%ile Leak 2.35 L/min.   CPAP 95% pressure 7.8 cm.   AHI 0.06 events per hour.     Past medical/surgical history, family history, social history, medications and allergies were reviewed.      Problem List:  Patient Active Problem List    Diagnosis Date Noted     Diabetes mellitus due to underlying condition, controlled, with complication, unspecified whether long term insulin use (H) 09/15/2021     Priority: Medium     Morbid obesity (H) 06/01/2021     Priority: Medium     Migraine with aura and without status migrainosus, not intractable 01/21/2020     Priority: Medium      Cervical spine pain 04/17/2019     Priority: Medium     History of elevated blood pressure while in hospital 04/12/2019     Priority: Medium     SANDRA (obstructive sleep apnea)- mild (AHI 12) 01/16/2017     Priority: Medium     Study Date: 1/09/2017- (300.0 lbs) apnea/hypopnea index was 12.2 events per hour.  The REM AHI was 32.3 events per hour.  The supine AHI was 51.3 events per hour.  The RERA index was 2.1 events per hour.   The RDI was 14.3 events per hour. . Lowest oxygen saturation was 70.1%.  Time spent less than or equal to 88% was 4.7 minutes. The PLM index was 5.2 movements per hour.        Class 2 obesity due to excess calories with body mass index (BMI) of 37.0 to 37.9 in adult, unspecified whether serious comorbidity present 11/14/2016     Priority: Medium     Idiopathic hypersomnia 11/11/2016     Priority: Medium     2007 MSL of 10.4 with REM present on 3 of the naps  10/8/2012. She was off all stimulant medications. Actigraphy x1 week- Adequate total sleep. Polysomnogram-Total sleep time 434. Sleep efficiency was 79.7%. Latency to sleep 13.5 minutes. Latency to .0 minutes. No significant sleep disordered breathing with only rare hypopneas, RDI was 0.7 per hour, oxygen remained above 92%, No CO2 retention.   MSLT revealed a mean latency of 8.75 minutes on the first 4 naps with sleep present in nap 1, 2 and 4 and REM sleep present on nap 4. The fifth nap was 20 minutes and was not included in the MSL.          There were no vitals taken for this visit.    Impression/Plan:    Mild sleep apnea. Tolerating PAP ok. Daytime symptoms are improved.   Continue current CPAP treatment.  Patient is stable from sleep apnea standpoint to proceed with bariatric surgery. She will bring her AUTO CPAP to the hospital for use.    Alina Thompson will follow up in about 1 year(s).     Ryan Ortiz PA-C

## 2021-11-03 ENCOUNTER — VIRTUAL VISIT (OUTPATIENT)
Dept: SLEEP MEDICINE | Facility: CLINIC | Age: 31
End: 2021-11-03
Payer: COMMERCIAL

## 2021-11-03 DIAGNOSIS — G47.33 OSA (OBSTRUCTIVE SLEEP APNEA): Primary | ICD-10-CM

## 2021-11-03 PROCEDURE — 99214 OFFICE O/P EST MOD 30 MIN: CPT | Mod: 95 | Performed by: PHYSICIAN ASSISTANT

## 2021-11-04 NOTE — NURSING NOTE
Return in about 1 year reminder sent via [employer].       ADIN Zabala [facility]

## 2021-11-18 ENCOUNTER — OFFICE VISIT (OUTPATIENT)
Dept: SURGERY | Facility: CLINIC | Age: 31
End: 2021-11-18
Payer: COMMERCIAL

## 2021-11-18 VITALS — HEART RATE: 102 BPM | WEIGHT: 262.2 LBS | BODY MASS INDEX: 36.71 KG/M2 | HEIGHT: 71 IN

## 2021-11-18 DIAGNOSIS — E66.01 MORBID OBESITY (H): Primary | ICD-10-CM

## 2021-11-18 PROCEDURE — 99204 OFFICE O/P NEW MOD 45 MIN: CPT | Performed by: SURGERY

## 2021-11-18 ASSESSMENT — MIFFLIN-ST. JEOR: SCORE: 1992.52

## 2021-11-22 ENCOUNTER — E-VISIT (OUTPATIENT)
Dept: URGENT CARE | Facility: CLINIC | Age: 31
End: 2021-11-22
Payer: COMMERCIAL

## 2021-11-22 DIAGNOSIS — R05.9 COUGH: Primary | ICD-10-CM

## 2021-11-22 PROCEDURE — 99421 OL DIG E/M SVC 5-10 MIN: CPT | Performed by: EMERGENCY MEDICINE

## 2021-11-22 NOTE — PATIENT INSTRUCTIONS
"  Dear Alina Thompson    After reviewing your responses, I've been able to diagnose you with \"Bronchitis\" which is a common infection of your lungs that is nearly always caused by a virus. The virus causes swelling and irritation of the air passages of your lungs which leads to cough. The illness spreads from your nose and throat to your windpipe and airways. It is often called a \"chest cold\" and can last up to 2 weeks, but is not a serious illness. Exposure to cigarette smoke usually makes this significantly worse.      To treat bronchitis, the main thing to do is drink lots of fluids and rest. Cough medications over-the-counter such as mucinex, robitussin or \"cold and sinus\" medications can be helpful. Ibuprofen and Tylenol also help with fevers or aching feelings that you often have with this kind of illness. Do not take ibuprofen if you have kidney disease, stomach ulcers or allergy to aspirin.     Bronchitis is most often highly contagious as viruses are spread through the air or touch. Avoid contact with others who may become infected, particularly children, the elderly and those whose immune systems might be weak.     If your symptoms worsen, you develop chest pain or shortness of breath, fevers over 101, or are not improving in 5 days, please contact your primary care provider for an appointment or visit any of our convenient Walk-in Care or Urgent Care Centers to be seen which can be found on our website here.    Thanks again for choosing us as your health care partner,    Socrates Serra MD  "

## 2021-12-01 ENCOUNTER — OFFICE VISIT (OUTPATIENT)
Dept: URGENT CARE | Facility: URGENT CARE | Age: 31
End: 2021-12-01
Payer: COMMERCIAL

## 2021-12-01 ENCOUNTER — TELEPHONE (OUTPATIENT)
Dept: SURGERY | Facility: CLINIC | Age: 31
End: 2021-12-01

## 2021-12-01 VITALS
OXYGEN SATURATION: 100 % | TEMPERATURE: 99.8 F | HEART RATE: 92 BPM | SYSTOLIC BLOOD PRESSURE: 132 MMHG | DIASTOLIC BLOOD PRESSURE: 88 MMHG | RESPIRATION RATE: 22 BRPM

## 2021-12-01 DIAGNOSIS — H66.001 ACUTE SUPPURATIVE OTITIS MEDIA OF RIGHT EAR WITHOUT SPONTANEOUS RUPTURE OF TYMPANIC MEMBRANE, RECURRENCE NOT SPECIFIED: Primary | ICD-10-CM

## 2021-12-01 DIAGNOSIS — E11.9 TYPE 2 DIABETES MELLITUS WITHOUT COMPLICATION, WITHOUT LONG-TERM CURRENT USE OF INSULIN (H): ICD-10-CM

## 2021-12-01 DIAGNOSIS — E66.01 MORBID OBESITY (H): Primary | ICD-10-CM

## 2021-12-01 DIAGNOSIS — N76.0 VAGINITIS AND VULVOVAGINITIS: ICD-10-CM

## 2021-12-01 PROCEDURE — 99214 OFFICE O/P EST MOD 30 MIN: CPT | Performed by: FAMILY MEDICINE

## 2021-12-01 RX ORDER — FLUCONAZOLE 150 MG/1
150 TABLET ORAL ONCE
Qty: 1 TABLET | Refills: 0 | Status: SHIPPED | OUTPATIENT
Start: 2021-12-01 | End: 2021-12-01

## 2021-12-01 ASSESSMENT — PAIN SCALES - GENERAL: PAINLEVEL: EXTREME PAIN (8)

## 2021-12-01 NOTE — PROGRESS NOTES
"Dear Genaro Curry,      Referring provider:       3/29/2019   Who referred you? Francheska Sampson     I was asked to see the patient regarding obesity by the referring provider above.    I had the pleasure of meeting with your patient Alina Thompson in our weight loss surgery office.  This patient is a 30 year old female who has been undergoing our thorough preoperative screening process in anticipation of potential bariatric surgery.    At initial evaluation we recorded Alina Thompson's Height: 179.1 cm (5' 10.5\"),   and  .  The patient has been unsuccessful with other methods of permanent weight loss and suffers from multiple weight related medical conditions.  Due to lack of success in achieving weight loss through other methods, she is interested in undergoing bariatric surgery.    PREVIOUS WEIGHT LOSS ATTEMPTS:     5/25/2021   I have tried the following methods to lose weight Watching portions or calories, Exercise, Weight Watchers, Atkins type diet (low carb/high protein), Pre packaged meals ex: Nutrisystem       CO-MORBIDITIES OF OBESITY INCLUDE:     5/25/2021   I have the following health issues associated with obesity: Type II Diabetes, Sleep Apnea, GERD (Reflux)       VITALS:  Pulse 102   Ht 1.791 m (5' 10.5\")   Wt 118.9 kg (262 lb 3.2 oz)   BMI 37.09 kg/m      PE:  GENERAL: Alert and oriented x3. NAD  HEENT exam: Sclerae not icteric. Hearing good. Head normocephalic and atraumatic.   CARDIOVASCULAR: No JVD  RESPIRATORY: Breathing unlabored  GASTROINTESTINAL: Obese  LOWER EXTREMITIES: no deformities  MUSCULOSKELETAL: Normal gait, Moves all 4 extremities equal and strong  NEUROLOGIC: no gross defect  SKIN: warm and dry to touch     In summary, she has undergone an appropriate medical evaluation, dietitian evaluation, as well as psychologic screening. The patient appears to be an appropriate candidate for bariatric surgery.    In the office today, I discussed the Robotic assisted laparoscopic " Vira-en-Y gastric bypass surgery.  Risks, benefits and anticipated outcomes were outlined including the risk of death, staple line leak (1-2%), PE, DVT, ulcer, worsening GERD, N/V, stricture, hernia, wound infection, weight regain, and vitamin deficiencies. This patient has a good chance of sustaining permanent weight loss due to this procedure.  This should also allow improvement if not resolution of his/her weight related medical conditions.    At present we are going to present your patient's file for prior authorization to insurance.  Pending prior authorization, I anticipate a surgical date in the near future.  We will keep you updated on any progress.  If you have questions regarding care please feel free to contact me.  Total time spent in the clinic was 45  minutes with greater than 50% in face-to-face consultation.        Sincerely,    Jean-Claude Lin MD    Please route or send letter to:  Primary Care Provider (PCP) and Referring Provider

## 2021-12-01 NOTE — LETTER
[facility] [place]  [address] [facility] [address]  Phone: [phone]    December 1, 2021        Alina Thompson  [address]  [place] [facility] [place] [address]          To whom it may concern:    RE: Alina Thompson    Patient was seen and treated today at our clinic.  Stay home until symptoms improve for 24 hours     Please contact me for questions or concerns.      Sincerely,        Ella Zhang MD

## 2021-12-01 NOTE — PROGRESS NOTES
Chief complaint: cough    2 weeks now of a cough  Patient did an evisit and was prescribed with bronchitis and got covid test and was negative   Cough is getting much better     But the past 2 days the ears have been popping  Today right ear pain    Has a history of ear infections   Does have a runny nose and some nasal congestion but no sinus pressure     No chest pain or shortness of breath   Because of persistent and worsening symptoms came in to be seen    Problem list and histories reviewed & adjusted, as indicated.  Additional history: as documented    Problem list, Medication list, Allergies, and Medical/Social/Surgical histories reviewed in [facility] and updated as appropriate.    ROS:  Constitutional, HEENT, cardiovascular, pulmonary, gi and gu systems are negative, except as otherwise noted.    OBJECTIVE:                                                    /88   Pulse 92   Temp 99.8  F (37.7  C) (Tympanic)   Resp 22   SpO2 100%   There is no height or weight on file to calculate BMI.  GENERAL: healthy, alert and no distress  EYES: pink palpebral conjunctiva, anicteric sclera, pupils equally reactive to light and accomodation, extraocular muscles intact full and equal.  ENT: midline nasal septum, positive  nasal congestion   Left ear:no tragal tenderness, no mastoid tenderness normal tympaninc membrane   Right ear: no tragal tenderness, no mastoid tenderness erythematous tympaninc membrane   NECK: no adenopathy, no asymmetry or  masses  RESP: lungs clear to auscultation - no rales, rhonchi or wheezes  CV: regular rate and rhythm, normal S1 S2, no S3 or S4, no murmur, click or rub, no peripheral edema and peripheral pulses strong  MS: no gross musculoskeletal defects noted, no edema  NEURO: Normal strength and tone, mentation intact and speech normal    Diagnostic Test Results:  No results found for this or any previous visit (from the past 24 hour(s)).     ASSESSMENT/PLAN:                                                           ICD-10-CM    1. Acute suppurative otitis media of right ear without spontaneous rupture of tympanic membrane, recurrence not specified  H66.001 amoxicillin-clavulanate (AUGMENTIN) 875-125 MG tablet   2. Type 2 diabetes mellitus without complication, without long-term current use of insulin (H)  E11.9    3. Vaginitis and vulvovaginitis  N76.0 fluconazole (DIFLUCAN) 150 MG tablet     Prescribed with augmentin  Side effects discussed warned about GI side effects   contitnue isolattion until symptoms resolve for 24 hours. Patient declined repeat covid test  Patient requested prescription for fluconazole as needed if she develops a yeast vaginitis. She says that she gets a yeast infection with antibiotics side effects discussed. Tolerated before.  Advised to try monistat first then if not improved may use one dose of Fluconazole. But if persist, recommend being seen.   Follow up with primary care provider for dm   Recommend follow up with primary care provider if no relief in 3 days, sooner if worse  Needs ear recheck with primary care provider in 2-4 weeks  Adverse reactions of medications discussed.  Over the counter medications discussed.   Aware to come back in if with worsening symptoms or if no relief despite treatment plan  Patient voiced understanding and had no further questions.     MD Ella Gabriel MD  [facility]

## 2021-12-01 NOTE — TELEPHONE ENCOUNTER
Reason for Call:  Other     Detailed comments: Patient wants to do the bypass surgery. Please give patient a call back.     Phone Number Patient can be reached at: Cell number on file:    Telephone Information:   Mobile [phone]       Best Time: Anytime     Can we leave a detailed message on this number? YES    Call taken on 12/1/2021 at 11:44 AM by Kalie Brennan

## 2021-12-02 NOTE — TELEPHONE ENCOUNTER
Called patient back; no answer.    Left message stating that I received her request to proceed with Gastric Bypass surgery and will submit her file to her insurance company for Prior Authorization.    Advised patient to call back with any questions.

## 2021-12-28 ENCOUNTER — MYC MEDICAL ADVICE (OUTPATIENT)
Dept: FAMILY MEDICINE | Facility: CLINIC | Age: 31
End: 2021-12-28
Payer: COMMERCIAL

## 2021-12-29 NOTE — TELEPHONE ENCOUNTER
Please advise, if her first visit need to be in-person with you, or if it can be done via video.Nemo Barrow MA/LORENA

## 2022-01-04 ENCOUNTER — TELEPHONE (OUTPATIENT)
Dept: SURGERY | Facility: CLINIC | Age: 32
End: 2022-01-04
Payer: COMMERCIAL

## 2022-01-04 NOTE — TELEPHONE ENCOUNTER
Received insurance approval to schedule gastric bypass surgery, contacted pt on 1/4/22, she will check her schedule and call back.

## 2022-01-27 ENCOUNTER — MYC MEDICAL ADVICE (OUTPATIENT)
Dept: FAMILY MEDICINE | Facility: CLINIC | Age: 32
End: 2022-01-27
Payer: COMMERCIAL

## 2022-01-28 ENCOUNTER — VIRTUAL VISIT (OUTPATIENT)
Dept: FAMILY MEDICINE | Facility: CLINIC | Age: 32
End: 2022-01-28
Payer: COMMERCIAL

## 2022-01-28 DIAGNOSIS — E66.812 CLASS 2 OBESITY DUE TO EXCESS CALORIES WITH BODY MASS INDEX (BMI) OF 37.0 TO 37.9 IN ADULT, UNSPECIFIED WHETHER SERIOUS COMORBIDITY PRESENT: ICD-10-CM

## 2022-01-28 DIAGNOSIS — Z76.89 ENCOUNTER TO ESTABLISH CARE: Primary | ICD-10-CM

## 2022-01-28 DIAGNOSIS — G43.109 MIGRAINE WITH AURA AND WITHOUT STATUS MIGRAINOSUS, NOT INTRACTABLE: ICD-10-CM

## 2022-01-28 DIAGNOSIS — E11.9 TYPE 2 DIABETES MELLITUS WITHOUT COMPLICATION, WITHOUT LONG-TERM CURRENT USE OF INSULIN (H): ICD-10-CM

## 2022-01-28 DIAGNOSIS — E66.09 CLASS 2 OBESITY DUE TO EXCESS CALORIES WITH BODY MASS INDEX (BMI) OF 37.0 TO 37.9 IN ADULT, UNSPECIFIED WHETHER SERIOUS COMORBIDITY PRESENT: ICD-10-CM

## 2022-01-28 PROBLEM — E66.01 MORBID OBESITY (H): Status: RESOLVED | Noted: 2021-06-01 | Resolved: 2022-01-28

## 2022-01-28 PROCEDURE — 99214 OFFICE O/P EST MOD 30 MIN: CPT | Mod: 95 | Performed by: NURSE PRACTITIONER

## 2022-01-28 NOTE — PROGRESS NOTES
Alina is a 31 year old who is being evaluated via a billable video visit.      How would you like to obtain your AVS? MyChart  If the video visit is dropped, the invitation should be resent by: Text to cell phone: [phone]  Will anyone else be joining your video visit? No    Video Start Time: 1500    Assessment & Plan     Encounter to establish care    Type 2 diabetes mellitus without complication, without long-term current use of insulin (H)  Chronic, stable.  Managed with diet and exercise. Due for A1C March 2022.   - Basic metabolic panel  (Ca, Cl, CO2, Creat, Gluc, K, Na, BUN); Future  - Hemoglobin A1c; Future  - Lipid panel reflex to direct LDL Fasting; Future  - Albumin Random Urine Quantitative with Creat Ratio; Future    Migraine with aura and without status migrainosus, not intractable  Chronic, stable.   Improved since working from home.     Class 2 obesity due to excess calories with body mass index (BMI) of 37.0 to 37.9 in adult, unspecified whether serious comorbidity present  Plans on gastric bypass surgery in near future.         Return in about 2 months (around 3/28/2022) for Follow-up, Lab Work.    Kathleen Almanzar, [facility]   Alina is a 31 year old who presents for the following health issues     HPI       Virtual visit to establish care  Hx of diabetes, controlled with diet and exercise.   Having gastric bypass surgery in March, possibly May.  Hoping sleep apnea and GERD will resolve with weight loss, as well as improving her diabetes numbers.  Most recent A1c was well controlled at 6.3%, September 2021.  Migraine history not so bad since working at home.  Had [employer] paper work in the past, maybe out of work once per month.  Used Imitrex once, doesn't remember if it worked.   Applying for new jobs, working on masters degree, some stress.       Review of Systems   Constitutional, HEENT, cardiovascular, pulmonary, gi and gu systems are negative,  except as otherwise noted.      Objective           Vitals:  No vitals were obtained today due to virtual visit.    Physical Exam   GENERAL: Healthy, alert and no distress  EYES: Eyes grossly normal to inspection.  No discharge or erythema, or obvious scleral/conjunctival abnormalities.  RESP: No audible wheeze, cough, or visible cyanosis.  No visible retractions or increased work of breathing.    SKIN: Visible skin clear. No significant rash, abnormal pigmentation or lesions.  NEURO: Cranial nerves grossly intact.  Mentation and speech appropriate for age.  PSYCH: Mentation appears normal, affect normal/bright, judgement and insight intact, normal speech and appearance well-groomed.    Lab Results   Component Value Date    A1C 6.3 09/15/2021    A1C 6.7 06/01/2021    A1C PER PATIENT 05/26/2021    A1C 5.9 01/21/2020    A1C 6.6 09/11/2019    A1C 7.6 04/05/2019     Recent Labs   Lab Test 05/26/21  1023 04/05/19  1517    138   POTASSIUM 4.2 3.9   CHLORIDE 106 102   CO2 22 27   ANIONGAP 9 9   * 182*   BUN 9 10   CR 0.63 0.59   BRADY 9.3 9.5     Recent Labs   Lab Test 05/26/21  1023 05/06/19  0859   CHOL 188 180   HDL 60 56   * 106*   TRIG 127 92             Video-Visit Details    Type of service:  Video Visit    Video End Time:1510    Originating Location (pt. Location): Home    Distant Location (provider location):  [facility]     Platform used for Video Visit: [employer]

## 2022-02-14 ENCOUNTER — MYC MEDICAL ADVICE (OUTPATIENT)
Dept: SURGERY | Facility: CLINIC | Age: 32
End: 2022-02-14
Payer: COMMERCIAL

## 2022-03-02 ENCOUNTER — TELEPHONE (OUTPATIENT)
Dept: SURGERY | Facility: CLINIC | Age: 32
End: 2022-03-02
Payer: COMMERCIAL

## 2022-03-02 NOTE — TELEPHONE ENCOUNTER
Called pt to schedule surgery, no answer left VM   Patient will not leave mask on for more than a couple minutes without ripping it off. Cannula replaced. precedex off. HR now staying 135/136. RR 30-40. abg and ekg being obtained.

## 2022-03-03 ENCOUNTER — MYC MEDICAL ADVICE (OUTPATIENT)
Dept: FAMILY MEDICINE | Facility: CLINIC | Age: 32
End: 2022-03-03
Payer: COMMERCIAL

## 2022-03-18 DIAGNOSIS — Z11.59 ENCOUNTER FOR SCREENING FOR OTHER VIRAL DISEASES: Primary | ICD-10-CM

## 2022-03-21 NOTE — PROGRESS NOTES
{2021 PROVIDER CHARTING PREFERENCE:801824}    Subjective     Alina Thompson is a 31 year old female who presents to clinic today for the following health issues accompanied by her {accompanied to visit:084044}:    HPI     {SUPERLIST (Optional):967201}  {additonal problems for provider to add (Optional):155120}    Review of Systems   {ROS COMP (Optional):408941}      Objective    There were no vitals taken for this visit.  There is no height or weight on file to calculate BMI.  Physical Exam   {Exam List (Optional):554878}    {Diagnostic Test Results (Optional):374244}

## 2022-03-23 ENCOUNTER — MYC MEDICAL ADVICE (OUTPATIENT)
Dept: FAMILY MEDICINE | Facility: CLINIC | Age: 32
End: 2022-03-23

## 2022-03-23 ENCOUNTER — LAB (OUTPATIENT)
Dept: LAB | Facility: CLINIC | Age: 32
End: 2022-03-23
Payer: COMMERCIAL

## 2022-03-23 DIAGNOSIS — Z11.59 ENCOUNTER FOR SCREENING FOR OTHER VIRAL DISEASES: ICD-10-CM

## 2022-03-23 DIAGNOSIS — E11.9 TYPE 2 DIABETES MELLITUS WITHOUT COMPLICATION, WITHOUT LONG-TERM CURRENT USE OF INSULIN (H): ICD-10-CM

## 2022-03-23 LAB
ANION GAP SERPL CALCULATED.3IONS-SCNC: 12 MMOL/L (ref 3–14)
BUN SERPL-MCNC: 6 MG/DL (ref 7–30)
CALCIUM SERPL-MCNC: 9.3 MG/DL (ref 8.5–10.1)
CHLORIDE BLD-SCNC: 108 MMOL/L (ref 94–109)
CHOLEST SERPL-MCNC: 190 MG/DL
CO2 SERPL-SCNC: 19 MMOL/L (ref 20–32)
CREAT SERPL-MCNC: 0.58 MG/DL (ref 0.52–1.04)
FASTING STATUS PATIENT QL REPORTED: YES
GFR SERPL CREATININE-BSD FRML MDRD: >90 ML/MIN/1.73M2
GLUCOSE BLD-MCNC: 132 MG/DL (ref 70–99)
HBA1C MFR BLD: 6.6 % (ref 0–5.6)
HDLC SERPL-MCNC: 62 MG/DL
LDLC SERPL CALC-MCNC: 110 MG/DL
NONHDLC SERPL-MCNC: 128 MG/DL
POTASSIUM BLD-SCNC: 4 MMOL/L (ref 3.4–5.3)
SARS-COV-2 RNA RESP QL NAA+PROBE: NEGATIVE
SODIUM SERPL-SCNC: 139 MMOL/L (ref 133–144)
TRIGL SERPL-MCNC: 88 MG/DL

## 2022-03-23 PROCEDURE — 83036 HEMOGLOBIN GLYCOSYLATED A1C: CPT

## 2022-03-23 PROCEDURE — U0003 INFECTIOUS AGENT DETECTION BY NUCLEIC ACID (DNA OR RNA); SEVERE ACUTE RESPIRATORY SYNDROME CORONAVIRUS 2 (SARS-COV-2) (CORONAVIRUS DISEASE [COVID-19]), AMPLIFIED PROBE TECHNIQUE, MAKING USE OF HIGH THROUGHPUT TECHNOLOGIES AS DESCRIBED BY CMS-2020-01-R: HCPCS

## 2022-03-23 PROCEDURE — 80048 BASIC METABOLIC PNL TOTAL CA: CPT

## 2022-03-23 PROCEDURE — U0005 INFEC AGEN DETEC AMPLI PROBE: HCPCS

## 2022-03-23 PROCEDURE — 80061 LIPID PANEL: CPT

## 2022-03-23 PROCEDURE — 36415 COLL VENOUS BLD VENIPUNCTURE: CPT

## 2022-03-28 DIAGNOSIS — Z11.59 ENCOUNTER FOR SCREENING FOR OTHER VIRAL DISEASES: Primary | ICD-10-CM

## 2022-04-11 NOTE — PROGRESS NOTES
Virtual bariatric pre op class completed.  Class power point and patient checklist information gone over with patient.     All questions were answered.  Quiz was completed.     Requested pt send wt on 4/15. No answer to MATTHEW del cid and fide Joy RD pt could not give pt at visit 4/18. Pt requested we use wt from 4/20 pre-op H&P.    Latoya Larry RN on 4/18/2022 at 11:25 AM

## 2022-04-13 ENCOUNTER — TELEPHONE (OUTPATIENT)
Dept: DERMATOLOGY | Facility: CLINIC | Age: 32
End: 2022-04-13
Payer: COMMERCIAL

## 2022-04-13 NOTE — TELEPHONE ENCOUNTER
4/13 2nd attempt. Provided phone number [phone] to schedule follow up  in about 1 year (around 7/13/2022) for in person, Follow up.    Gabbi tapia Procedure   Orthopedics, Podiatry, Sports Medicine, ENT/Eye Specialties  [facility]   [phone]

## 2022-04-15 ENCOUNTER — VIRTUAL VISIT (OUTPATIENT)
Dept: SURGERY | Facility: CLINIC | Age: 32
End: 2022-04-15
Payer: COMMERCIAL

## 2022-04-15 DIAGNOSIS — E66.09 CLASS 2 OBESITY DUE TO EXCESS CALORIES WITH BODY MASS INDEX (BMI) OF 37.0 TO 37.9 IN ADULT, UNSPECIFIED WHETHER SERIOUS COMORBIDITY PRESENT: Primary | ICD-10-CM

## 2022-04-15 DIAGNOSIS — E66.812 CLASS 2 OBESITY DUE TO EXCESS CALORIES WITH BODY MASS INDEX (BMI) OF 37.0 TO 37.9 IN ADULT, UNSPECIFIED WHETHER SERIOUS COMORBIDITY PRESENT: Primary | ICD-10-CM

## 2022-04-15 PROCEDURE — 99207 PR NO CHARGE NURSE ONLY: CPT

## 2022-04-18 ENCOUNTER — VIRTUAL VISIT (OUTPATIENT)
Dept: SURGERY | Facility: CLINIC | Age: 32
End: 2022-04-18
Payer: COMMERCIAL

## 2022-04-18 PROCEDURE — 97803 MED NUTRITION INDIV SUBSEQ: CPT | Mod: GT | Performed by: DIETITIAN, REGISTERED
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
## 2022-04-18 NOTE — PATIENT INSTRUCTIONS
Asif Fuller-  It was great to visit with you and learn about your progress. Below are the goals we discussed.    GOALS:  Set alerts to help remember to avoid fluids 30 minutes before/after/during meals  If hungry between meals replace snacks with a protein drinks    Your future visits with a Dietitian have been scheduled.  Thanks!  Rufino Saldana RD, EVELYN  [facility], [place]

## 2022-04-20 ENCOUNTER — OFFICE VISIT (OUTPATIENT)
Dept: FAMILY MEDICINE | Facility: CLINIC | Age: 32
End: 2022-04-20
Payer: COMMERCIAL

## 2022-04-20 VITALS
WEIGHT: 263.1 LBS | OXYGEN SATURATION: 98 % | RESPIRATION RATE: 16 BRPM | SYSTOLIC BLOOD PRESSURE: 118 MMHG | HEART RATE: 83 BPM | BODY MASS INDEX: 36.83 KG/M2 | HEIGHT: 71 IN | DIASTOLIC BLOOD PRESSURE: 70 MMHG | TEMPERATURE: 98.8 F

## 2022-04-20 DIAGNOSIS — E11.9 TYPE 2 DIABETES MELLITUS WITHOUT COMPLICATION, WITHOUT LONG-TERM CURRENT USE OF INSULIN (H): ICD-10-CM

## 2022-04-20 DIAGNOSIS — Z01.818 PREOP GENERAL PHYSICAL EXAM: Primary | ICD-10-CM

## 2022-04-20 DIAGNOSIS — G47.33 OSA (OBSTRUCTIVE SLEEP APNEA): ICD-10-CM

## 2022-04-20 DIAGNOSIS — Z76.89 ENCOUNTER TO ESTABLISH CARE WITH NEW DOCTOR: ICD-10-CM

## 2022-04-20 DIAGNOSIS — F32.0 MILD MAJOR DEPRESSION, SINGLE EPISODE (H): ICD-10-CM

## 2022-04-20 DIAGNOSIS — E66.01 MORBID OBESITY (H): ICD-10-CM

## 2022-04-20 PROCEDURE — 99215 OFFICE O/P EST HI 40 MIN: CPT | Performed by: INTERNAL MEDICINE

## 2022-04-20 ASSESSMENT — PAIN SCALES - GENERAL: PAINLEVEL: NO PAIN (0)

## 2022-04-20 NOTE — PROGRESS NOTES
[facility]  [id number] [address] [address]  Phone: [phone]  Primary Provider: Mariia Meyer MD  Pre-op Performing Provider: MARIIA MEYER      PREOPERATIVE EVALUATION:  Today's date: 4/20/2022    Alina Thompson is a 31 year old female who presents for a preoperative evaluation. Transitioning to new clinic due to a move.     Surgical Information:  Surgery/Procedure: Robotic assisted laparoscopic Vira-en-Y gastric bypass  Surgery Location: [facility]   Surgeon: Jean-Claude Lin MD  Surgery Date: 5/4/2022  Time of Surgery: 7:30 am   Where patient plans to recover: At home with family  Fax number for surgical facility: Note does not need to be faxed, will be available electronically in Epic.    Type of Anesthesia Anticipated: General      Assessment & Plan     The proposed surgical procedure is considered INTERMEDIATE risk.    (Z01.818) Preop general physical exam  (primary encounter diagnosis)  Comment: 5/4/2022 Bariatric Surgery.   Plan: SANDRA- Bring CPAP    (E66.01) Morbid obesity (H) at heaviest 306#  Comment: highest weight 306#  Current: Body mass index is 36.95 kg/m .   Plan: proceed with surgery and follow up with Bariatric team    (E11.9) Type 2 diabetes mellitus without complication, without long-term current use of insulin (H)  Comment: pt reports highest A1C 7.0; she has used NO DM medications to date.   Lab Results   Component Value Date    A1C 6.6 03/23/2022    A1C 6.3 09/15/2021    A1C 6.7 06/01/2021    A1C PER PATIENT 05/26/2021     Plan: ongoing monitoring; reassess in 3 months.     (F32.0) Mild major depression, single episode (H)  Comment: Fluoxetine use reviewed  Plan:     SANDRA  CPAP used and will bring to use perioperatively  Plans to meet with sleep medicine. May consider new sleep study in a few months after bariatric surgery to assess ongoing need and settings for CPAP             RECOMMENDATION:    --Approval given to  proceed with proposed procedure, without further diagnostic evaluation    --Pt advised to avoid NSAIDS (Motrin, Ibuprofen, Aleve or Naprosyn);  If needed, Tylenol or Acetaminophen are fine to use.    --Meds reviewed; may continue AM meds ( Fluoxetine and OCP)  on morning of surgery with a small sip of water.     --Pain medications, time off from work and FMLA following surgery deferred to surgeon.     --Covid test as scheduled    --Pt to bring CPAP to hospital to use during recovery period.    --Transfer of care reviewed; goals of treatment and future follow up    Prudence Laughlin MD  Internal Medicine  electronically signed     40 minutes spent on the date of the encounter doing chart review, history and exam, documentation and further activities per the note              Subjective     HPI related to upcoming procedure: Alina Thompson is a 31 year old female who presents for preoperative evaluation     Preop Questions 4/15/2022   1. Have you ever had a heart attack or stroke? No   2. Have you ever had surgery on your heart or blood vessels, such as a stent placement, a coronary artery bypass, or surgery on an artery in your head, neck, heart, or legs? No   3. Do you have chest pain with activity? No   4. Do you have a history of  heart failure? No   5. Do you currently have a cold, bronchitis or symptoms of other infection? No   6. Do you have a cough, shortness of breath, or wheezing? No   7. Do you or anyone in your family have previous history of blood clots? No   8. Do you or does anyone in your family have a serious bleeding problem such as prolonged bleeding following surgeries or cuts? No   9. Have you ever had problems with anemia or been told to take iron pills? No   10. Have you had any abnormal blood loss such as black, tarry or bloody stools, or abnormal vaginal bleeding? No   11. Have you ever had a blood transfusion? No   12. Are you willing to have a blood transfusion if it is medically needed  before, during, or after your surgery? Yes   13. Have you or any of your relatives ever had problems with anesthesia? No   14. Do you have sleep apnea, excessive snoring or daytime drowsiness? YES - using CPAP   14a. Do you have a CPAP machine? Yes   15. Do you have any artifical heart valves or other implanted medical devices like a pacemaker, defibrillator, or continuous glucose monitor? No   16. Do you have artificial joints? No   17. Are you allergic to latex? No   18. Is there any chance that you may be pregnant? No       Health Care Directive:  Patient does not have a Health Care Directive or Living Will: Patient states has Advance Directive and will bring in a copy to clinic.    Preoperative Review of :   reviewed - no record of controlled substances prescribed.        Review of Systems  CONSTITUTIONAL: NEGATIVE for fever, chills, change in weight  INTEGUMENTARY/SKIN: Eczema- mild; well controlled  EYES: NEGATIVE for vision changes or irritation  ENT/MOUTH: NEGATIVE for ear, mouth and throat problems  RESP: NEGATIVE for significant cough or SOB  CV: NEGATIVE for chest pain, palpitations or peripheral edema  GI: NEGATIVE for nausea, abdominal pain, heartburn, or change in bowel habits  : NEGATIVE for frequency, dysuria, or hematuria  MUSCULOSKELETAL: NEGATIVE for significant arthralgias or myalgia  NEURO: NEGATIVE for weakness, dizziness or paresthesias  ENDOCRINE: diabetes- controlled with diet and exercise; no meds needed.  HEME: NEGATIVE for bleeding problems  PSYCHIATRIC: NEGATIVE for changes in mood or affect    Patient Active Problem List    Diagnosis Date Noted     Type 2 diabetes mellitus without complication, without long-term current use of insulin (H) 09/15/2021     Priority: Medium     Migraine with aura and without status migrainosus, not intractable 01/21/2020     Priority: Medium     Cervical spine pain 04/17/2019     Priority: Medium     History of elevated blood pressure while in  [facility] 04/12/2019     Priority: Medium     SANDRA (obstructive sleep apnea)- mild (AHI 12) 01/16/2017     Priority: Medium     Study Date: 1/09/2017- (300.0 lbs) apnea/hypopnea index was 12.2 events per hour.  The REM AHI was 32.3 events per hour.  The supine AHI was 51.3 events per hour.  The RERA index was 2.1 events per hour.   The RDI was 14.3 events per hour. . Lowest oxygen saturation was 70.1%.  Time spent less than or equal to 88% was 4.7 minutes. The PLM index was 5.2 movements per hour.        Class 2 obesity due to excess calories with body mass index (BMI) of 37.0 to 37.9 in adult, unspecified whether serious comorbidity present 11/14/2016     Priority: Medium     Idiopathic hypersomnia 11/11/2016     Priority: Medium     2007 MSL of 10.4 with REM present on 3 of the naps  10/8/2012. She was off all stimulant medications. Actigraphy x1 week- Adequate total sleep. Polysomnogram-Total sleep time 434. Sleep efficiency was 79.7%. Latency to sleep 13.5 minutes. Latency to .0 minutes. No significant sleep disordered breathing with only rare hypopneas, RDI was 0.7 per hour, oxygen remained above 92%, No CO2 retention.   MSLT revealed a mean latency of 8.75 minutes on the first 4 naps with sleep present in nap 1, 2 and 4 and REM sleep present on nap 4. The fifth nap was 20 minutes and was not included in the MSL.        Past Medical History:   Diagnosis Date     Depression      Migraine with aura and without status migrainosus, not intractable 01/21/2020     Morbid obesity due to excess calories (H) 11/14/2016     Obesity      Recurrent UTI      Recurrent vaginitis     BV and Yeast     Sleep apnea      Past Surgical History:   Procedure Laterality Date     CHOLECYSTECTOMY       ENT SURGERY      ear tubes     TONSILLECTOMY       Current Outpatient Medications   Medication Sig Dispense Refill     calcium carbonate 600 mg-vitamin D 400 units (CALTRATE) 600-400 MG-UNIT per tablet Take 1 tablet by mouth 2 times  "daily       desogestrel-ethinyl estradiol (APRI) 0.15-30 MG-MCG tablet Take 1 tablet by mouth daily Continuously 112 tablet 3     FLUoxetine (PROZAC) 20 MG capsule Take 1 capsule (20 mg) by mouth daily 90 capsule 2     fluticasone (FLONASE) 50 MCG/ACT nasal spray Spray 1 spray into both nostrils daily 16 g 3     Multiple Vitamin (MULTIVITAMIN ADULT PO) Take 1 tablet by mouth daily       SUMAtriptan (IMITREX) 25 MG tablet Take 1 tablet (25 mg) by mouth at onset of headache for migraine May repeat in 2 hours. Max 8 tablets/24 hours. 30 tablet 0     triamcinolone (KENALOG) 0.1 % external ointment Apply topically 2 times daily as needed for irritation 80 g 1     Vitamin D, Cholecalciferol, 10 MCG (400 UNIT) TABS Take 5,000 Int'l Units by mouth daily         Allergies   Allergen Reactions     Sulfa Drugs      Wellbutrin [Bupropion] Other (See Comments)     Dizziness        Social History     Tobacco Use     Smoking status: Never Smoker     Smokeless tobacco: Never Used   Substance Use Topics     Alcohol use: Yes     Family History   Problem Relation Age of Onset     Sleep Apnea Other         Grandmother     Obesity Mother      Obesity Father      Diabetes Maternal Grandmother      Heart Disease Maternal Grandmother      Obesity Maternal Grandmother      Obesity Maternal Grandfather      Diabetes Paternal Grandmother      Genetic Disorder Paternal Grandmother      Obesity Paternal Grandmother      Liver Cancer Paternal Grandfather      Obesity Sister      History   Drug Use No         Objective     /70   Pulse 83   Temp 98.8  F (37.1  C) (Oral)   Resp 16   Ht 1.797 m (5' 10.75\")   Wt 119.3 kg (263 lb 1.6 oz)   SpO2 98%   BMI 36.95 kg/m      Physical Exam    GENERAL APPEARANCE: healthy, alert and no distress     EYES: EOMI, PERRL     HENT: ear canals and TM's normal and nose and mouth without ulcers or lesions     NECK: no adenopathy, no asymmetry, masses, or scars and thyroid normal to palpation     RESP: " lungs clear to auscultation - no rales, rhonchi or wheezes     CV: regular rates and rhythm, normal S1 S2, no S3 or S4 and no murmur, click or rub     ABDOMEN:  soft, nontender, no HSM or masses and bowel sounds normal     MS: extremities normal- no gross deformities noted, no evidence of inflammation in joints, FROM in all extremities.     SKIN: no suspicious lesions or rashes     NEURO: Normal strength and tone, sensory exam grossly normal, mentation intact and speech normal     PSYCH: mentation appears normal. and affect normal/bright    Recent Labs   Lab Test 03/23/22  1029 09/15/21  0815 06/01/21  1327 05/26/21  1023   HGB  --   --  13.4  --    PLT  --   --  319  --      --   --  137   POTASSIUM 4.0  --   --  4.2   CR 0.58  --   --  0.63   A1C 6.6* 6.3* 6.7* PER PATIENT        Diagnostics:  Recent Results (from the past 720 hour(s))   Asymptomatic COVID-19 Virus (Coronavirus) by PCR Nose    Collection Time: 03/23/22 10:29 AM    Specimen: Nose; Swab   Result Value Ref Range    SARS CoV2 PCR Negative Negative, Testing sent to reference lab. Results will be returned via unsolicited result   Basic metabolic panel  (Ca, Cl, CO2, Creat, Gluc, K, Na, BUN)    Collection Time: 03/23/22 10:29 AM   Result Value Ref Range    Sodium 139 133 - 144 mmol/L    Potassium 4.0 3.4 - 5.3 mmol/L    Chloride 108 94 - 109 mmol/L    Carbon Dioxide (CO2) 19 (L) 20 - 32 mmol/L    Anion Gap 12 3 - 14 mmol/L    Urea Nitrogen 6 (L) 7 - 30 mg/dL    Creatinine 0.58 0.52 - 1.04 mg/dL    Calcium 9.3 8.5 - 10.1 mg/dL    Glucose 132 (H) 70 - 99 mg/dL    GFR Estimate >90 >60 mL/min/1.73m2   Hemoglobin A1c    Collection Time: 03/23/22 10:29 AM   Result Value Ref Range    Hemoglobin A1C 6.6 (H) 0.0 - 5.6 %   Lipid panel reflex to direct LDL Fasting    Collection Time: 03/23/22 10:29 AM   Result Value Ref Range    Cholesterol 190 <200 mg/dL    Triglycerides 88 <150 mg/dL    Direct Measure HDL 62 >=50 mg/dL    LDL Cholesterol Calculated 110 (H)  <=100 mg/dL    Non HDL Cholesterol 128 <130 mg/dL    Patient Fasting > 8hrs? Yes           Revised Cardiac Risk Index (RCRI):  The patient has the following serious cardiovascular risks for perioperative complications:   - No serious cardiac risks = 0 points     RCRI Interpretation: 0 points: Class I (very low risk - 0.4% complication rate)           Signed Electronically by: Prudence Laughlin MD  Copy of this evaluation report is provided to requesting physician.

## 2022-04-20 NOTE — PATIENT INSTRUCTIONS
Preparing for Your Surgery  Getting started  A nurse will call you to review your health history and instructions. They will give you an arrival time based on your scheduled surgery time. Please be ready to share:  Your doctor's clinic name and phone number  Your medical, surgical and anesthesia history  A list of allergies and sensitivities  A list of medicines, including herbal treatments and over-the-counter drugs  Whether the patient has a legal guardian (ask how to send us the papers in advance)  Please tell us if you're pregnant--or if there's any chance you might be pregnant. Some surgeries may injure a fetus (unborn baby), so they require a pregnancy test. Surgeries that are safe for a fetus don't always need a test, and you can choose whether to have one.   If you have a child who's having surgery, please ask for a copy of Preparing for Your Child's Surgery.    Preparing for surgery  Within 30 days of surgery: Have a pre-op exam (sometimes called an H&P, or History and Physical). This can be done at a clinic or pre-operative center.  If you're having a , you may not need this exam. Talk to your care team.  At your pre-op exam, talk to your care team about all medicines you take. If you need to stop any medicines before surgery, ask when to start taking them again.  We do this for your safety. Many medicines can make you bleed too much during surgery. Some change how well surgery (anesthesia) drugs work.  Call your insurance company to let them know you're having surgery. (If you don't have insurance, call [phone].)  Call your clinic if there's any change in your health. This includes signs of a cold or flu (sore throat, runny nose, cough, rash, fever). It also includes a scrape or scratch near the surgery site.  If you have questions on the day of surgery, call your hospital or surgery center.  COVID testing  You may need to be tested for COVID-19 before having surgery. If so, your surgical  team will give you instructions for scheduling this test, separate from your preoperative history and physical.  Eating and drinking guidelines  For your safety: Unless your surgeon tells you otherwise, follow the guidelines below.  Eat and drink as usual until 8 hours before surgery. After that, no food or milk.  Drink clear liquids until 2 hours before surgery. These are liquids you can see through, like water, Gatorade and Propel Water. You may also have black coffee and tea (no cream or milk).  Nothing by mouth within 2 hours of surgery. This includes gum, candy and breath mints.  If you drink alcohol: Stop drinking it the night before surgery.  If your care team tells you to take medicine on the morning of surgery, it's okay to take it with a sip of water.  Preventing infection  Shower or bathe the night before and morning of your surgery. Follow the instructions your clinic gave you. (If no instructions, use regular soap.)  Don't shave or clip hair near your surgery site. We'll remove the hair if needed.  Don't smoke or vape the morning of surgery. You may chew nicotine gum up to 2 hours before surgery. A nicotine patch is okay.  Note: Some surgeries require you to completely quit smoking and nicotine. Check with your surgeon.  Your care team will make every effort to keep you safe from infection. We will:  Clean our hands often with soap and water (or an alcohol-based hand rub).  Clean the skin at your surgery site with a special soap that kills germs.  Give you a special gown to keep you warm. (Cold raises the risk of infection.)  Wear special hair covers, masks, gowns and gloves during surgery.  Give antibiotic medicine, if prescribed. Not all surgeries need antibiotics.  What to bring on the day of surgery  Photo ID and insurance card  Copy of your health care directive, if you have one  Glasses and hearing aides (bring cases)  You can't wear contacts during surgery  Inhaler and eye drops, if you use them  (tell us about these when you arrive)  CPAP machine or breathing device, if you use them  A few personal items, if spending the night  If you have . . .  A pacemaker, ICD (cardiac defibrillator) or other implant: Bring the ID card.  An implanted stimulator: Bring the remote control.  A legal guardian: Bring a copy of the certified (court-stamped) guardianship papers.  Please remove any jewelry, including body piercings. Leave jewelry and other valuables at home.  If you're going home the day of surgery  You must have a responsible adult drive you home. They should stay with you overnight as well.  If you don't have someone to stay with you, and you aren't safe to go home alone, we may keep you overnight. Insurance often won't pay for this.  After surgery  If it's hard to control your pain or you need more pain medicine, please call your surgeon's office.  Questions?   If you have any questions for your care team, list them here: _________________________________________________________________________________________________________________________________________________________________________ ____________________________________ ____________________________________ ____________________________________  For informational purposes only. Not to replace the advice of your health care provider. Copyright   2003, 2019 [employer]. All rights reserved. Clinically reviewed by Magalis Langston MD. OFERTALDIA 135288 - REV 07/21.        --Approval given to proceed with proposed procedure, without further diagnostic evaluation    --Pt advised to avoid NSAIDS (Motrin, Ibuprofen, Aleve or Naprosyn);  If needed, Tylenol or Acetaminophen are fine to use.    --meds reviewed; may continue AM meds ( Fluoxetine and OCP)  on morning of surgery with a small sip of water.     --Pain medications, time off from work and FMLA following surgery deferred to surgeon.     --Covid test as scheduled    --Pt to bring CPAP to hospital to  use during recovery period.

## 2022-04-20 NOTE — H&P (VIEW-ONLY)
[facility]  [address] [address]  Phone: [phone]  Primary Provider: Mariia Meyer MD  Pre-op Performing Provider: MARIIA MEYER      PREOPERATIVE EVALUATION:  Today's date: 4/20/2022    Alina Thompson is a 31 year old female who presents for a preoperative evaluation. Transitioning to new clinic due to a move.     Surgical Information:  Surgery/Procedure: Robotic assisted laparoscopic Vira-en-Y gastric bypass  Surgery Location: [facility]   Surgeon: Jean-Claude Lin MD  Surgery Date: 5/4/2022  Time of Surgery: 7:30 am   Where patient plans to recover: At home with family  Fax number for surgical facility: Note does not need to be faxed, will be available electronically in Epic.    Type of Anesthesia Anticipated: General      Assessment & Plan     The proposed surgical procedure is considered INTERMEDIATE risk.    (Z01.818) Preop general physical exam  (primary encounter diagnosis)  Comment: 5/4/2022 Bariatric Surgery.   Plan: SANDRA- Bring CPAP    (E66.01) Morbid obesity (H) at heaviest 306#  Comment: highest weight 306#  Current: Body mass index is 36.95 kg/m .   Plan: proceed with surgery and follow up with Bariatric team    (E11.9) Type 2 diabetes mellitus without complication, without long-term current use of insulin (H)  Comment: pt reports highest A1C 7.0; she has used NO DM medications to date.   Lab Results   Component Value Date    A1C 6.6 03/23/2022    A1C 6.3 09/15/2021    A1C 6.7 06/01/2021    A1C PER PATIENT 05/26/2021     Plan: ongoing monitoring; reassess in 3 months.     (F32.0) Mild major depression, single episode (H)  Comment: Fluoxetine use reviewed  Plan:     SANDRA  CPAP used and will bring to use perioperatively  Plans to meet with sleep medicine. May consider new sleep study in a few months after bariatric surgery to assess ongoing need and settings for CPAP             RECOMMENDATION:    --Approval given to  proceed with proposed procedure, without further diagnostic evaluation    --Pt advised to avoid NSAIDS (Motrin, Ibuprofen, Aleve or Naprosyn);  If needed, Tylenol or Acetaminophen are fine to use.    --Meds reviewed; may continue AM meds ( Fluoxetine and OCP)  on morning of surgery with a small sip of water.     --Pain medications, time off from work and FMLA following surgery deferred to surgeon.     --Covid test as scheduled    --Pt to bring CPAP to hospital to use during recovery period.    --Transfer of care reviewed; goals of treatment and future follow up    Prudence Laughlin MD  Internal Medicine  electronically signed     40 minutes spent on the date of the encounter doing chart review, history and exam, documentation and further activities per the note              Subjective     HPI related to upcoming procedure: Alina Thompson is a 31 year old female who presents for preoperative evaluation     Preop Questions 4/15/2022   1. Have you ever had a heart attack or stroke? No   2. Have you ever had surgery on your heart or blood vessels, such as a stent placement, a coronary artery bypass, or surgery on an artery in your head, neck, heart, or legs? No   3. Do you have chest pain with activity? No   4. Do you have a history of  heart failure? No   5. Do you currently have a cold, bronchitis or symptoms of other infection? No   6. Do you have a cough, shortness of breath, or wheezing? No   7. Do you or anyone in your family have previous history of blood clots? No   8. Do you or does anyone in your family have a serious bleeding problem such as prolonged bleeding following surgeries or cuts? No   9. Have you ever had problems with anemia or been told to take iron pills? No   10. Have you had any abnormal blood loss such as black, tarry or bloody stools, or abnormal vaginal bleeding? No   11. Have you ever had a blood transfusion? No   12. Are you willing to have a blood transfusion if it is medically needed  before, during, or after your surgery? Yes   13. Have you or any of your relatives ever had problems with anesthesia? No   14. Do you have sleep apnea, excessive snoring or daytime drowsiness? YES - using CPAP   14a. Do you have a CPAP machine? Yes   15. Do you have any artifical heart valves or other implanted medical devices like a pacemaker, defibrillator, or continuous glucose monitor? No   16. Do you have artificial joints? No   17. Are you allergic to latex? No   18. Is there any chance that you may be pregnant? No       Health Care Directive:  Patient does not have a Health Care Directive or Living Will: Patient states has Advance Directive and will bring in a copy to clinic.    Preoperative Review of :   reviewed - no record of controlled substances prescribed.        Review of Systems  CONSTITUTIONAL: NEGATIVE for fever, chills, change in weight  INTEGUMENTARY/SKIN: Eczema- mild; well controlled  EYES: NEGATIVE for vision changes or irritation  ENT/MOUTH: NEGATIVE for ear, mouth and throat problems  RESP: NEGATIVE for significant cough or SOB  CV: NEGATIVE for chest pain, palpitations or peripheral edema  GI: NEGATIVE for nausea, abdominal pain, heartburn, or change in bowel habits  : NEGATIVE for frequency, dysuria, or hematuria  MUSCULOSKELETAL: NEGATIVE for significant arthralgias or myalgia  NEURO: NEGATIVE for weakness, dizziness or paresthesias  ENDOCRINE: diabetes- controlled with diet and exercise; no meds needed.  HEME: NEGATIVE for bleeding problems  PSYCHIATRIC: NEGATIVE for changes in mood or affect    Patient Active Problem List    Diagnosis Date Noted     Type 2 diabetes mellitus without complication, without long-term current use of insulin (H) 09/15/2021     Priority: Medium     Migraine with aura and without status migrainosus, not intractable 01/21/2020     Priority: Medium     Cervical spine pain 04/17/2019     Priority: Medium     History of elevated blood pressure while in  [facility] 04/12/2019     Priority: Medium     SANDRA (obstructive sleep apnea)- mild (AHI 12) 01/16/2017     Priority: Medium     Study Date: 1/09/2017- (300.0 lbs) apnea/hypopnea index was 12.2 events per hour.  The REM AHI was 32.3 events per hour.  The supine AHI was 51.3 events per hour.  The RERA index was 2.1 events per hour.   The RDI was 14.3 events per hour. . Lowest oxygen saturation was 70.1%.  Time spent less than or equal to 88% was 4.7 minutes. The PLM index was 5.2 movements per hour.        Class 2 obesity due to excess calories with body mass index (BMI) of 37.0 to 37.9 in adult, unspecified whether serious comorbidity present 11/14/2016     Priority: Medium     Idiopathic hypersomnia 11/11/2016     Priority: Medium     2007 MSL of 10.4 with REM present on 3 of the naps  10/8/2012. She was off all stimulant medications. Actigraphy x1 week- Adequate total sleep. Polysomnogram-Total sleep time 434. Sleep efficiency was 79.7%. Latency to sleep 13.5 minutes. Latency to .0 minutes. No significant sleep disordered breathing with only rare hypopneas, RDI was 0.7 per hour, oxygen remained above 92%, No CO2 retention.   MSLT revealed a mean latency of 8.75 minutes on the first 4 naps with sleep present in nap 1, 2 and 4 and REM sleep present on nap 4. The fifth nap was 20 minutes and was not included in the MSL.        Past Medical History:   Diagnosis Date     Depression      Migraine with aura and without status migrainosus, not intractable 01/21/2020     Morbid obesity due to excess calories (H) 11/14/2016     Obesity      Recurrent UTI      Recurrent vaginitis     BV and Yeast     Sleep apnea      Past Surgical History:   Procedure Laterality Date     CHOLECYSTECTOMY       ENT SURGERY      ear tubes     TONSILLECTOMY       Current Outpatient Medications   Medication Sig Dispense Refill     calcium carbonate 600 mg-vitamin D 400 units (CALTRATE) 600-400 MG-UNIT per tablet Take 1 tablet by mouth 2 times  "daily       desogestrel-ethinyl estradiol (APRI) 0.15-30 MG-MCG tablet Take 1 tablet by mouth daily Continuously 112 tablet 3     FLUoxetine (PROZAC) 20 MG capsule Take 1 capsule (20 mg) by mouth daily 90 capsule 2     fluticasone (FLONASE) 50 MCG/ACT nasal spray Spray 1 spray into both nostrils daily 16 g 3     Multiple Vitamin (MULTIVITAMIN ADULT PO) Take 1 tablet by mouth daily       SUMAtriptan (IMITREX) 25 MG tablet Take 1 tablet (25 mg) by mouth at onset of headache for migraine May repeat in 2 hours. Max 8 tablets/24 hours. 30 tablet 0     triamcinolone (KENALOG) 0.1 % external ointment Apply topically 2 times daily as needed for irritation 80 g 1     Vitamin D, Cholecalciferol, 10 MCG (400 UNIT) TABS Take 5,000 Int'l Units by mouth daily         Allergies   Allergen Reactions     Sulfa Drugs      Wellbutrin [Bupropion] Other (See Comments)     Dizziness        Social History     Tobacco Use     Smoking status: Never Smoker     Smokeless tobacco: Never Used   Substance Use Topics     Alcohol use: Yes     Family History   Problem Relation Age of Onset     Sleep Apnea Other         Grandmother     Obesity Mother      Obesity Father      Diabetes Maternal Grandmother      Heart Disease Maternal Grandmother      Obesity Maternal Grandmother      Obesity Maternal Grandfather      Diabetes Paternal Grandmother      Genetic Disorder Paternal Grandmother      Obesity Paternal Grandmother      Liver Cancer Paternal Grandfather      Obesity Sister      History   Drug Use No         Objective     /70   Pulse 83   Temp 98.8  F (37.1  C) (Oral)   Resp 16   Ht 1.797 m (5' 10.75\")   Wt 119.3 kg (263 lb 1.6 oz)   SpO2 98%   BMI 36.95 kg/m      Physical Exam    GENERAL APPEARANCE: healthy, alert and no distress     EYES: EOMI, PERRL     HENT: ear canals and TM's normal and nose and mouth without ulcers or lesions     NECK: no adenopathy, no asymmetry, masses, or scars and thyroid normal to palpation     RESP: " lungs clear to auscultation - no rales, rhonchi or wheezes     CV: regular rates and rhythm, normal S1 S2, no S3 or S4 and no murmur, click or rub     ABDOMEN:  soft, nontender, no HSM or masses and bowel sounds normal     MS: extremities normal- no gross deformities noted, no evidence of inflammation in joints, FROM in all extremities.     SKIN: no suspicious lesions or rashes     NEURO: Normal strength and tone, sensory exam grossly normal, mentation intact and speech normal     PSYCH: mentation appears normal. and affect normal/bright    Recent Labs   Lab Test 03/23/22  1029 09/15/21  0815 06/01/21  1327 05/26/21  1023   HGB  --   --  13.4  --    PLT  --   --  319  --      --   --  137   POTASSIUM 4.0  --   --  4.2   CR 0.58  --   --  0.63   A1C 6.6* 6.3* 6.7* PER PATIENT        Diagnostics:  Recent Results (from the past 720 hour(s))   Asymptomatic COVID-19 Virus (Coronavirus) by PCR Nose    Collection Time: 03/23/22 10:29 AM    Specimen: Nose; Swab   Result Value Ref Range    SARS CoV2 PCR Negative Negative, Testing sent to reference lab. Results will be returned via unsolicited result   Basic metabolic panel  (Ca, Cl, CO2, Creat, Gluc, K, Na, BUN)    Collection Time: 03/23/22 10:29 AM   Result Value Ref Range    Sodium 139 133 - 144 mmol/L    Potassium 4.0 3.4 - 5.3 mmol/L    Chloride 108 94 - 109 mmol/L    Carbon Dioxide (CO2) 19 (L) 20 - 32 mmol/L    Anion Gap 12 3 - 14 mmol/L    Urea Nitrogen 6 (L) 7 - 30 mg/dL    Creatinine 0.58 0.52 - 1.04 mg/dL    Calcium 9.3 8.5 - 10.1 mg/dL    Glucose 132 (H) 70 - 99 mg/dL    GFR Estimate >90 >60 mL/min/1.73m2   Hemoglobin A1c    Collection Time: 03/23/22 10:29 AM   Result Value Ref Range    Hemoglobin A1C 6.6 (H) 0.0 - 5.6 %   Lipid panel reflex to direct LDL Fasting    Collection Time: 03/23/22 10:29 AM   Result Value Ref Range    Cholesterol 190 <200 mg/dL    Triglycerides 88 <150 mg/dL    Direct Measure HDL 62 >=50 mg/dL    LDL Cholesterol Calculated 110 (H)  <=100 mg/dL    Non HDL Cholesterol 128 <130 mg/dL    Patient Fasting > 8hrs? Yes           Revised Cardiac Risk Index (RCRI):  The patient has the following serious cardiovascular risks for perioperative complications:   - No serious cardiac risks = 0 points     RCRI Interpretation: 0 points: Class I (very low risk - 0.4% complication rate)           Signed Electronically by: Prudence Laughlin MD  Copy of this evaluation report is provided to requesting physician.

## 2022-04-29 ENCOUNTER — DOCUMENTATION ONLY (OUTPATIENT)
Dept: OTHER | Facility: CLINIC | Age: 32
End: 2022-04-29
Payer: COMMERCIAL

## 2022-05-03 ENCOUNTER — LAB (OUTPATIENT)
Dept: LAB | Facility: CLINIC | Age: 32
End: 2022-05-03
Payer: COMMERCIAL

## 2022-05-03 ENCOUNTER — ANESTHESIA EVENT (OUTPATIENT)
Dept: SURGERY | Facility: CLINIC | Age: 32
DRG: 620 | End: 2022-05-03
Payer: COMMERCIAL

## 2022-05-03 DIAGNOSIS — Z11.59 ENCOUNTER FOR SCREENING FOR OTHER VIRAL DISEASES: ICD-10-CM

## 2022-05-03 LAB — SARS-COV-2 RNA RESP QL NAA+PROBE: NEGATIVE

## 2022-05-03 PROCEDURE — U0003 INFECTIOUS AGENT DETECTION BY NUCLEIC ACID (DNA OR RNA); SEVERE ACUTE RESPIRATORY SYNDROME CORONAVIRUS 2 (SARS-COV-2) (CORONAVIRUS DISEASE [COVID-19]), AMPLIFIED PROBE TECHNIQUE, MAKING USE OF HIGH THROUGHPUT TECHNOLOGIES AS DESCRIBED BY CMS-2020-01-R: HCPCS

## 2022-05-03 PROCEDURE — U0005 INFEC AGEN DETEC AMPLI PROBE: HCPCS

## 2022-05-03 RX ORDER — ACETAMINOPHEN 500 MG
500-1000 TABLET ORAL EVERY 6 HOURS PRN
COMMUNITY
End: 2022-08-26

## 2022-05-03 NOTE — PROGRESS NOTES
PTA medications updated by Medication Scribe prior to surgery via phone call with patient (last doses completed by Nurse)     Medication history sources: Patient, Surescripts and H&P  In the past week, patient estimated taking medication this percent of the time: Greater than 90%  Adherence assessment: N/A Not Observed    Significant changes made to the medication list:  None      Additional medication history information:   None    Medication reconciliation completed by provider prior to medication history? No    Time spent in this activity: 40 minutes    The information provided in this note is only as accurate as the sources available at the time of update(s)    Prior to Admission medications    Medication Sig Last Dose Taking? Auth Provider   acetaminophen (TYLENOL) 500 MG tablet Take 500-1,000 mg by mouth every 6 hours as needed for mild pain 5/3/2022 at prn Yes Reported, Patient   CALCIUM PO Take 1 capsule by mouth daily 5/2/2022 at am Yes Reported, Patient   desogestrel-ethinyl estradiol (APRI) 0.15-30 MG-MCG tablet Take 1 tablet by mouth daily Continuously 5/3/2022 at am Yes Lidia Harris APRN CNP   FLUoxetine (PROZAC) 20 MG capsule Take 1 capsule (20 mg) by mouth daily 5/3/2022 at am Yes Cameron Ibarra DO   fluticasone (FLONASE) 50 MCG/ACT nasal spray Spray 1 spray into both nostrils daily  at prn Yes Cameron Ibarra DO   Multiple Vitamin (MULTIVITAMIN ADULT PO) Take 1 tablet by mouth daily 5/2/2022 at am Yes Reported, Patient   SUMAtriptan (IMITREX) 25 MG tablet Take 1 tablet (25 mg) by mouth at onset of headache for migraine May repeat in 2 hours. Max 8 tablets/24 hours.  at prn Yes Genaro Vu MD   triamcinolone (KENALOG) 0.1 % external ointment Apply topically 2 times daily as needed for irritation  at prn Yes Cameron Ibarra DO   VITAMIN D (CHOLECALCIFEROL) PO Take 1 tablet by mouth daily 5/2/2022 at am Yes Reported, Patient     Medication history completed by:    Tommy  , SUSANhT  [facility]

## 2022-05-04 ENCOUNTER — HOSPITAL ENCOUNTER (INPATIENT)
Facility: CLINIC | Age: 32
LOS: 1 days | Discharge: HOME | DRG: 620 | End: 2022-05-05
Attending: SURGERY | Admitting: SURGERY
Payer: COMMERCIAL

## 2022-05-04 ENCOUNTER — ANESTHESIA (OUTPATIENT)
Dept: SURGERY | Facility: CLINIC | Age: 32
DRG: 620 | End: 2022-05-04
Payer: COMMERCIAL

## 2022-05-04 DIAGNOSIS — Z98.890 PONV (POSTOPERATIVE NAUSEA AND VOMITING): ICD-10-CM

## 2022-05-04 DIAGNOSIS — R11.2 PONV (POSTOPERATIVE NAUSEA AND VOMITING): ICD-10-CM

## 2022-05-04 DIAGNOSIS — E66.01 MORBID OBESITY WITH BMI OF 40.0-44.9, ADULT (H): Primary | ICD-10-CM

## 2022-05-04 LAB
CREAT SERPL-MCNC: 0.6 MG/DL (ref 0.52–1.04)
CREAT SERPL-MCNC: 0.63 MG/DL (ref 0.52–1.04)
GFR SERPL CREATININE-BSD FRML MDRD: >90 ML/MIN/1.73M2
GFR SERPL CREATININE-BSD FRML MDRD: >90 ML/MIN/1.73M2
GLUCOSE BLD-MCNC: 149 MG/DL (ref 70–99)
GLUCOSE BLDC GLUCOMTR-MCNC: 151 MG/DL (ref 70–99)
GLUCOSE BLDC GLUCOMTR-MCNC: 176 MG/DL (ref 70–99)
GLUCOSE BLDC GLUCOMTR-MCNC: 206 MG/DL (ref 70–99)
HCG UR QL: NEGATIVE
HGB BLD-MCNC: 12.8 G/DL (ref 11.7–15.7)
PLATELET # BLD AUTO: 277 10E3/UL (ref 150–450)
POTASSIUM BLD-SCNC: 3.3 MMOL/L (ref 3.4–5.3)

## 2022-05-04 PROCEDURE — 250N000011 HC RX IP 250 OP 636: Performed by: ANESTHESIOLOGY

## 2022-05-04 PROCEDURE — 258N000001 HC RX 258: Performed by: SURGERY

## 2022-05-04 PROCEDURE — 250N000009 HC RX 250: Performed by: NURSE ANESTHETIST, CERTIFIED REGISTERED

## 2022-05-04 PROCEDURE — 82565 ASSAY OF CREATININE: CPT | Performed by: PHYSICIAN ASSISTANT

## 2022-05-04 PROCEDURE — 43644 LAP GASTRIC BYPASS/ROUX-EN-Y: CPT | Performed by: SURGERY

## 2022-05-04 PROCEDURE — 258N000003 HC RX IP 258 OP 636: Performed by: ANESTHESIOLOGY

## 2022-05-04 PROCEDURE — 258N000003 HC RX IP 258 OP 636: Performed by: PHYSICIAN ASSISTANT

## 2022-05-04 PROCEDURE — 43644 LAP GASTRIC BYPASS/ROUX-EN-Y: CPT | Mod: AS | Performed by: PHYSICIAN ASSISTANT

## 2022-05-04 PROCEDURE — 36415 COLL VENOUS BLD VENIPUNCTURE: CPT | Performed by: PHYSICIAN ASSISTANT

## 2022-05-04 PROCEDURE — 250N000011 HC RX IP 250 OP 636: Performed by: NURSE ANESTHETIST, CERTIFIED REGISTERED

## 2022-05-04 PROCEDURE — 120N000001 HC R&B MED SURG/OB

## 2022-05-04 PROCEDURE — 81025 URINE PREGNANCY TEST: CPT | Performed by: ANESTHESIOLOGY

## 2022-05-04 PROCEDURE — 250N000009 HC RX 250: Performed by: PHYSICIAN ASSISTANT

## 2022-05-04 PROCEDURE — 84132 ASSAY OF SERUM POTASSIUM: CPT | Performed by: ANESTHESIOLOGY

## 2022-05-04 PROCEDURE — 82565 ASSAY OF CREATININE: CPT | Performed by: ANESTHESIOLOGY

## 2022-05-04 PROCEDURE — 250N000011 HC RX IP 250 OP 636: Performed by: SURGERY

## 2022-05-04 PROCEDURE — 85049 AUTOMATED PLATELET COUNT: CPT

## 2022-05-04 PROCEDURE — 710N000009 HC RECOVERY PHASE 1, LEVEL 1, PER MIN: Performed by: SURGERY

## 2022-05-04 PROCEDURE — 36415 COLL VENOUS BLD VENIPUNCTURE: CPT | Performed by: ANESTHESIOLOGY

## 2022-05-04 PROCEDURE — 250N000013 HC RX MED GY IP 250 OP 250 PS 637: Performed by: SURGERY

## 2022-05-04 PROCEDURE — 0D164ZA BYPASS STOMACH TO JEJUNUM, PERCUTANEOUS ENDOSCOPIC APPROACH: ICD-10-PCS | Performed by: SURGERY

## 2022-05-04 PROCEDURE — 250N000025 HC SEVOFLURANE, PER MIN: Performed by: SURGERY

## 2022-05-04 PROCEDURE — 250N000011 HC RX IP 250 OP 636: Performed by: PHYSICIAN ASSISTANT

## 2022-05-04 PROCEDURE — 370N000017 HC ANESTHESIA TECHNICAL FEE, PER MIN: Performed by: SURGERY

## 2022-05-04 PROCEDURE — 85018 HEMOGLOBIN: CPT | Performed by: ANESTHESIOLOGY

## 2022-05-04 PROCEDURE — 250N000009 HC RX 250: Performed by: SURGERY

## 2022-05-04 PROCEDURE — 82947 ASSAY GLUCOSE BLOOD QUANT: CPT | Performed by: ANESTHESIOLOGY

## 2022-05-04 PROCEDURE — 250N000013 HC RX MED GY IP 250 OP 250 PS 637: Performed by: PHYSICIAN ASSISTANT

## 2022-05-04 PROCEDURE — 360N000077 HC SURGERY LEVEL 4, PER MIN: Performed by: SURGERY

## 2022-05-04 PROCEDURE — 8E0W4CZ ROBOTIC ASSISTED PROCEDURE OF TRUNK REGION, PERCUTANEOUS ENDOSCOPIC APPROACH: ICD-10-PCS | Performed by: SURGERY

## 2022-05-04 PROCEDURE — 272N000001 HC OR GENERAL SUPPLY STERILE: Performed by: SURGERY

## 2022-05-04 PROCEDURE — 999N000141 HC STATISTIC PRE-PROCEDURE NURSING ASSESSMENT: Performed by: SURGERY

## 2022-05-04 RX ORDER — HYDROMORPHONE HCL IN WATER/PF 6 MG/30 ML
0.2 PATIENT CONTROLLED ANALGESIA SYRINGE INTRAVENOUS
Status: DISCONTINUED | OUTPATIENT
Start: 2022-05-04 | End: 2022-05-05 | Stop reason: HOSPADM

## 2022-05-04 RX ORDER — ENALAPRILAT 1.25 MG/ML
1.25 INJECTION INTRAVENOUS EVERY 6 HOURS PRN
Status: DISCONTINUED | OUTPATIENT
Start: 2022-05-04 | End: 2022-05-05 | Stop reason: HOSPADM

## 2022-05-04 RX ORDER — HYDROMORPHONE HYDROCHLORIDE 1 MG/ML
INJECTION, SOLUTION INTRAMUSCULAR; INTRAVENOUS; SUBCUTANEOUS PRN
Status: DISCONTINUED | OUTPATIENT
Start: 2022-05-04 | End: 2022-05-04

## 2022-05-04 RX ORDER — OXYCODONE HYDROCHLORIDE 5 MG/1
10 TABLET ORAL
Status: DISCONTINUED | OUTPATIENT
Start: 2022-05-04 | End: 2022-05-05 | Stop reason: HOSPADM

## 2022-05-04 RX ORDER — SCOLOPAMINE TRANSDERMAL SYSTEM 1 MG/1
1 PATCH, EXTENDED RELEASE TRANSDERMAL
Status: DISCONTINUED | OUTPATIENT
Start: 2022-05-04 | End: 2022-05-05 | Stop reason: HOSPADM

## 2022-05-04 RX ORDER — SODIUM CHLORIDE, SODIUM LACTATE, POTASSIUM CHLORIDE, CALCIUM CHLORIDE 600; 310; 30; 20 MG/100ML; MG/100ML; MG/100ML; MG/100ML
INJECTION, SOLUTION INTRAVENOUS CONTINUOUS
Status: DISCONTINUED | OUTPATIENT
Start: 2022-05-04 | End: 2022-05-04 | Stop reason: HOSPADM

## 2022-05-04 RX ORDER — NALOXONE HYDROCHLORIDE 0.4 MG/ML
0.2 INJECTION, SOLUTION INTRAMUSCULAR; INTRAVENOUS; SUBCUTANEOUS
Status: DISCONTINUED | OUTPATIENT
Start: 2022-05-04 | End: 2022-05-05 | Stop reason: HOSPADM

## 2022-05-04 RX ORDER — CEFAZOLIN SODIUM/WATER 2 G/20 ML
2 SYRINGE (ML) INTRAVENOUS SEE ADMIN INSTRUCTIONS
Status: DISCONTINUED | OUTPATIENT
Start: 2022-05-04 | End: 2022-05-04 | Stop reason: HOSPADM

## 2022-05-04 RX ORDER — OXYCODONE HYDROCHLORIDE 5 MG/1
5 TABLET ORAL EVERY 4 HOURS PRN
Status: DISCONTINUED | OUTPATIENT
Start: 2022-05-04 | End: 2022-05-04

## 2022-05-04 RX ORDER — PROPOFOL 10 MG/ML
INJECTION, EMULSION INTRAVENOUS PRN
Status: DISCONTINUED | OUTPATIENT
Start: 2022-05-04 | End: 2022-05-04

## 2022-05-04 RX ORDER — ONDANSETRON 2 MG/ML
4 INJECTION INTRAMUSCULAR; INTRAVENOUS
Status: COMPLETED | OUTPATIENT
Start: 2022-05-04 | End: 2022-05-04

## 2022-05-04 RX ORDER — FENTANYL CITRATE 0.05 MG/ML
25 INJECTION, SOLUTION INTRAMUSCULAR; INTRAVENOUS EVERY 5 MIN PRN
Status: DISCONTINUED | OUTPATIENT
Start: 2022-05-04 | End: 2022-05-04 | Stop reason: HOSPADM

## 2022-05-04 RX ORDER — NALOXONE HYDROCHLORIDE 0.4 MG/ML
0.4 INJECTION, SOLUTION INTRAMUSCULAR; INTRAVENOUS; SUBCUTANEOUS
Status: DISCONTINUED | OUTPATIENT
Start: 2022-05-04 | End: 2022-05-05 | Stop reason: HOSPADM

## 2022-05-04 RX ORDER — BUPIVACAINE HYDROCHLORIDE AND EPINEPHRINE 2.5; 5 MG/ML; UG/ML
INJECTION, SOLUTION INFILTRATION; PERINEURAL PRN
Status: DISCONTINUED | OUTPATIENT
Start: 2022-05-04 | End: 2022-05-04 | Stop reason: HOSPADM

## 2022-05-04 RX ORDER — HYDROMORPHONE HCL IN WATER/PF 6 MG/30 ML
0.2 PATIENT CONTROLLED ANALGESIA SYRINGE INTRAVENOUS EVERY 5 MIN PRN
Status: DISCONTINUED | OUTPATIENT
Start: 2022-05-04 | End: 2022-05-04 | Stop reason: HOSPADM

## 2022-05-04 RX ORDER — DEXAMETHASONE SODIUM PHOSPHATE 4 MG/ML
INJECTION, SOLUTION INTRA-ARTICULAR; INTRALESIONAL; INTRAMUSCULAR; INTRAVENOUS; SOFT TISSUE PRN
Status: DISCONTINUED | OUTPATIENT
Start: 2022-05-04 | End: 2022-05-04

## 2022-05-04 RX ORDER — KETOROLAC TROMETHAMINE 30 MG/ML
30 INJECTION, SOLUTION INTRAMUSCULAR; INTRAVENOUS EVERY 6 HOURS
Status: COMPLETED | OUTPATIENT
Start: 2022-05-04 | End: 2022-05-05

## 2022-05-04 RX ORDER — VECURONIUM BROMIDE 1 MG/ML
INJECTION, POWDER, LYOPHILIZED, FOR SOLUTION INTRAVENOUS PRN
Status: DISCONTINUED | OUTPATIENT
Start: 2022-05-04 | End: 2022-05-04

## 2022-05-04 RX ORDER — DIPHENHYDRAMINE HYDROCHLORIDE 50 MG/ML
25 INJECTION INTRAMUSCULAR; INTRAVENOUS EVERY 6 HOURS PRN
Status: DISCONTINUED | OUTPATIENT
Start: 2022-05-04 | End: 2022-05-05 | Stop reason: HOSPADM

## 2022-05-04 RX ORDER — LIDOCAINE HYDROCHLORIDE 20 MG/ML
INJECTION, SOLUTION INFILTRATION; PERINEURAL PRN
Status: DISCONTINUED | OUTPATIENT
Start: 2022-05-04 | End: 2022-05-04

## 2022-05-04 RX ORDER — GLYCOPYRROLATE 0.2 MG/ML
INJECTION, SOLUTION INTRAMUSCULAR; INTRAVENOUS PRN
Status: DISCONTINUED | OUTPATIENT
Start: 2022-05-04 | End: 2022-05-04

## 2022-05-04 RX ORDER — INDOCYANINE GREEN AND WATER 25 MG
KIT INJECTION PRN
Status: DISCONTINUED | OUTPATIENT
Start: 2022-05-04 | End: 2022-05-04 | Stop reason: HOSPADM

## 2022-05-04 RX ORDER — ACETAMINOPHEN 325 MG/1
975 TABLET ORAL ONCE
Status: COMPLETED | OUTPATIENT
Start: 2022-05-04 | End: 2022-05-04

## 2022-05-04 RX ORDER — OXYCODONE HYDROCHLORIDE 5 MG/1
5 TABLET ORAL
Status: DISCONTINUED | OUTPATIENT
Start: 2022-05-04 | End: 2022-05-05 | Stop reason: HOSPADM

## 2022-05-04 RX ORDER — PROPOFOL 10 MG/ML
INJECTION, EMULSION INTRAVENOUS CONTINUOUS PRN
Status: DISCONTINUED | OUTPATIENT
Start: 2022-05-04 | End: 2022-05-04

## 2022-05-04 RX ORDER — CEFAZOLIN SODIUM/WATER 2 G/20 ML
2 SYRINGE (ML) INTRAVENOUS
Status: COMPLETED | OUTPATIENT
Start: 2022-05-04 | End: 2022-05-04

## 2022-05-04 RX ORDER — SIMETHICONE 40MG/0.6ML
100 SUSPENSION, DROPS(FINAL DOSAGE FORM)(ML) ORAL 4 TIMES DAILY PRN
Status: DISCONTINUED | OUTPATIENT
Start: 2022-05-04 | End: 2022-05-05 | Stop reason: HOSPADM

## 2022-05-04 RX ORDER — POTASSIUM CHLORIDE 7.45 MG/ML
10 INJECTION INTRAVENOUS
Status: COMPLETED | OUTPATIENT
Start: 2022-05-04 | End: 2022-05-05

## 2022-05-04 RX ORDER — ONDANSETRON 2 MG/ML
INJECTION INTRAMUSCULAR; INTRAVENOUS PRN
Status: DISCONTINUED | OUTPATIENT
Start: 2022-05-04 | End: 2022-05-04

## 2022-05-04 RX ORDER — ONDANSETRON 2 MG/ML
4 INJECTION INTRAMUSCULAR; INTRAVENOUS EVERY 30 MIN PRN
Status: DISCONTINUED | OUTPATIENT
Start: 2022-05-04 | End: 2022-05-04 | Stop reason: HOSPADM

## 2022-05-04 RX ORDER — ENOXAPARIN SODIUM 100 MG/ML
40 INJECTION SUBCUTANEOUS EVERY 12 HOURS
Status: DISCONTINUED | OUTPATIENT
Start: 2022-05-05 | End: 2022-05-05 | Stop reason: HOSPADM

## 2022-05-04 RX ORDER — MAGNESIUM HYDROXIDE 1200 MG/15ML
LIQUID ORAL PRN
Status: DISCONTINUED | OUTPATIENT
Start: 2022-05-04 | End: 2022-05-04 | Stop reason: HOSPADM

## 2022-05-04 RX ORDER — SODIUM CHLORIDE, SODIUM LACTATE, POTASSIUM CHLORIDE, CALCIUM CHLORIDE 600; 310; 30; 20 MG/100ML; MG/100ML; MG/100ML; MG/100ML
INJECTION, SOLUTION INTRAVENOUS CONTINUOUS
Status: DISCONTINUED | OUTPATIENT
Start: 2022-05-04 | End: 2022-05-05 | Stop reason: HOSPADM

## 2022-05-04 RX ORDER — FENTANYL CITRATE 50 UG/ML
INJECTION, SOLUTION INTRAMUSCULAR; INTRAVENOUS PRN
Status: DISCONTINUED | OUTPATIENT
Start: 2022-05-04 | End: 2022-05-04

## 2022-05-04 RX ORDER — LIDOCAINE 40 MG/G
CREAM TOPICAL
Status: DISCONTINUED | OUTPATIENT
Start: 2022-05-04 | End: 2022-05-05 | Stop reason: HOSPADM

## 2022-05-04 RX ORDER — ACETAMINOPHEN 325 MG/1
650 TABLET ORAL EVERY 4 HOURS PRN
Status: DISCONTINUED | OUTPATIENT
Start: 2022-05-04 | End: 2022-05-05 | Stop reason: HOSPADM

## 2022-05-04 RX ORDER — NEOSTIGMINE METHYLSULFATE 1 MG/ML
VIAL (ML) INJECTION PRN
Status: DISCONTINUED | OUTPATIENT
Start: 2022-05-04 | End: 2022-05-04

## 2022-05-04 RX ORDER — PROCHLORPERAZINE MALEATE 10 MG
10 TABLET ORAL EVERY 6 HOURS PRN
Status: DISCONTINUED | OUTPATIENT
Start: 2022-05-04 | End: 2022-05-05 | Stop reason: HOSPADM

## 2022-05-04 RX ORDER — ONDANSETRON 4 MG/1
4 TABLET, ORALLY DISINTEGRATING ORAL EVERY 6 HOURS PRN
Status: DISCONTINUED | OUTPATIENT
Start: 2022-05-04 | End: 2022-05-05 | Stop reason: HOSPADM

## 2022-05-04 RX ORDER — ONDANSETRON 2 MG/ML
4 INJECTION INTRAMUSCULAR; INTRAVENOUS EVERY 6 HOURS PRN
Status: DISCONTINUED | OUTPATIENT
Start: 2022-05-04 | End: 2022-05-05 | Stop reason: HOSPADM

## 2022-05-04 RX ORDER — DIPHENHYDRAMINE HCL 25 MG
25 CAPSULE ORAL EVERY 6 HOURS PRN
Status: DISCONTINUED | OUTPATIENT
Start: 2022-05-04 | End: 2022-05-05 | Stop reason: HOSPADM

## 2022-05-04 RX ORDER — ONDANSETRON 4 MG/1
4 TABLET, ORALLY DISINTEGRATING ORAL EVERY 30 MIN PRN
Status: DISCONTINUED | OUTPATIENT
Start: 2022-05-04 | End: 2022-05-04 | Stop reason: HOSPADM

## 2022-05-04 RX ORDER — HEPARIN SODIUM 5000 [USP'U]/.5ML
5000 INJECTION, SOLUTION INTRAVENOUS; SUBCUTANEOUS
Status: COMPLETED | OUTPATIENT
Start: 2022-05-04 | End: 2022-05-04

## 2022-05-04 RX ADMIN — Medication 2 G: at 07:38

## 2022-05-04 RX ADMIN — FENTANYL CITRATE 50 MCG: 50 INJECTION, SOLUTION INTRAMUSCULAR; INTRAVENOUS at 07:41

## 2022-05-04 RX ADMIN — GLYCOPYRROLATE 0.7 MG: 0.2 INJECTION, SOLUTION INTRAMUSCULAR; INTRAVENOUS at 09:55

## 2022-05-04 RX ADMIN — VECURONIUM BROMIDE 3 MG: 1 INJECTION, POWDER, LYOPHILIZED, FOR SOLUTION INTRAVENOUS at 08:18

## 2022-05-04 RX ADMIN — KETOROLAC TROMETHAMINE 30 MG: 30 INJECTION, SOLUTION INTRAMUSCULAR; INTRAVENOUS at 12:35

## 2022-05-04 RX ADMIN — PROCHLORPERAZINE EDISYLATE 5 MG: 5 INJECTION INTRAMUSCULAR; INTRAVENOUS at 11:01

## 2022-05-04 RX ADMIN — ONDANSETRON 4 MG: 2 INJECTION INTRAMUSCULAR; INTRAVENOUS at 10:15

## 2022-05-04 RX ADMIN — FENTANYL CITRATE 25 MCG: 50 INJECTION, SOLUTION INTRAMUSCULAR; INTRAVENOUS at 11:14

## 2022-05-04 RX ADMIN — ROCURONIUM BROMIDE 40 MG: 50 INJECTION, SOLUTION INTRAVENOUS at 07:46

## 2022-05-04 RX ADMIN — DEXAMETHASONE SODIUM PHOSPHATE 4 MG: 4 INJECTION, SOLUTION INTRA-ARTICULAR; INTRALESIONAL; INTRAMUSCULAR; INTRAVENOUS; SOFT TISSUE at 07:59

## 2022-05-04 RX ADMIN — POTASSIUM CHLORIDE 10 MEQ: 7.46 INJECTION, SOLUTION INTRAVENOUS at 18:03

## 2022-05-04 RX ADMIN — KETOROLAC TROMETHAMINE 30 MG: 30 INJECTION, SOLUTION INTRAMUSCULAR; INTRAVENOUS at 20:10

## 2022-05-04 RX ADMIN — SCOPALAMINE 1 PATCH: 1 PATCH, EXTENDED RELEASE TRANSDERMAL at 12:29

## 2022-05-04 RX ADMIN — OXYCODONE HYDROCHLORIDE 5 MG: 5 TABLET ORAL at 17:59

## 2022-05-04 RX ADMIN — NEOSTIGMINE METHYLSULFATE 5 MG: 1 INJECTION, SOLUTION INTRAVENOUS at 09:55

## 2022-05-04 RX ADMIN — POTASSIUM CHLORIDE 10 MEQ: 7.46 INJECTION, SOLUTION INTRAVENOUS at 20:11

## 2022-05-04 RX ADMIN — POTASSIUM CHLORIDE 10 MEQ: 7.46 INJECTION, SOLUTION INTRAVENOUS at 22:32

## 2022-05-04 RX ADMIN — SODIUM CHLORIDE, POTASSIUM CHLORIDE, SODIUM LACTATE AND CALCIUM CHLORIDE: 600; 310; 30; 20 INJECTION, SOLUTION INTRAVENOUS at 12:31

## 2022-05-04 RX ADMIN — HYDROMORPHONE HYDROCHLORIDE 0.2 MG: 0.2 INJECTION, SOLUTION INTRAMUSCULAR; INTRAVENOUS; SUBCUTANEOUS at 13:38

## 2022-05-04 RX ADMIN — VECURONIUM BROMIDE 2 MG: 1 INJECTION, POWDER, LYOPHILIZED, FOR SOLUTION INTRAVENOUS at 08:30

## 2022-05-04 RX ADMIN — PROPOFOL 200 MG: 10 INJECTION, EMULSION INTRAVENOUS at 07:46

## 2022-05-04 RX ADMIN — MIDAZOLAM 2 MG: 1 INJECTION INTRAMUSCULAR; INTRAVENOUS at 07:35

## 2022-05-04 RX ADMIN — SODIUM CHLORIDE, POTASSIUM CHLORIDE, SODIUM LACTATE AND CALCIUM CHLORIDE: 600; 310; 30; 20 INJECTION, SOLUTION INTRAVENOUS at 06:51

## 2022-05-04 RX ADMIN — ONDANSETRON 4 MG: 2 INJECTION INTRAMUSCULAR; INTRAVENOUS at 09:40

## 2022-05-04 RX ADMIN — HEPARIN SODIUM 5000 UNITS: 5000 INJECTION, SOLUTION INTRAVENOUS; SUBCUTANEOUS at 06:47

## 2022-05-04 RX ADMIN — FENTANYL CITRATE 50 MCG: 50 INJECTION, SOLUTION INTRAMUSCULAR; INTRAVENOUS at 07:46

## 2022-05-04 RX ADMIN — FENTANYL CITRATE 25 MCG: 50 INJECTION, SOLUTION INTRAMUSCULAR; INTRAVENOUS at 10:36

## 2022-05-04 RX ADMIN — OXYCODONE HYDROCHLORIDE 5 MG: 5 TABLET ORAL at 17:02

## 2022-05-04 RX ADMIN — LIDOCAINE HYDROCHLORIDE 100 MG: 20 INJECTION, SOLUTION INFILTRATION; PERINEURAL at 07:46

## 2022-05-04 RX ADMIN — PROPOFOL 30 MCG/KG/MIN: 10 INJECTION, EMULSION INTRAVENOUS at 07:51

## 2022-05-04 RX ADMIN — ONDANSETRON 4 MG: 2 INJECTION INTRAMUSCULAR; INTRAVENOUS at 12:30

## 2022-05-04 RX ADMIN — PROCHLORPERAZINE EDISYLATE 10 MG: 5 INJECTION INTRAMUSCULAR; INTRAVENOUS at 16:59

## 2022-05-04 RX ADMIN — FENTANYL CITRATE 50 MCG: 50 INJECTION, SOLUTION INTRAMUSCULAR; INTRAVENOUS at 10:01

## 2022-05-04 RX ADMIN — VECURONIUM BROMIDE 1 MG: 1 INJECTION, POWDER, LYOPHILIZED, FOR SOLUTION INTRAVENOUS at 09:17

## 2022-05-04 RX ADMIN — SIMETHICONE 100 MG: 20 EMULSION ORAL at 14:30

## 2022-05-04 RX ADMIN — ACETAMINOPHEN 650 MG: 325 TABLET ORAL at 17:59

## 2022-05-04 RX ADMIN — FAMOTIDINE 20 MG: 10 INJECTION, SOLUTION INTRAVENOUS at 06:51

## 2022-05-04 RX ADMIN — ROCURONIUM BROMIDE 10 MG: 50 INJECTION, SOLUTION INTRAVENOUS at 09:03

## 2022-05-04 RX ADMIN — HYDROMORPHONE HYDROCHLORIDE 0.5 MG: 1 INJECTION, SOLUTION INTRAMUSCULAR; INTRAVENOUS; SUBCUTANEOUS at 08:14

## 2022-05-04 RX ADMIN — ACETAMINOPHEN 975 MG: 325 TABLET ORAL at 06:25

## 2022-05-04 RX ADMIN — HYDROMORPHONE HYDROCHLORIDE 0.2 MG: 0.2 INJECTION, SOLUTION INTRAMUSCULAR; INTRAVENOUS; SUBCUTANEOUS at 11:32

## 2022-05-04 RX ADMIN — FENTANYL CITRATE 50 MCG: 50 INJECTION, SOLUTION INTRAMUSCULAR; INTRAVENOUS at 10:16

## 2022-05-04 RX ADMIN — HYDROMORPHONE HYDROCHLORIDE 0.2 MG: 0.2 INJECTION, SOLUTION INTRAMUSCULAR; INTRAVENOUS; SUBCUTANEOUS at 15:45

## 2022-05-04 RX ADMIN — ONDANSETRON 4 MG: 2 INJECTION INTRAMUSCULAR; INTRAVENOUS at 06:54

## 2022-05-04 RX ADMIN — SODIUM CHLORIDE, POTASSIUM CHLORIDE, SODIUM LACTATE AND CALCIUM CHLORIDE: 600; 310; 30; 20 INJECTION, SOLUTION INTRAVENOUS at 08:35

## 2022-05-04 ASSESSMENT — ACTIVITIES OF DAILY LIVING (ADL)
ADLS_ACUITY_SCORE: 4

## 2022-05-04 NOTE — ANESTHESIA CARE TRANSFER NOTE
Patient: Alina Thompson    Procedure: Procedure(s):  Robotic assisted laparoscopic Vira-en-Y gastric bypass       Diagnosis: Morbid obesity (H) [E66.01]  Diagnosis Additional Information: No value filed.    Anesthesia Type:   General     Note:    Oropharynx: oropharynx clear of all foreign objects and spontaneously breathing  Level of Consciousness: awake  Oxygen Supplementation: face mask  Level of Supplemental Oxygen (L/min / FiO2): 8  Independent Airway: airway patency satisfactory and stable  Dentition: dentition unchanged  Vital Signs Stable: post-procedure vital signs reviewed and stable  Report to RN Given: handoff report given  Patient transferred to: PACU  Comments: Suctioned, spont resp ,lifts head  > 5 sec. Extubated with immediate exchange, to PACU, report to RN.  Handoff Report: Identifed the Patient, Identified the Reponsible Provider, Reviewed the pertinent medical history, Discussed the surgical course, Reviewed Intra-OP anesthesia mangement and issues during anesthesia, Set expectations for post-procedure period and Allowed opportunity for questions and acknowledgement of understanding      Vitals:  Vitals Value Taken Time   BP     Temp     Pulse     Resp     SpO2         Electronically Signed By: COLIN Duque CRNA  May 4, 2022  10:21 AM

## 2022-05-04 NOTE — ANESTHESIA POSTPROCEDURE EVALUATION
Patient: Alina Thompson    Procedure: Procedure(s):  Robotic assisted laparoscopic Vira-en-Y gastric bypass       Anesthesia Type:  General    Note:  Disposition: Inpatient   Postop Pain Control: Uneventful            Sign Out: Well controlled pain   PONV: No   Neuro/Psych: Uneventful            Sign Out: Acceptable/Baseline neuro status   Airway/Respiratory: Uneventful            Sign Out: Acceptable/Baseline resp. status   CV/Hemodynamics: Uneventful            Sign Out: Acceptable CV status   Other NRE: NONE   DID A NON-ROUTINE EVENT OCCUR? No           Last vitals:  Vitals Value Taken Time   /95 05/04/22 1200   Temp 36.8  C (98.2  F) 05/04/22 1200   Pulse 98 05/04/22 1200   Resp 0 05/04/22 1200   SpO2 97 % 05/04/22 1203   Vitals shown include unvalidated device data.    Electronically Signed By: Genaro Gonzales MD  May 4, 2022  12:48 PM

## 2022-05-04 NOTE — ANESTHESIA PROCEDURE NOTES
Airway       Patient location during procedure: OR       Procedure Start/Stop Times: 5/4/2022 7:48 AM  Staff -        Anesthesiologist:  Genaro Gonzales MD       CRNA: Meena Villaseñor APRN CRNA       Performed By: SRNA  Consent for Airway        Urgency: elective  Indications and Patient Condition       Indications for airway management: sona-procedural       Induction type:intravenous       Mask difficulty assessment: 1 - vent by mask    Final Airway Details       Final airway type: endotracheal airway       Successful airway: ETT - single  Endotracheal Airway Details        ETT size (mm): 7.0       Cuffed: yes       Successful intubation technique: direct laryngoscopy       DL Blade Type: Fu 2       Grade View of Cords: 1       Adjucts: stylet       Position: Right       Measured from: lips       Secured at (cm): 21       Bite block used: None    Post intubation assessment        Placement verified by: capnometry, equal breath sounds and chest rise        Number of attempts at approach: 1       Number of other approaches attempted: 0       Secured with: pink tape       Ease of procedure: easy       Dentition: Unchanged and Intact    Medication(s) Administered   Medication Administration Time: 5/4/2022 7:48 AM

## 2022-05-04 NOTE — INTERVAL H&P NOTE
"I have reviewed the surgical (or preoperative) H&P that is linked to this encounter, and examined the patient. There are no significant changes    Clinical Conditions Present on Arrival:  Clinically Significant Risk Factors Present on Admission                   # Diabetes, type II: last A1C 6.6 % (Ref range: 0.0 - 5.6 %)  # Obesity: Estimated body mass index is 36.17 kg/m  as calculated from the following:    Height as of this encounter: 1.803 m (5' 11\").    Weight as of this encounter: 117.6 kg (259 lb 4.8 oz).       "

## 2022-05-04 NOTE — OR NURSING
ANNA Gonzales gave OK for pt to go to her inpt room 2224 from PACU. Nausea gone and tolerated minimal amount of ice chips. Dr. Gonzales aware of pt's POCT glucose 206 (was given IV decadron).

## 2022-05-04 NOTE — PLAN OF CARE
POD 0 from a lorenzo-en-y. A&O. CMS intact. Bowel sounds hypoactive, - flatus, tolerating water. Nausea decreased with zofran & compazine. Gas pains decreased with simethicone. Unable to void - straight cathed for 525 at 1400. Still unable to void at 1830 - straight cathed for 750.  VSS. Lap sites CDI. Up with ad ting independent with set up assistance. C/o moderate pain, decreased with dilaudid, oxycodone & tylenol. Potassium replacement initiated, burning at IV, rate slowed to 50 mL/hr. Pt scoring green on the Aggression Stop Light Tool. Continue to montior.

## 2022-05-04 NOTE — PROGRESS NOTES
MD Notification    Notified Person: MD    Notified Person Name: Wm Juan HUA    Notification Date/Time: 12:20 5/04    Notification Interaction: Low potassium. Nursing potassium protocol?     Purpose of Notification:    Orders Received:    Comments:

## 2022-05-04 NOTE — ANESTHESIA PREPROCEDURE EVALUATION
Anesthesia Pre-Procedure Evaluation    Patient: Alina Thompson   MRN: [id number] : 1990        Procedure : Procedure(s):  Robotic assisted laparoscopic Vira-en-Y gastric bypass          Past Medical History:   Diagnosis Date     Depression      Migraine with aura and without status migrainosus, not intractable 2020     Morbid obesity due to excess calories (H) 2016     Obesity      Recurrent UTI      Recurrent vaginitis     BV and Yeast     Sleep apnea       Past Surgical History:   Procedure Laterality Date     CHOLECYSTECTOMY       ENT SURGERY      ear tubes     TONSILLECTOMY        Allergies   Allergen Reactions     Sulfa Drugs      Wellbutrin [Bupropion] Other (See Comments)     Dizziness      Social History     Tobacco Use     Smoking status: Never Smoker     Smokeless tobacco: Never Used   Substance Use Topics     Alcohol use: Yes      Wt Readings from Last 1 Encounters:   22 119.3 kg (263 lb 1.6 oz)        Anesthesia Evaluation            ROS/MED HX  ENT/Pulmonary:     (+) sleep apnea,     Neurologic:     (+) migraines,     Cardiovascular:       METS/Exercise Tolerance:     Hematologic:       Musculoskeletal:   (+) arthritis,     GI/Hepatic:    (-) GERD   Renal/Genitourinary:       Endo:     (+) type II DM, Obesity,     Psychiatric/Substance Use:     (+) psychiatric history depression     Infectious Disease:       Malignancy:       Other:            Physical Exam    Airway        Mallampati: III   TM distance: > 3 FB   Neck ROM: full   Mouth opening: > 3 cm    Respiratory Devices and Support         Dental  no notable dental history         Cardiovascular   cardiovascular exam normal          Pulmonary   pulmonary exam normal                OUTSIDE LABS:  CBC:   Lab Results   Component Value Date    WBC 12.0 (H) 2021    WBC 8.9 2019    HGB 13.4 2021    HGB 15.2 2019    HCT 40.2 2021    HCT 44.6 2019     2021     2019      BMP:   Lab Results   Component Value Date     03/23/2022     05/26/2021    POTASSIUM 4.0 03/23/2022    POTASSIUM 4.2 05/26/2021    CHLORIDE 108 03/23/2022    CHLORIDE 106 05/26/2021    CO2 19 (L) 03/23/2022    CO2 22 05/26/2021    BUN 6 (L) 03/23/2022    BUN 9 05/26/2021    CR 0.58 03/23/2022    CR 0.63 05/26/2021     (H) 03/23/2022     (H) 05/26/2021     COAGS: No results found for: PTT, INR, FIBR  POC: No results found for: BGM, HCG, HCGS  HEPATIC:   Lab Results   Component Value Date    ALBUMIN 3.7 06/01/2021    PROTTOTAL 7.3 06/01/2021    ALT 48 06/01/2021    AST 37 06/01/2021    ALKPHOS 81 06/01/2021    BILITOTAL 0.4 06/01/2021     OTHER:   Lab Results   Component Value Date    A1C 6.6 (H) 03/23/2022    BRADY 9.3 03/23/2022    TSH 1.86 09/11/2019       Anesthesia Plan    ASA Status:  2   NPO Status:  NPO Appropriate    Anesthesia Type: General.     - Airway: ETT   Induction: Intravenous, Propofol.   Maintenance: Inhalation.   Techniques and Equipment:     - Airway: Video-Laryngoscope         Consents    Anesthesia Plan(s) and associated risks, benefits, and realistic alternatives discussed. Questions answered and patient/representative(s) expressed understanding.    - Discussed:     - Discussed with:  Patient         Postoperative Care    Pain management: IV analgesics, Oral pain medications.   PONV prophylaxis: Ondansetron (or other 5HT-3), Dexamethasone or Solumedrol     Comments:                Genaro Gonzales MD

## 2022-05-04 NOTE — BRIEF OP NOTE
[facility]    Brief Operative Note    Pre-operative diagnosis: Morbid obesity (H) [E66.01]  Post-operative diagnosis Morbid Obesity    Procedure: Procedure(s):  Robotic assisted laparoscopic Vira-en-Y gastric bypass     Surgeon: Surgeon(s) and Role:     * Jean-Claude Lin MD - Primary     * Wm Stephenson PA-C - Assisting     Anesthesia: General   Estimated Blood Loss: 5 mL from 5/4/2022  7:36 AM to 5/4/2022 10:17 AM      Drains:  None  Specimens: * No specimens in log *  Findings:   None.  Complications: None.  Implants: * No implants in log *

## 2022-05-04 NOTE — OP NOTE
PREOPERATIVE DIAGNOSIS: Morbid obesity with  medical comorbidities including type 2 diabetes, obstructive sleep apnea, and gastroesophageal reflux disease.  Failure of other methods of permanent weight loss including portion control and calorie counting, exercise, weight watchers, Atkins diet, Nutrisystem.  BMI at the time of initial presentation was 38.35    POSTOPERATIVE DIAGNOSIS: Same    PROCEDURE: Robotic assisted laparoscopic Vira-en-Y gastric bypass.     SURGEON: Jean-Claude Lin MD     ASSISTANT: Wm Stephenson PA-C, Physician assistant first assistant was necessary during the performance of this procedure for expertise in patient positioning, prepping, draping, trocar placement, camera management, retraction and exposure, and suctioning.    ANESTHESIA: General endotracheal.     ESTIMATED BLOOD LOSS: 5 cc.     DRAINS: None.     COMPLICATIONS: None.     SPECIMENS: None.     OPERATIVE INDICATIONS: Alina Thompson has undergone the preoperative screening process through the [facility] and has been deemed an appropriate candidate for bariatric surgery. She  was furnished with a copy of our specialized consent form for said procedure. This document was reviewed with her  in great detail. After thoroughly discussing the procedures, potential risks and anticipated outcome she  wished to proceed.   Moreover, as the surgeon performing this procedure, I certify that the following are true in regards to this patient at or prior to the day of surgery:   1. The patient's body mass index (BMI) is or has been greater than or equal to 35 kg/m2.  2. The patient has at least one co-morbidity related to obesity (as outlined above).  3. The patient has been previously unsuccessful with medical treatment for obesity.  Please note that some of this information has been documented as part of a comprehensive pre-bariatric surgery process in the outpatient clinic and is NOT immediately available in the  inpatient encounter for this bariatric operation.    DESCRIPTION OF PROCEDURE: After informed consent was obtained, the patient was taken to the operating room and placed supine on the operating table. Following the induction of adequate general endotracheal anesthesia, the patient's abdomen was shaved, prepped and draped in the usual fashion. A surgical timeout was initiated and completed. Appropriate IV antibiotics as well as subq heparin were administered. Skin incision was then made to the left of the umbilicus in the mid abdomen and a 5 mm optical trocar was used to gain access to the peritoneal cavity. Carbon dioxide pneumoperitoneum was then established.  Under direct vision a Sean liver retractor was then introduced through a subxiphoid stab incision and used to elevate the left lobe of the liver.  Under direct vision the following ports were then placed: 12 mm robotic port was placed in the right mid abdomen, inferior and medial to this an 11 mm assistant port was placed, an 8 mm port was placed left lateral to the initial 5 mm port, an 8 mm port was placed in the left anterior axillary line, finally the initial 5 mm port was exchanged for an 8 mm robotic port.  The robot was then uneventfully docked and I assumed control of the surgeon console.  Starting in the upper abdomen the angle of Hiss was taken down.  I then dissected along the lesser curvature the stomach and gain access to the retrogastric space.  36 Angolan VISI G-tube was then introduced transorally into the upper stomach.  This was used as a means of sizing the gastric pouch.  Starting along the lesser curvature of the stomach the 60 mm blue load stapler was fired transversely.  Then using the G-tube is a guide additional firings were placed vertically to complete a completely divided gastric pouch.  With this then completed the G-tube was backed up into the esophagus.  The greater omentum was grasped and elevated.  This was divided from  its distal tip to the level of the transverse colon.  The transverse colon was then reflected superiorly and the small bowel was run proximally to the ligament of Treitz.  60 cm distal to ligament of Treitz a loop omega limb was brought up in an antecolic/antigastric manner.  I then proceeded with 2 layer handsewn gastrojejunostomy.  Initial posterior layer of suture was placed using 2-0 PDS strata fix suture.  A gastrotomy as well as enterotomy was then created using monopolar scissors.  The enterotomy in both the stomach pouch and the small bowel was large enough to accommodate the 36 Turkmen VISI G-tube.  Posterior mucosal layer of sutures was then placed using 2-0 PDS strata fix suture.  I then initiated anterior mucosal closure again with 2-0 PDS strata fix suture.  When this was nearly closed the VISI G-tube was passed through the anastomosis and into the proximal small bowel and directed down what would be, are bypass Vira limb in the omega limb that had been brought up.  The anterior mucosal closure was then completed.  Second layer closure was performed over this with 2-0 PDS strata fix suture.  Proximal to the gastrojejunostomy the omega limb was then divided using the 60 mm blue load stapler.  Leak test was then performed using immunofluorescence.  There was no evidence of leakage.  I then measured out 125 cm Vira limb and a side-to-side jejunojejunostomy was created using the 60 mm blue load stapler.  The enterotomies for the stapler were sutured closed using 2-0 PDS strata fix suture.  The mesenteric defect was closed using running 2-0 silk suture.  This completed our Vira-en-Y gastric bypass, the robot was then undocked.  The fascia at the 12 mm port sites were closed using a fascial closure instrument and 0 Vicryl tie. Trocars as well as Sean retractor were all then removed. Carbon dioxide was massaged from the abdomen. Local anesthetic was injected at the incision sites and the incisions were all  closed with subcuticular 4-0 Vicryl stitches. Benzoin and Steri-Strips were applied. Needle and sponge counts were correct. The patient tolerated this without difficulty, was awakened in the operating room and taken to the recovery room in stable condition.     FEMI MCCORMICK MD

## 2022-05-05 VITALS
TEMPERATURE: 98.5 F | WEIGHT: 259.3 LBS | DIASTOLIC BLOOD PRESSURE: 80 MMHG | BODY MASS INDEX: 36.3 KG/M2 | HEART RATE: 76 BPM | OXYGEN SATURATION: 99 % | RESPIRATION RATE: 16 BRPM | HEIGHT: 71 IN | SYSTOLIC BLOOD PRESSURE: 146 MMHG

## 2022-05-05 LAB
ANION GAP SERPL CALCULATED.3IONS-SCNC: 6 MMOL/L (ref 3–14)
CHLORIDE BLD-SCNC: 108 MMOL/L (ref 94–109)
CO2 SERPL-SCNC: 23 MMOL/L (ref 20–32)
GLUCOSE BLD-MCNC: 112 MG/DL (ref 70–99)
HGB BLD-MCNC: 11.9 G/DL (ref 11.7–15.7)
POTASSIUM BLD-SCNC: 3.8 MMOL/L (ref 3.4–5.3)
POTASSIUM BLD-SCNC: 3.8 MMOL/L (ref 3.4–5.3)
SODIUM SERPL-SCNC: 137 MMOL/L (ref 133–144)

## 2022-05-05 PROCEDURE — 250N000011 HC RX IP 250 OP 636: Performed by: PHYSICIAN ASSISTANT

## 2022-05-05 PROCEDURE — 84132 ASSAY OF SERUM POTASSIUM: CPT | Performed by: SURGERY

## 2022-05-05 PROCEDURE — 258N000003 HC RX IP 258 OP 636: Performed by: PHYSICIAN ASSISTANT

## 2022-05-05 PROCEDURE — 250N000013 HC RX MED GY IP 250 OP 250 PS 637: Performed by: PHYSICIAN ASSISTANT

## 2022-05-05 PROCEDURE — 80051 ELECTROLYTE PANEL: CPT | Performed by: PHYSICIAN ASSISTANT

## 2022-05-05 PROCEDURE — 36415 COLL VENOUS BLD VENIPUNCTURE: CPT | Performed by: PHYSICIAN ASSISTANT

## 2022-05-05 PROCEDURE — 82947 ASSAY GLUCOSE BLOOD QUANT: CPT | Performed by: SURGERY

## 2022-05-05 PROCEDURE — 85018 HEMOGLOBIN: CPT | Performed by: PHYSICIAN ASSISTANT

## 2022-05-05 RX ORDER — SUMATRIPTAN 25 MG/1
25 TABLET, FILM COATED ORAL
Status: DISCONTINUED | OUTPATIENT
Start: 2022-05-05 | End: 2022-05-05 | Stop reason: HOSPADM

## 2022-05-05 RX ORDER — DESOGESTREL AND ETHINYL ESTRADIOL 0.15-0.03
1 KIT ORAL DAILY
Status: DISCONTINUED | OUTPATIENT
Start: 2022-05-05 | End: 2022-05-05 | Stop reason: HOSPADM

## 2022-05-05 RX ORDER — FLUTICASONE PROPIONATE 50 MCG
1 SPRAY, SUSPENSION (ML) NASAL DAILY
Status: DISCONTINUED | OUTPATIENT
Start: 2022-05-05 | End: 2022-05-05 | Stop reason: HOSPADM

## 2022-05-05 RX ORDER — OXYCODONE HYDROCHLORIDE 5 MG/1
5 TABLET ORAL
Qty: 15 TABLET | Refills: 0 | Status: SHIPPED | OUTPATIENT
Start: 2022-05-05 | End: 2022-05-12

## 2022-05-05 RX ORDER — ONDANSETRON 4 MG/1
4 TABLET, ORALLY DISINTEGRATING ORAL EVERY 6 HOURS PRN
Qty: 10 TABLET | Refills: 0 | Status: SHIPPED | OUTPATIENT
Start: 2022-05-05 | End: 2022-07-11

## 2022-05-05 RX ADMIN — KETOROLAC TROMETHAMINE 30 MG: 30 INJECTION, SOLUTION INTRAMUSCULAR; INTRAVENOUS at 01:55

## 2022-05-05 RX ADMIN — OMEPRAZOLE 20 MG: 20 CAPSULE, DELAYED RELEASE ORAL at 06:42

## 2022-05-05 RX ADMIN — FLUOXETINE 20 MG: 20 CAPSULE ORAL at 08:57

## 2022-05-05 RX ADMIN — ONDANSETRON 4 MG: 4 TABLET, ORALLY DISINTEGRATING ORAL at 06:42

## 2022-05-05 RX ADMIN — SODIUM CHLORIDE, POTASSIUM CHLORIDE, SODIUM LACTATE AND CALCIUM CHLORIDE: 600; 310; 30; 20 INJECTION, SOLUTION INTRAVENOUS at 03:29

## 2022-05-05 RX ADMIN — POTASSIUM CHLORIDE 10 MEQ: 7.46 INJECTION, SOLUTION INTRAVENOUS at 00:55

## 2022-05-05 RX ADMIN — KETOROLAC TROMETHAMINE 30 MG: 30 INJECTION, SOLUTION INTRAMUSCULAR; INTRAVENOUS at 06:42

## 2022-05-05 RX ADMIN — OXYCODONE HYDROCHLORIDE 5 MG: 5 TABLET ORAL at 09:07

## 2022-05-05 RX ADMIN — ENOXAPARIN SODIUM 40 MG: 40 INJECTION SUBCUTANEOUS at 08:57

## 2022-05-05 RX ADMIN — OXYCODONE HYDROCHLORIDE 5 MG: 5 TABLET ORAL at 13:56

## 2022-05-05 ASSESSMENT — ACTIVITIES OF DAILY LIVING (ADL)
ADLS_ACUITY_SCORE: 4

## 2022-05-05 NOTE — DISCHARGE INSTRUCTIONS
After Bariatric Surgery Discharge Instructions  [facility]     Note: Ask your nurse to order your medications from the pharmacy. Be sure you have your medications with you when you leave.    What should my diet be for the first 2 weeks after surgery?  Follow the bariatric diet the dietitian discussed with you.    How much fluid should I drink?  Strive for 48-64 ounces daily.  Carry a water bottle with you without a straw or sports top. Drink from it often.  Keep track of how much fluid you drink in a day.  Remember:  -Do not use straws, chew gum or suck on hard candies. They may cause painful gas.    -Sip, don't slurp when you drink.    -Practice small sips using a medicine cup for the first week postop.   -No ice or cold drinks. This could cause gas or spasms.   -No coffee, soda pop or drinks with caffeine. These may cause stomach pain.   -No alcohol. It is bad for your liver and will cause stomach pain.    How often should I do my deep breathing and coughing?  Use your incentive spirometer (small plastic breathing device) every hour while awake after you get home. Using the incentive spirometer helps you deep breath. Continuing to cough and deep breath will help prevent fevers and pneumonia.   If you do not have a fever after one week, you can stop using the incentive spirometer and discard it.     You can continue to take deep breaths without the incentive spirometer every one to two hours while awake for the month after surgery.    What kinds of activity can I do?  Get plenty of rest the first few days after surgery and try to balance rest and activity. You will need some time to recover - you may be more tired than you realize at first. You'll feel better and heal faster if you take good care of yourself.  For 4 weeks after surgery (Please review restrictions at your one week visit, they could change based on how well you are doing):  -Don't lift more than 20 pounds.    -Take 4-5 short walks every day.  -Don't jog, run, or do belly exercises.  Don't swim, bathe or use a hot tub until your cuts are healed (scabs are gone).  You may shower  Don't plan to fly or take a road trip within the first 1 to 3 months after surgery.  You could get a blood clot in your legs. If you must travel, get up and move around every hour for at least 5 minutes before continuing on your journey.  Your care team can help you decide when it's safe for you to travel.     What can I do for pain control?  You had major belly surgery that involves all layers of your belly muscles. Pain is expected, even for some as far out as 6-8 weeks after surgery. Moving, sneezing, coughing, and breathing will cause discomfort because these activities use your belly muscles.   Please see your after visit summary medication review for what pain medication will be continued, discontinued and newly started for you.    You can take opioid pain medicine if prescribed and if needed. Try to wean off from it as soon as you feel comfortable.   Do not drive while you are taking opioid pain medicine. This is dangerous.  You can take acetaminophen (Tylenol) between your prescribed pain medicine on a scheduled basis OR take it scheduled every 6 hours (check with your care team for specifics).  -Acetaminophen formulation options:    -Liquid   -Caplet (Cut caplet in half before taking)   -Do not take more than 3000 mg Tylenol in a 24 hour period.  -You may also take Tylenol for pain in place of the opioid pain medicine (check with your care team for specifics).   You can apply ice or heat to the affected area(s). Just remember to wrap the ice in something and limit icing sessions to 20 minutes. Excessive icing can irritate the skin or cause skin damage.  You can apply heat with a hot, wet towel or heating pad. Just like cold therapy, limit heat application to 20 minutes. Never sleep with a heating pad on. It could cause severe burns to  your skin.  Wear your binder to support your belly muscles if you have one.  Take this off a little more each day and try to be off completely by 2 weeks after surgery. If you don't need your binder for comfort or support, you don't need to wear it.   You may not be able to sleep in a comfortable position for a few weeks after surgery. This is normal. You may be more comfortable sleeping in a recliner or propped up with 3 pillows for the first couple of weeks after surgery.  Do not take NSAID's (Non-Steroidal Anti-Inflammatory Drugs) (examples: ibuprofen, Motrin, Advil, Aleve, and Naproxen), aspirin, or use pain patches with NSAID's. They will increase your risk of bleeding or getting an ulcer.    Please call the clinic for any of the following pain concerns, we would like to talk to you:  -pain that does not improve with rest  -pain that gets worse and worse  -pain that is not controlled by your pain medicine  -a sudden severe increase in pain    What medications will I need to take after surgery?  You may be discharged with the following types of medications: antacids, pain medications, anti-nausea medications, etc.  Please see your after visit summary medication review for what will be continued, discontinued and newly started.  It is important to reduce the amount of acid in your new stomach for a couple of months after surgery while it is still healing. We will prescribe an acid reducer or antacid. Take it as directed. This will help prevent ulcers, heartburn and acid reflux.  If you took an acid reducer before you had surgery, your care team will let you know what acid reducer you will take after surgery.   It is okay to swallow any medications smaller than   inch in size.   -If it is larger than   inch, it may need to be cut, crushed, or in a liquid form. Check with your care team about which way is most appropriate for you and your medications.    What should I know about my incisions (cuts)?  Your incisions  are covered with white steri strips or butterfly tape and have band aids or gauze over the top. If you have gauze or band aids, they can come off in the hospital.  Leave your steri strips on until they fall off on their own. If the steri strips don't fall off after 1 to 2 weeks, you can take them off. If they fall off earlier, replace them with clean band aids trying to avoid touching the incision itself.   If you have gauze covered by a clear dressing, remove 2-3 days after surgery or as directed by your care team.  You may shower in the hospital after surgery and can get your incision coverings wet.  Do not submerge in water (e.g. No baths, swimming pools, hot tubs) until your care team tells you it is okay and your incisions are completely healed.    Call the clinic if you have any of these signs or symptoms of infection:  -Redness around the site.  -Drainage that smells bad.  -Drainage that is thick yellow or green.  -An increase in pain around the incision site  -An increase in swelling around the incision site  -Heat or warmth around incision site   -Fever of 101.5  F (38.3  C) or higher when taken under the tongue.    -Chills    Will my urine or bowel movements change?   Your first bowel movements (stools) will likely be liquid. You may also notice old blood or a darker color (black or maroon color) in your bowel movements.  This is not unusual and usually goes away after the first week, if not sooner. You may not have a bowel movement for a week.   If you have not had a bowel movement for at least three days after your surgery date and are passing gas, you can use over the counter stool softeners.  Please stop taking the stool softeners and laxatives if your stools are loose.  Increasing fluids and activity as well as getting off narcotics will help prevent constipation.    Call the clinic if:   -You have stomach pain.   -You continue to have constipation.  -You have excessive bloating after walking and  "passing gas.    How can I prevent dehydration if I feel nauseated (sick to my stomach) and vomit (throw up)?   Vomiting is not normal after surgery. If you continue to have nausea and vomiting, call the clinic.   Nausea can be a sign of dehydration. That is why it is very important to track your fluids.  Do not nap more than one hour during the day. Set a timer to wake yourself up, if needed. Too much sleep will keep you from drinking enough fluid during the day and lead to dehydration.  No outside activity in hot, humid weather until you can drink 48 to 64 ounces of fluid in 24 hours. If you sweat a lot, your body may lose too much water.  Try to take a 1 ounce sip of water (one medicine cup) every 15 minutes.  Set a timer to remind yourself.    Call the clinic if you have any of these signs or symptoms of dehydration:  -Dark colored urine  -Urinating (pass water) less than 2-3 times per day  -Lack of energy  -Nausea  -Dizziness  -Headache    Call the clinic ANY TIME at [phone] if:  -Your pain medicine is not working.  -You have a fever ? 101.5 F.  -You have belly or left shoulder pain that gets worse and worse.  -You have a swollen leg with redness, warmth, or pain behind the knee or calf.  -You have chest pain   -You feel very short of breath.  -You have a sudden severe increase in heart rate.  -You have vomiting that gets worse and worse.  -You have constant nausea (feeling sick to your stomach) that does not go away with medication.  -You have trouble swallowing.  -You have an increasing feeling that \"something is not right\".  -You have hiccups that do not stop.  -You have any questions or concerns.    If you have to go to the Emergency Room, we prefer you go to the hospital that did your surgery. Please let them know that you had bariatric surgery and to notify your surgeon.    When should I go back to the clinic?  Follow up with your care team in 1-2 weeks.   If this appointment was not already made, " please call: [phone]

## 2022-05-05 NOTE — PROVIDER NOTIFICATION
Notified Person: MD Nicolás    Notification Date/Time: 5/4/22 @ 2344    Notification Interaction: phone    Purpose of Notification: patient has required x3 straight catheterizations, asking MD if he would like vinson placed or to continue with bladder scans and straight cathing    Orders Received: ok for vinson catheter and to remove in the morning as appropriate

## 2022-05-05 NOTE — DISCHARGE SUMMARY
[facility] Discharge Summary    Alina Thompson MRN# [id number]   Age: 31 year old YOB: 1990     Date of Admission:  5/4/2022  Date of Discharge:  5/5/2022  Admitting Provider:  Jean-Claude Lin MD  Discharge Provider:  Wm Stephenson PA-C with Dr. Lin  Discharging Service: Bariatric Surgery     Primary Provider: Prudence Laughlin  Primary Care Physician Phone Number: [phone]          Admission Diagnoses:   Principle Diagnosis: Morbid obesity (H) [E66.01]  Morbid obesity with BMI of 40.0-44.9, adult (H) [E66.01, Z68.41]  Secondary Diagnoses associated with Obesity:  Type 2 Diabetes Mellitus. SANDRA  Other Diagnoses: Depression           Discharge Diagnosis:     Morbid obesity (H) [E66.01]          Procedures:     Procedure(s): Robotic assisted laparoscopic Vira-en-Y gastric bypass            Discharge Medications:     Current Discharge Medication List      START taking these medications    Details   omeprazole (PRILOSEC) 20 MG DR capsule Take 1 capsule (20 mg) by mouth every morning (before breakfast)  Qty: 90 capsule, Refills: 0    Associated Diagnoses: Morbid obesity with BMI of 40.0-44.9, adult (H)      ondansetron (ZOFRAN ODT) 4 MG ODT tab Take 1 tablet (4 mg) by mouth every 6 hours as needed for nausea or vomiting  Qty: 10 tablet, Refills: 0    Associated Diagnoses: Morbid obesity with BMI of 40.0-44.9, adult (H); PONV (postoperative nausea and vomiting)      oxyCODONE (ROXICODONE) 5 MG tablet Take 1 tablet (5 mg) by mouth every 3 hours as needed for moderate to severe pain  Qty: 15 tablet, Refills: 0    Associated Diagnoses: Morbid obesity with BMI of 40.0-44.9, adult (H)         CONTINUE these medications which have NOT CHANGED    Details   acetaminophen (TYLENOL) 500 MG tablet Take 500-1,000 mg by mouth every 6 hours as needed for mild pain      CALCIUM PO Take 1 capsule by mouth daily      desogestrel-ethinyl estradiol (APRI) 0.15-30 MG-MCG tablet Take 1 tablet by  mouth daily Continuously  Qty: 112 tablet, Refills: 3    Associated Diagnoses: Surveillance of previously prescribed contraceptive pill      FLUoxetine (PROZAC) 20 MG capsule Take 1 capsule (20 mg) by mouth daily  Qty: 90 capsule, Refills: 2    Associated Diagnoses: BINDU (generalized anxiety disorder)      fluticasone (FLONASE) 50 MCG/ACT nasal spray Spray 1 spray into both nostrils daily  Qty: 16 g, Refills: 3    Associated Diagnoses: Seasonal allergic rhinitis, unspecified trigger      Multiple Vitamin (MULTIVITAMIN ADULT PO) Take 1 tablet by mouth daily      SUMAtriptan (IMITREX) 25 MG tablet Take 1 tablet (25 mg) by mouth at onset of headache for migraine May repeat in 2 hours. Max 8 tablets/24 hours.  Qty: 30 tablet, Refills: 0    Associated Diagnoses: Migraine with aura and without status migrainosus, not intractable      triamcinolone (KENALOG) 0.1 % external ointment Apply topically 2 times daily as needed for irritation  Qty: 80 g, Refills: 1    Associated Diagnoses: Eczema, unspecified type      VITAMIN D (CHOLECALCIFEROL) PO Take 1 tablet by mouth daily                 Allergies:         Allergies   Allergen Reactions     Sulfa Drugs      Wellbutrin [Bupropion] Other (See Comments)     Dizziness             Brief History of Illness:   Alina Thompson is a 31 year old-year-old female who underwent preoperative screening in anticipation for potential bariatric surgery. She was deemed an appropriate candidate for bariatric surgery by the consensus of our [facility] team. In the office, surgical options were thoroughly discussed. She was furnished with a copy of our specialized consent form for bariatric surgery. The potential risks as outlined in that document were all thoroughly reviewed. All questions were answered and she wished to proceed.    On the day of surgery, after reviewing the risks, benefits, and possible complications, informed consent was obtained and the patient underwent the above  "procedure.  There were no complications.  Please see the Operative Report for full details.           Hospital Course:   Alina Thompson's hospital course was unremarkable.  She recovered as anticipated and experienced no post-operative complications. She had some nausea without emesis night of surgery that was well controlled with scopolamine patch and adding Zofran.  Also, had to have straight catheterization x3 for urinary retention, which resolved by POD1.      On the date of discharge, the patient was discharged to home in stable condition and afebrile.  She verbalized understanding of all discharge instructions and felt comfortable with the discharge plan.  She was asked to call with any further questions or concerns.           Condition on Discharge:        Discharge condition: Stable   Discharge vitals: Blood pressure (!) 146/80, pulse 76, temperature 98.5  F (36.9  C), temperature source Oral, resp. rate 16, height 1.803 m (5' 11\"), weight 117.6 kg (259 lb 4.8 oz), SpO2 99 %, not currently breastfeeding.           Discharge Disposition:     Discharged to home          Discharge Instructions and Follow-Up:      Alina Thompson was asked to follow up with the bariatric surgical team within 1 week.  She will see our clinic PA-C at that visit, with plans to reconvene with a Registered Dietician at the 2nd week office visit.    Wm Stephenson PA-C  dictating on behalf of Dr. Lin  [phone]       "

## 2022-05-05 NOTE — PLAN OF CARE
Goal Outcome Evaluation:    Plan of Care Reviewed With: patient     A&OX4, VSS, pain controlled with oxycodone. Up IND. Tolerating clear liquid diet without nausea.  Positive BS, passing flatus. Voiding well.  Lap sites CD&I with steristrips.  ABD binder on.  Discharge instructions reviewed and well received by patient.  Discharge to home when meds  here.

## 2022-05-05 NOTE — PROGRESS NOTES
Problem: Gastric Bypass Vira-en-Y  Vitals and oxygen: VSS  Orientation/Neuro: A&Ox4  Activity Level: independent  Diet: bariatric diet  Comorbidity: SANDRA and depression  Labs: 5/4 K+ 3.3, recheck 5/5 K+ 3.8  IV Fluids/Drains/Tubes: IVF running LR at 125ml/hr  Pain Management: managed effectively with scheduled Toradol   Skin: abd lap sites are CDI and approximated with steri strips and band-aids. One lap site has scant amount of dried blood on dressing.   GI/: BS hypoactive, belching, abdominal fullness, no flatus or BM. Pt was having urinary retention and was straight cath'd x3 since admit to floor. Orders received to place vinson if BS continue to be over 300ml, however patient then voided and BS was <300ml. No vinson placed. Zofran provided x1 this AM for nausea  Respiratory: WDL  Cardio: intermittent HTN  Plan: probable discharge home today

## 2022-05-05 NOTE — CONSULTS
"-NUTRITION EDUCATION    REASON FOR ASSESSMENT:  Bariatric Surgery Consult    CURRENT DIET:  Bariatric Clear Liquid    NUTRITION HISTORY:  Patient worked with clinical dietitian in [facility] Clinic prior to surgery to assist with lifestyle modification.    ANTHROPOMETRICS:   Ht: 5'11\"  Wt: 117 kg  BMI: 36.1 kg/(m^2)  IBW: 65.9 kg  %IBW: 177%    ASSESSED NUTRITION NEEDS:  Energy Needs: 6604-0211 kcals (11 - 14 kcal/kg ABW)                      Protein Needs: 65-98 gm (1 - 1.5 gm/kg IBW)  Fluid Needs: 5960-5966 mL (30-35 mL/kg ABW)    Know patient will not meet assessed needs due to the restrictive nature of surgery.    NUTRITION DIAGNOSIS:   Food- and nutrition related knowledge deficit related to bariatric surgery as evidence by lorenzo en y gastric bypass surgery on 5//4/2022.    INTERVENTIONS:    Nutrition Prescription:    Recommended patient follow bariatric diet advancement for lorenzo en y gastric bypass    Implementation:    Nutrition Education (Content):  a) Reviewed gastric bypass full liquid diet guidelines with patient.   b) Reviewed vitamin regimen     Nutrition Education (Application):  c) Discussed current eating habits and recommended alternative food choices  d) Patient verbalizes understanding of diet by listing appropriate foods for home    Expected patient engagement: good     Goals:    Patient will follow bariatric diet advancement schedule    Patient will follow post-operative vitamin mineral schedule      Follow Up:    Patient to follow up in 2 weeks with RD in Weight Loss Clinic    Ryann Farias, RD, EVELYN  [facility]patient Dietitian/[facility]   [phone] (office phone)        "

## 2022-05-06 ENCOUNTER — TELEPHONE (OUTPATIENT)
Dept: SURGERY | Facility: CLINIC | Age: 32
End: 2022-05-06
Payer: COMMERCIAL

## 2022-05-06 NOTE — TELEPHONE ENCOUNTER
Called pt to check on postop status at 10:10 AM  No answer. Left VM to call clinic, number provided.  Pt returned call [phone]  Pt responses below:    Surgery Date: 5/4/22  Surgery Type: Bypass  Surgeon: Riley    Fluid Intake: clear liquids today + 1/4 cup plain greed yogurt breakfast.  Trying to get full water bottle in by lunch and then 2 more.    Pain Assessment:    Pain level: 4-5/10  Location: general discomfort pressure and muscle pain abdomen  Last oxy about 5 am - hasn't taken any tyl.  Encourage to take the 500 mg she has at home and cut in 4's. Take at least 3 times daily up to 3000 mg daily.     Medications:  START taking these medications     Details   omeprazole (PRILOSEC) 20 MG DR capsule Take 1 capsule (20 mg) by mouth every morning (before breakfast)  Qty: 90 capsule, Refills: 0     Associated Diagnoses: Morbid obesity with BMI of 40.0-44.9, adult (H)       ondansetron (ZOFRAN ODT) 4 MG ODT tab Take 1 tablet (4 mg) by mouth every 6 hours as needed for nausea or vomiting  Qty: 10 tablet, Refills: 0     Associated Diagnoses: Morbid obesity with BMI of 40.0-44.9, adult (H); PONV (postoperative nausea and vomiting)       oxyCODONE (ROXICODONE) 5 MG tablet Take 1 tablet (5 mg) by mouth every 3 hours as needed for moderate to severe pain  Qty: 15 tablet, Refills: 0     Associated Diagnoses: Morbid obesity with BMI of 40.0-44.9, adult (H)           RX for PPI?  yes  Do you understand how to take your medications postop?  yes  Reviewed postop med changes:  none  Have you restarted all of the medications that weren't changed after surgery?  Not vits  Do you have any questions about how to take your medications?  No    Diet: clear liquids plus greek yogurt at breakfast today  How tolerated? Well tolerated    Nausea: none  Took one  Zofran last night - no vomiting, - just maybe overdid it during the day.     Vomiting: none      NSAIDs: none  Smoking: none    Fever: none    Incisions: feel fine and looks  fine    BMs:  Last BM: day before surgery  Flatus: yes  Bloating/cramping:  Chest and shoulder but moving out of body  Informed pt it is normal to have no stool for up to a week if passing gas and not having bloating/cramping.    Urinary:  No issues    Sleeping/Rest: slept pretty good    Activity: inside - try to get outside    Activity caution:  Advised pt to be careful to avoid straining abdominal muscles during recovery.    Incentive Spirometer: [id number]    Other symptoms (CP/SOB/dizzy/rapid HR): no     Plan/Advice:   Advised pt to contact PCP and schedule hospital follow up visit within 1 week of discharge.   Discuss medications or recommendations from discharging provider.  1 week followup appointment verified. Thursday at 1130 and 1200  Use of incentive spirometer reinforced.  Call clinic with any questions or concerns.    Reviewed that clinic staff are available on call during nights and weekends for postoperative concerns.  Number given.        No further questions or concerns now. Pt agrees to call if having any questions or concerns.  Alexa Pierre, MS, RD, RN

## 2022-05-08 ENCOUNTER — HOSPITAL ENCOUNTER (EMERGENCY)
Facility: CLINIC | Age: 32
Discharge: HOME | End: 2022-05-08
Attending: EMERGENCY MEDICINE | Admitting: EMERGENCY MEDICINE
Payer: COMMERCIAL

## 2022-05-08 ENCOUNTER — TELEPHONE (OUTPATIENT)
Dept: SURGERY | Facility: CLINIC | Age: 32
End: 2022-05-08
Payer: COMMERCIAL

## 2022-05-08 ENCOUNTER — APPOINTMENT (OUTPATIENT)
Dept: ULTRASOUND IMAGING | Facility: CLINIC | Age: 32
End: 2022-05-08
Attending: EMERGENCY MEDICINE
Payer: COMMERCIAL

## 2022-05-08 VITALS
BODY MASS INDEX: 35.56 KG/M2 | HEART RATE: 74 BPM | DIASTOLIC BLOOD PRESSURE: 70 MMHG | SYSTOLIC BLOOD PRESSURE: 133 MMHG | RESPIRATION RATE: 18 BRPM | TEMPERATURE: 97 F | WEIGHT: 254 LBS | OXYGEN SATURATION: 99 % | HEIGHT: 71 IN

## 2022-05-08 DIAGNOSIS — I80.8 SUPERFICIAL THROMBOPHLEBITIS OF RIGHT UPPER EXTREMITY: ICD-10-CM

## 2022-05-08 PROCEDURE — 93971 EXTREMITY STUDY: CPT | Mod: RT

## 2022-05-08 PROCEDURE — 93005 ELECTROCARDIOGRAM TRACING: CPT

## 2022-05-08 PROCEDURE — 99285 EMERGENCY DEPT VISIT HI MDM: CPT | Mod: 25

## 2022-05-08 PROCEDURE — 250N000013 HC RX MED GY IP 250 OP 250 PS 637: Performed by: EMERGENCY MEDICINE

## 2022-05-08 PROCEDURE — 96360 HYDRATION IV INFUSION INIT: CPT

## 2022-05-08 RX ORDER — OXYCODONE HYDROCHLORIDE 5 MG/1
5 TABLET ORAL ONCE
Status: COMPLETED | OUTPATIENT
Start: 2022-05-08 | End: 2022-05-08

## 2022-05-08 RX ADMIN — OXYCODONE HYDROCHLORIDE 5 MG: 5 TABLET ORAL at 02:07

## 2022-05-08 NOTE — TELEPHONE ENCOUNTER
Pt called MD answering service overnight with concern for right forearm swelling and pain. This is not at the site of her IV but further up her forearm. She was reading on google that this could be a blood clot and is concerned. We discussed that over the phone I am unable to assess this and that it is possible to be a blood clot and she should have evaluation in the ER. She agrees and will present to ER for evaluation. On chart review, US of the arm revealed superficial thrombophlebitis.     Marycruz Baker MD  [facility]  [phone]

## 2022-05-08 NOTE — ED PROVIDER NOTES
"  History     Chief Complaint:  Arm Pain       HPI   Alina Thompson is a 31 year old female who presents with concerns for discomfort to the right forearm for the past 3 days.  The patient underwent gastric bypass surgery 4 days ago, having an IV placed to the dorsum of the right hand.  She noted discomfort in this area the following day.  Since then, she has an aching sensation with the addition of a small \"lump\" to the mid forearm tonight that got her concerned.  She notes the discomfort is an aching phenomenon that keeps her from sleeping.  She denies any fevers with this.  She denies any pain extending up the arm.  She denies chest pain or shortness of breath.  With concerns for a possible clot, she presents to us for evaluation.    ROS:  Review of Systems  Pertinent positives and negatives as above.  A 14-point review of systems was performed with all other systems reviewed as negative.      Allergies:  Sulfa Drugs  Wellbutrin [Bupropion]     Medications:    acetaminophen (TYLENOL) 500 MG tablet  CALCIUM PO  desogestrel-ethinyl estradiol (APRI) 0.15-30 MG-MCG tablet  FLUoxetine (PROZAC) 20 MG capsule  fluticasone (FLONASE) 50 MCG/ACT nasal spray  Multiple Vitamin (MULTIVITAMIN ADULT PO)  omeprazole (PRILOSEC) 20 MG DR capsule  ondansetron (ZOFRAN ODT) 4 MG ODT tab  oxyCODONE (ROXICODONE) 5 MG tablet  SUMAtriptan (IMITREX) 25 MG tablet  triamcinolone (KENALOG) 0.1 % external ointment  VITAMIN D (CHOLECALCIFEROL) PO        Past Medical History:    Past Medical History:   Diagnosis Date     Depression      Diabetes mellitus, type 2 (H)      Migraine with aura and without status migrainosus, not intractable 01/21/2020     Morbid obesity due to excess calories (H) 11/14/2016     Obesity      Recurrent UTI      Recurrent vaginitis      Sleep apnea      Patient Active Problem List   Diagnosis     Idiopathic hypersomnia     Class 2 obesity due to excess calories with body mass index (BMI) of 37.0 to 37.9 in adult, " "unspecified whether serious comorbidity present     SANDRA (obstructive sleep apnea)- mild (AHI 12)     History of elevated blood pressure while in hospital     Cervical spine pain     Migraine with aura and without status migrainosus, not intractable     Type 2 diabetes mellitus without complication, without long-term current use of insulin (H)     Morbid obesity (H)     Mild major depression, single episode (H)     Morbid obesity with BMI of 40.0-44.9, adult (H)        Past Surgical History:    Past Surgical History:   Procedure Laterality Date     CHOLECYSTECTOMY       DAVINCI BYPASS GASTRIC MARTHA-EN-Y N/A 5/4/2022    Procedure: Robotic assisted laparoscopic Martha-en-Y gastric bypass;  Surgeon: Jean-Claude Lin MD;  Location:  OR     ENT SURGERY      ear tubes     TONSILLECTOMY          Family History:    family history includes Diabetes in her maternal grandmother and paternal grandmother; Genetic Disorder in her paternal grandmother; Heart Disease in her maternal grandmother; Liver Cancer in her paternal grandfather; Obesity in her father, maternal grandfather, maternal grandmother, mother, paternal grandmother, and sister; Sleep Apnea in an other family member.    Social History:   reports that she has never smoked. She has never used smokeless tobacco. She reports current alcohol use. She reports current drug use. Drug: Marijuana.  PCP: Prudence Laughlin     Physical Exam     Patient Vitals for the past 24 hrs:   BP Temp Temp src Pulse Resp SpO2 Height Weight   05/08/22 0355 133/70 -- -- 74 18 99 % -- --   05/08/22 0158 (!) 141/84 97  F (36.1  C) Temporal 85 16 99 % 1.803 m (5' 11\") 115.2 kg (254 lb)        Physical Exam  Eye:  Pupils are equal, round, and reactive.  Extraocular movements intact.    ENT:  No rhinorrhea.  Moist mucus membranes.  Normal tongue and tonsil.    Cardiac:  Regular rate and rhythm.  No murmurs, gallops, or rubs.    Pulmonary:  Clear to auscultation bilaterally.  No wheezes, " rales, or rhonchi.    MSK:  Normal movement through the elbow, wrist, and fingers without tendonous deficit.    SKIN:  Warm and dry with strong radial pulse and normal capillary refill.  There is evidence of a defect to the skin of the dorsum of the right hand where her IV had been placed.  However, this is very small and there is no evidence of redness or warmth.  She has mild discomfort along the forearm cephalic vein without there being a hard cord.  There is no redness or warmth.  There is no discomfort to the upper arm with palpation.    NEURO:  5/5 strength through the fingers/wrist/elbow.  Normal sensation through the radial/ulnar/median nerve distributions.    PSYCH:  Normal affect      Emergency Department Course     Imaging:  US Upper Extremity Venous Duplex Right   Final Result   IMPRESSION:   1.  No deep venous thrombosis in the right upper extremity.   2.  Superficial thrombus in the cephalic vein of the forearm.         Report per radiology      Emergency Department Course:    Reviewed:  I reviewed nursing notes, vitals and past medical history      Interventions:  Medications   oxyCODONE (ROXICODONE) tablet 5 mg (5 mg Oral Given 5/8/22 0207)        Disposition:  The patient was discharged to home.     Impression & Plan      Medical Decision Making:  This 31-year-old woman who is 3 days status post Vira-en-Y bypass surgery presents to us with concerns of having swelling to the arm where she had her IV placed.  Ultrasound shows there is a superficial thrombophlebitis at the forearm of the cephalic vein.  On exam, there is no significant redness or warmth.  There is mild tenderness.  I reassured the patient that this does not qualify as a deep vein thrombosis.  Often, with conservative measures, this will resolve on its own.  While research is conflicted on using anticoagulants in the scenarios, I feel that the risk is too great in this scenario considering her recent surgical state and feel that we  should first attempt more conservative measures of warm compresses, gentle compression at bedtime, and gentle massage of the area.  She is in support of this as well.  I explained that she should have this reassessed by her primary team by midweek to make sure that it is not showing signs of propagation.  Otherwise, she will return to us for any worsening of condition or other emergent concerns.    Diagnosis:    ICD-10-CM    1. Superficial thrombophlebitis of right upper extremity  I80.8         Discharge Medications:  Discharge Medication List as of 5/8/2022  3:52 AM           5/8/2022   Trierweiler, Chad A, MD Trierweiler, Chad A, MD  05/08/22 6517

## 2022-05-08 NOTE — DISCHARGE INSTRUCTIONS
As discussed, your forearm blood clot has a good chance of improving with warm compresses, compression at night, and gentle massage.  Your arm should be rechecked by midweek to make sure symptoms are improving.  You may require anticoagulation, though we will hold off on this for now because of your recent surgery.  Otherwise, return to the ER immediately for increasing redness, warmth, chest pain, difficulty breathing, or any other emergent concerns.

## 2022-05-08 NOTE — ED TRIAGE NOTES
pt had gastric bypass surgery on Thursday. Pt had an IV in her right arm. Pt noted pain in right arm Friday and lump in her right forearm Saturday. No redness or swelling noted

## 2022-05-09 NOTE — PROGRESS NOTES
[facility]   1 Week Surgical Follow-Up     PCP:  Prudence Laughlin    DOS: 05/04/22  Surgeon: ELEN  Surgery Type: Bypass     HISTORY OF PRESENT ILLNESS:  Alina Thompson returns today for her follow-up appointment status post bariatric surgery.  She has Type 2 Diabetes Mellitus and SANDRA. She recovered as anticipated and experienced no post-operative complications. She had some nausea night of surgery that was well controlled with scopolamine patch and adding Zofran.  Also, had to have straight catheterization x3 for urinary retention, which resolved by POD1.  On the date of discharge, the patient was discharged to home in stable condition and afebrile.    She was seen in the ER and found to have a superficial thrombophlebitis of right upper extremity in her cephalic vein where her IV was placed. Conservative treatment was applied and this has improved.  Will see PCP tomorrow for ultrasound for monitor healing.     She is currently using Tylenol for pain medication.  Refill Needed: No.  Patient's Pain Scale: 0.   Patient's current daily fluid intake in oz is: 46 oz of various fluids daily, water, ice chips, popsicle, jello, protein shake.  Pt states she forgets to drink.  Has some pressure when drinking fluids.  Patient has started postoperative Vitamins: No.  Activity:   Activity: walk every day , does not think she is moving every hour during day     REVIEW OF SYSTEMS:  GI:    Nausea: Yes (with waking, improves with food/water; 3-4 days ago)  Vomiting: No  Diarrhea: Yes (1 episode per day, last yesterday)  Constipation: No  Last BM: yesterday  Dysphagia: No (does feel pressure with water)  GERD: No    CV/Pulmonary:   Chest Pain: No  Dizzy: Yes (same as lightheaded for pt)  Light Headed: Yes (everyday, comes and goes, feels more like lethargy, does not feel like she is going to fall/faint)  SOB: No     Endo:  Diabetes: Yes     :    Contraception: BCP, condoms  Dysuria: No     Vascular:    LE  Edema: No     Has CPAP but not using.  Has mild SANDRA.  Was at 3000 for IS.        Current Outpatient Medications   Medication     acetaminophen (TYLENOL) 500 MG tablet     desogestrel-ethinyl estradiol (APRI) 0.15-30 MG-MCG tablet     diphenhydrAMINE (BENADRYL) 25 MG capsule     FLUoxetine (PROZAC) 20 MG capsule     hydrocortisone 2.5 % ointment     hyoscyamine SL (LEVSIN/SL) 0.125 MG sublingual tablet     omeprazole (PRILOSEC) 20 MG DR capsule     ondansetron (ZOFRAN ODT) 4 MG ODT tab     triamcinolone (KENALOG) 0.1 % external ointment     CALCIUM PO     fluticasone (FLONASE) 50 MCG/ACT nasal spray     Multiple Vitamin (MULTIVITAMIN ADULT PO)     SUMAtriptan (IMITREX) 25 MG tablet     VITAMIN D (CHOLECALCIFEROL) PO     No current facility-administered medications for this visit.      PHYSICAL EXAMINATION:    /84 (BP Location: Left arm, Patient Position: Sitting)   Pulse 83   Temp 98.2  F (36.8  C) (Oral)   Resp 16   Wt 246 lb 14.4 oz (112 kg)   SpO2 98%   BMI 34.44 kg/m    GENERAL: No acute distress. Alert and oriented time 3.  HEART: Regular rate and rhythm. No murmurs, rubs or gallops  LUNGS:  Breathing unlabored. Clear to auscultation bilaterally.  ABDOMEN: Soft, incisions clean,dry, and intact. Tenderness WNL for post op.  EXTREMITIES: No lower extremity edema bilaterally. No calf swelling or tenderness. Negative Mahendra's sign. Right upper extremity tender to palpation.  Not warm, no erythema.   SKIN: Diffuse macular rash on abdomen and chest.      PSYCHOLOGICAL: Stable. Pleasant     Assessment & Plan   Problem List Items Addressed This Visit     SANDRA (obstructive sleep apnea)- mild (AHI 12) (Chronic)     Continue using CPAP nightly  With weight loss you may need adjustments with you CPAP.  Call you sleep clinic if mask becomes too loose or pressures seem high.    Will consider follow up sleep study at 1 year post op.             Class 1 obesity due to excess calories with serious comorbidity and body  mass index (BMI) of 34.0 to 34.9 in adult - Primary    Malnutrition following gastrointestinal surgery     Continue taking recommended post-op vitamins.  Labs to be ordered per protocol at 3 mo po.              Bariatric surgery status     5/4/2022 Robotic RYGBS BRP           Relevant Medications    hyoscyamine SL (LEVSIN/SL) 0.125 MG sublingual tablet    Irritant contact dermatitis due to other agents     Use hydrocortizone cream twice daily as needed  Take zyrtec twice daily as needed for rash  Can take 50 mg Benadryl at bedtime.    Gave remover in case steri strips bother her.           Relevant Medications    diphenhydrAMINE (BENADRYL) 25 MG capsule    hydrocortisone 2.5 % ointment    Epigastric pressure     Will start Levsin prn.  Discussed slowing down when drinking and taking smaller sips.            Relevant Medications    hyoscyamine SL (LEVSIN/SL) 0.125 MG sublingual tablet             PLAN:  Pt instructions:   Use hydrocortisone cream twice daily as needed  Take zyrtec twice daily as needed for rash  Start hycosamine as needed up to 4x daily for spasms.    Return to clinic: in 1 week for diet/nurse visit.  Bring post op vitamins along.  Call with questions or concerns at any time. [phone]  GENERAL POST OP GUIDELINES  Hydration:  Strive to drink 64 oz of fluid daily  Diet:   Advance diet per Dietitian at your 2 week appointment. Start recommended postoperative vitamins within in the first 6 weeks.  Movement:  Strive to move hourly while awake to decrease risk of developing a blood clot. Continue to do deep breathing exercises twice daily or use your spirometer.  Pain:  Can use tylenol 1000 mg scheduled three times a day for pain. If you have Narcotic pain medication take only as needed. Start to decrease use and stop by end of week 2.  Ice packs and heat are helpful for abdominal pain and muscle strains. Wear binder to support abdominal muscles and gradually wean off over the next few weeks.    Meds: Continue with recommended antacid medication for the next 3 months.   Appts: Return to clinic for 2 wk post op appointment and bring post op vitamins along.  Make follow up appointment to meet with psychologist at 1 and 3 months post operatively. Follow up with your primary care provider to discuss obesity related conditions by one month post op.

## 2022-05-10 ENCOUNTER — TELEPHONE (OUTPATIENT)
Dept: SURGERY | Facility: CLINIC | Age: 32
End: 2022-05-10
Payer: COMMERCIAL

## 2022-05-10 ENCOUNTER — MYC MEDICAL ADVICE (OUTPATIENT)
Dept: FAMILY MEDICINE | Facility: CLINIC | Age: 32
End: 2022-05-10
Payer: COMMERCIAL

## 2022-05-10 NOTE — TELEPHONE ENCOUNTER
Call to pt- scheduled in SAME day for hosp f/u - pt has superficial blood clot and suppose to f/up for imaging.     PER ED note 5/8/22:   I explained that she should have this reassessed by her primary team by midweek to make sure that it is not showing signs of propagation.    Adv pt if worsening symptoms should not wait for appt and be seen more urgently.         5/13 10:45 am helen/ Nemo KENNEDY RN

## 2022-05-12 ENCOUNTER — OFFICE VISIT (OUTPATIENT)
Dept: SURGERY | Facility: CLINIC | Age: 32
End: 2022-05-12

## 2022-05-12 ENCOUNTER — OFFICE VISIT (OUTPATIENT)
Dept: SURGERY | Facility: CLINIC | Age: 32
End: 2022-05-12
Payer: COMMERCIAL

## 2022-05-12 VITALS
HEART RATE: 83 BPM | DIASTOLIC BLOOD PRESSURE: 84 MMHG | BODY MASS INDEX: 34.44 KG/M2 | TEMPERATURE: 98.2 F | OXYGEN SATURATION: 98 % | WEIGHT: 246.9 LBS | SYSTOLIC BLOOD PRESSURE: 118 MMHG | RESPIRATION RATE: 16 BRPM

## 2022-05-12 DIAGNOSIS — E66.09 CLASS 1 OBESITY DUE TO EXCESS CALORIES WITH SERIOUS COMORBIDITY AND BODY MASS INDEX (BMI) OF 34.0 TO 34.9 IN ADULT: Primary | ICD-10-CM

## 2022-05-12 DIAGNOSIS — K91.2 MALNUTRITION FOLLOWING GASTROINTESTINAL SURGERY: ICD-10-CM

## 2022-05-12 DIAGNOSIS — R10.13 EPIGASTRIC PRESSURE: ICD-10-CM

## 2022-05-12 DIAGNOSIS — Z98.84 BARIATRIC SURGERY STATUS: ICD-10-CM

## 2022-05-12 DIAGNOSIS — L24.89 IRRITANT CONTACT DERMATITIS DUE TO OTHER AGENTS: ICD-10-CM

## 2022-05-12 DIAGNOSIS — E66.811 CLASS 1 OBESITY DUE TO EXCESS CALORIES WITH SERIOUS COMORBIDITY AND BODY MASS INDEX (BMI) OF 34.0 TO 34.9 IN ADULT: Primary | ICD-10-CM

## 2022-05-12 DIAGNOSIS — Z98.84 BARIATRIC SURGERY STATUS: Primary | ICD-10-CM

## 2022-05-12 DIAGNOSIS — G47.33 OSA (OBSTRUCTIVE SLEEP APNEA): Chronic | ICD-10-CM

## 2022-05-12 DIAGNOSIS — E66.09 OTHER OBESITY DUE TO EXCESS CALORIES: ICD-10-CM

## 2022-05-12 PROBLEM — E66.01 MORBID OBESITY WITH BMI OF 40.0-44.9, ADULT (H): Status: RESOLVED | Noted: 2022-05-04 | Resolved: 2022-05-12

## 2022-05-12 PROBLEM — E66.01 MORBID OBESITY (H): Status: RESOLVED | Noted: 2022-04-20 | Resolved: 2022-05-12

## 2022-05-12 PROCEDURE — 99207 PR NO CHARGE NURSE ONLY: CPT

## 2022-05-12 PROCEDURE — 99207 PR NO CHARGE LOS: CPT | Performed by: PHYSICIAN ASSISTANT

## 2022-05-12 RX ORDER — DIPHENHYDRAMINE HCL 25 MG
25 CAPSULE ORAL EVERY 6 HOURS PRN
COMMUNITY
End: 2022-07-11

## 2022-05-12 RX ORDER — HYDROCORTISONE 25 MG/G
OINTMENT TOPICAL 2 TIMES DAILY
Qty: 453.6 G | Refills: 0 | Status: SHIPPED | OUTPATIENT
Start: 2022-05-12 | End: 2023-03-15

## 2022-05-12 ASSESSMENT — PAIN SCALES - GENERAL: PAINLEVEL: NO PAIN (0)

## 2022-05-12 NOTE — ASSESSMENT & PLAN NOTE
Use hydrocortizone cream twice daily as needed  Take zyrtec twice daily as needed for rash  Can take 50 mg Benadryl at bedtime.    Gave remover in case steri strips bother her.

## 2022-05-12 NOTE — PROGRESS NOTES
Patient seen by provider in clinic who completed assessment.    Advised restarting use of CPAP, setting timer to drink q 15 minutes - minimum 48 oz daily  Rash to be assessed by provider.    Latoya Larry RN on 5/12/2022 at 12:01 PM

## 2022-05-12 NOTE — PATIENT INSTRUCTIONS
Today you were seen for your 1 week post op.    PLAN:  Use hydrocortizone cream twice daily as needed  Take zyrtec twice daily as needed for rash    Start hycosamine as needed up to 4x daily for spasms.      Return to clinic: in 1 week for diet/nurse visit.  Bring post op vitamins along.  Call with questions or concerns at any time. [phone]    GENERAL POST OP GUIDELINES    2 most important things to remember for the 1st month is to DRINK and MOVE.     Drink-   Keep a water bottle with you throughout the day (Have 20 oz down by lunch, supper, bedtime)  Sip on fluids every 15 minutes or more  Goal 50-60 oz of fluid daily  YOU DO NOT HAVE TO SEPARATE FLUIDS WITH YOUR LIQUID MEALS. (A liquid is a liquid)    Move-   Move in house every hour while awake to decrease risk of developing a blood clot.  Activity: Start 5 minutes of walking to start and increase when able    Continue to do deep breathing exercises twice daily or use your spirometer to avoid pneumonia.  Wear binder to support abdominal muscles if desired and gradually wean off over the next few weeks.       Medications:   Continue with recommended antacid medication for the next 3 months.   Postoperative vitamins within in the first 6 weeks.     Start recommended vitamins in the first 6 weeks.   Introduce them 1 at a time.   Diet:    Continue on liquid diet  You'll advance your diet per Dietitian at your 2 week appointment.     Pain Management:  You can take Acetaminophen (Tylenol) for pain. Max amount 3000 mg per day.  1000 mg three times as needed daily or 650 mg as needed 4 times daily     Follow up:    Return to clinic at 2 weeks, 4 weeks, 3 mo, 6 mo, and annually  Make follow up appointment to meet with psychologist at 1 and 3 months post operatively.   Follow up with your primary care provider to discuss obesity related conditions by one month post op.

## 2022-05-12 NOTE — ASSESSMENT & PLAN NOTE
Continue using CPAP nightly  With weight loss you may need adjustments with you CPAP.  Call you sleep clinic if mask becomes too loose or pressures seem high.    Will consider follow up sleep study at 1 year post op.

## 2022-05-13 ENCOUNTER — OFFICE VISIT (OUTPATIENT)
Dept: FAMILY MEDICINE | Facility: CLINIC | Age: 32
End: 2022-05-13
Payer: COMMERCIAL

## 2022-05-13 VITALS
HEART RATE: 84 BPM | BODY MASS INDEX: 34.52 KG/M2 | OXYGEN SATURATION: 98 % | WEIGHT: 246.6 LBS | DIASTOLIC BLOOD PRESSURE: 78 MMHG | TEMPERATURE: 97.9 F | SYSTOLIC BLOOD PRESSURE: 128 MMHG | HEIGHT: 71 IN | RESPIRATION RATE: 15 BRPM

## 2022-05-13 DIAGNOSIS — I80.8 SUPERFICIAL THROMBOPHLEBITIS OF RIGHT UPPER EXTREMITY: Primary | ICD-10-CM

## 2022-05-13 PROCEDURE — 99213 OFFICE O/P EST LOW 20 MIN: CPT | Performed by: NURSE PRACTITIONER

## 2022-05-13 ASSESSMENT — PAIN SCALES - GENERAL: PAINLEVEL: NO PAIN (0)

## 2022-05-13 ASSESSMENT — PATIENT HEALTH QUESTIONNAIRE - PHQ9: SUM OF ALL RESPONSES TO PHQ QUESTIONS 1-9: 1

## 2022-05-13 NOTE — PROGRESS NOTES
Assessment & Plan     Superficial thrombophlebitis of right upper extremity  Continue with warm compress.  Discussed resolution over time.  Discomfort has improved which is reassuring.  Discussed symptoms usually resolve over 2 weeks, area of firmness may be felt longer.  Will repeat US next week to ensure resolution.   - US Upper Extremity Venous Duplex Right; Future         She has physical scheduled with GYN in Aug for pap.     Return in about 4 months (around 9/13/2022) for diabetes with PCP .    COLIN Ordaz CNP  [facility] BANG Fuller is a 31 year old who presents for the following health issues     HPI     ED/ Followup:    Facility:  [facility] ED  Date of visit: 5/8/22  Reason for visit: Superficial thrombophlebitis of right upper extremity  Current Status: Clot is still there. She has intermittent tenderness         EXAM: US UPPER EXTREMITY VENOUS DUPLEX RIGHT  LOCATION: [facility]  DATE/TIME: 5/8/2022 2:31 AM     INDICATION: Right arm pain and swelling.  COMPARISON: None.  TECHNIQUE: Venous Duplex ultrasound of the right upper extremity with (when possible) and without compression, augmentation, and duplex. Color flow and spectral Doppler with waveform analysis performed.     FINDINGS: Ultrasound includes evaluation of the internal jugular vein, innominate vein, subclavian vein, axillary vein, and brachial vein. The superficial cephalic and basilic veins were also evaluated where seen.      RIGHT: No deep venous thrombosis. There is occlusive superficial thrombus in the cephalic vein from the antecubital fossa to the distal forearm.                                                                      IMPRESSION:  1.  No deep venous thrombosis in the right upper extremity.  2.  Superficial thrombus in the cephalic vein of the forearm.    Has noticed discomfort is slightly improved.  She was told she should have  "updated imaging to ensure resolution.     Review of Systems   Constitutional, HEENT, cardiovascular, pulmonary, gi and gu systems are negative, except as otherwise noted.      Objective    /78 (BP Location: Right arm, Patient Position: Sitting, Cuff Size: Adult Regular)   Pulse 84   Temp 97.9  F (36.6  C) (Oral)   Resp 15   Ht 1.803 m (5' 11\")   Wt 111.9 kg (246 lb 9.6 oz)   LMP 01/13/2022 (Approximate)   SpO2 98%   BMI 34.39 kg/m    Body mass index is 34.39 kg/m .  Physical Exam   GENERAL: healthy, alert and no distress  RESP: lungs clear to auscultation - no rales, rhonchi or wheezes  CV: regular rate and rhythm, normal S1 S2, no S3 or S4, no murmur, click or rub, no peripheral edema and peripheral pulses strong  MS: no gross musculoskeletal defects noted, no edema  SKIN: no suspicious lesions or rashes, R arm: skin warm and dry; no redness or swelling, subtle discomfort over the forearm cephalic vein, cord palpable proximal to the AC fossa    No results found for this or any previous visit (from the past 24 hour(s)).            "

## 2022-05-18 ENCOUNTER — OFFICE VISIT (OUTPATIENT)
Dept: SURGERY | Facility: CLINIC | Age: 32
End: 2022-05-18
Payer: COMMERCIAL

## 2022-05-18 VITALS
RESPIRATION RATE: 16 BRPM | SYSTOLIC BLOOD PRESSURE: 106 MMHG | HEART RATE: 82 BPM | WEIGHT: 242.5 LBS | OXYGEN SATURATION: 98 % | BODY MASS INDEX: 33.82 KG/M2 | DIASTOLIC BLOOD PRESSURE: 80 MMHG | TEMPERATURE: 97.9 F

## 2022-05-18 VITALS — HEIGHT: 71 IN | BODY MASS INDEX: 33.95 KG/M2 | WEIGHT: 242.5 LBS

## 2022-05-18 DIAGNOSIS — E66.811 CLASS 1 OBESITY DUE TO EXCESS CALORIES WITH BODY MASS INDEX (BMI) OF 33.0 TO 33.9 IN ADULT: Primary | ICD-10-CM

## 2022-05-18 DIAGNOSIS — Z98.84 BARIATRIC SURGERY STATUS: ICD-10-CM

## 2022-05-18 DIAGNOSIS — K91.2 MALNUTRITION FOLLOWING GASTROINTESTINAL SURGERY: ICD-10-CM

## 2022-05-18 DIAGNOSIS — E66.811 CLASS 1 OBESITY DUE TO EXCESS CALORIES WITH SERIOUS COMORBIDITY AND BODY MASS INDEX (BMI) OF 34.0 TO 34.9 IN ADULT: ICD-10-CM

## 2022-05-18 DIAGNOSIS — E66.09 CLASS 1 OBESITY DUE TO EXCESS CALORIES WITH SERIOUS COMORBIDITY AND BODY MASS INDEX (BMI) OF 34.0 TO 34.9 IN ADULT: ICD-10-CM

## 2022-05-18 DIAGNOSIS — E66.09 CLASS 1 OBESITY DUE TO EXCESS CALORIES WITH BODY MASS INDEX (BMI) OF 33.0 TO 33.9 IN ADULT: Primary | ICD-10-CM

## 2022-05-18 PROCEDURE — 97803 MED NUTRITION INDIV SUBSEQ: CPT | Performed by: DIETITIAN, REGISTERED

## 2022-05-18 PROCEDURE — 99207 PR NO CHARGE NURSE ONLY: CPT

## 2022-05-18 ASSESSMENT — PAIN SCALES - GENERAL: PAINLEVEL: NO PAIN (0)

## 2022-05-18 NOTE — PROGRESS NOTES
[facility]  2 Week Surgical Follow-Up     HISTORY OF PRESENT ILLNESS:  The patient returns today for two week follow-up appointment status post gastric bypass  The patient is accompanied by self.   The patient is drinking maxudn18 oz of fluid daily, various fluids. Pt has not been tracking fluids. She is not sure of exact amount of fluid intake. Discussed with pt the importance of getting MINIMUM 48 oz daily she should aim for 64 oz daily. Pt is going to measure out what her water bottle holds. Pt is not tolerating protein shakes. RD gave pt samples of unjury unflavored protein powder + chicken broth.    Pain:    The patient is currently using None for pain medication.  The patient rates pain at a 0/10  on the pain scale. The pain is located in NA.      Activity:  The patient is considered a fall risk:  No. Interventions to secure the patient's safety are in place.  What are you doing for physical activity?  walking  How many days per week?  everyday  How many minutes per day?  30 minutes  Review of Systems:  GI:    Nausea occurs never.          Vomiting occurs never.    GERD occurs never.  Patient is currently taking Omeprazole 20 mg daily.           Diarrhea occurs daily - not completely water. Only 1 episode per day.  Patient's last bowel movement was yesterday, with the color noted as brown.           Constipation occurs never.            CV/Pulmonary:   Dizziness occurs never.     Shortness of Breath occurs never.  Chest pain occurs never.    Vascular:    Leg swelling none.     Mahendra's Negative     :  Form of birth control is OCP, condom.    PHYSICAL EXAMINATION:    VITALS:  See Epic for VS.  LUNGS:  clear to auscultation  HEART:  Apical pulse strong, regular.  ABDOMEN:  Abdomen soft, non-tender. BS normal.   INCISIONS:  dry and intact.  Steristrips removed.  Adhesive remover given to patient.    MEDICATIONS/ALLERGIES REVIEWED:  Yes    ASSESSMENT:    1.  2 weeks status post gastric bypass  surgery.    PLAN:    Increase activity per physical therapist recommendations today.   Make follow up appointment to meet with psychologist and 1 month post operatively.   Follow up with your primary care provider to obesity related conditions by one month post op.   Advance diet per Dietitian at your 2 week appointment.   Start recommended postoperative vitamins within in the first 6 weeks per Dietitian  Strive to drink 64 oz of fluid daily.  Wear binder to support abdominal muscles and gradually wean off over the next few weeks.   Continue to do deep breathing exercises twice daily or use your spirometer.   Return to clinic in 4 weeks for nutrition class.  Return to clinic postop month 3.  Call with questions or concerns at any time.    INTERVENTIONS:      Symptoms given re: signs and symptoms of infection and when to call clinic. Patient verbalized understanding.    Patient exercise/activity restrictions reviewed; exercise handout given.  Exercise plan postop is walking, gym membership - classes once restrictions lifted.  Reviewed when patient can return to bathing and swimming.  Reviewed patient vitamin timing.  Instructed patient to take calcium 600mg in divided doses with meals, to take calcium separate from multivitamin, and to take both multivitamins together.  Reviewed signs/symptoms of DVT with patient.  Reviewed FMLA.  No further forms needed at this time.    No further needs or questions at this time.  Patient agrees to call clinic with any questions or concerns prior to next appointment.    Pt has psych appt scheduled for 5/27.   Pt is going to call to schedule hospital follow-up with PCP within 1 month.  Pt is going to scheduled 3 mo PO appt with RD + provider.    Latoya Larry RN on 5/18/2022 at 9:50 AM

## 2022-05-18 NOTE — PROGRESS NOTES
"BARIATRIC PROGRESS NOTE - 2 Week Post Op  DATE OF VISIT: May 18, 2022    Alina Thompson  1990  female  [id number]  31 year old    ASSESSMENT:    REASON FOR VISIT:  Alina Thompson is a 31 year old year old female presents today for a 2 Week Post Op nutrition follow-up appointment. Patient is accompanied by significant other.      DIAGNOSIS:  Status: post gastric bypass surgery.   Obesity Grade I BMI 30-34.9    ANTHROPOMETRICS:  Height: 5' 11\"  Initial weight: 273 lbs    Current Weight: 242 lbs 8 oz    BMI: Body mass index is 33.82 kg/m .    VITAMINS AND MINERALS:   None currently    Brought bottles:   MVT meeting guidelines   600mg Calcium Carbonate  5000 international unit(s) Vitamin D    **Pt with hx of menses ~q4m. Currently starting a cycle. Discussed starting additional iron supplementation if period increase in heaviness or frequency    NUTRITION HISTORY:  Tolerating diet: Bariatric full liquid diet  Fluids/water intake: 46 ounces/day (water, protein drinks)  Small bites: Yes  Portion size:  1/2 or less  20-30 minute meals: Yes  Fluids and meals  by 30 minutes: (not needed at current diet stage)  Chew foods thoroughly: Yes  Breakfast: yogurt   Lunch: SF jello or pudding  strained cream soups   Dinner: (same as breakfast/lunch)  Snacks: SF popsicles  Nausea: none  Vomiting: none  Constipation: none  Additional Information: Pt feeling well and tolerating diet. Dislikes many protein drinks (chaulky taste/texture) - sent with samples of Unjury broth and unflavored protein powder. Stressed the importance of hydration and protein.       PHYSICAL ACTIVITY:  Type: walking  Frequency: 7 days a week.  Duration: 30 minutes.    DIAGNOSIS:     Current Nutrition Diagnosis: Altered gastrointestinal function related to alternation in gastrointestinal structure as evidenced by history of gastric bypass.     INTERVENTION  Nutrition Prescription: Recommended bariatric puree diet.    Goals:  Start puree diet " at day 14 post op.  Start MVI, calcium with vitamin D, vitamin B12, vitamin D, and Vitamin B1  Aim for 60 to 90 grams protein.  Start 48 to 64 oz of fluid per day.    Implementation:  Discussed transition to puree diet.  Emphasized importance of adequate protein.  Reviewed required vitamins and mineral supplements.  Verbalizes fair-good understanding of surgery diet guidelines.  Assessed learning needs and learning preferences.      NUTRITION MONITORING AND EVALUATION:   Monitor diet tolerance and weight loss.      Anticipated Compliance: good  Follow Up: Continue to monitor patient closely regarding weight loss and diet.  At 4 weeks Post Op    TIME SPENT WITH PATIENT: 25 minutes.        Diya Najera RD, LD  Clinical Dietitian

## 2022-06-07 ENCOUNTER — VIRTUAL VISIT (OUTPATIENT)
Dept: SURGERY | Facility: CLINIC | Age: 32
End: 2022-06-07
Payer: COMMERCIAL

## 2022-06-07 VITALS — HEIGHT: 71 IN | WEIGHT: 225 LBS | BODY MASS INDEX: 31.5 KG/M2

## 2022-06-07 DIAGNOSIS — K91.2 MALNUTRITION FOLLOWING GASTROINTESTINAL SURGERY: ICD-10-CM

## 2022-06-07 DIAGNOSIS — E66.09 CLASS 1 OBESITY DUE TO EXCESS CALORIES WITH SERIOUS COMORBIDITY AND BODY MASS INDEX (BMI) OF 34.0 TO 34.9 IN ADULT: ICD-10-CM

## 2022-06-07 DIAGNOSIS — Z98.84 BARIATRIC SURGERY STATUS: ICD-10-CM

## 2022-06-07 DIAGNOSIS — E66.811 CLASS 1 OBESITY DUE TO EXCESS CALORIES WITH SERIOUS COMORBIDITY AND BODY MASS INDEX (BMI) OF 34.0 TO 34.9 IN ADULT: ICD-10-CM

## 2022-06-07 PROCEDURE — 97803 MED NUTRITION INDIV SUBSEQ: CPT | Mod: GT | Performed by: DIETITIAN, REGISTERED

## 2022-06-07 NOTE — PROGRESS NOTES
"Alina is a 31 year old who is being evaluated via a billable video visit.      How would you like to obtain your AVS? MyChart  If the video visit is dropped, the invitation should be resent by: Text to cell phone: [phone]  Will anyone else be joining your video visit? No      Video Start Time: 8:31am    Video-Visit Details    Type of service:  Video Visit    Video End Time:225 lbs    Originating Location (pt. Location): work    Distant Location (provider location):  [facility] [place]     Platform used for Video Visit: [place]     BARIATRIC PROGRESS NOTE - 4 Week Post Op  DATE OF VISIT: June 7, 2022    Alina Thompson  1990  female  [id number]  31 year old    ASSESSMENT:    REASON FOR VISIT:  Alina Thompson is a 31 year old year old female presents today for a 4 Week Post Op nutrition follow-up appointment. Patient is accompanied by self      DIAGNOSIS:  Status: post gastric bypass surgery.   Obesity Grade I BMI 30-34.9    ANTHROPOMETRICS:  Height: 5'11\"   Initial weight: 273 lbs    Current Weight: 225 lbs 0 oz   BMI: Body mass index is 31.38 kg/m .    VITAMINS AND MINERALS:   2 Multivitamin with Minerals (HS)  600 mg Calcium With Vitamin D (BID - breakfast and lunch)  5000 International units Vitamin D (HS)      NUTRITION HISTORY:  Tolerating diet: Bariatric pureed diet  Fluids/water intake: 44 ounces/day (water, SF popsicles, enhanced water)  Small bites: Yes  Portion size: 1/2c  20-30 minute meals: Yes  Fluids and meals  by 30 minutes: usually  Chew foods thoroughly: Yes  Breakfast: fruit (banana or peaches)  egg +/- avocado  fruit pouch   Lunch: (skips occasionally  tuna + crackers  Dinner:  salad (ham, cheese, Ranch, lettuce)   cottage cheese  steamed vegetables   Snacks: SF popsicles  Nausea: none  Vomiting: none  Constipation: some; manageable  Additional Information: Pt struggling with hydration and protein intake (taste aversion to most " supplements). Reports some weakness but overall feeling well and tolerating diet. Appropriate questions about fat intake and long-term weight loss expectations.       PHYSICAL ACTIVITY:  Type: walking/walking dog, walking track at work   Frequency: 4-7 days a week.  Duration: 20 minutes.  **Plans to join gym    DIAGNOSIS:   Previous Nutrition Diagnosis: Altered gastrointestinal function related to alternation in gastrointestinal structure as evidenced by history of gastric bypass.   No change    Previous Goals:  Start puree diet at day 14 post op. - met  Start MVI, calcium with vitamin D, vitamin B12, vitamin D, and Vitamin B1 - partially met  Aim for 60 to 90 grams protein. - not met  Start 48 to 64 oz of fluid per day. - not met    Current Nutrition Diagnosis: Altered gastrointestinal function related to alternation in gastrointestinal structure as evidenced by history of gastric bypass.     INTERVENTION  Nutrition Prescription: Recommended bariatric soft diet.    Goals:  Start soft diet at day 28 post op.  Continue MVI, calcium with vitamin D, vitamin D  Start Vitamin B1 and B12  Aim for 60 to 90 grams protein.  Start 48 to 64 oz of fluid per day.    Implementation:  Discussed transition to soft diet.  Emphasized importance of adequate protein.  Reviewed required vitamins and mineral supplements.  Verbalizes fair-good understanding of surgery diet guidelines.  Assessed learning needs and learning preferences.      NUTRITION MONITORING AND EVALUATION:   Monitor diet tolerance and weight loss.      Anticipated Compliance: fair-good  Follow Up: Continue to monitor patient closely regarding weight loss and diet.  At 3 months Post Op    TIME SPENT WITH PATIENT: 35 minutes.      Diya Najera RD, LD  Clinical Dietitian

## 2022-06-07 NOTE — PATIENT INSTRUCTIONS
"Asif Fuller!        Great chatting with you today! Here's a summary of your next diet stage (Stage 4). You ll stay in this phase for 2 months:    Your Stage 4 Diet: Soft Foods  [email or web address]     Over the next couple of months:    1. Aim for 600-800 calories  - Also: 60-90g protein and 10-15g fiber     2. Eat 3 food groups at each meal  - 1/2c protein, 1/4c vegetable/fruit, 1/4c fruit/starch  - I'll mail you a new sample meal plan    3.  Continue to eat slowly, chew well, and separate fluids and meals by 30 minutes     4. Limit \"snacks\" to milk or protein drinks     5. Avoid caffeine, carbonation and alcohol     6. Vitamins/minerals  - Multivitamin: Continue to take 2 multivitamins, daily  - Calcium: Continue to take 600mg twice per day, separate from multivitamins  - Vitamin D: Continue to take 5000 international unit(s) per day  - Vitamin B1 (Thiamine): START 12mg per day OR 100mg 1x/week  - Vitamin B12: START 500-1000mcg (sublingual) per day, or 5000 mcg 1x/week       Plan on following up in 2 months. This can be scheduled via our call center at . Of course, reach out sooner with any questions or concerns. Have a great day!           Diya Najera, RD, LD  ?Clinical Dietitian      "

## 2022-06-28 ENCOUNTER — MYC MEDICAL ADVICE (OUTPATIENT)
Dept: FAMILY MEDICINE | Facility: CLINIC | Age: 32
End: 2022-06-28

## 2022-06-30 ENCOUNTER — E-VISIT (OUTPATIENT)
Dept: URGENT CARE | Facility: CLINIC | Age: 32
End: 2022-06-30
Payer: COMMERCIAL

## 2022-06-30 DIAGNOSIS — N39.0 ACUTE UTI (URINARY TRACT INFECTION): Primary | ICD-10-CM

## 2022-06-30 PROCEDURE — 99422 OL DIG E/M SVC 11-20 MIN: CPT

## 2022-06-30 RX ORDER — NITROFURANTOIN 25; 75 MG/1; MG/1
100 CAPSULE ORAL 2 TIMES DAILY
Qty: 10 CAPSULE | Refills: 0 | Status: SHIPPED | OUTPATIENT
Start: 2022-06-30 | End: 2022-07-05

## 2022-06-30 NOTE — PATIENT INSTRUCTIONS
Dear Alina Thompson    After reviewing your responses, I've been able to diagnose you with a urinary tract infection, which is a common infection of the bladder with bacteria.  This is not a sexually transmitted infection, though urinating immediately after intercourse can help prevent infections.  Drinking lots of fluids is also helpful to clear your current infection and prevent the next one.      I have sent a prescription for antibiotics to your pharmacy to treat this infection.    It is important that you take all of your prescribed medication even if your symptoms are improving after a few doses.  Taking all of your medicine helps prevent the symptoms from returning.     If your symptoms worsen, you develop pain in your back or stomach, develop fevers, or are not improving in 5 days, please contact your primary care provider for an appointment or visit any of our convenient Walk-in or Urgent Care Centers to be seen, which can be found on our website here.    Thanks again for choosing us as your health care partner,    Cal Mendoza MD, MD    Urinary Tract Infections in Women  Urinary tract infections (UTIs) are most often caused by bacteria. These bacteria enter the urinary tract. The bacteria may come from inside the body. Or they may travel from the skin outside the rectum or vagina into the urethra. Female anatomy makes it easy for bacteria from the bowel to enter a woman s urinary tract, which is the most common source of UTI. This means women develop UTIs more often than men. Pain in or around the urinary tract is a common UTI symptom. But the only way to know for sure if you have a UTI for the healthcare provider to test your urine. The two tests that may be done are the urinalysis and urine culture.     Types of UTIs    Cystitis. A bladder infection (cystitis) is the most common UTI in women. You may have urgent or frequent need to pee. You may also have pain, burning when you  pee, and bloody urine.    Urethritis. This is an inflamed urethra, which is the tube that carries urine from the bladder to outside the body. You may have lower stomach or back pain. You may also have urgent or frequent need to pee.    Pyelonephritis. This is a kidney infection. If not treated, it can be serious and damage your kidneys. In severe cases, you may need to stay in the hospital. You may have a fever and lower back pain.    Medicines to treat a UTI  Most UTIs are treated with antibiotics. These kill the bacteria. The length of time you need to take them depends on the type of infection. It may be as short as 3 days. If you have repeated UTIs, you may need a low-dose antibiotic for several months. Take antibiotics exactly as directed. Don t stop taking them until all of the medicine is gone. If you stop taking the antibiotic too soon, the infection may not go away. You may also develop a resistance to the antibiotic. This can make it much harder to treat.   Lifestyle changes to treat and prevent UTIs   The lifestyle changes below will help get rid of your UTI. They may also help prevent future UTIs.     Drink plenty of fluids. This includes water, juice, or other caffeine-free drinks. Fluids help flush bacteria out of your body.    Empty your bladder. Always empty your bladder when you feel the urge to pee. And always pee before going to sleep. Urine that stays in your bladder can lead to infection. Try to pee before and after sex as well.    Practice good personal hygiene. Wipe yourself from front to back after using the toilet. This helps keep bacteria from getting into the urethra.    Use condoms during sex. These help prevent UTIs caused by sexually transmitted bacteria. Also don't use spermicides during sex. These can increase the risk for UTIs. Choose other forms of birth control instead. For women who tend to get UTIs after sex, a low-dose of a preventive antibiotic may be used. Be sure to discuss  this option with your healthcare provider.    Follow up with your healthcare provider as directed. He or she may test to make sure the infection has cleared. If needed, more treatment may be started.  Steffen last reviewed this educational content on 7/1/2019 2000-2021 [employer]. All rights reserved. This information is not intended as a substitute for professional medical care. Always follow your healthcare professional's instructions.

## 2022-07-11 ENCOUNTER — VIRTUAL VISIT (OUTPATIENT)
Dept: FAMILY MEDICINE | Facility: CLINIC | Age: 32
End: 2022-07-11
Payer: COMMERCIAL

## 2022-07-11 DIAGNOSIS — G43.109 MIGRAINE WITH AURA AND WITHOUT STATUS MIGRAINOSUS, NOT INTRACTABLE: Primary | ICD-10-CM

## 2022-07-11 DIAGNOSIS — E11.9 TYPE 2 DIABETES MELLITUS WITHOUT COMPLICATION, WITHOUT LONG-TERM CURRENT USE OF INSULIN (H): ICD-10-CM

## 2022-07-11 DIAGNOSIS — E66.01 MORBID OBESITY (H): ICD-10-CM

## 2022-07-11 DIAGNOSIS — Z98.84 GASTRIC BYPASS STATUS FOR OBESITY: ICD-10-CM

## 2022-07-11 PROCEDURE — 99214 OFFICE O/P EST MOD 30 MIN: CPT | Mod: 95 | Performed by: INTERNAL MEDICINE

## 2022-07-11 NOTE — PROGRESS NOTES
"Alina is a 31 year old who is being evaluated via a billable video visit.      How would you like to obtain your AVS? MyChart  If the video visit is dropped, the invitation should be resent by: Text to cell phone: [phone]  Will anyone else be joining your video visit? No      Assessment & Plan     (G43.109) Migraine with aura and without status migrainosus, not intractable FMLA 7/10/2022  (primary encounter diagnosis)  Comment: Triggers: fluorescent  lighting, computer work; 1-2 days per month; has Imitrex, needs 4-24hours to allow migraine to resolve. Time to rest and resolve symptoms  Plan: FMLA Paper work completed. reviewed with pt     (E11.9) Type 2 diabetes mellitus without complication, without long-term current use of insulin (H)  Comment: gastric bypass done 5/4/2022; doing well; due for diabetes labs.   Plan: Lipid panel reflex to direct LDL Fasting,         Comprehensive metabolic panel, Hemoglobin A1c,         Albumin Random Urine Quantitative with Creat         Ratio, TSH with free T4 reflex          (E66.01) Morbid obesity (H)  Comment: Estimated body mass index is 31.38 kg/m  as calculated from the following:    Height as of 6/7/22: 1.803 m (5' 11\").    Weight as of 6/7/22: 102.1 kg (225 lb).   Plan: Prior to bariatric surgery weight, was 306 #    (Z98.84) Gastric bypass status for obesity 5/4/2022 [facility]- Dr. Jean-Claude Lin.  Comment: 5/4/2022  Plan:     Review of the result(s) of each unique test - chart reviewd inclduing labs, meds and plan of care.   Ordering of each unique test  Prescription drug management  35 minutes spent on the date of the encounter doing chart review, history and exam, documentation and further activities per the note       BMI:   Estimated body mass index is 31.38 kg/m  as calculated from the following:    Height as of 6/7/22: 1.803 m (5' 11\").    Weight as of 6/7/22: 102.1 kg (225 lb).   Weight management plan: Discussed healthy diet and exercise guidelines " Bariatric surgery    FUTURE LABS:       - Schedule a fasting blood draw prior to Sept appt.   Regular exercise    Return in about 7 weeks (around 9/1/2022) for Diabetes, Please have labs done prior to visit, in Clinic.    Prudence Laughlin MD  Internal Medicine   [facility] [place]    FMLA form sent to pt.              Danny Fuller is a 31 year old, presenting for the following health issues:  Forms (FMLA  previously completed by her primary provider.   Is looking to get intermittent leave about 2 times per month. )      HPI     Pt has FMLA  paper work to be completed for migraines.     Migraine     Since your last clinic visit, how have your headaches changed?  No change    How often are you getting headaches or migraines? 1 headache about every 8 weeks      Are you able to do normal daily activities when you have a migraine? No   Due to light and sound sensitivity it is hard to completed work.  Also gets nauseated and vomits.     Are you taking rescue/relief medications? (Select all that apply) sumatriptan (Imitrex)    How helpful is your rescue/relief medication?  The relief is inconsistent   Pt states needs to take the dose right away and then it is more effective.     Are you taking any medications to prevent migraines? (Select all that apply)  No    In the past 4 weeks, how often have you gone to urgent care or the emergency room because of your headaches?  0    Triggers: fluorescent  lighting, computer work; 1-2 days per month; has Imitrex, needs 4-24 hours to allow migraine to resolve. Time to rest and resolve symptoms.        Review of Systems   Pt has had Gastric Bypass 5/4/2022  Diabetes- due for fasting labs.   Migraines; see above. Aura: scotomata, nausea, at times vomiting.      Objective           Vitals:  No vitals were obtained today due to virtual visit.    Physical Exam   GENERAL: Healthy, alert and no distress  EYES: Eyes grossly normal to inspection.  No discharge or  erythema, or obvious scleral/conjunctival abnormalities.  RESP: No audible wheeze, cough, or visible cyanosis.  No visible retractions or increased work of breathing.    SKIN: Visible skin clear. No significant rash, abnormal pigmentation or lesions.  NEURO: Cranial nerves grossly intact.  Mentation and speech appropriate for age.  PSYCH: Mentation appears normal, affect normal/bright, judgement and insight intact, normal speech and appearance well-groomed.            Video-Visit Details    Video Start Time: 3:55 PM    Type of service:  Video Visit    Video End Time:4:30 PM    Originating Location (pt. Location): Home    Distant Location (provider location):  [facility]     Platform used for Video Visit: [employer]    .  ..

## 2022-07-25 ENCOUNTER — E-VISIT (OUTPATIENT)
Dept: URGENT CARE | Facility: CLINIC | Age: 32
End: 2022-07-25
Payer: COMMERCIAL

## 2022-07-25 DIAGNOSIS — N39.0 ACUTE UTI (URINARY TRACT INFECTION): Primary | ICD-10-CM

## 2022-07-25 PROCEDURE — 99421 OL DIG E/M SVC 5-10 MIN: CPT | Performed by: FAMILY MEDICINE

## 2022-07-25 RX ORDER — NITROFURANTOIN 25; 75 MG/1; MG/1
100 CAPSULE ORAL 2 TIMES DAILY
Qty: 10 CAPSULE | Refills: 0 | Status: SHIPPED | OUTPATIENT
Start: 2022-07-25 | End: 2022-07-30

## 2022-07-25 NOTE — PATIENT INSTRUCTIONS
Dear Alina Thompson    After reviewing your responses, I've been able to diagnose you with a urinary tract infection, which is a common infection of the bladder with bacteria.  This is not a sexually transmitted infection, though urinating immediately after intercourse can help prevent infections.  Drinking lots of fluids is also helpful to clear your current infection and prevent the next one.      I have sent a prescription for antibiotics to your pharmacy to treat this infection.    It is important that you take all of your prescribed medication even if your symptoms are improving after a few doses.  Taking all of your medicine helps prevent the symptoms from returning.     If your symptoms worsen, you develop pain in your back or stomach, develop fevers, or are not improving in 5 days, please contact your primary care provider for an appointment or visit any of our convenient Walk-in or Urgent Care Centers to be seen, which can be found on our website here.    Thanks again for choosing us as your health care partner,    Marilou Toribio MD

## 2022-07-29 ENCOUNTER — OFFICE VISIT (OUTPATIENT)
Dept: FAMILY MEDICINE | Facility: CLINIC | Age: 32
End: 2022-07-29
Payer: COMMERCIAL

## 2022-07-29 VITALS
WEIGHT: 203 LBS | SYSTOLIC BLOOD PRESSURE: 118 MMHG | BODY MASS INDEX: 28.42 KG/M2 | RESPIRATION RATE: 16 BRPM | HEART RATE: 94 BPM | DIASTOLIC BLOOD PRESSURE: 74 MMHG | HEIGHT: 71 IN | OXYGEN SATURATION: 99 % | TEMPERATURE: 98.2 F

## 2022-07-29 DIAGNOSIS — R30.0 DYSURIA: Primary | ICD-10-CM

## 2022-07-29 LAB
ALBUMIN UR-MCNC: 100 MG/DL
APPEARANCE UR: CLEAR
BILIRUB UR QL STRIP: ABNORMAL
COLOR UR AUTO: YELLOW
GLUCOSE UR STRIP-MCNC: NEGATIVE MG/DL
HGB UR QL STRIP: NEGATIVE
KETONES UR STRIP-MCNC: 40 MG/DL
LEUKOCYTE ESTERASE UR QL STRIP: NEGATIVE
NITRATE UR QL: NEGATIVE
PH UR STRIP: 6 [PH] (ref 5–8)
SP GR UR STRIP: >=1.03 (ref 1–1.03)
UROBILINOGEN UR STRIP-ACNC: 0.2 E.U./DL

## 2022-07-29 PROCEDURE — 99213 OFFICE O/P EST LOW 20 MIN: CPT | Performed by: FAMILY MEDICINE

## 2022-07-29 PROCEDURE — 81003 URINALYSIS AUTO W/O SCOPE: CPT | Performed by: FAMILY MEDICINE

## 2022-07-29 PROCEDURE — 87086 URINE CULTURE/COLONY COUNT: CPT | Performed by: FAMILY MEDICINE

## 2022-07-29 RX ORDER — CEPHALEXIN 500 MG/1
500 CAPSULE ORAL 4 TIMES DAILY
Qty: 7 CAPSULE | Refills: 0 | Status: SHIPPED | OUTPATIENT
Start: 2022-07-29 | End: 2022-08-16

## 2022-07-29 NOTE — PROGRESS NOTES
Alina was seen today for dysuria.    Diagnoses and all orders for this visit:    Dysuria: Unfortunately I do not have many previous urine cultures to go off of and the ones she does have seem to show mixed urogenital cyril.  But she has been treated twice now with Macrobid end of June early July.  That she said she felt her symptoms went away and now she has been retreated again through the E-visit.  Unclear if it was persistent infection that did not get fully treated or a completely new bacteria/UTI.  Discussed checking UA and culture today.  She is allergic to sulfa medications.  No red flag signs or symptoms consistent with Pyelo.  She does not feel like this is a yeast infection.  She has had no improvement with the Macrobid this time.  Told her to stop the Macrobid she only has 1 day left.  We will switch her to Keflex for 7 days.  Will await culture results.  Follow-up Monday to assess improvement in symptoms.  Reviewed reasons to go to the emergency room this weekend.  -     UA Macro with Reflex to Micro and Culture - lab collect; Future  -     cephALEXin (KEFLEX) 500 MG capsule; Take 1 capsule (500 mg) by mouth 4 times daily  -     UA Macro with Reflex to Micro and Culture - lab collect  -     Cancel: Urine Microscopic  -     Urine Culture Aerobic Bacterial - lab collect; Future      Subjective   Alina is a 31 year old, presenting for the following health issues:  Dysuria      History of Present Illness       Reason for visit:  Conplicated UTI    She eats 2-3 servings of fruits and vegetables daily.She consumes 0 sweetened beverage(s) daily.She exercises with enough effort to increase her heart rate 30 to 60 minutes per day.  She exercises with enough effort to increase her heart rate 4 days per week.   She is taking medications regularly.     Had one 3 weeks ago- got 5 Day treatment. That one she felt better within 2 days of taking it.   Symptoms returned Sunday  Burnign, urgency, going 1-20  "minutes  No hematuria  And not a lot comes out   Got another 5 day treat  Not feeling any better  No fever, no back pain, no vomiting/nausea   Hx of them but then they got better   now bariatric surgeyr- getting them a lot more  Been awhile since yeast infeciton. This does not feel like yeast infeciton     Review of Systems   Constitutional, HEENT, cardiovascular, pulmonary, gi and gu systems are negative, except as otherwise noted.      Objective    /74 (BP Location: Right arm, Patient Position: Sitting, Cuff Size: Adult Regular)   Pulse 94   Temp 98.2  F (36.8  C) (Oral)   Resp 16   Ht 1.803 m (5' 11\")   Wt 92.1 kg (203 lb)   LMP 06/17/2022   SpO2 99%   BMI 28.31 kg/m    Body mass index is 28.31 kg/m .  Physical Exam   GENERAL: healthy, alert and no distress  RESP: lungs clear to auscultation - no rales, rhonchi or wheezes  CV: regular rate and rhythm, normal S1 S2, no S3 or S4, no murmur, click or rub, no peripheral edema and peripheral pulses strong  BACK: no CVA tenderness, no paralumbar tenderness                    .  ..  "

## 2022-07-31 LAB — BACTERIA UR CULT: NO GROWTH

## 2022-08-15 NOTE — PATIENT INSTRUCTIONS
"Asif Fuller!        Great chatting with you today! Here's a summary of your next diet stage (Stage 5). This will become your baseline diet, life-long.     Your Stage 5 Diet: Regular Foods  [email or web address]       Goals:    1. Aim for 600-800 calories  - Also: 60-90g protein and 10-15g fiber     2. Eat 3 food groups at each meal  - 1/2c protein, 1/4c vegetable/fruit, 1/4c fruit/starch  - I'll mail you a new sample meal plan    3.  Continue to eat slowly, chew well, and separate fluids and meals by 30 minutes     4. Limit \"snacks\" to milk or protein drinks   - Have up to 1 protein drink per day OR 2 cups of low-fat milk    5. Avoid caffeine, carbonation and alcohol            Plan on following up in 3 months. This can be scheduled via our call center at . Of course, reach out sooner with any questions or concerns. Have a great day!           Diya Najera RD, LD  Clinical Dietitian      "

## 2022-08-15 NOTE — PROGRESS NOTES
"NUTRITION POST OP APPOINTMENT  DATE OF VISIT: August 15, 2022    Alina Thompson  1990  female  [id number]  31 year old     ASSESSMENT:    REASON FOR VISIT:  Alina is a 31 year old year old female presents today for 3 month PO nutrition follow-up appointment. Patient is accompanied by significant other.    DIAGNOSIS:  Status post gastric bypass surgery.  Overweight BMI 25-29.9     ANTHROPOMETRICS:  Initial Weight: 273 lbs    Height: 5' 11\"    Current Weight: 197 lbs 11.2 oz    BMI: Body mass index is 27.57 kg/m .    VITAMINS AND MINERALS:  2 Multivitamin with Minerals (HS)  600 mg Calcium With Vitamin D (BID - breakfast and lunch)  5000 International units Vitamin D  5000 mcg Vitamin B-12 sublingual - 1x/week    **Takes supplements 2-3x/week      NUTRITION HISTORY:  Breakfast: skips  fruit or yogurt   Lunch: cucumbers w/cream cheese   Supper: smoothie (yogurt, ice, fruit)  chicken occasionally  salmon + asparagus  sushi (4pcs)  Snacks: fruit   Fluids consumed: water (30oz), protein water (0.5-1 per day; 10-15g protein)  Consuming liquid calories: Yes  Protein intake: 20-30 grams/day  Tolerate regular texture food: Yes  Any foods not tolerated details: Yes  If any food not tolerated: eggs, meat  Portion size: 1/2-1 cup  Take 20-30 minutes to consume each meal: Yes  Eat protein foods first: No  Fluids and meals separate by at least 30 minutes: Yes  Chew foods thoroughly: Yes  Tolerating diet: Yes  Drinking high protein supplements: Yes  Consuming snacks per day: 2-3  Additional Information: Reviewed importance of taking vitamins/minerals consistently. Reviewed protein to protect muscle mass and subsequently support metabolism. Discouraged snacking between meals.         PHYSICAL ACTIVITY:  Type:    Frequency (days per week): 3  Duration (min): 60    DIAGNOSIS:  Previous Nutrition Diagnosis: Altered gastrointestinal function related to alteration in gastrointestinal structure as evidenced " by history of gastric bypass surgery.- no change    Previous goals:  Start soft diet at day 28 post op. - met  Continue MVI, calcium with vitamin D, vitamin D - partially met  Start Vitamin B1 and B12 - partially met  Aim for 60 to 90 grams protein. - not met  Start 48 to 64 oz of fluid per day. - not met    Current Nutrition Diagnosis: Altered gastrointestinal function related to alteration in gastrointestinal structure as evidenced by history of gastric bypass surgery.    INTERVENTION:   Nutrition Prescription: Eat 3 meals a day at regular intervals. Consume 60-90 grams of protein daily. Follow post-surgical vitamin and mineral protocol.  Assessed learning needs and learning preferences.    GOALS:  Eat 3 food groups at each meal and include protein  Replace snacks with milk or protein drinks  Take all vitamins/minerals consistently    Follow-Up:   Recommend standard post-op follow up visits to assist with lifestyle changes or per insurance.  Implementation: Discussed progress toward previous goals; reinforced importance of following bariatric lifestyle changes.    NUTRITION MONITORING AND EVALUATION:  Anticipated compliance: fair  Verbalized fair-good understanding.    Follow up: Patient to follow up in 3 months (at 6 months post-op)    TIME SPENT WITH PATIENT:  25 minutes      Diya Najera RD, LD  Clinical Dietitian

## 2022-08-16 ENCOUNTER — OFFICE VISIT (OUTPATIENT)
Dept: SURGERY | Facility: CLINIC | Age: 32
End: 2022-08-16
Payer: COMMERCIAL

## 2022-08-16 VITALS
HEIGHT: 71 IN | BODY MASS INDEX: 27.68 KG/M2 | DIASTOLIC BLOOD PRESSURE: 82 MMHG | OXYGEN SATURATION: 100 % | WEIGHT: 197.7 LBS | HEART RATE: 72 BPM | SYSTOLIC BLOOD PRESSURE: 112 MMHG

## 2022-08-16 VITALS — BODY MASS INDEX: 27.68 KG/M2 | HEIGHT: 71 IN | WEIGHT: 197.7 LBS

## 2022-08-16 DIAGNOSIS — L65.0 TELOGEN EFFLUVIUM: ICD-10-CM

## 2022-08-16 DIAGNOSIS — Z98.84 BARIATRIC SURGERY STATUS: Primary | ICD-10-CM

## 2022-08-16 DIAGNOSIS — Z98.84 BARIATRIC SURGERY STATUS: ICD-10-CM

## 2022-08-16 DIAGNOSIS — K91.2 POSTSURGICAL MALABSORPTION: ICD-10-CM

## 2022-08-16 DIAGNOSIS — R42 DIZZY: ICD-10-CM

## 2022-08-16 PROCEDURE — 97803 MED NUTRITION INDIV SUBSEQ: CPT | Performed by: DIETITIAN, REGISTERED

## 2022-08-16 PROCEDURE — 99213 OFFICE O/P EST LOW 20 MIN: CPT | Performed by: PHYSICIAN ASSISTANT

## 2022-08-16 RX ORDER — UBIDECARENONE 75 MG
100 CAPSULE ORAL DAILY
COMMUNITY

## 2022-08-16 NOTE — PROGRESS NOTES
"BARIATRIC FOLLOW UP VISIT           August 16, 2022       HISTORY OF PRESENT ILLNESS: Pt returns today for her follow-up appointment status post laparoscopic gastric bypass  Has been dizzy when she gets up too fast or bends over.  Is only getting in around 36 oz water. Happening daily but only lasts for a moment.  This has been going on a couple of months and has not gotten worse.  Otherwise very happy with her weight loss.            BARIATRIC METRICS:  Current Weight: 197 lb 11.2 oz (89.7 kg)  Body mass index is 27.57 kg/m .   Wt change since last visit (lbs): -5.3  Cumulative weight loss (lbs): 100.5       Patient is taking the following bariatric postoperative vitamins:  Is only taking 2-3 days weekly    2 Complete multivitamins with minerals (at different times than calcium)   5000 International Units of Vitamin D daily  1200 mg of Calcium daily in divided doses  5000 mcg of Vitamin B12 sl once weekly - Just started 2 weeks ago  No iron (has been menstruating for 4  0  Days)  No thiamine      Pt is exercising by having 3 PT sessions weekly. Heavy on the resistance exercises       OBESITY RELATED CONDITIONS:  T2DM - Primary will be checking at annual next month  SANDRA - Has not used CPAP since surgery. Reports no snoring and wakes up rested  GERD without esophagitis - just has a couple days left of her omeprazole         SOCIAL HISTORY:  Pt denies smoking.  Pt denies alcohol use.  Avoids NSAIDS.  One cup of caffeine      REVIEW OF SYSTEMS:     GI:  Nausea- No  Vomiting- No  Diarrhea- No  Constipation- No getting in 36 oz water  Dysphagia-  No  Abdominal Pain- No  Heartburn- No     SKIN:  Intertriginous irritation- No  Hair loss - Yes       PSYCH:  Depression- No  Anxiety- No      LABS/IMAGING/MEDICAL RECORDS REVIEW:  Epic     PHYSICAL EXAMINATION:   /82   Pulse 72   Ht 5' 11\" (1.803 m)   Wt 197 lb 11.2 oz (89.7 kg)   LMP 06/17/2022   SpO2 100%   BMI 27.57 kg/m    GENERAL: Alert and oriented x3. " NAD  HEART: No murmurs, rubs or gallops, Regular rate and rhythm  LUNGS: Breathing unlabored, Lung sounds clear to auscultation bilaterally  ABDOMEN: soft; nontender; nondistended, incision well healed. No hernia  EXTREMITIES: No LE edema bilaterally, Gait normal  SKIN: No intertriginous irritation or rash      ASSESSMENT AND PLAN:    3 months status laparoscopic gastric bypass    Provided number for [place] counseling  Morbid Obesity - Resolved - Body mass index is 27.57 kg/m ..   Congratulated patient on her overall weight loss.  Post surgical malabsorption:   Ordered vitamin B12, vitamin D, PTH, ferritin, TIBC, and iron labs.   Follow food plan per dietitian recommendations.   Discussed the importance of taking recommended post-op vitamins regularly.  Telogen Effluvium - Discussed iron, protein and surgical post op stress. Information provided  Dizzy - Discussed patient increasing her fluids and take her vitamins regularly. Move slowly. If worsens to contact clinic  Return to clinic in 3 months to see diet and provider.      25 minutes spent on the date of the encounter doing chart review, review of test results, patient visit and documentation

## 2022-08-16 NOTE — PATIENT INSTRUCTIONS
"Nice to talk with you today. Thank you for allowing me the privilege of caring for you. We hope we provided you with the excellent service you deserve.     To ensure the quality of our services you may receive a patient satisfaction survey from an independent monitoring company.  The greatest compliment you can give is \"Likely to Recommend\"    Below is our plan we discussed.-  MANI Bird      Labs have been ordered in the system.You can have these drawn at any [facility] lab.  Please call [phone] set up an appointment.  Your results will be posted on [employer] as soon as they're complete.  After all are resulted, I will review and comment to you.      Continue your bariatric vitamins       FOLLOW-UP:  Please call [phone] to schedule your next visit.       Bariatric Post Op Guidelines  General:    To avoid marginal ulcers avoid all forms of tobacco, alcohol in excess, caffeine, and NSAIDS (unless indicated for cardioprotection or othewise and opposed by a PPI).    Exercise is key for continued weight loss and weight maintenance. Aim for 30-60 minutes of physical activity most days of the week. Include cardiovascular and strength training.    Continue lifelong vitamins supplementation and annual lab follow up.  All  patients should supplement with the following bariatric postoperative vitamins:  2 Complete multivitamins with minerals (at different times than calcium)  Vitamin D 5000 Int Units/125 mg daily   Calcium 600 mg twice daily or 500 mg hree times daily   Vitamin B12: 500 mcg sl daily or 1000 mcg Inj monthly  B complex daily or Thiamine 100 mg weekly  1 Iron/Vit C. Daily for females who menstruate and/or as directed    The bariatric team should be aware and evaluate all adverse gastrointestinal symptoms which can be a sign of complication. Inability to tolerate textured solid food (chicken, steak, fish) may need to be evaluated by endoscopy.    There is a 10% increase of Alcohol Use Disorder in " patients with bariatric surgery.   Most often occurring around 2 years post op.  Please call the clinic if you feel alcohol is interfering in your daily life.  We can help.     Follow up annually lifelong. Obesity is a chronic disease.  Weight gain can be expected. The goal of follow-up visits is to ensure adequate vitamin and protein absorption, evaluate food intake behavior, review exercise/activity level, and assist with weight regain.    Nutritional:  Eat 3 meals per day  (No snacks between meals.)  Do not skip meals.  This can cause overeating at the next meal and will prevent adequate protein and nutritional intake.    Aim for 60-80 grams of protein per day.  Always eat your protein first. This assists with optimal nutrition and helps you stay full longer.    Eat your protein first, and then follow with fiber.    Add fiber by including fruits, vegetables, whole grains, and beans.     Portions should be about 1 cup per meal. Use measuring cups to be accurate.  Continue to use saucer/salad plates, infant/toddler silverware to keep portion sizes small and take small bites.    Eat S-L-O-W-L-Y to make each meal last 20-30 minutes. Always stop eating when satisfied.    Aim for 64 oz. of calorie-free fluids daily.    Avoid drinking 30 min before, during, and 30 min after meal    Avoid high sugar and high fat foods to prevent high calorie intake. This will reduce your rate of weight loss and can cause weight regain.   Check nutrition labels for less than 10 grams of sugar and less than 10 grams of fat per serving.                            Telogen Effluvium (Hair Loss)  What Is It?  At any given time, about 85% to 90% of the hairs on the average person's head are actively growing (the anagen phase) and the others are resting (the telogen phase). Typically, a hair is in the anagen phase for two to four years, then enters the telogen phase, rests for about two to four months, and then falls out and is replaced by a  new, growing hair. The average person naturally loses about 100 hairs a day.  In a person with telogen effluvium, some body change or shock pushes more hairs into the telogen phase. About 30% of the hairs stop growing and go into the resting phase before falling out. So you may lose an average of 300 hairs a day instead of 100.  Telogen effluvium can be triggered by a number of different events, including:  Surgery (like wt. loss surgery)  Extreme weight loss  Extreme change in diet  Abrupt hormonal changes, including those associated with childbirth and menopause  Iron deficiency  Hypothyroidism or hyperthyroidism  Because hairs that enter the telogen phase rest in place for two to four months before falling out, you may not notice any hair loss until two to four months after the event that caused the problem. Telogen effluvium rarely lasts longer than six months.  Although losing a great number of hairs within a short time can be frightening, the condition is  temporary. Each hair pushed out is replaced by a new, growing hair. Because hair on the scalp grows slowly, your hair may feel or look thinner than usual for a time but fullness will return as the new hairs grow in.  Expected Duration  Typically, hair loss begins two to four months after the event that triggered the problem, and lasts approximately six months. New hairs begin growing immediately after the hair falls out, but significant growth may not be noticed for several months.  Prevention  Nothing can be done to prevent most of the types of physical shock that can start telogen effluvium.   Treatment  No treatment for active telogen effluvium has been proven effective.        Telogen Effluvium (Hair Loss)  What Is It?  At any given time, about 85% to 90% of the hairs on the average person's head are actively growing (the anagen phase) and the others are resting (the telogen phase). Typically, a hair is in the anagen phase for two to four years, then  enters the telogen phase, rests for about two to four months, and then falls out and is replaced by a new, growing hair. The average person naturally loses about 100 hairs a day.  In a person with telogen effluvium, some body change or shock pushes more hairs into the telogen phase. About 30% of the hairs stop growing and go into the resting phase before falling out. So you may lose an average of 300 hairs a day instead of 100.  Telogen effluvium can be triggered by a number of different events, including:  Surgery (like wt. loss surgery)  Extreme weight loss  Extreme change in diet  Abrupt hormonal changes, including those associated with childbirth and menopause  Iron deficiency  Hypothyroidism or hyperthyroidism  Because hairs that enter the telogen phase rest in place for two to four months before falling out, you may not notice any hair loss until two to four months after the event that caused the problem. Telogen effluvium rarely lasts longer than six months.  Although losing a great number of hairs within a short time can be frightening, the condition is  temporary. Each hair pushed out is replaced by a new, growing hair. Because hair on the scalp grows slowly, your hair may feel or look thinner than usual for a time but fullness will return as the new hairs grow in.  Expected Duration  Typically, hair loss begins two to four months after the event that triggered the problem, and lasts approximately six months. New hairs begin growing immediately after the hair falls out, but significant growth may not be noticed for several months.  Prevention  Nothing can be done to prevent most of the types of physical shock that can start telogen effluvium.   Treatment  No treatment for active telogen effluvium has been proven effective.

## 2022-08-26 ENCOUNTER — LAB (OUTPATIENT)
Dept: LAB | Facility: CLINIC | Age: 32
End: 2022-08-26
Payer: COMMERCIAL

## 2022-08-26 ENCOUNTER — OFFICE VISIT (OUTPATIENT)
Dept: OBGYN | Facility: CLINIC | Age: 32
End: 2022-08-26

## 2022-08-26 VITALS
BODY MASS INDEX: 26.88 KG/M2 | SYSTOLIC BLOOD PRESSURE: 110 MMHG | HEIGHT: 71 IN | DIASTOLIC BLOOD PRESSURE: 70 MMHG | WEIGHT: 192 LBS

## 2022-08-26 DIAGNOSIS — E11.9 TYPE 2 DIABETES MELLITUS WITHOUT COMPLICATION, WITHOUT LONG-TERM CURRENT USE OF INSULIN (H): ICD-10-CM

## 2022-08-26 DIAGNOSIS — N89.8 VAGINAL DISCHARGE: ICD-10-CM

## 2022-08-26 DIAGNOSIS — Z01.419 ENCOUNTER FOR GYNECOLOGICAL EXAMINATION WITHOUT ABNORMAL FINDING: Primary | ICD-10-CM

## 2022-08-26 DIAGNOSIS — Z12.4 SCREENING FOR CERVICAL CANCER: ICD-10-CM

## 2022-08-26 DIAGNOSIS — Z30.41 SURVEILLANCE OF PREVIOUSLY PRESCRIBED CONTRACEPTIVE PILL: ICD-10-CM

## 2022-08-26 DIAGNOSIS — Z87.440 PERSONAL HISTORY OF URINARY TRACT INFECTION: ICD-10-CM

## 2022-08-26 DIAGNOSIS — K91.2 POSTSURGICAL MALABSORPTION: ICD-10-CM

## 2022-08-26 LAB
CLUE CELLS: ABNORMAL
HBA1C MFR BLD: 4.8 % (ref 0–5.6)
IRON SATN MFR SERPL: 40 % (ref 15–46)
IRON SERPL-MCNC: 95 UG/DL (ref 35–180)
PTH-INTACT SERPL-MCNC: 44 PG/ML (ref 15–65)
TIBC SERPL-MCNC: 238 UG/DL (ref 240–430)
TRICHOMONAS, WET PREP: ABNORMAL
VIT B12 SERPL-MCNC: 384 PG/ML (ref 232–1245)
WBC'S/HIGH POWER FIELD, WET PREP: ABNORMAL
YEAST, WET PREP: ABNORMAL

## 2022-08-26 PROCEDURE — 83036 HEMOGLOBIN GLYCOSYLATED A1C: CPT

## 2022-08-26 PROCEDURE — 82607 VITAMIN B-12: CPT

## 2022-08-26 PROCEDURE — 87624 HPV HI-RISK TYP POOLED RSLT: CPT | Performed by: OBSTETRICS & GYNECOLOGY

## 2022-08-26 PROCEDURE — 82728 ASSAY OF FERRITIN: CPT

## 2022-08-26 PROCEDURE — G0145 SCR C/V CYTO,THINLAYER,RESCR: HCPCS | Performed by: OBSTETRICS & GYNECOLOGY

## 2022-08-26 PROCEDURE — 82043 UR ALBUMIN QUANTITATIVE: CPT

## 2022-08-26 PROCEDURE — 83970 ASSAY OF PARATHORMONE: CPT

## 2022-08-26 PROCEDURE — 82306 VITAMIN D 25 HYDROXY: CPT

## 2022-08-26 PROCEDURE — 87210 SMEAR WET MOUNT SALINE/INK: CPT | Performed by: OBSTETRICS & GYNECOLOGY

## 2022-08-26 PROCEDURE — 99395 PREV VISIT EST AGE 18-39: CPT | Performed by: OBSTETRICS & GYNECOLOGY

## 2022-08-26 PROCEDURE — 36415 COLL VENOUS BLD VENIPUNCTURE: CPT

## 2022-08-26 PROCEDURE — 80053 COMPREHEN METABOLIC PANEL: CPT

## 2022-08-26 PROCEDURE — 83550 IRON BINDING TEST: CPT

## 2022-08-26 PROCEDURE — 80061 LIPID PANEL: CPT

## 2022-08-26 PROCEDURE — 84443 ASSAY THYROID STIM HORMONE: CPT

## 2022-08-26 RX ORDER — DESOGESTREL AND ETHINYL ESTRADIOL 0.15-0.03
1 KIT ORAL DAILY
Qty: 112 TABLET | Refills: 3 | Status: SHIPPED | OUTPATIENT
Start: 2022-08-26 | End: 2023-10-17

## 2022-08-26 NOTE — PROGRESS NOTES
Alina is a 31 year old  female who presents for annual exam.     Besides routine health maintenance,  she would like to discuss long periods after gastric bypass. In May of 2022    HPI:  The patient's PCP is  Prudence Laughlin MD.      Takes Apri continuously for birth control.  Had gastric bypass in May 2022 and lost 70 pounds so far, had a 45 day period this summer.   Gets recurrent UTIs after intercourse with her boyfriend.  Not documented with a urine culture, but has taken abx before which has made it better.  She feels the main problem is that she doesn't drink as much fluid post-gastric bypass surgery so her urine is more concentrated.  Has a fishy smelling vaginal discharge.  Due for pap smear    Maybe interested in pregnancy by the time she is 35 years old.        GYNECOLOGIC HISTORY:    Patient's last menstrual period was 2022 (exact date).    Regular menses? no  Menses usually every 3 momths continuos pills) days.  Length of menses: last 2 have been really long    Her current contraception method is: oral contraceptives and condoms.  She  reports that she has never smoked. She has never used smokeless tobacco.    Patient is sexually active.  STD testing offered?  Declined  Last PHQ-9 score on record =   PHQ-9 SCORE 2022   PHQ-9 Total Score 1   PHQ2 =0  Last GAD7 score on record =   BINDU-7 SCORE 3/23/2022   Total Score 7 (mild anxiety)   Total Score 7     HEALTH MAINTENANCE:  Cholesterol: PCP does  Cholesterol   Date Value Ref Range Status   2022 190 <200 mg/dL Final   2021 188 <200 mg/dL Final   2019 180 <200 mg/dL Final     Pap: 2019 Nil  Lab Results   Component Value Date    GYNINTERP  10/24/2016     Negative for squamous intraepithelial lesion or malignancy  Electronically signed by Rebecca Morales CT (ASCP) on 10/31/2016 at 10:50 AM      PAP NIL 2019     Health maintenance updated:  yes    HISTORY:  OB History    Para Term  AB Living    1 0 0 0 1 0   SAB IAB Ectopic Multiple Live Births   0 0 0 0 0      # Outcome Date GA Lbr Hugh/2nd Weight Sex Delivery Anes PTL Lv   1 AB 2008               Patient Active Problem List   Diagnosis     Idiopathic hypersomnia     Body mass index (BMI) of 27.0-27.9 in adult     SANDRA (obstructive sleep apnea)- mild (AHI 12)     History of elevated blood pressure while in hospital     Cervical spine pain     Migraine with aura and without status migrainosus, not intractable     Type 2 diabetes mellitus without complication, without long-term current use of insulin (H)     Mild major depression, single episode (H)     Postsurgical malabsorption     Bariatric surgery status     Irritant contact dermatitis due to other agents     Epigastric pressure     Past Surgical History:   Procedure Laterality Date     ABDOMEN SURGERY  5/4/22    Gastric Bypass     CHOLECYSTECTOMY       DAVINCI BYPASS GASTRIC MARTHA-EN-Y N/A 05/04/2022    Procedure: Robotic assisted laparoscopic Martha-en-Y gastric bypass;  Surgeon: Jean-Claude Lin MD;  Location: [facility] OR     ENT SURGERY      ear tubes     TONSILLECTOMY        Social History     Tobacco Use     Smoking status: Never Smoker     Smokeless tobacco: Never Used   Substance Use Topics     Alcohol use: Not Currently      Problem (# of Occurrences) Relation (Name,Age of Onset)    Diabetes (3) Mother (Candace): Had before her gastric bypass, Maternal Grandmother (Gwen), Paternal Grandmother (Susan)    Genetic Disorder (1) Paternal Grandmother (Susan)    Heart Disease (1) Maternal Grandmother (Gwen)    Liver Cancer (1) Paternal Grandfather    Obesity (6) Mother (Candace), Father, Maternal Grandmother (Gwen), Maternal Grandfather (Genaro), Paternal Grandmother (Susan), Sister (Nilam)    Other Cancer (2) Maternal Grandmother (Gwen): Uterine or Ovarian Cancer, Paternal Grandmother (Susan): Uterine or Ovarian Cancer    Sleep Apnea (1) Other: Grandmother    Thyroid Disease (1) Paternal  Grandmother (Susan): Parathyroid removed            Current Outpatient Medications   Medication Sig     CALCIUM PO Take 2 capsules by mouth daily 600mg breakfast and lunch     cyanocobalamin (VITAMIN B-12) 100 MCG tablet Take 100 mcg by mouth daily     desogestrel-ethinyl estradiol (APRI) 0.15-30 MG-MCG tablet Take 1 tablet by mouth daily Continuously     ferrous fumarate 65 mg, [place]. FE,-Vitamin C 125 mg (VITRON C)  MG TABS tablet Take 1 tablet by mouth daily     FLUoxetine (PROZAC) 20 MG capsule Take 1 capsule (20 mg) by mouth daily     Multiple Vitamin (MULTIVITAMIN ADULT PO) Take 1 tablet by mouth daily     omeprazole (PRILOSEC) 20 MG DR capsule Take 1 capsule (20 mg) by mouth every morning (before breakfast)     VITAMIN D (CHOLECALCIFEROL) PO Take 1 tablet by mouth daily     fluticasone (FLONASE) 50 MCG/ACT nasal spray Spray 1 spray into both nostrils daily (Patient not taking: No sig reported)     hydrocortisone 2.5 % ointment Apply topically 2 times daily (Patient not taking: Reported on 8/26/2022)     SUMAtriptan (IMITREX) 25 MG tablet Take 1 tablet (25 mg) by mouth at onset of headache for migraine May repeat in 2 hours. Max 8 tablets/24 hours. (Patient not taking: Reported on 8/26/2022)     triamcinolone (KENALOG) 0.1 % external ointment Apply topically 2 times daily as needed for irritation (Patient not taking: Reported on 8/26/2022)     No current facility-administered medications for this visit.     Allergies   Allergen Reactions     Asa [Aspirin]      Gastric Bypass      Nsaids      Gastric Bypass     Sulfa Drugs      Wellbutrin [Bupropion] Other (See Comments)     Dizziness       Past medical, surgical, social and family histories were reviewed and updated in EPIC.    ROS:   12 point review of systems negative other than symptoms noted below or in the HPI.  No urinary frequency or dysuria, bladder or kidney problems, POSITIVE for:, irregular menses, vaginal discharge    EXAM:  /70 (BP  "Location: Right arm, Patient Position: Chair, Cuff Size: Adult Regular)   Ht 1.803 m (5' 11\")   Wt 87.1 kg (192 lb)   LMP 08/01/2022 (Exact Date)   Breastfeeding No   BMI 26.78 kg/m     BMI: Body mass index is 26.78 kg/m .    PHYSICAL EXAM:  Constitutional:   Appearance: Well nourished, well developed, alert, in no acute distress  Neck:  Lymph Nodes:  No lymphadenopathy present    Thyroid:  Gland size normal, nontender, no nodules or masses present  on palpation  Chest:  Respiratory Effort:  Breathing unlabored  Cardiovascular:    Heart: Auscultation:  Regular rate, normal rhythm, no murmurs present  Breasts: Inspection of Breasts:  No lymphadenopathy present., Palpation of Breasts and Axillae:  No masses present on palpation, no breast tenderness. and No nodularity, asymmetry or nipple discharge bilaterally.  Gastrointestinal:   Abdominal Examination:  Abdomen nontender to palpation, tone normal without rigidity or guarding, no masses present, umbilicus without lesions   Liver and Spleen:  No hepatomegaly present, liver nontender to palpation    Hernias:  No hernias present  Lymphatic: Lymph Nodes:  No other lymphadenopathy present  Skin:  General Inspection:  No rashes present, no lesions present, no areas of  discoloration  Neurologic:    Mental Status:  Oriented X3.  Normal strength and tone, sensory exam                grossly normal, mentation intact and speech normal.    Psychiatric:   Mentation appears normal and affect normal/bright.         Pelvic Exam:  External Genitalia:     Normal appearance for age, no discharge present, no tenderness present, no inflammatory lesions present, color normal  Vagina:     Normal vaginal vault without central or paravaginal defects, scant malodorous discharge present, no inflammatory lesions present, no masses present  Bladder:     Nontender to palpation  Urethra:   Urethral Body:  Urethra palpation normal, urethra structural support normal   Urethral Meatus:  No " erythema or lesions present  Cervix:     Appearance healthy, no lesions present, nontender to palpation, no bleeding present  Uterus:     Uterus: firm, normal sized and nontender, anteverted in position.   Adnexa:     No adnexal tenderness present, no adnexal masses present  Perineum:     Perineum within normal limits, no evidence of trauma, no rashes or skin lesions present  Anus:     Anus within normal limits, no hemorrhoids present  Inguinal Lymph Nodes:     No lymphadenopathy present  Pubic Hair:     Normal pubic hair distribution for age  Genitalia and Groin:     No rashes present, no lesions present, no areas of discoloration, no masses present      COUNSELING:   Reviewed preventive health counseling, as reflected in patient instructions       Contraception    BMI: Body mass index is 26.78 kg/m .      ASSESSMENT:  31 year old female with satisfactory annual exam.    PLAN:  1. Encounter for gynecological examination without abnormal finding  Declines STI screening.  Pap smear done today.  Mammogram not indicated today due to age.  Colonoscopy not indicated today due to age.  Labs - done by PCP.  Immunizations up to date.  Discussed weight control, activity, and aging gracefully.     2. Screening for cervical cancer  - Pap imaged thin layer screen with HPV - recommended age 30 - 65    3. Surveillance of previously prescribed contraceptive pill  Will take continuously  - desogestrel-ethinyl estradiol (APRI) 0.15-30 MG-MCG tablet; Take 1 tablet by mouth daily Continuously  Dispense: 112 tablet; Refill: 3    4. Personal history of urinary tract infection  At next symptoms, will use AZO for symptoms and obtain urine culture.   - Urine Culture Aerobic Bacterial; Standing    5. Vaginal discharge  - Wet prep - Clinic Collect     Abida Sorenson MD

## 2022-08-26 NOTE — NURSING NOTE
"Chief Complaint   Patient presents with     Gyn Exam       Initial /70 (BP Location: Right arm, Patient Position: Chair, Cuff Size: Adult Regular)   Ht 1.803 m (5' 11\")   Wt 87.1 kg (192 lb)   LMP 2022 (Exact Date)   Breastfeeding No   BMI 26.78 kg/m   Estimated body mass index is 26.78 kg/m  as calculated from the following:    Height as of this encounter: 1.803 m (5' 11\").    Weight as of this encounter: 87.1 kg (192 lb).  BP completed using cuff size: regular    Questioned patient about current smoking habits.  Pt. has never smoked.          The following HM Due: pap smear    Amber Kowalski CMA    "

## 2022-08-27 LAB
ALBUMIN SERPL-MCNC: 3.3 G/DL (ref 3.4–5)
ALP SERPL-CCNC: 91 U/L (ref 40–150)
ALT SERPL W P-5'-P-CCNC: 44 U/L (ref 0–50)
ANION GAP SERPL CALCULATED.3IONS-SCNC: 11 MMOL/L (ref 3–14)
AST SERPL W P-5'-P-CCNC: 40 U/L (ref 0–45)
BILIRUB SERPL-MCNC: 0.8 MG/DL (ref 0.2–1.3)
BUN SERPL-MCNC: 8 MG/DL (ref 7–30)
CALCIUM SERPL-MCNC: 9.3 MG/DL (ref 8.5–10.1)
CHLORIDE BLD-SCNC: 108 MMOL/L (ref 94–109)
CHOLEST SERPL-MCNC: 156 MG/DL
CO2 SERPL-SCNC: 21 MMOL/L (ref 20–32)
CREAT SERPL-MCNC: 0.54 MG/DL (ref 0.52–1.04)
CREAT UR-MCNC: 456 MG/DL
DEPRECATED CALCIDIOL+CALCIFEROL SERPL-MC: 45 UG/L (ref 20–75)
FASTING STATUS PATIENT QL REPORTED: YES
FERRITIN SERPL-MCNC: 162 NG/ML (ref 12–150)
GFR SERPL CREATININE-BSD FRML MDRD: >90 ML/MIN/1.73M2
GLUCOSE BLD-MCNC: 78 MG/DL (ref 70–99)
HDLC SERPL-MCNC: 53 MG/DL
LDLC SERPL CALC-MCNC: 77 MG/DL
MICROALBUMIN UR-MCNC: 57 MG/L
MICROALBUMIN/CREAT UR: 12.5 MG/G CR (ref 0–25)
NONHDLC SERPL-MCNC: 103 MG/DL
POTASSIUM BLD-SCNC: 4.1 MMOL/L (ref 3.4–5.3)
PROT SERPL-MCNC: 6.7 G/DL (ref 6.8–8.8)
SODIUM SERPL-SCNC: 140 MMOL/L (ref 133–144)
TRIGL SERPL-MCNC: 130 MG/DL
TSH SERPL DL<=0.005 MIU/L-ACNC: 1.94 MU/L (ref 0.4–4)

## 2022-08-29 DIAGNOSIS — Z01.419 ENCOUNTER FOR GYNECOLOGICAL EXAMINATION WITHOUT ABNORMAL FINDING: Primary | ICD-10-CM

## 2022-08-29 RX ORDER — METRONIDAZOLE 7.5 MG/G
1 GEL VAGINAL DAILY
Qty: 70 G | Refills: 0 | Status: SHIPPED | OUTPATIENT
Start: 2022-08-29 | End: 2022-10-05

## 2022-08-30 LAB
BKR LAB AP GYN ADEQUACY: NORMAL
BKR LAB AP GYN INTERPRETATION: NORMAL
BKR LAB AP HPV REFLEX: NORMAL
BKR LAB AP LMP: NORMAL
BKR LAB AP PREVIOUS ABNORMAL: NORMAL
PATH REPORT.COMMENTS IMP SPEC: NORMAL
PATH REPORT.COMMENTS IMP SPEC: NORMAL
PATH REPORT.RELEVANT HX SPEC: NORMAL

## 2022-09-01 ENCOUNTER — OFFICE VISIT (OUTPATIENT)
Dept: FAMILY MEDICINE | Facility: CLINIC | Age: 32
End: 2022-09-01
Payer: COMMERCIAL

## 2022-09-01 VITALS
RESPIRATION RATE: 15 BRPM | TEMPERATURE: 98.5 F | BODY MASS INDEX: 27.26 KG/M2 | SYSTOLIC BLOOD PRESSURE: 100 MMHG | HEIGHT: 71 IN | DIASTOLIC BLOOD PRESSURE: 68 MMHG | OXYGEN SATURATION: 100 % | HEART RATE: 71 BPM | WEIGHT: 194.7 LBS

## 2022-09-01 DIAGNOSIS — E11.9 TYPE 2 DIABETES MELLITUS WITHOUT COMPLICATION, WITHOUT LONG-TERM CURRENT USE OF INSULIN (H): ICD-10-CM

## 2022-09-01 DIAGNOSIS — Z98.84 BARIATRIC SURGERY STATUS: Primary | ICD-10-CM

## 2022-09-01 DIAGNOSIS — F41.1 GAD (GENERALIZED ANXIETY DISORDER): ICD-10-CM

## 2022-09-01 LAB
HUMAN PAPILLOMA VIRUS 16 DNA: NEGATIVE
HUMAN PAPILLOMA VIRUS 18 DNA: NEGATIVE
HUMAN PAPILLOMA VIRUS FINAL DIAGNOSIS: NORMAL
HUMAN PAPILLOMA VIRUS OTHER HR: NEGATIVE

## 2022-09-01 PROCEDURE — 99214 OFFICE O/P EST MOD 30 MIN: CPT | Mod: 25 | Performed by: INTERNAL MEDICINE

## 2022-09-01 PROCEDURE — 90471 IMMUNIZATION ADMIN: CPT | Performed by: INTERNAL MEDICINE

## 2022-09-01 PROCEDURE — 90686 IIV4 VACC NO PRSV 0.5 ML IM: CPT | Performed by: INTERNAL MEDICINE

## 2022-09-01 PROCEDURE — 99207 PR FOOT EXAM NO CHARGE: CPT | Mod: 25 | Performed by: INTERNAL MEDICINE

## 2022-09-01 ASSESSMENT — ENCOUNTER SYMPTOMS: NERVOUS/ANXIOUS: 1

## 2022-09-01 ASSESSMENT — PAIN SCALES - GENERAL: PAINLEVEL: NO PAIN (0)

## 2022-09-01 NOTE — PROGRESS NOTES
Assessment & Plan     (Z98.84) Bariatric surgery status 5/4/2022 surgery wt 260#, now 194# with goal 180#   (primary encounter diagnosis)  Comment: Bariatric surgery status 5/4/2022 surgery wt 260#, now 194# with goal 180#   Plan: she continues to follow with bariatric team    (F41.1) BINDU (generalized anxiety disorder)  Comment: PHQ-9 and BINDU-7 reviewed.  BINDU-7 SCORE 7/1/2021 9/17/2021 3/23/2022   Total Score - - 7 (mild anxiety)   Total Score 3 2 7     PHQ 7/1/2021 9/17/2021 5/13/2022   PHQ-9 Total Score 6 2 1   Q9: Thoughts of better off dead/self-harm past 2 weeks Not at all Not at all Not at all   overall, doing well on current medications; she desires to continue current medication regimen.  Plan: FLUoxetine (PROZAC) 20 MG capsule          (E11.9) Type 2 diabetes mellitus without complication, without long-term current use of insulin (H)  Comment:   Lab Results   Component Value Date    A1C 4.8 08/26/2022    A1C 6.6 03/23/2022    A1C 6.7 06/01/2021    A1C PER PATIENT 05/26/2021   Overall, doing well; since surgery, she is not on any DM medications.  Plan: FOOT EXAM            Review of the result(s) of each unique test - reviewed medications and lab results and plan of care.  Prescription drug management  36 minutes spent on the date of the encounter doing chart review, history and exam, documentation and further activities per the note       MEDICATIONS:  Continue current medications without change  FUTURE LABS:       - labs as recommended by Bariatric team following surgery.   FUTURE APPOINTMENTS:       - Follow-up visit in 6 months with prinmary care, labs prior   Regular exercise    Return in about 6 months (around 3/1/2023) for Diabetes, overweight., Depression/Mood/Anxiety.    Prudence Laughlin MD  Internal Medicine   [facility] BANG Fuller is a 31 year old, presenting for the following health issues:  Anxiety and Diabetes      Anxiety    History of  Present Illness       Diabetes:   She presents for follow up of diabetes.  She is not checking blood glucose. She has no concerns regarding her diabetes at this time.  She is not experiencing numbness or burning in feet, excessive thirst, blurry vision, weight changes or redness, sores or blisters on feet. The patient has not had a diabetic eye exam in the last 12 months.         Reason for visit:  Diabetes care and post surgery follow up    She eats 2-3 servings of fruits and vegetables daily.She consumes 0 sweetened beverage(s) daily.She exercises with enough effort to increase her heart rate 60 or more minutes per day.  She exercises with enough effort to increase her heart rate 3 or less days per week. She is missing 1 dose(s) of medications per week.  She is not taking prescribed medications regularly due to remembering to take.       Anxiety Follow-Up    How are you doing with your anxiety since your last visit? Stable     Are you having other symptoms that might be associated with anxiety? No    Have you had a significant life event? Job Concerns and Health Concerns   Finished her masters degree and is starting her new job next week.     Are you feeling depressed? No    Do you have any concerns with your use of alcohol or other drugs? No    Social History     Tobacco Use     Smoking status: Never Smoker     Smokeless tobacco: Never Used   Vaping Use     Vaping Use: Never used   Substance Use Topics     Alcohol use: Not Currently     Drug use: Yes     Types: Marijuana     Comment: 1-2 per month (Edibles)     BINDU-7 SCORE 7/1/2021 9/17/2021 3/23/2022   Total Score - - 7 (mild anxiety)   Total Score 3 2 7     PHQ 7/1/2021 9/17/2021 5/13/2022   PHQ-9 Total Score 6 2 1   Q9: Thoughts of better off dead/self-harm past 2 weeks Not at all Not at all Not at all     Last PHQ-9 5/13/2022   1.  Little interest or pleasure in doing things 0   2.  Feeling down, depressed, or hopeless 0   3.  Trouble falling or staying  "asleep, or sleeping too much 0   4.  Feeling tired or having little energy 1   5.  Poor appetite or overeating 0   6.  Feeling bad about yourself 0   7.  Trouble concentrating 0   8.  Moving slowly or restless 0   Q9: Thoughts of better off dead/self-harm past 2 weeks 0   PHQ-9 Total Score 1   Difficulty at work, home, or with people Not difficult at all     BINDU-7  3/23/2022   1. Feeling nervous, anxious, or on edge 2   2. Not being able to stop or control worrying 1   3. Worrying too much about different things 2   4. Trouble relaxing 0   5. Being so restless that it is hard to sit still 0   6. Becoming easily annoyed or irritable 1   7. Feeling afraid, as if something awful might happen 1   BINDU-7 Total Score 7   If you checked any problems, how difficult have they made it for you to do your work, take care of things at home, or get along with other people? -         Review of Systems   Psychiatric/Behavioral: The patient is nervous/anxious.       Bariatric team Q 3,6,9,12 then yearly  Doing well, overall  Diabetes- as noted above.          Objective    /68   Pulse 71   Temp 98.5  F (36.9  C) (Oral)   Resp 15   Ht 1.791 m (5' 10.5\")   Wt 88.3 kg (194 lb 11.2 oz)   LMP 08/01/2022 (Exact Date)   SpO2 100%   BMI 27.54 kg/m    Body mass index is 27.54 kg/m .  Physical Exam   GENERAL: healthy, alert and no distress  NECK: no adenopathy, no asymmetry, masses, or scars and thyroid normal to palpation  RESP: lungs clear to auscultation - no rales, rhonchi or wheezes  CV: regular rate and rhythm, normal S1 S2, no S3 or S4, no murmur, click or rub, no peripheral edema and peripheral pulses strong  ABDOMEN: soft, nontender and bowel sounds normal  MS: no gross musculoskeletal defects noted, no edema  NEURO: Normal strength and tone, sensory exam grossly normal and mentation intact  PSYCH: mentation appears normal, affect normal/bright  Diabetic foot exam: normal DP and PT pulses, no trophic changes or ulcerative " lesions and normal sensory exam              [unfilled]  [unfilled]

## 2022-09-07 RX ORDER — NITROFURANTOIN 25; 75 MG/1; MG/1
CAPSULE ORAL
Qty: 10 CAPSULE | Refills: 0 | OUTPATIENT
Start: 2022-09-07

## 2022-09-08 ENCOUNTER — TRANSFERRED RECORDS (OUTPATIENT)
Dept: FAMILY MEDICINE | Facility: CLINIC | Age: 32
End: 2022-09-08

## 2022-09-08 LAB — RETINOPATHY: NEGATIVE

## 2022-09-28 ENCOUNTER — MYC MEDICAL ADVICE (OUTPATIENT)
Dept: FAMILY MEDICINE | Facility: CLINIC | Age: 32
End: 2022-09-28

## 2022-09-28 ENCOUNTER — TELEPHONE (OUTPATIENT)
Dept: FAMILY MEDICINE | Facility: CLINIC | Age: 32
End: 2022-09-28

## 2022-09-28 DIAGNOSIS — K91.2 POSTSURGICAL MALABSORPTION: Primary | ICD-10-CM

## 2022-09-28 DIAGNOSIS — Z98.84 BARIATRIC SURGERY STATUS: ICD-10-CM

## 2022-09-28 DIAGNOSIS — G62.9 NEUROPATHY: ICD-10-CM

## 2022-09-28 NOTE — TELEPHONE ENCOUNTER
See telephone encounter for attempt to schedule appointment     Jack Torres RN on 9/28/2022 at 12:44 PM

## 2022-09-28 NOTE — TELEPHONE ENCOUNTER
I don't think drop foot should not be an issue from a bariatric surgery stand point.  She should be seen for an appointment in the next couple days ideally.      Thanks!  Ildefonso

## 2022-09-28 NOTE — TELEPHONE ENCOUNTER
Called patient, left voicemail for call back to schedule appointment per Ildefonso Javier's request.     See mychart message from Surgery (09/28/22). Trying to get patient scheduled for appointment with next available provider (or extender at [place]).     Jack Torres RN

## 2022-09-28 NOTE — TELEPHONE ENCOUNTER
Patient is in communization with bariatric team as well. Advised of providers recommendation     See other mychart encounter from today 09/28/22.     Jack Torres RN on 9/28/2022 at 10:25 AM

## 2022-10-05 ENCOUNTER — OFFICE VISIT (OUTPATIENT)
Dept: INTERNAL MEDICINE | Facility: CLINIC | Age: 32
End: 2022-10-05
Payer: COMMERCIAL

## 2022-10-05 VITALS
BODY MASS INDEX: 26.64 KG/M2 | DIASTOLIC BLOOD PRESSURE: 76 MMHG | HEART RATE: 76 BPM | SYSTOLIC BLOOD PRESSURE: 104 MMHG | OXYGEN SATURATION: 100 % | WEIGHT: 188.3 LBS

## 2022-10-05 DIAGNOSIS — Z98.84 BARIATRIC SURGERY STATUS: ICD-10-CM

## 2022-10-05 DIAGNOSIS — E11.9 TYPE 2 DIABETES MELLITUS WITHOUT COMPLICATION, WITHOUT LONG-TERM CURRENT USE OF INSULIN (H): ICD-10-CM

## 2022-10-05 DIAGNOSIS — K91.2 POSTSURGICAL MALABSORPTION: ICD-10-CM

## 2022-10-05 DIAGNOSIS — G62.9 NEUROPATHY: ICD-10-CM

## 2022-10-05 DIAGNOSIS — R20.2 PARESTHESIA OF RIGHT FOOT: ICD-10-CM

## 2022-10-05 DIAGNOSIS — M21.371 RIGHT FOOT DROP: Primary | ICD-10-CM

## 2022-10-05 PROCEDURE — 99214 OFFICE O/P EST MOD 30 MIN: CPT | Performed by: INTERNAL MEDICINE

## 2022-10-05 PROCEDURE — 84207 ASSAY OF VITAMIN B-6: CPT | Mod: 90 | Performed by: INTERNAL MEDICINE

## 2022-10-05 PROCEDURE — 99000 SPECIMEN HANDLING OFFICE-LAB: CPT | Performed by: INTERNAL MEDICINE

## 2022-10-05 PROCEDURE — 36415 COLL VENOUS BLD VENIPUNCTURE: CPT | Performed by: INTERNAL MEDICINE

## 2022-10-05 PROCEDURE — 82525 ASSAY OF COPPER: CPT | Mod: 90 | Performed by: INTERNAL MEDICINE

## 2022-10-05 NOTE — PROGRESS NOTES
Assessment & Plan     Right foot drop  Noticed when walking that tripping often and right foot is heavier and drops hard on the ground. Also noticed some tingling and numbness on the lateral side if the right lower leg and foot and last 3 toes. Symptoms comes and goes and can be provoked with crossing legs or sitting on the toilet.  She lost about 80lbs since gastric bypass surgery done in May of this year. She is taking all supplements and labs were done in August.  She has diabetes and since her surgery, HgbA1c dropped to 4.8. she denies h/o neuropathy in both feet.  She will schedule EMG and visit with Neurology.    - EMG; Future  - Adult Neurology  Referral; Future    Paresthesia of right foot    - EMG; Future  - Adult Neurology  Referral; Future    Type 2 diabetes mellitus without complication, without long-term current use of insulin (H)  Diet controlled.   Component      Latest Ref Rng & Units 8/26/2022   Hemoglobin A1C      0.0 - 5.6 % 4.8     Bariatric surgery status  See above  - Vitamin B6  - Copper level    Postsurgical malabsorption  Component      Latest Ref Rng & Units 8/26/2022   Iron      35 - 180 ug/dL 95   Iron Binding Cap      240 - 430 ug/dL 238 (L)   Iron Saturation Index      15 - 46 % 40   Ferritin      12 - 150 ng/mL 162 (H)   Vitamin B12      232 - 1,245 pg/mL 384     - Vitamin B6  - Copper level    Neuropathy    - Vitamin B6  - Copper level                 Return in about 4 weeks (around 11/2/2022) for Follow up with PCP.    Ledy Yusuf MD  [facility]    Danny Fuller is a 31 year old, presenting for the following health issues:  Ankle/Foot right      History of Present Illness       Reason for visit:  Drop foot symptoms  Symptom onset:  1-2 weeks ago  Symptoms include:  Tingling, tripping, loss of feeling  Symptom intensity:  Moderate  Symptom progression:  Staying the same  Had these symptoms before:  No  What makes it  worse:  Crossing my legs    She eats 2-3 servings of fruits and vegetables daily.She consumes 0 sweetened beverage(s) daily.She exercises with enough effort to increase her heart rate 20 to 29 minutes per day.  She exercises with enough effort to increase her heart rate 3 or less days per week.   She is taking medications regularly.             Review of Systems   Constitutional, HEENT, cardiovascular, pulmonary, gi and gu systems are negative, except as otherwise noted.      Objective    /76   Pulse 76   Wt 85.4 kg (188 lb 4.8 oz)   LMP  (LMP Unknown)   SpO2 100%   BMI 26.64 kg/m    Body mass index is 26.64 kg/m .  Physical Exam   Constitutional:  oriented to person, place, and time, appears well-nourished. No distress.   HENT:   Head: Normocephalic.   Eyes: Conjunctivae are normal.  Neck: Normal range of motion.   Pulmonary/Chest: Effort normal   Musculoskeletal: Normal range of motion in the lower back and lower extremities.   Neurological: alert and oriented to person, place, and time. DTRs in LE equal bilat. Sensation intact. Dorsiflexion and plantar flexion in the right foot decreased strength. Gait normal. Walking on the heels impaired with weakness in the right foot.   Skin: Skin is warm.   Psychiatric: normal mood and affect.

## 2022-10-07 LAB — COPPER SERPL-MCNC: 186.2 UG/DL

## 2022-10-10 LAB — PYRIDOXAL PHOS SERPL-SCNC: 93.9 NMOL/L

## 2022-10-17 ENCOUNTER — TELEPHONE (OUTPATIENT)
Dept: SURGERY | Facility: CLINIC | Age: 32
End: 2022-10-17

## 2022-10-17 NOTE — TELEPHONE ENCOUNTER
Spoke with Mele at [facility].  The tubes used for the copper collection were the correct tubes.  The results should be accurate.    Also spoke with lab at [facility] and they have the correct tubes to collect copper at this time.  Will update provider.  Alexa Pierre, MS, RD, RN

## 2022-12-04 ENCOUNTER — E-VISIT (OUTPATIENT)
Dept: URGENT CARE | Facility: CLINIC | Age: 32
End: 2022-12-04
Payer: COMMERCIAL

## 2022-12-04 DIAGNOSIS — N30.90 BLADDER INFECTION: Primary | ICD-10-CM

## 2022-12-04 PROCEDURE — 99421 OL DIG E/M SVC 5-10 MIN: CPT | Performed by: INTERNAL MEDICINE

## 2022-12-04 RX ORDER — NITROFURANTOIN 25; 75 MG/1; MG/1
100 CAPSULE ORAL 2 TIMES DAILY
Qty: 10 CAPSULE | Refills: 0 | Status: SHIPPED | OUTPATIENT
Start: 2022-12-04 | End: 2022-12-09

## 2022-12-04 NOTE — PATIENT INSTRUCTIONS
Dear Alina Thompson    After reviewing your responses, I've been able to diagnose you with a urinary tract infection, which is a common infection of the bladder with bacteria.  This is not a sexually transmitted infection, though urinating immediately after intercourse can help prevent infections.  Drinking lots of fluids is also helpful to clear your current infection and prevent the next one.      I have sent a prescription for antibiotics to your pharmacy to treat this infection.  But if you start them before you have your urine test done, they could affect the results and not show a urine infection even if you have one.    It is important that you take all of your prescribed medication even if your symptoms are improving after a few doses.  Taking all of your medicine helps prevent the symptoms from returning.     If your symptoms worsen, you develop pain in your back or stomach, develop fevers, or are not improving in 5 days, please contact your primary care provider for an appointment or visit any of our convenient Walk-in or Urgent Care Centers to be seen, which can be found on our website here.    Thanks again for choosing us as your health care partner,    Latoya Sierra MD    Urinary Tract Infections in Women  Urinary tract infections (UTIs) are most often caused by bacteria. These bacteria enter the urinary tract. The bacteria may come from inside the body. Or they may travel from the skin outside the rectum or vagina into the urethra. Female anatomy makes it easy for bacteria from the bowel to enter a woman s urinary tract, which is the most common source of UTI. This means women develop UTIs more often than men. Pain in or around the urinary tract is a common UTI symptom. But the only way to know for sure if you have a UTI for the healthcare provider to test your urine. The two tests that may be done are the urinalysis and urine culture.     Types of UTIs    Cystitis. A bladder infection  (cystitis) is the most common UTI in women. You may have urgent or frequent need to pee. You may also have pain, burning when you pee, and bloody urine.    Urethritis. This is an inflamed urethra, which is the tube that carries urine from the bladder to outside the body. You may have lower stomach or back pain. You may also have urgent or frequent need to pee.    Pyelonephritis. This is a kidney infection. If not treated, it can be serious and damage your kidneys. In severe cases, you may need to stay in the hospital. You may have a fever and lower back pain.    Medicines to treat a UTI  Most UTIs are treated with antibiotics. These kill the bacteria. The length of time you need to take them depends on the type of infection. It may be as short as 3 days. If you have repeated UTIs, you may need a low-dose antibiotic for several months. Take antibiotics exactly as directed. Don t stop taking them until all of the medicine is gone. If you stop taking the antibiotic too soon, the infection may not go away. You may also develop a resistance to the antibiotic. This can make it much harder to treat.   Lifestyle changes to treat and prevent UTIs   The lifestyle changes below will help get rid of your UTI. They may also help prevent future UTIs.     Drink plenty of fluids. This includes water, juice, or other caffeine-free drinks. Fluids help flush bacteria out of your body.    Empty your bladder. Always empty your bladder when you feel the urge to pee. And always pee before going to sleep. Urine that stays in your bladder can lead to infection. Try to pee before and after sex as well.    Practice good personal hygiene. Wipe yourself from front to back after using the toilet. This helps keep bacteria from getting into the urethra.    Use condoms during sex. These help prevent UTIs caused by sexually transmitted bacteria. Also don't use spermicides during sex. These can increase the risk for UTIs. Choose other forms of birth  control instead. For women who tend to get UTIs after sex, a low-dose of a preventive antibiotic may be used. Be sure to discuss this option with your healthcare provider.    Follow up with your healthcare provider as directed. He or she may test to make sure the infection has cleared. If needed, more treatment may be started.  [employer] last reviewed this educational content on 7/1/2019 2000-2021 [employer]. All rights reserved. This information is not intended as a substitute for professional medical care. Always follow your healthcare professional's instructions.

## 2022-12-23 ENCOUNTER — E-VISIT (OUTPATIENT)
Dept: URGENT CARE | Facility: CLINIC | Age: 32
End: 2022-12-23
Payer: COMMERCIAL

## 2022-12-23 DIAGNOSIS — N76.0 BACTERIAL VAGINOSIS: ICD-10-CM

## 2022-12-23 DIAGNOSIS — B37.31 CANDIDAL VULVOVAGINITIS: ICD-10-CM

## 2022-12-23 DIAGNOSIS — B96.89 BACTERIAL VAGINOSIS: ICD-10-CM

## 2022-12-23 PROCEDURE — 99421 OL DIG E/M SVC 5-10 MIN: CPT | Performed by: EMERGENCY MEDICINE

## 2022-12-23 RX ORDER — FLUCONAZOLE 150 MG/1
150 TABLET ORAL ONCE
Qty: 1 TABLET | Refills: 0 | Status: SHIPPED | OUTPATIENT
Start: 2022-12-23 | End: 2022-12-23

## 2022-12-23 RX ORDER — METRONIDAZOLE 500 MG/1
500 TABLET ORAL 2 TIMES DAILY
Qty: 14 TABLET | Refills: 0 | Status: SHIPPED | OUTPATIENT
Start: 2022-12-23 | End: 2022-12-30

## 2022-12-23 NOTE — PATIENT INSTRUCTIONS
Yeast Infection (Candida Vaginal Infection)    You have a Candida vaginal infection. This is also known as a yeast infection. It's most often caused by a type of yeast (fungus) called Candida. Candida are normally found in the vagina. But if they increase in number, this can lead to infection and cause symptoms.   Symptoms of a yeast infection can include:     Clumpy or thin, white discharge, which may look like cottage cheese    Itching or burning    Burning with urination  Certain factors can make a yeast infection more likely. These can include:     Taking certain medicines, such as antibiotics or birth control pills    Pregnancy    Diabetes    Weak immune system  A yeast infection is most often treated with antifungal medicine. This may be given as a vaginal cream or pills you take by mouth. Treatment may last for about 1 to 7 days. Women with severe or recurrent infections may need longer courses of treatment.   Home care    If you re prescribed medicine, be sure to use it as directed. Finish all of the medicine, even if your symptoms go away. Don t try to treat yourself using over-the-counter products without talking with your provider first. They will let you know if this is a good option for you.    Ask your provider what steps you can take to help reduce your risk of having a yeast infection in the future.    Follow-up care  Follow up with your healthcare provider, or as directed.   When to seek medical advice  Call your healthcare provider right away if:     You have a fever of 100.4 F (38 C) or higher, or as directed by your provider.    Your symptoms worsen, or they don t go away within a few days of starting treatment.    You have new pain in the lower belly or pelvic region.    You have side effects that bother you or a reaction to the cream or pills you re prescribed.    You or any partners you have sex with have new symptoms, such as a rash, joint pain, or sores.  [employer] last reviewed this  educational content on 2020-2021 [employer]. All rights reserved. This information is not intended as a substitute for professional medical care. Always follow your healthcare professional's instructions.          Bacterial Vaginosis    You have a vaginal infection called bacterial vaginosis (BV). Both good and bad bacteria are present in a healthy vagina. BV occurs when these bacteria get out of balance. The number of bad bacteria increase. And the number of good bacteria decrease. BV is linked with sexual activity, but it's not a sexually transmitted infection (STI).   BV may or may not cause symptoms. If symptoms do occur, they can include:     Thin, gray, milky-white, or sometimes green discharge    Unpleasant odor or  fishy  smell    Itching, burning, or pain in or around the vagina  It is not known what causes BV, but certain factors can make the problem more likely. These can include:     Douching    Spermicides    Use of antibiotics    Change in hormone levels with pregnancy, breastfeeding, or menopause    Having sex with a new partner    Having sex with more than one partner  BV will sometimes go away on its own. But treatment is often advised. This is because untreated BV can raise the risk of more serious health problems such as:     Pelvic inflammatory disease (PID)     delivery (giving birth to a baby early if you re pregnant)    HIV and some other sexually transmitted infections (STIs)    Infection after surgery on the reproductive organs  Home care  General care    BV is most often treated with medicines called antibiotics. These may be given as pills or as a vaginal cream. If antibiotics are prescribed, be sure to use them exactly as directed. And complete all of the medicine, even if your symptoms go away.    Don't douche or having sex during treatment.    If you have sex with a female partner, ask your healthcare provider if she should also be  treated.  Prevention    Don't douche.    Don't have sex. If you do have sex, then take steps to lower your risk:  ? Use condoms when having sex.  ? Limit the number of sex partners you have.    Follow-up care  Follow up with your healthcare provider, or as advised.   When to get medical advice  Call your healthcare provider right away if:     You have a fever of 100.4 F (38 C) or higher, or as directed by your provider.    Your symptoms get worse, or they don t go away within a few days of starting treatment.    You have new pain in the lower belly or pelvic region.    You have side effects that bother you or a reaction to the pills or cream you re prescribed.    You or any of your sex partners have new symptoms, such as a rash, joint pain, or sores.  [employer] last reviewed this educational content on 6/1/2020 2000-2021 [employer]. All rights reserved. This information is not intended as a substitute for professional medical care. Always follow your healthcare professional's instructions.

## 2023-01-09 ENCOUNTER — OFFICE VISIT (OUTPATIENT)
Dept: FAMILY MEDICINE | Facility: CLINIC | Age: 33
End: 2023-01-09
Payer: COMMERCIAL

## 2023-01-09 VITALS
TEMPERATURE: 100.1 F | OXYGEN SATURATION: 96 % | DIASTOLIC BLOOD PRESSURE: 83 MMHG | SYSTOLIC BLOOD PRESSURE: 126 MMHG | HEART RATE: 114 BPM | RESPIRATION RATE: 16 BRPM

## 2023-01-09 DIAGNOSIS — R50.9 FEVER, UNSPECIFIED FEVER CAUSE: Primary | ICD-10-CM

## 2023-01-09 DIAGNOSIS — R30.0 DYSURIA: ICD-10-CM

## 2023-01-09 LAB
BACTERIA #/AREA URNS HPF: ABNORMAL /HPF
BASOPHILS # BLD AUTO: 0 10E3/UL (ref 0–0.2)
BASOPHILS NFR BLD AUTO: 0 %
EOSINOPHIL # BLD AUTO: 0.1 10E3/UL (ref 0–0.7)
EOSINOPHIL NFR BLD AUTO: 1 %
ERYTHROCYTE [DISTWIDTH] IN BLOOD BY AUTOMATED COUNT: 12.5 % (ref 10–15)
HCT VFR BLD AUTO: 37.5 % (ref 35–47)
HGB BLD-MCNC: 12.7 G/DL (ref 11.7–15.7)
IMM GRANULOCYTES # BLD: 0 10E3/UL
IMM GRANULOCYTES NFR BLD: 0 %
LYMPHOCYTES # BLD AUTO: 1 10E3/UL (ref 0.8–5.3)
LYMPHOCYTES NFR BLD AUTO: 9 %
MCH RBC QN AUTO: 31.4 PG (ref 26.5–33)
MCHC RBC AUTO-ENTMCNC: 33.9 G/DL (ref 31.5–36.5)
MCV RBC AUTO: 93 FL (ref 78–100)
MONOCYTES # BLD AUTO: 0.4 10E3/UL (ref 0–1.3)
MONOCYTES NFR BLD AUTO: 4 %
MUCOUS THREADS #/AREA URNS LPF: PRESENT /LPF
NEUTROPHILS # BLD AUTO: 10.2 10E3/UL (ref 1.6–8.3)
NEUTROPHILS NFR BLD AUTO: 87 %
PLATELET # BLD AUTO: 206 10E3/UL (ref 150–450)
RBC # BLD AUTO: 4.05 10E6/UL (ref 3.8–5.2)
RBC #/AREA URNS AUTO: ABNORMAL /HPF
SQUAMOUS #/AREA URNS AUTO: ABNORMAL /LPF
WBC # BLD AUTO: 11.7 10E3/UL (ref 4–11)
WBC #/AREA URNS AUTO: ABNORMAL /HPF

## 2023-01-09 PROCEDURE — 81015 MICROSCOPIC EXAM OF URINE: CPT | Performed by: PHYSICIAN ASSISTANT

## 2023-01-09 PROCEDURE — U0003 INFECTIOUS AGENT DETECTION BY NUCLEIC ACID (DNA OR RNA); SEVERE ACUTE RESPIRATORY SYNDROME CORONAVIRUS 2 (SARS-COV-2) (CORONAVIRUS DISEASE [COVID-19]), AMPLIFIED PROBE TECHNIQUE, MAKING USE OF HIGH THROUGHPUT TECHNOLOGIES AS DESCRIBED BY CMS-2020-01-R: HCPCS | Performed by: PHYSICIAN ASSISTANT

## 2023-01-09 PROCEDURE — 36415 COLL VENOUS BLD VENIPUNCTURE: CPT | Performed by: PHYSICIAN ASSISTANT

## 2023-01-09 PROCEDURE — U0005 INFEC AGEN DETEC AMPLI PROBE: HCPCS | Performed by: PHYSICIAN ASSISTANT

## 2023-01-09 PROCEDURE — 99214 OFFICE O/P EST MOD 30 MIN: CPT | Performed by: PHYSICIAN ASSISTANT

## 2023-01-09 PROCEDURE — 87086 URINE CULTURE/COLONY COUNT: CPT | Performed by: PHYSICIAN ASSISTANT

## 2023-01-09 PROCEDURE — 85025 COMPLETE CBC W/AUTO DIFF WBC: CPT | Performed by: PHYSICIAN ASSISTANT

## 2023-01-09 PROCEDURE — 80053 COMPREHEN METABOLIC PANEL: CPT | Performed by: PHYSICIAN ASSISTANT

## 2023-01-10 LAB
ALBUMIN SERPL BCG-MCNC: 4 G/DL (ref 3.5–5.2)
ALP SERPL-CCNC: 76 U/L (ref 35–104)
ALT SERPL W P-5'-P-CCNC: 18 U/L (ref 10–35)
ANION GAP SERPL CALCULATED.3IONS-SCNC: 16 MMOL/L (ref 7–15)
AST SERPL W P-5'-P-CCNC: 22 U/L (ref 10–35)
BILIRUB SERPL-MCNC: 0.7 MG/DL
BUN SERPL-MCNC: 10.5 MG/DL (ref 6–20)
CALCIUM SERPL-MCNC: 9.2 MG/DL (ref 8.6–10)
CHLORIDE SERPL-SCNC: 102 MMOL/L (ref 98–107)
CREAT SERPL-MCNC: 0.56 MG/DL (ref 0.51–0.95)
DEPRECATED HCO3 PLAS-SCNC: 19 MMOL/L (ref 22–29)
GFR SERPL CREATININE-BSD FRML MDRD: >90 ML/MIN/1.73M2
GLUCOSE SERPL-MCNC: 104 MG/DL (ref 70–99)
POTASSIUM SERPL-SCNC: 3.8 MMOL/L (ref 3.4–5.3)
PROT SERPL-MCNC: 6.5 G/DL (ref 6.4–8.3)
SARS-COV-2 RNA RESP QL NAA+PROBE: NEGATIVE
SODIUM SERPL-SCNC: 137 MMOL/L (ref 136–145)

## 2023-01-10 NOTE — PATIENT INSTRUCTIONS
Use Tylenol 1000 mg up to 3 times a day for fever and aches and pains.  Heat, gentle range of motion and massage for the back    We will update you as your results come back if there is anything indicating a change in your plan today    If you not improving within 24 to 48 hours please return to clinic.  If you continue to have dysuria and fever return to clinic for another urine sample    If you have any abdominal pain return to clinic

## 2023-01-10 NOTE — PROGRESS NOTES
Chief Complaint   Patient presents with     Urinary Problem     Fever chills and back pain low, has not had a bowel movement in days  urinary problem, was treated for uti and bv and finished meds, check tattoo on left arm make sure its okay         ASSESSMENT/PLAN:  Alina was seen today for urinary problem.    Diagnoses and all orders for this visit:    Fever, unspecified fever cause  -     CBC with platelets and differential; Future  -     Comprehensive metabolic panel (BMP + Alb, Alk Phos, ALT, AST, Total. Bili, TP); Future  -     Symptomatic COVID-19 Virus (Coronavirus) by PCR Nose  -     Urine Culture Aerobic Bacterial - lab collect; Future  -     Urine Culture Aerobic Bacterial - lab collect  -     CBC with platelets and differential  -     Comprehensive metabolic panel (BMP + Alb, Alk Phos, ALT, AST, Total. Bili, TP)    Dysuria  -     Cancel: UA macro with reflex to Microscopic and Culture - Clinc Collect  -     Urine Microscopic  -     Urine Culture Aerobic Bacterial - lab collect; Future  -     Urine Culture Aerobic Bacterial - lab collect    Differential diagnosis includes COVID-19, influenza, other viral URI, pyelonephritis, UTI, cellulitis, PID among others.  Last menstrual cycle was 1 week ago.  No vaginal discharge or lesions.  UA was not overly suspicious for UTI.  Her symptoms are somewhat consistent with this but has not had any dysuria today.  Does have a new tattoo that does not seem to be infected and sometimes people do experience transient fevers after receiving tattoo.  Was at a convention and exposed to lots of people with new headache and rhinorrhea so COVID-19 is a possibility.  We will test this.  CBC did show 11.7 white blood cell count.  Comp pending.  For now supportive care.  Start antibiotics if urine culture comes back positive  Return to clinic if no improvement over the next 1 to 2 days and dysuria still present  Go to ER or return to clinic sooner if any abdominal pain, nausea,  vomiting or worsening symptoms develop    Jack Zavaleta PA-C      SUBJECTIVE:  Alina is a 32 year old with a past history of type 2 diabetes and bariatric surgery 9 months ago female who presents to urgent care with 3 to 4 days of dysuria and she has been taking Azo for this.  She is also had frequency, urgency, incomplete emptying.  Does have a history of UTIs.  Was treated about a month ago with Macrobid and then developed a yeast or BV infection and it was treated with Flagyl.   those symptoms did fully resolve and she is back to normal.  She did get a tattoo 2 days ago.  Has not noticed any significant swelling, purulence or changing erythema.  Today she developed a fever and was 101 at home.  She is also developed right-sided back pain.  She has been sleeping different than usual to avoid sleeping on her new tattoo.  Her last dose of Azo was this morning and has not had any dysuria today.  She also has a headache and some runny nose.    ROS: Pertinent ROS neg other than the symptoms noted above in the HPI.     OBJECTIVE:  /83   Pulse 114   Temp 100.1  F (37.8  C) (Oral)   Resp 16   SpO2 96%    GENERAL: healthy, alert and no distress  EYES: Eyes grossly normal to inspection, PERRL and conjunctivae and sclerae normal  HENT: ear canals and TM's normal, nose and mouth without ulcers or lesions, clear rhinorrhea  RESP: lungs clear to auscultation - no rales, rhonchi or wheezes  CV: Tachycardic, normal S1 S2, no S3 or S4, no murmur, click or rub  ABDOMEN: soft, mild suprapubic discomfort, no rebound or guarding  MS: no gross musculoskeletal defects noted, no edema  SKIN: Tattoo on left arm does not appear to have any significant induration, slight erythema surrounding the borders.  No purulence  BACK: Tenderness over the right mid paravertebral musculature    DIAGNOSTICS    Results for orders placed or performed in visit on 01/09/23   Urine Microscopic     Status: Abnormal   Result Value Ref Range     Bacteria Urine Few (A) None Seen /HPF    RBC Urine 0-2 0-2 /HPF /HPF    WBC Urine 0-5 0-5 /HPF /HPF    Squamous Epithelials Urine Few (A) None Seen /LPF    Mucus Urine Present (A) None Seen /LPF    Narrative    Urine Culture not indicated   CBC with platelets and differential     Status: Abnormal   Result Value Ref Range    WBC Count 11.7 (H) 4.0 - 11.0 10e3/uL    RBC Count 4.05 3.80 - 5.20 10e6/uL    Hemoglobin 12.7 11.7 - 15.7 g/dL    Hematocrit 37.5 35.0 - 47.0 %    MCV 93 78 - 100 fL    MCH 31.4 26.5 - 33.0 pg    MCHC 33.9 31.5 - 36.5 g/dL    RDW 12.5 10.0 - 15.0 %    Platelet Count 206 150 - 450 10e3/uL    % Neutrophils 87 %    % Lymphocytes 9 %    % Monocytes 4 %    % Eosinophils 1 %    % Basophils 0 %    % Immature Granulocytes 0 %    Absolute Neutrophils 10.2 (H) 1.6 - 8.3 10e3/uL    Absolute Lymphocytes 1.0 0.8 - 5.3 10e3/uL    Absolute Monocytes 0.4 0.0 - 1.3 10e3/uL    Absolute Eosinophils 0.1 0.0 - 0.7 10e3/uL    Absolute Basophils 0.0 0.0 - 0.2 10e3/uL    Absolute Immature Granulocytes 0.0 <=0.4 10e3/uL   CBC with platelets and differential     Status: Abnormal    Narrative    The following orders were created for panel order CBC with platelets and differential.  Procedure                               Abnormality         Status                     ---------                               -----------         ------                     CBC with platelets and d...[[id number]]  Abnormal            Final result                 Please view results for these tests on the individual orders.        Current Outpatient Medications   Medication     desogestrel-ethinyl estradiol (APRI) 0.15-30 MG-MCG tablet     FLUoxetine (PROZAC) 20 MG capsule     CALCIUM PO     cyanocobalamin (VITAMIN B-12) 100 MCG tablet     ferrous fumarate 65 mg, [place]. FE,-Vitamin C 125 mg (VITRON C)  MG TABS tablet     fluticasone (FLONASE) 50 MCG/ACT nasal spray     hydrocortisone 2.5 % ointment     Multiple Vitamin (MULTIVITAMIN ADULT  PO)     SUMAtriptan (IMITREX) 25 MG tablet     triamcinolone (KENALOG) 0.1 % external ointment     VITAMIN D (CHOLECALCIFEROL) PO     No current facility-administered medications for this visit.      Patient Active Problem List   Diagnosis     Idiopathic hypersomnia     Body mass index (BMI) of 27.0-27.9 in adult     SANDRA (obstructive sleep apnea)- mild (AHI 12)     History of elevated blood pressure while in hospital     Cervical spine pain     Migraine with aura and without status migrainosus, not intractable     Type 2 diabetes mellitus without complication, without long-term current use of insulin (H)     Mild major depression, single episode (H)     Postsurgical malabsorption     Bariatric surgery status     Irritant contact dermatitis due to other agents     Epigastric pressure      Past Medical History:   Diagnosis Date     Diabetes mellitus, type 2 (H)     NO meds used     Migraine with aura and without status migrainosus, not intractable 01/21/2020     Obesity      Recurrent UTI      Recurrent vaginitis     BV and Yeast     Sleep apnea      Past Surgical History:   Procedure Laterality Date     ABDOMEN SURGERY  5/4/22    Gastric Bypass     CHOLECYSTECTOMY       DAVINCI BYPASS GASTRIC VIRA-EN-Y N/A 05/04/2022    Procedure: Robotic assisted laparoscopic Vira-en-Y gastric bypass;  Surgeon: Jean-Claude Lin MD;  Location: [facility] OR     ENT SURGERY      ear tubes     TONSILLECTOMY       Family History   Problem Relation Age of Onset     Sleep Apnea Other         Grandmother     Obesity Mother      Diabetes Mother         Had before her gastric bypass     Obesity Father      Diabetes Maternal Grandmother      Heart Disease Maternal Grandmother      Obesity Maternal Grandmother      Other Cancer Maternal Grandmother         Uterine or Ovarian Cancer     Obesity Maternal Grandfather      Diabetes Paternal Grandmother      Genetic Disorder Paternal Grandmother      Obesity Paternal Grandmother      Other Cancer  Paternal Grandmother         Uterine or Ovarian Cancer     Thyroid Disease Paternal Grandmother         Parathyroid removed     Liver Cancer Paternal Grandfather      Obesity Sister      Social History     Tobacco Use     Smoking status: Never     Smokeless tobacco: Never   Substance Use Topics     Alcohol use: Not Currently              The plan of care was discussed with the patient. They understand and agree with the course of treatment prescribed. A printed summary was given including instructions and medications.  The use of Dragon/[employer] dictation services may have been used to construct the content in this note; any grammatical or spelling errors are non-intentional. Please contact the author of this note directly if you are in need of any clarification.

## 2023-01-11 LAB — BACTERIA UR CULT: NORMAL

## 2023-03-15 ENCOUNTER — NURSE TRIAGE (OUTPATIENT)
Dept: FAMILY MEDICINE | Facility: CLINIC | Age: 33
End: 2023-03-15

## 2023-03-15 ENCOUNTER — TELEPHONE (OUTPATIENT)
Dept: SURGERY | Facility: CLINIC | Age: 33
End: 2023-03-15
Payer: COMMERCIAL

## 2023-03-15 ENCOUNTER — OFFICE VISIT (OUTPATIENT)
Dept: FAMILY MEDICINE | Facility: CLINIC | Age: 33
End: 2023-03-15
Payer: COMMERCIAL

## 2023-03-15 VITALS
SYSTOLIC BLOOD PRESSURE: 118 MMHG | OXYGEN SATURATION: 99 % | WEIGHT: 177.2 LBS | DIASTOLIC BLOOD PRESSURE: 64 MMHG | RESPIRATION RATE: 18 BRPM | BODY MASS INDEX: 24.81 KG/M2 | HEIGHT: 71 IN | TEMPERATURE: 97.5 F | HEART RATE: 70 BPM

## 2023-03-15 DIAGNOSIS — R07.9 ACUTE CHEST PAIN: Primary | ICD-10-CM

## 2023-03-15 DIAGNOSIS — R42 DIZZINESS: ICD-10-CM

## 2023-03-15 DIAGNOSIS — Z98.84 BARIATRIC SURGERY STATUS: ICD-10-CM

## 2023-03-15 DIAGNOSIS — K91.2 POSTSURGICAL MALABSORPTION: ICD-10-CM

## 2023-03-15 LAB
ERYTHROCYTE [DISTWIDTH] IN BLOOD BY AUTOMATED COUNT: 12 % (ref 10–15)
HCT VFR BLD AUTO: 35.2 % (ref 35–47)
HGB BLD-MCNC: 11.5 G/DL (ref 11.7–15.7)
MCH RBC QN AUTO: 30.3 PG (ref 26.5–33)
MCHC RBC AUTO-ENTMCNC: 32.7 G/DL (ref 31.5–36.5)
MCV RBC AUTO: 93 FL (ref 78–100)
PLATELET # BLD AUTO: 217 10E3/UL (ref 150–450)
RBC # BLD AUTO: 3.79 10E6/UL (ref 3.8–5.2)
WBC # BLD AUTO: 7.5 10E3/UL (ref 4–11)

## 2023-03-15 PROCEDURE — 99214 OFFICE O/P EST MOD 30 MIN: CPT | Performed by: NURSE PRACTITIONER

## 2023-03-15 PROCEDURE — 93000 ELECTROCARDIOGRAM COMPLETE: CPT | Performed by: NURSE PRACTITIONER

## 2023-03-15 PROCEDURE — 85027 COMPLETE CBC AUTOMATED: CPT | Performed by: NURSE PRACTITIONER

## 2023-03-15 PROCEDURE — 36415 COLL VENOUS BLD VENIPUNCTURE: CPT | Performed by: NURSE PRACTITIONER

## 2023-03-15 PROCEDURE — 80053 COMPREHEN METABOLIC PANEL: CPT | Performed by: NURSE PRACTITIONER

## 2023-03-15 PROCEDURE — 84443 ASSAY THYROID STIM HORMONE: CPT | Performed by: NURSE PRACTITIONER

## 2023-03-15 RX ORDER — THIAMINE MONONITRATE (VIT B1) 100 MG
TABLET ORAL
COMMUNITY

## 2023-03-15 RX ORDER — MECLIZINE HYDROCHLORIDE 25 MG/1
25 TABLET ORAL 3 TIMES DAILY PRN
Qty: 15 TABLET | Refills: 0 | Status: SHIPPED | OUTPATIENT
Start: 2023-03-15 | End: 2024-04-10

## 2023-03-15 ASSESSMENT — PATIENT HEALTH QUESTIONNAIRE - PHQ9
10. IF YOU CHECKED OFF ANY PROBLEMS, HOW DIFFICULT HAVE THESE PROBLEMS MADE IT FOR YOU TO DO YOUR WORK, TAKE CARE OF THINGS AT HOME, OR GET ALONG WITH OTHER PEOPLE: NOT DIFFICULT AT ALL
SUM OF ALL RESPONSES TO PHQ QUESTIONS 1-9: 2
SUM OF ALL RESPONSES TO PHQ QUESTIONS 1-9: 2

## 2023-03-15 ASSESSMENT — PAIN SCALES - GENERAL: PAINLEVEL: NO PAIN (0)

## 2023-03-15 NOTE — TELEPHONE ENCOUNTER
Pt has been good with follow up.  Requesting labs be ordered prior to visit 5/30/23.  Labs were ordered per protocol.  Pt notified via [facility].  Alexa iPerre MS, RD, RN

## 2023-03-15 NOTE — PROGRESS NOTES
Assessment & Plan     Acute chest pain  Acute; will r/o underlying lab work; EKG was normal, discussed possible causes with patient. Discussed red flag symptoms and when to seek immediate medical attention.     - CBC with platelets; Future  - Comprehensive metabolic panel (BMP + Alb, Alk Phos, ALT, AST, Total. Bili, TP); Future  - EKG 12-lead complete w/read - Clinics  - TSH with free T4 reflex; Future    Dizziness  Acute; patient not actively having symptoms today; seems likely that it is vertigo. Discussed at home therapies to help when having symptoms, provided as needed meclizine to help with dizziness, also encouraged to have vestibular therapy if symptoms return.     - meclizine (ANTIVERT) 25 MG tablet; Take 1 tablet (25 mg) by mouth 3 times daily as needed for dizziness  - Physical Therapy Referral; Future      Return in about 1 month (around 4/15/2023) for if symptoms do not improve and/or worsen.    COLIN Hanley CNP  [facility] BANG Fuller is a 32 year old, presenting for the following health issues:  Chest Pain      History of Present Illness       Reason for visit:  Chest pain and vertigo  Symptom onset:  More than a month  Symptoms include:  Chest pain and vertigo  Symptom intensity:  Moderate  Symptom progression:  Staying the same  Had these symptoms before:  No  What makes it worse:  Bending over, moving too fast  What makes it better:  Not really    She eats 2-3 servings of fruits and vegetables daily.She consumes 0 sweetened beverage(s) daily.She exercises with enough effort to increase her heart rate 30 to 60 minutes per day.  She exercises with enough effort to increase her heart rate 3 or less days per week.   She is taking medications regularly.    Today's PHQ-9         PHQ-9 Total Score: 2    PHQ-9 Q9 Thoughts of better off dead/self-harm past 2 weeks :   Not at all    How difficult have these problems made it for you to do your work, take care of  "things at home, or get along with other people: Not difficult at all       Concern - Chest pain and Vertigo   Onset: 2 months (chest pain) 4-5 months (vertigo)  Intensity: moderate  Progression of Symptoms:  worsening  Accompanying Signs & Symptoms: Nothing   Previous history of similar problem: No   Precipitating factors:        Worsened by: Bending over and moving to quickly from supine to sitting up.   Alleviating factors:        Improved by: Time  Therapies tried and outcome: None    Patient presents to clinic with concerns of dizziness and sharp chest pain on occasion. She had gastric bypass in May and since then she has had bought's of dizziness-she finds it difficult to stay hydrated. She states that some episodes last up to two weeks, worse when she turns her head from side to side. Patient states that when she lays down the room spins to the point of nausea. She has been seeing a chiropractor for PT and is not sure if this is working but has not been there since December d/t increase in charges.     Over the last few months she gets sharp chest pain on the left side when moving to quickly and bending over, primarily occurs when cleaning, attempted to massage area with no improvement, attempts to take deep breaths typically will resolve within 5 minutes. States she has not had it for >5 days and happens on occasion. Denies numbness, tingling, chest pressure, shortness of breath, nausea, or vomiting.       Review of Systems   Constitutional, HEENT, cardiovascular, pulmonary, gi and gu systems are negative, except as otherwise noted.      Objective    /64 (BP Location: Right arm, Patient Position: Sitting, Cuff Size: Adult Regular)   Pulse 70   Temp 97.5  F (36.4  C) (Tympanic)   Resp 18   Ht 1.803 m (5' 11\")   Wt 80.4 kg (177 lb 3.2 oz)   LMP 03/01/2023 (Exact Date)   SpO2 99%   BMI 24.71 kg/m    Body mass index is 24.71 kg/m .  Physical Exam   GENERAL: healthy, alert and no distress  HENT: " normal cephalic/atraumatic, ear canals and TM's normal.  RESP: lungs clear to auscultation - no rales, rhonchi or wheezes  CV: regular rate and rhythm, normal S1 S2, no S3 or S4, no murmur, click or rub, no peripheral edema and peripheral pulses strong  PSYCH: mentation appears normal, affect normal/bright    EKG - Reviewed and interpreted by me appears normal, NSR, normal axis, normal intervals, no acute ST/T changes c/w ischemia, no LVH by voltage criteria    kari-hallpik negative

## 2023-03-15 NOTE — TELEPHONE ENCOUNTER
Pa approved for celecoxib 200 mg capsule effective from 6-30-21 until 6-30-22 Pt has upcoming appt 5/30 and would like labs beforehand. She will be going to a  clinic    [phone]

## 2023-03-15 NOTE — TELEPHONE ENCOUNTER
Pt calls.    She has been having chest pain for the past few months. It comes if she moves too fast or if she bends over and stands up.  It is a very sharp pain.  It does not last very long.  She also has some vertigo - sometimes it lasts longer than a week or 2.   She says it does not impair her ability to walk or drive.  She has gotten dizzy since her surgery last May 2022.  She does not have any chest pain today - has not had for 4-5 days.  When she gets this sharp pain, it is 7/10.  It lasts under 5 minutes.      Appt scheduled today per protocol.            Reason for Disposition    Chest pain(s) lasting a few seconds persists > 3 days    Additional Information    Negative: SEVERE difficulty breathing (e.g., struggling for each breath, speaks in single words)    Negative: Passed out (i.e., fainted, collapsed and was not responding)    Negative: Difficult to awaken or acting confused (e.g., disoriented, slurred speech)    Negative: Shock suspected (e.g., cold/pale/clammy skin, too weak to stand, low BP, rapid pulse)    Negative: Chest pain lasting longer than 5 minutes and ANY of the following:* Over 44 years old* Over 30 years old and at least one cardiac risk factor (e.g., diabetes mellitus, high blood pressure, high cholesterol, smoker, or strong family history of heart disease)* History of heart disease (i.e., angina, heart attack, heart failure, bypass surgery, takes nitroglycerin)* Pain is crushing, pressure-like, or heavy     She says she lost 100 pounds and now her number are not in the diabetic range    Negative: Heart beating < 50 beats per minute OR > 140 beats per minute    Negative: Visible sweat on face or sweat dripping down face    Negative: Sounds like a life-threatening emergency to the triager    Negative: Followed an injury to chest    Negative: SEVERE chest pain    Negative: Pain also in shoulder(s) or arm(s) or jaw    Negative: Difficulty breathing    Negative: Cocaine use within last 3  "days    Negative: Major surgery in the past month    Negative: Hip or leg fracture (broken bone) in past month (or had cast on leg or ankle in past month)    Negative: Illness requiring prolonged bedrest in past month (e.g., immobilization, long hospital stay)    Negative: Long-distance travel in past month (e.g., car, bus, train, plane; with trip lasting 6 or more hours)    Negative: History of prior 'blood clot' in leg or lungs (i.e., deep vein thrombosis, pulmonary embolism)    Negative: History of inherited increased risk of blood clots (e.g., Factor 5 Leiden, Anti-thrombin 3, Protein C or Protein S deficiency, Prothrombin mutation)    Negative: Cancer treatment in the past two months (or has cancer now)    Negative: Heart beating irregularly or very rapidly    Negative: Chest pain lasting longer than 5 minutes and occurred in last 3 days (72 hours) (Exception: feels exactly the same as previously diagnosed heartburn and has accompanying sour taste in mouth)    Negative: Chest pain or 'angina' comes and goes and is happening more often (increasing in frequency) or getting worse (increasing in severity) (Exception: chest pains that last only a few seconds)    Negative: Dizziness or lightheadedness     She says she is not dizzy now, has had some since her surgery May of 2022    Negative: Coughing up blood    Negative: Patient sounds very sick or weak to the triager    Negative: Patient says chest pain feels exactly the same as previously diagnosed 'heartburn' and describes burning in chest and accompanying sour taste in mouth    Negative: Fever > 100.4 F (38.0 C)    Answer Assessment - Initial Assessment Questions  1. LOCATION: \"Where does it hurt?\"        It does not currently hurt, but when she has it it is the left side, she last had it about 4-5 days ago  2. RADIATION: \"Does the pain go anywhere else?\" (e.g., into neck, jaw, arms, back)      no  3. ONSET: \"When did the chest pain begin?\" (Minutes, hours or " "days)       She has had it for about 2 months - it is very random  4. PATTERN \"Does the pain come and go, or has it been constant since it started?\"  \"Does it get worse with exertion?\"       Comes and goes   5. DURATION: \"How long does it last\" (e.g., seconds, minutes, hours)      Usually under 5 minutes   6. SEVERITY: \"How bad is the pain?\"  (e.g., Scale 1-10; mild, moderate, or severe)     - MILD (1-3): doesn't interfere with normal activities      - MODERATE (4-7): interferes with normal activities or awakens from sleep     - SEVERE (8-10): excruciating pain, unable to do any normal activities        She is rating the pain 7/10  7. CARDIAC RISK FACTORS: \"Do you have any history of heart problems or risk factors for heart disease?\" (e.g., angina, prior heart attack; diabetes, high blood pressure, high cholesterol, smoker, or strong family history of heart disease)      She says she is a diabetic, but she does not take meds, she said she lost 100 pounds   8. PULMONARY RISK FACTORS: \"Do you have any history of lung disease?\"  (e.g., blood clots in lung, asthma, emphysema, birth control pills)      Birth control pills   9. CAUSE: \"What do you think is causing the chest pain?\"      unsure  10. OTHER SYMPTOMS: \"Do you have any other symptoms?\" (e.g., dizziness, nausea, vomiting, sweating, fever, difficulty breathing, cough)        Dizzy a lot since surgery a year ago  11. PREGNANCY: \"Is there any chance you are pregnant?\" \"When was your last menstrual period?\"        No, she said she just had her period    Protocols used: CHEST PAIN-A-OH      "

## 2023-03-16 ENCOUNTER — IMMUNIZATION (OUTPATIENT)
Dept: PEDIATRICS | Facility: CLINIC | Age: 33
End: 2023-03-16
Payer: COMMERCIAL

## 2023-03-16 DIAGNOSIS — Z23 HIGH PRIORITY FOR 2019-NCOV VACCINE: Primary | ICD-10-CM

## 2023-03-16 LAB
ALBUMIN SERPL BCG-MCNC: 4.2 G/DL (ref 3.5–5.2)
ALP SERPL-CCNC: 60 U/L (ref 35–104)
ALT SERPL W P-5'-P-CCNC: 21 U/L (ref 10–35)
ANION GAP SERPL CALCULATED.3IONS-SCNC: 11 MMOL/L (ref 7–15)
AST SERPL W P-5'-P-CCNC: 25 U/L (ref 10–35)
BILIRUB SERPL-MCNC: 0.3 MG/DL
BUN SERPL-MCNC: 11.4 MG/DL (ref 6–20)
CALCIUM SERPL-MCNC: 9.2 MG/DL (ref 8.6–10)
CHLORIDE SERPL-SCNC: 106 MMOL/L (ref 98–107)
CREAT SERPL-MCNC: 0.61 MG/DL (ref 0.51–0.95)
DEPRECATED HCO3 PLAS-SCNC: 22 MMOL/L (ref 22–29)
GFR SERPL CREATININE-BSD FRML MDRD: >90 ML/MIN/1.73M2
GLUCOSE SERPL-MCNC: 79 MG/DL (ref 70–99)
POTASSIUM SERPL-SCNC: 3.8 MMOL/L (ref 3.4–5.3)
PROT SERPL-MCNC: 6.9 G/DL (ref 6.4–8.3)
SODIUM SERPL-SCNC: 139 MMOL/L (ref 136–145)
TSH SERPL DL<=0.005 MIU/L-ACNC: 2.1 UIU/ML (ref 0.3–4.2)

## 2023-03-16 PROCEDURE — 99207 PR NO CHARGE NURSE ONLY: CPT

## 2023-03-16 PROCEDURE — 0124A COVID-19 VACCINE BIVALENT BOOSTER 12+ (PFIZER): CPT

## 2023-03-16 PROCEDURE — 91312 COVID-19 VACCINE BIVALENT BOOSTER 12+ (PFIZER): CPT

## 2023-03-26 ENCOUNTER — HOSPITAL ENCOUNTER (EMERGENCY)
Facility: CLINIC | Age: 33
Discharge: HOME | End: 2023-03-26
Attending: EMERGENCY MEDICINE | Admitting: EMERGENCY MEDICINE
Payer: COMMERCIAL

## 2023-03-26 VITALS
BODY MASS INDEX: 41.31 KG/M2 | TEMPERATURE: 98.4 F | OXYGEN SATURATION: 98 % | SYSTOLIC BLOOD PRESSURE: 137 MMHG | DIASTOLIC BLOOD PRESSURE: 82 MMHG | HEIGHT: 55 IN | WEIGHT: 178.5 LBS | RESPIRATION RATE: 20 BRPM | HEART RATE: 105 BPM

## 2023-03-26 DIAGNOSIS — K59.00 CONSTIPATION, UNSPECIFIED CONSTIPATION TYPE: ICD-10-CM

## 2023-03-26 PROCEDURE — 99282 EMERGENCY DEPT VISIT SF MDM: CPT

## 2023-03-26 PROCEDURE — 250N000013 HC RX MED GY IP 250 OP 250 PS 637: Performed by: STUDENT IN AN ORGANIZED HEALTH CARE EDUCATION/TRAINING PROGRAM

## 2023-03-26 RX ADMIN — Medication 226 ML: at 22:04

## 2023-03-26 ASSESSMENT — ACTIVITIES OF DAILY LIVING (ADL): ADLS_ACUITY_SCORE: 35

## 2023-03-27 NOTE — ED TRIAGE NOTES
Patient arrives to the ER for c/o constipation.  The patient has not had a bowel movement x 4 days. Usually goes everyday.  Took Miralax and prune juice about 2 hours ago. Per patient she has attempted to disimpact herself with little success. Pain 10/10. Hx of weight loss surgery 1 year ago.     Triage Assessment     Row Name 03/26/23 2035       Triage Assessment (Adult)    Airway WDL WDL       Respiratory WDL    Respiratory WDL WDL       Skin Circulation/Temperature WDL    Skin Circulation/Temperature WDL WDL       Cardiac WDL    Cardiac WDL WDL       Peripheral/Neurovascular WDL    Peripheral Neurovascular WDL WDL       Cognitive/Neuro/Behavioral WDL    Cognitive/Neuro/Behavioral WDL WDL

## 2023-03-27 NOTE — ED PROVIDER NOTES
EMERGENCY DEPARTMENT ENCOUNTER      NAME: Alina Thompson  AGE: 32 year old female  YOB: 1990  MRN: [id number]  EVALUATION DATE & TIME: 3/26/2023  9:33 PM    PCP: Prudence Laughlin    ED PROVIDER: Juan Villafuerte D.O.      Chief Complaint   Patient presents with     Constipation       FINAL IMPRESSION:  1. Constipation, unspecified constipation type        ED COURSE & MEDICAL DECISION MAKIN:55 PM I met with the patient to gather history and to perform my initial exam. I discussed the plan for care while in the Emergency Department.         Pertinent Labs & Imaging studies reviewed. (See chart for details)  32 year old female presents to the Emergency Department for evaluation of constipation.  Initial concern for constipation versus bowel obstruction versus volvulus.  Patient does have previous history of gastric bypass surgery making her higher risk for bowel obstruction however based on her symptoms we did decide to perform an enema first.  After the enema patient had significant relief of her symptoms, and this time I do not believe requires CT imaging of the abdomen for bowel obstruction as I believe it is likely just functional constipation that resulted in her symptoms tonight.  Patient is comfortable discharge at this time and outpatient follow-up.  Return precautions discussed.    Medical Decision Making    History:    Supplemental history from: Documented in chart, if applicable    External Record(s) reviewed: Documented in chart, if applicable.    Work Up:    Chart documentation includes differential considered and any EKGs or imaging independently interpreted by provider, where specified.    In additional to work up documented, I considered the following work up: Documented in chart, if applicable.    External consultation:    Discussion of management with another provider: Documented in chart, if applicable    Complicating factors:    Care impacted by chronic illness: Other:  previous gastric bypass surgery    Care affected by social determinants of health: N/A    Disposition considerations: Discharge. No recommendations on prescription strength medication(s). I considered admission, but discharged patient after significant clinical improvement.        At the conclusion of the encounter I discussed the results of all of the tests and the disposition. The questions were answered. The patient or family acknowledged understanding and was agreeable with the care plan.        HPI    Patient information was obtained from: patient     Use of : N/A       Alina Thompson is a 32 year old female who presents for constipation. Patient states she has not had a bowel movement for the past 4 days. She has taken Miralax and prune juice and attempted to disimpact herself with little success. Patient reports a past medical history of type II diabetes, though patient is not on medication for this. She also reports a past surgical history of gastric bypass one year ago and states that she has trouble getting water in. Patient additionally endorses nausea and chills. Patient denies fever. No other medical concerns are expressed at this time.     Per Chart Review: patient has a past medical history of bariatric surgery, type II diabetes, SANDRA, and mild major depression.      REVIEW OF SYSTEMS  Constitutional:  Denies fever, weight loss or weakness. Positive for for chills.  Eyes:  No pain, discharge, redness  HENT:  Denies sore throat, ear pain, congestion  Respiratory: No SOB, wheeze or cough  Cardiovascular:  No CP, palpitations  GI:  Denies abdominal pain, vomiting, diarrhea. Positive for constipation and nausea.  : Denies dysuria, hematuria  Musculoskeletal:  Denies any new muscle/joint pain, swelling or loss of function.  Skin:  Denies rash, pallor  Neurologic:  Denies headache, focal weakness or sensory changes  Lymph: Denies swollen nodes    All other systems negative unless noted in  HPI.    PAST MEDICAL HISTORY:  Past Medical History:   Diagnosis Date     Diabetes mellitus, type 2 (H)     NO meds used     Migraine with aura and without status migrainosus, not intractable 01/21/2020     Obesity      Recurrent UTI      Recurrent vaginitis     BV and Yeast     Sleep apnea        PAST SURGICAL HISTORY:  Past Surgical History:   Procedure Laterality Date     ABDOMEN SURGERY  5/4/22    Gastric Bypass     CHOLECYSTECTOMY       DAVINCI BYPASS GASTRIC VIRA-EN-Y N/A 05/04/2022    Procedure: Robotic assisted laparoscopic Viar-en-Y gastric bypass;  Surgeon: Jean-Claude Lin MD;  Location:  OR     ENT SURGERY      ear tubes     TONSILLECTOMY           CURRENT MEDICATIONS:    No current facility-administered medications for this encounter.     Current Outpatient Medications   Medication     CALCIUM PO     cyanocobalamin (VITAMIN B-12) 100 MCG tablet     desogestrel-ethinyl estradiol (APRI) 0.15-30 MG-MCG tablet     ferrous fumarate 65 mg, Circle. FE,-Vitamin C 125 mg (VITRON C)  MG TABS tablet     FLUoxetine (PROZAC) 20 MG capsule     fluticasone (FLONASE) 50 MCG/ACT nasal spray     meclizine (ANTIVERT) 25 MG tablet     Multiple Vitamin (MULTIVITAMIN ADULT PO)     SUMAtriptan (IMITREX) 25 MG tablet     Thiamine Mononitrate (VITAMIN B-1) 100 MG TABS     triamcinolone (KENALOG) 0.1 % external ointment     VITAMIN D (CHOLECALCIFEROL) PO         ALLERGIES:  Allergies   Allergen Reactions     Asa [Aspirin]      Gastric Bypass      Nsaids      Gastric Bypass     Sulfa Drugs      Wellbutrin [Bupropion] Other (See Comments)     Dizziness       FAMILY HISTORY:  Family History   Problem Relation Age of Onset     Sleep Apnea Other         Grandmother     Obesity Mother      Diabetes Mother         Had before her gastric bypass     Obesity Father      Diabetes Maternal Grandmother      Heart Disease Maternal Grandmother      Obesity Maternal Grandmother      Other Cancer Maternal Grandmother         Uterine  "or Ovarian Cancer     Obesity Maternal Grandfather      Diabetes Paternal Grandmother      Genetic Disorder Paternal Grandmother      Obesity Paternal Grandmother      Other Cancer Paternal Grandmother         Uterine or Ovarian Cancer     Thyroid Disease Paternal Grandmother         Parathyroid removed     Liver Cancer Paternal Grandfather      Obesity Sister        SOCIAL HISTORY:  Social History     Socioeconomic History     Marital status: Single     Number of children: 0   Occupational History     Occupation: Academic  Adviser   Tobacco Use     Smoking status: Never     Passive exposure: Never     Smokeless tobacco: Never   Vaping Use     Vaping Use: Never used   Substance and Sexual Activity     Alcohol use: Not Currently     Drug use: Yes     Types: Marijuana     Comment: 1-2 per month (Edibles)     Sexual activity: Yes     Partners: Male     Birth control/protection: Condom, Pill   Other Topics Concern     Parent/sibling w/ CABG, MI or angioplasty before 65F 55M? No   Social History Narrative    From [place]    Mother in [place]    Works at [employer] in [place] [email or web address]    Enjoys movies, outdoor activities, animal rescue, board games       VITALS:  Patient Vitals for the past 24 hrs:   BP Temp Temp src Pulse Resp SpO2 Height Weight   03/26/23 2036 137/82 -- -- -- -- -- -- --   03/26/23 2033 -- 98.4  F (36.9  C) Temporal 105 20 98 % (!) 0.051 m (2.01\") 81 kg (178 lb 8 oz)       PHYSICAL EXAM    VITAL SIGNS: /82   Pulse 105   Temp 98.4  F (36.9  C) (Temporal)   Resp 20   Ht (!) 0.051 m (2.01\")   Wt 81 kg (178 lb 8 oz)   LMP 03/01/2023 (Exact Date)   SpO2 98%   BMI 34942.67 kg/m      General Appearance: Well-appearing, well-nourished, no acute distress   Head:  Normocephalic, without obvious abnormality, atraumatic  Eyes:  PERRL, conjunctiva/corneas clear, EOM's intact,  ENT:  Lips, mucosa, and tongue normal, membranes are moist without pallor  Neck:  Normal ROM, " symmetrical, trachea midline    Chest:  No tenderness or deformity, no crepitus  Cardio:  Regular rate and rhythm, no murmur, rub or gallop, 2+ pulses symmetric in all extremities  Pulm:  Clear to auscultation bilaterally, respirations unlabored,  Back:  ROM normal, no CVA tenderness, no spinal tenderness, no paraspinal tenderness  Abdomen:  Soft, non-tender, no rebound or guarding.  Musculoskeletal: Full ROM, no edema, no cyanosis, good ROM of major joints  Integument:  Warm, Dry, No erythema, No rash.    Neurologic:  Alert & oriented.  No focal deficits appreciated.  Ambulatory.  Psychiatric:  Affect normal, Judgment normal, Mood normal.      LABS  No results found for this or any previous visit (from the past 24 hour(s)).      RADIOLOGY  No orders to display              MEDICATIONS GIVEN IN THE EMERGENCY:  Medications   Enema Compound (docusate/mineral oil/NaPhos) NO MAG CIT PREMIX (226 mLs Rectal $Given 3/26/23 2209)       NEW PRESCRIPTIONS STARTED AT TODAY'S ER VISIT  Discharge Medication List as of 3/26/2023 11:00 PM           Rena NGUYEN, am serving as a scribe to document services personally performed by Dr. Villafuerte, based on my observations and the provider's statements to me. I, Dr. Villafuerte, attest that Rena Malin is acting in a scribe capacity, has observed my performance of the services and has documented them in accordance with my direction.     Juan Villafuerte D.O.  Emergency Medicine  [facility] EMERGENCY ROOM  [address] [facility] [address] [id number] [phone]  Dept: [phone]       Juan Villafuerte,   03/26/23 5913

## 2023-03-27 NOTE — ED NOTES
Pt reports waiting 15 minutes after enema administered, unable to have a BM and is still sitting on the commode

## 2023-04-16 ENCOUNTER — HEALTH MAINTENANCE LETTER (OUTPATIENT)
Age: 33
End: 2023-04-16

## 2023-05-25 ENCOUNTER — LAB (OUTPATIENT)
Dept: LAB | Facility: CLINIC | Age: 33
End: 2023-05-25
Payer: COMMERCIAL

## 2023-05-25 DIAGNOSIS — K91.2 POSTSURGICAL MALABSORPTION: ICD-10-CM

## 2023-05-25 DIAGNOSIS — Z98.84 BARIATRIC SURGERY STATUS: ICD-10-CM

## 2023-05-25 DIAGNOSIS — E11.9 TYPE 2 DIABETES MELLITUS WITHOUT COMPLICATION, WITHOUT LONG-TERM CURRENT USE OF INSULIN (H): Primary | ICD-10-CM

## 2023-05-25 LAB
DEPRECATED CALCIDIOL+CALCIFEROL SERPL-MC: 35 UG/L (ref 20–75)
ERYTHROCYTE [DISTWIDTH] IN BLOOD BY AUTOMATED COUNT: 12 % (ref 10–15)
FERRITIN SERPL-MCNC: 44 NG/ML (ref 6–175)
HBA1C MFR BLD: 5 % (ref 0–5.6)
HCT VFR BLD AUTO: 36.9 % (ref 35–47)
HGB BLD-MCNC: 12.7 G/DL (ref 11.7–15.7)
IRON BINDING CAPACITY (ROCHE): 283 UG/DL (ref 240–430)
IRON SATN MFR SERPL: 35 % (ref 15–46)
IRON SERPL-MCNC: 98 UG/DL (ref 37–145)
MCH RBC QN AUTO: 31.7 PG (ref 26.5–33)
MCHC RBC AUTO-ENTMCNC: 34.4 G/DL (ref 31.5–36.5)
MCV RBC AUTO: 92 FL (ref 78–100)
PLATELET # BLD AUTO: 214 10E3/UL (ref 150–450)
PTH-INTACT SERPL-MCNC: 19 PG/ML (ref 15–65)
RBC # BLD AUTO: 4.01 10E6/UL (ref 3.8–5.2)
VIT B12 SERPL-MCNC: 309 PG/ML (ref 232–1245)
WBC # BLD AUTO: 6.2 10E3/UL (ref 4–11)

## 2023-05-25 PROCEDURE — 82728 ASSAY OF FERRITIN: CPT

## 2023-05-25 PROCEDURE — 85027 COMPLETE CBC AUTOMATED: CPT

## 2023-05-25 PROCEDURE — 82607 VITAMIN B-12: CPT

## 2023-05-25 PROCEDURE — 83036 HEMOGLOBIN GLYCOSYLATED A1C: CPT

## 2023-05-25 PROCEDURE — 99000 SPECIMEN HANDLING OFFICE-LAB: CPT

## 2023-05-25 PROCEDURE — 84590 ASSAY OF VITAMIN A: CPT | Mod: 90

## 2023-05-25 PROCEDURE — 82306 VITAMIN D 25 HYDROXY: CPT

## 2023-05-25 PROCEDURE — 36415 COLL VENOUS BLD VENIPUNCTURE: CPT

## 2023-05-25 PROCEDURE — 83550 IRON BINDING TEST: CPT

## 2023-05-25 PROCEDURE — 83970 ASSAY OF PARATHORMONE: CPT

## 2023-05-25 PROCEDURE — 83540 ASSAY OF IRON: CPT

## 2023-05-27 LAB
ANNOTATION COMMENT IMP: NORMAL
RETINYL PALMITATE SERPL-MCNC: <0.02 MG/L
VIT A SERPL-MCNC: 0.46 MG/L

## 2023-05-29 NOTE — PROGRESS NOTES
Return Bariatric Surgery Note    RE: Alina Thompson  MR#: [id number]  : 1990  VISIT DATE: May 30, 2023       Dear Truman, Prudence Torres,    I had the pleasure of seeing your patient, Alina Thompson, in my post-bariatric surgery assessment clinic.      CHIEF COMPLAINT: Post-bariatric surgery follow-up    HISTORY OF PRESENT ILLNESS:    Patient reports generally doing well - biggest issue is she still is not drinking enough water. Is aware of importance and tips/tricks including having a water bottle around, reminders on phone, etc.        2023    12:18 PM   Questions Regarding Prior Weight Loss Surgery Reviewed With Patient   I had the following weight loss procedure Vira-en-y Gastric Bypass   What year was your surgery?    How has your weight changed since your last visit? I have lost weight   Do you currently have any of the following None of the above   Do you have any concerns today? Skin Removal Surgery      Reviewed vitamins that the patient is takin Complete multivitamins with minerals (at different times than calcium)   5000 International Units of Vitamin D daily  1200 mg of Calcium daily in divided doses  5000 mcg of Vitamin B12 sl once weekly   Vitron C 1 tab, daily  B1 100mg once weekly      Weight History:      2023    12:18 PM   --   What is your highest lifetime weight? 306   What is your lowest weight since surgery? (In pounds) 170     Initial Weight (lbs): 298.2 lbs  Weight: 178 lb (80.7 kg)  Last Visits Weight: 203 lb (92.1 kg)  Cumulative weight loss (lbs): 120.2  Weight Loss Percentage: 40.31%        2023    12:18 PM   Questions Regarding Co-Morbidities and Health Concerns Reviewed With Patient   Pre-diabetes Gone away   Diabetes II Gone away   High Blood Pressure Never   High cholesterol Never   Heartburn/Reflux Gone away   Sleep apnea Gone away   PCOS Never   Back pain Never   Joint pain Never   Lower leg swelling Never      No follow-up sleep study  but good energy. No snoring. Interested in repeat sleep study.        5/23/2023    12:18 PM   Eating Habits   How many meals do you eat per day? 3   Do you snack between meals? Sometimes   How much food are you eating at each meal? Greater than 1 cup   Are you able to separate your meals and liquids by at least 30 minutes? Sometimes   Are you able to avoid liquid calories? Yes      Once week of Zeb with coffee, half chocolate    Still having some dizziness thinks she needs more water. having 30-40oz - aware needs to increase to 64oz        5/23/2023    12:18 PM   Exercise Questions Reviewed With Patient   How often do you exercise? 3 to 4 times per week   What is the duration of your exercise (in minutes)? 45 Minutes   What types of exercise do you do? walking    gym membership    group fitness classes   What keeps you from being more active? I should be more active but I just have not gotten around to it    Lack of Time       Social History:      5/23/2023    12:18 PM   --   Are you smoking? No   Are you drinking alcohol? No   Limited caffeine/chocolate - weekly w/caribou as noted above    Medications:  Current Outpatient Medications   Medication     CALCIUM PO     cyanocobalamin (VITAMIN B-12) 100 MCG tablet     desogestrel-ethinyl estradiol (APRI) 0.15-30 MG-MCG tablet     ferrous fumarate 65 mg, [place]. FE,-Vitamin C 125 mg (VITRON C)  MG TABS tablet     FLUoxetine (PROZAC) 20 MG capsule     fluticasone (FLONASE) 50 MCG/ACT nasal spray     meclizine (ANTIVERT) 25 MG tablet     Multiple Vitamin (MULTIVITAMIN ADULT PO)     nystatin (MYCOSTATIN) 492530 UNIT/GM external cream     SUMAtriptan (IMITREX) 25 MG tablet     Thiamine Mononitrate (VITAMIN B-1) 100 MG TABS     triamcinolone (KENALOG) 0.1 % external ointment     VITAMIN D (CHOLECALCIFEROL) PO     No current facility-administered medications for this visit.         5/23/2023    12:18 PM   --   Do you avoid NSAIDs such as (Ibuprofen, Aleve, Naproxen,  "Advil)? Yes       ROS:  GI:       5/23/2023    12:18 PM   --   Vomiting No   Diarrhea Yes   Constipation Yes   Swallowing trouble No   Abdominal pain No   Heartburn No      Constipation in March went to ED - aware to drink more water.  Diarrhea tends to be w/[employer] drink weekly - suspects due to higher fat content.  Positional dizziness when moving from sitting to standing. Does report intermittent chest pains - has been seen and evaluated in clinic and told was muscuoloskeletal. Iron studies today are good and hemoglobin is normal.    Skin:       5/23/2023    12:18 PM   BAR RBS ROS - SKIN   Rash in skin folds Yes      Currently improved and like 'black heads' but reports has been worse in the past. Associated with sweating. Interested in plastic surgery consult. Has not tried antifungals during flairs yet.    Psych:       5/23/2023    12:18 PM   --   Depression No   Anxiety Yes   Controlled on medications    :       5/23/2023    12:18 PM   BAR RBS ROS -    Female only Irregular menstrual cycles    Birth control   Stress urinary incontinence No     On birth control - follows with OB/GYN for this.    LABS/IMAGING/MEDICAL RECORDS REVIEW: [facility]      PHYSICAL EXAMINATION:  /70   Pulse 83   Resp 14   Ht 5' 11\" (1.803 m)   Wt 178 lb (80.7 kg)   SpO2 99%   BMI 24.83 kg/m     GENERAL: Alert and oriented x3. NAD  HEART: No murmurs, rubs or gallops, Regular rate and rhythm  LUNGS/chest: Breathing unlabored. Lung sounds clear to auscultation bilaterally. Mild tenderness to palpation of left upper chest wall in area.  ABDOMEN: No hernias, Incisions well healed, soft and nontender. Faint scattered black heads along skin fold line. No erythema, warmth, or tenderness.  SKIN: Warm and dry. Scant rash as noted under abdomen.      ASSESSMENT AND PLAN:      1. 1 years status laparoscopic gastric bypass  2. Morbid Obesity resolved, current BMI: Body mass index is 24.83 kg/m .  3. Post surgical malabsorption:   Labs " reviewed.   Follow food plan per dietitian recommendations.   Continue taking recommended post-op vitamins.  4. Intertrigo - discussed hygiene. No significant rash currently. No clinical concerns for cellulitis. Location, skin fold presence, and moisture high risk for fungal/yeast dermatitis. Ordered prn nystatin to try. Referral given for plastic surgery.  5. Dehydration - discussed the importance of adequate fluid at least 64 oz.  6. SANDRA - pt believes has improved. Interested in repeating sleep study  7. Return to clinic in 1 year      Sincerely,    Debbie Nolasco PA-C      30 minutes spent on the date of the encounter doing chart review, review of test results, patient visit and documentation

## 2023-05-30 ENCOUNTER — OFFICE VISIT (OUTPATIENT)
Dept: SURGERY | Facility: CLINIC | Age: 33
End: 2023-05-30
Payer: COMMERCIAL

## 2023-05-30 VITALS — HEIGHT: 71 IN | BODY MASS INDEX: 24.92 KG/M2 | WEIGHT: 178 LBS

## 2023-05-30 VITALS
RESPIRATION RATE: 14 BRPM | OXYGEN SATURATION: 99 % | WEIGHT: 178 LBS | SYSTOLIC BLOOD PRESSURE: 114 MMHG | DIASTOLIC BLOOD PRESSURE: 70 MMHG | HEART RATE: 83 BPM | HEIGHT: 71 IN | BODY MASS INDEX: 24.92 KG/M2

## 2023-05-30 DIAGNOSIS — Z98.84 BARIATRIC SURGERY STATUS: Primary | ICD-10-CM

## 2023-05-30 DIAGNOSIS — E86.0 DEHYDRATION: ICD-10-CM

## 2023-05-30 DIAGNOSIS — G47.33 OSA (OBSTRUCTIVE SLEEP APNEA): Chronic | ICD-10-CM

## 2023-05-30 DIAGNOSIS — L30.4 INTERTRIGINOUS DERMATITIS ASSOCIATED WITH MOISTURE: ICD-10-CM

## 2023-05-30 DIAGNOSIS — K91.2 POSTSURGICAL MALABSORPTION: ICD-10-CM

## 2023-05-30 PROCEDURE — 99214 OFFICE O/P EST MOD 30 MIN: CPT | Performed by: PHYSICIAN ASSISTANT

## 2023-05-30 PROCEDURE — 97803 MED NUTRITION INDIV SUBSEQ: CPT

## 2023-05-30 RX ORDER — NYSTATIN 100000 U/G
CREAM TOPICAL 2 TIMES DAILY PRN
Qty: 30 G | Refills: 3 | Status: SHIPPED | OUTPATIENT
Start: 2023-05-30

## 2023-05-30 NOTE — PATIENT INSTRUCTIONS
"Nice to talk with you today. Thank you for allowing me the privilege of caring for you. We hope we provided you with the excellent service you deserve.     To ensure the quality of our services you may receive a patient satisfaction survey from an independent monitoring company.  The greatest compliment you can give is \"Likely to Recommend\"    Below is our plan we discussed.-  MANI Bird      1. 1 years status laparoscopic gastric bypass  2. Morbid Obesity resolved - current BMI: Body mass index is 24.83 kg/m . Great job!  3. Post surgical malabsorption:   Labs reviewed and within normal limites.   Follow food plan per dietitian recommendations.   Continue taking recommended post-op vitamins.  4. Intertrigo dermatitis - discussed hygiene. Location, skin fold presence, and moisture high risk for fungal/yeast dermatitis. Ordered nystatin to use as needed when rash flairs. Referral given for plastic surgery.  5. Dehydration - discussed the importance of adequate fluid at least 64 oz.  4. Return to clinic in 1 year with provider and dietitian.    Please call [phone] and schedule a follow up with Pelon Miller PA-C in 1 year.  If you need to reach me sooner you can do so by calling [phone] or ShotClip    Have a great day!         Bariatric Post Op Guidelines  General:    To avoid marginal ulcers avoid all forms of tobacco, alcohol in excess, caffeine, and NSAIDS     Exercise is key for weight loss and weight maintenance. Aim for 30-60 minutes of physical activity most days.  Include cardiovascular and strength training.    Continue lifelong vitamins supplementation and annual lab follow up.  All  patients should supplement with the following bariatric postoperative vitamins:  2 Complete multivitamins with minerals (at different times than calcium)  Vitamin D 5000 Int Units/125 mg daily   Calcium 600 mg twice daily or 500 mg three times daily   Vitamin B12: 500 mcg sl daily or 1000 mcg Inj monthly  B complex " daily or Thiamine 100 mg weekly  1 Iron/Vit C. Daily for females who menstruate and/or as directed    The bariatric team should be aware and evaluate all GI symptoms which can be a sign of complications. Inability to tolerate textured solid food (chicken, steak, fish) may need to be evaluated by endoscopy.    There is a 10% increase of Alcohol Use Disorder in patients with bariatric surgery.   Most often occurring around 2 years post op.  Call if you feel alcohol is interfering in your daily life.  We can help.     Follow up annually lifelong. Obesity is a chronic disease.  Weight gain can be expected. The goal of follow-up visits is to ensure adequate vitamin and protein absorption, evaluate food intake behavior, review exercise/activity level, and assist with weight regain.    Nutritional:  Eat 3 meals per day  (No snacks between meals.)  Do not skip meals.  This can cause overeating at the next meal and will prevent adequate protein and nutritional intake.    Aim for 60-80 grams of protein per day.  Always eat your protein first. This assists with optimal nutrition and helps you stay full longer.    Eat your protein first, and then follow with fiber.    Add fiber by including fruits, vegetables, whole grains, and beans.     Portions should be about 1 cup per meal. Use measuring cups to be accurate.  Continue to use saucer/salad plates, infant/toddler silverware to keep portion sizes small and take small bites.    Eat S-L-O-W-L-Y to make each meal last 20-30 minutes. Always stop eating when satisfied.    Aim for 64 oz. of calorie-free fluids daily.    Avoid drinking 30 min before, during, and 30 min after meal    Avoid high sugar and high fat foods to prevent high calorie intake. This will reduce your rate of weight loss and can cause weight regain.   Check nutrition labels for less than 10 grams of sugar and less than 10 grams of fat per serving.

## 2023-05-30 NOTE — ASSESSMENT & PLAN NOTE
5/30/2023 related to skin folds from weight loss after gastric bypass. Nystatin cream ordered for use prn. Encouraged to use deoderant in area or clothing between fold line to reduce moisture   Patient:  Britni Mcmullen                  1954  MRN: [id number]    PROBLEM LIST:    Patient Active Problem List    Diagnosis Date Noted    Presence of Watchman left atrial appendage closure device 11/18/2021     Priority: High    Longstanding persistent atrial fibrillation (HCC)      Priority: Low    LVH (left ventricular hypertrophy) 07/08/2020     Priority: Low    History of renal transplant 11/05/2019     Priority: Low    UGI bleed 11/01/2019     Priority: Low    PSVT (paroxysmal supraventricular tachycardia) ([facility] 02/05/2019     Priority: Low    Chronic anticoagulation 01/07/2019     Priority: Low     Overview Note:     Eliquis      Orthostatic dizziness 01/07/2019     Priority: Low    Daytime somnolence 04/09/2018     Priority: Low    PAF (paroxysmal atrial fibrillation) ([facility] [address] [facility] 04/09/2018     Priority: Low    Fatigue 04/09/2018     Priority: Low    History of tachycardia 04/09/2018     Priority: Low    Immunosuppression ([facility] [address]      Priority: Low    Atrial fibrillation with RVR ([facility] [address] 02/26/2018     Priority: Low    Infection of AV graft for dialysis ([facility] [address] 02/23/2018     Priority: Low    Cellulitis of right upper extremity      Priority: Low    Cellulitis 02/22/2018     Priority: Low    Chest pressure 07/17/2016     Priority: Low    Diabetes mellitus ([address] 07/17/2016     Priority: Low    Ex-cigarette smoker 07/17/2016     Priority: Low    Essential hypertension      Priority: Low    Chest pain      Priority: Low     1. Permanent atrial fibrillation with prior DCCV with recurrence, last DCCV 11/18/2021 with recurrent GI bleed, status post watchman device left atrial appendage closure 11/18/2021. LV ejection fraction 56%, moderate LVH, negative Lexiscan study 3/2/2018, short runs of SVT. 2.  End-stage renal disease due to hypertensive renal disease status post renal transplantation 2/18/2010.  3.  Diabetes mellitus non-insulin-requiring.   4.  Morbid obesity    PRESENTATION: Britni Mcmullen is a 70y.o. year old male who presents with history of recent watchman left atrial appendage closure device placement 11/18/2021 due to recurrent GI bleed on Eliquis being referred for LINDA to ensure appropriate left atrial appendage closure. REVIEW OF SYSTEMS:  Review of Systems   Constitutional:  Negative for activity change, diaphoresis and fatigue. HENT:  Negative for hearing loss, nosebleeds and tinnitus. Eyes:  Negative for visual disturbance. Respiratory:  Negative for cough, shortness of breath and wheezing. Cardiovascular:  Negative for chest pain, palpitations and leg swelling. Gastrointestinal:  Negative for abdominal distention, abdominal pain, blood in stool, diarrhea and vomiting. Endocrine: Negative for cold intolerance, heat intolerance, polydipsia, polyphagia and polyuria. Genitourinary:  Negative for difficulty urinating, flank pain and hematuria. Musculoskeletal:  Negative for arthralgias, back pain, joint swelling and myalgias. Skin:  Negative for pallor and rash. Neurological:  Negative for dizziness, seizures, syncope and headaches. Psychiatric/Behavioral:  Negative for behavioral problems and dysphoric mood. The patient is not nervous/anxious. Past Medical History:      Diagnosis Date    Arthritis     Atrial fibrillation ([address]     Bleeding ulcer 11/2019    Cancer ([address]     skin    CHF (congestive heart failure) ([address]     Diabetes mellitus ([address]     Ex-cigarette smoker 7/17/2016    History of blood transfusion     History of gastric ulcer 2009    Hyperlipidemia     Hypertension     hx.  10 years ago    Kidney disease, chronic, end stage on dialysis [facility]) 2006 2007, dialysis for 3 years, then had a kidney transplant,    Obese     Peritoneal dialysis status ([address]     past hx    Prolonged emergence from general anesthesia     Vision problem     wears glasses       Past Surgical History:      Procedure Laterality Date    CHOLECYSTECTOMY      COLONOSCOPY  06/23/2017    Dr Nena Araya: Diverticulosis, internal hemorrhoids, 5yr recall    COLONOSCOPY  01/05/2012    Dr Matthias Sandhu, 5 yr recall    DIALYSIS FISTULA CREATION  [place] 2007    left arm radial cephalic    HERNIA REPAIR      umbilical hernia repair    KIDNEY TRANSPLANT      2/18/2010 in [facility] Right 2/23/2018    AV FISTULA GRAFT REMOVAL performed by Kelly Ulloa MD at [address]      TUNNELED VENOUS CATHETER PLACEMENT  multiple    UPPER GASTROINTESTINAL ENDOSCOPY N/A 11/4/2019    Dr Dar Adams (Dr Maureen Art pt)1.2 cm superficial gastric ulcer in the antrum with surrounding moderate gastritis and oozing of blood    UPPER GASTROINTESTINAL ENDOSCOPY  10/05/2009    Dr Oseas Terrell w/resolution of ulcer, lenin +    UPPER GASTROINTESTINAL ENDOSCOPY  07/10/2009    Dr Gladis House ulceration w/pigmented base on posterior wall of the body, small erosions, active gastritis    UPPER GASTROINTESTINAL ENDOSCOPY N/A 1/10/2020    Dr Dennis Forrest hiatal hernia, healed previous antral ulcer-Gastritis    VASCULAR SURGERY Right [place] 2008    av loop graft       Medications Prior to Admission:    Prior to Admission medications    Medication Sig Start Date End Date Taking?  Authorizing Provider   sulfamethoxazole-trimethoprim (BACTRIM DS;SEPTRA DS) 800-160 MG per tablet Take 1 tablet by mouth 2 times daily for 14 days 10/13/22 10/27/22  Lolis Akers MD   tamsulosin [facility]) 0.4 MG capsule Take 2 capsules by mouth daily 10/13/22   Lolis Akers MD   calcitRIOL (ROCALTROL) 0.25 MCG capsule TAKE 1 CAPSULE BY MOUTH 3 TIMES A WEEK 9/4/22   Historical Provider, MD   propranolol (INDERAL) 80 MG tablet Take 1 tablet by mouth 2 times daily 9/21/22   CHRIS Gonzales   Lancets MISC 1 each by Does not apply route daily 9/20/22   Lolis Akers MD   Lancets MISC Use to check blood sugar daily Dx E11.9 Patient has true metrix device 9/20/22   Lolis Akers MD   blood glucose test strips (TRUE METRIX BLOOD GLUCOSE TEST) strip TEST ONE TIME A DAY & AS NEEDED FOR SYMPTOMS OF IRREGULAR BLOOD GLUCOSE. DX: E11.9 22   Sultana Ferris MD   JARDIANCE 25 MG tablet TAKE 1 TABLET BY MOUTH EVERY DAY 22   Sultana Ferris MD   sodium bicarbonate 325 MG tablet TAKE 1 TABLET BY MOUTH TWICE A DAY  Patient taking differently: Take 650 mg by mouth 2 times daily 22   Sultana Ferris MD   metFORMIN (GLUCOPHAGE) 1000 MG tablet TAKE 1 TABLET BY MOUTH TWICE A DAY WITH MEALS 22   Sultana Ferris MD   vitamin D (ERGOCALCIFEROL) 1.25 MG (60064 UT) CAPS capsule TAKE 1 CAPSULE BY MOUTH ONE TIME PER WEEK  Patient taking differently: every 30 days 1/10/22   Sultana Ferris MD   blood glucose monitor strips Test one time a day & as needed for symptoms of irregular blood glucose. 21   Sultana Ferris MD   Omega-3 Fatty Acids (FISH OIL) 1000 MG CPDR Take 1,000 mg by mouth 2 times daily    Historical Provider, MD   Tacrolimus 1 MG TB24 Take 2 mg by mouth every morning Indications: on an empty stomach     Historical Provider, MD   mycophenolate (CELLCEPT) 250 MG capsule Take 500 mg by mouth 2 times daily    Historical Provider, MD   glucose blood VI test strips (ACCU-CHEK ANANT) strip Contour strips,check daily E11.9 17   Madeleine Stringer,        Allergies:  Patient has no known allergies. Past Social History:  Social History     Socioeconomic History    Marital status:       Spouse name: Not on file    Number of children: Not on file    Years of education: Not on file    Highest education level: Not on file   Occupational History    Not on file   Tobacco Use    Smoking status: Former     Packs/day: 2.00     Years: 4.00     Pack years: 8.00     Types: Cigarettes     Quit date: 1973     Years since quittin.6    Smokeless tobacco: Never   Vaping Use    Vaping Use: Never used   Substance and Sexual Activity    Alcohol use: No    Drug use: Never    Sexual activity: Not Currently     Partners: Female   Other Topics Concern    Not on file   Social History Narrative    Not on file     Social Determinants of Health     Financial Resource Strain: Low Risk     Difficulty of Paying Living Expenses: Not hard at all   Food Insecurity: No Food Insecurity    Worried About Running Out of Food in the Last Year: Never true    Ran Out of Food in the Last Year: Never true   Transportation Needs: Not on file   Physical Activity: Not on file   Stress: Not on file   Social Connections: Not on file   Intimate Partner Violence: Not on file   Housing Stability: Not on file       Family History:       Problem Relation Age of Onset    Kidney Disease Father     Heart Disease Father     Colon Cancer Neg Hx     Colon Polyps Neg Hx      Physical Exam:    Vitals: /77   Pulse 86   Temp 96.9 °F (36.1 °C) (Temporal)   Resp 24   Ht 6' 1\" (1.854 m)   Wt 264 lb (119.7 kg)   SpO2 97%   BMI 34.83 kg/m²   24HR INTAKE/OUTPUT:  No intake or output data in the 24 hours ending 10/20/22 0718    Physical Exam  Constitutional:       Appearance: He is obese. HENT:      Mouth/Throat:      Pharynx: No oropharyngeal exudate. Eyes:      General: No scleral icterus. Right eye: No discharge. Left eye: No discharge. Neck:      Thyroid: No thyromegaly. Vascular: No JVD. Cardiovascular:      Rate and Rhythm: Normal rate and regular rhythm. Heart sounds: No murmur heard. No friction rub. No gallop. Pulmonary:      Effort: No respiratory distress. Breath sounds: No stridor. No wheezing or rales. Abdominal:      General: Bowel sounds are normal. There is no distension. Palpations: Abdomen is soft. There is no mass. Tenderness: There is no abdominal tenderness. There is no guarding or rebound. Musculoskeletal:         General: No deformity. Skin:     General: Skin is warm. Coloration: Skin is not pale. Findings: No erythema or rash.    Neurological:      Mental Status: He is alert and oriented to person, place, and time. Motor: No abnormal muscle tone. Coordination: Coordination normal.      Deep Tendon Reflexes: Reflexes normal.       LAB DATA:  CBC: No results for input(s): WBC, HGB, PLT in the last 72 hours. BMP:  No results for input(s): NA, K, CL, CO2, BUN, CREATININE, GLUCOSE in the last 72 hours. Hepatic: No results for input(s): AST, ALT, ALB, BILITOT, ALKPHOS in the last 72 hours. CK, CKMB, Troponin: @LABRCNT (CKTOTAL:3, CKMB:3, TROPONINI:3)@  Pro-BNP: No results for input(s): BNP in the last 72 hours. Lipids: No results for input(s): CHOL, HDL, LDL in the last 72 hours. ABGs: No results for input(s): PHART, SOX2REC, PO2ART, INX5VAJ, BEART, HGBAE, D4XIGROM, CARBOXHGBART, 02THERAPY in the last 72 hours. INR: No results for input(s): INR in the last 72 hours. A1c:Invalid input(s): HEMOGLOBIN A1C  URINALYSIS:   Lab Results   Component Value Date/Time    NITRU Negative 10/13/2022 12:54 PM    WBCUA 1 02/13/2019 09:06 AM    BACTERIA NEGATIVE 02/13/2019 09:06 AM    RBCUA 1 02/13/2019 09:06 AM    BLOODU Negative 10/13/2022 12:54 PM    SPECGRAV 1.034 10/13/2022 12:54 PM    GLUCOSEU =>1000 10/13/2022 12:54 PM     -----------------------------------------------------------------  IMAGING:  No orders to display         Assessment and Recommendations: This is a 70y.o. year old male with past medical history of end-stage renal disease status post renal transplantation February 2010, on immunosuppressive therapy, hypertensive disease, non-insulin-dependent diabetes mellitus, permanent atrial fibrillation with recurrent GI bleeds on Eliquis, status post left atrial appendage closure with watchman device 11/18/2021, normal LV ejection fraction with moderate LVH, here for LINDA to confirm appropriate left atrial appendage closure. Risks, benefits, alternatives of transesophageal echocardiography discussed with the patient and full informed consent obtained.   Acceptable Mallampati score  Consent for moderate conscious sedation  ASA 3             Electronically signed by Glory Duff MD on 10/20/2022 at 7:18 AM

## 2023-05-30 NOTE — PROGRESS NOTES
NUTRITION POST OP APPOINTMENT  DATE OF VISIT: May 30, 2023    Alina Thompson  1990  female  [id number]  32 year old     ASSESSMENT:    REASON FOR VISIT:  Alina is a 32 year old year old female presents today for 1 year PO nutrition follow-up appointment. Patient is accompanied by self.    DIAGNOSIS:  Status post gastric bypass surgery.  Obesity Normal BMI     ANTHROPOMETRICS:  Initial Weight: 273 lbs    Height: 5'11    Current Weight: 178 lbs 0 oz     BMI: Body mass index is 24.83 kg/m .    VITAMINS AND MINERALS:  2 Multivitamin with Minerals (PM)  600 mg Calcium With Vitamin D BID breakfast and lunch; takes inconsistently   5000 International units Vitamin D (PM)  5000 mcg Vitamin B-12 sublingual - 1x/week on Monday nights  100mg Thiamin - 1x/week on Monday nights   1 Vitron C (PM)    NUTRITION HISTORY:  Breakfast: yogurt +/- hard boiled egg  1-2 slices peanut butter toast (rare)  weekends - scrambled eggs w/cheese +/- hash browns   Lunch: leftovers from dinner  1/2 sandwich  Crisp & Green - sumeet salad w/chicken   Supper: pizza (thin crust; chicken and artichoke; 1 slice)  fish, steak or chicken + vegetables +/- fruit  tacos w/fish and avocado  spaghetti sauce + cauliflower román sauce + shrimp   Snacks: [2-3x/day] fruit, handful of almonds, string cheese  Fluids consumed: water (32oz), sparkling water (1 per day), coffee (1x/week; caramel high rise cooler, 1/2 syrup), diet coke (very rare)   Consuming liquid calories: No  Protein intake: 50-60 grams/day  Tolerate regular texture food: Yes  Any foods not tolerated details: Yes  If any food not tolerated: fresh bread, rice, noodles  Portion size: 1 cup or more  Take 20-30 minutes to consume each meal: No   Eat protein foods first: Yes  Fluids and meals separate by at least 30 minutes: Yes with meals, No with snacks  Chew foods thoroughly: No  Tolerating diet: Yes  Drinking high protein supplements: No  Consuming snacks per day: 2-3  Additional  Information: Pt lost to follow up since 3m post-op. She is doing well although acknowledges more snacking and the need for increased hydration. She was previously working with a  for strength training and would like to restart this. Discussed metabolic changes associated with second year after surgery. Reviewed appropriate portions/snacking guidelines. Reviewed rationale for avoiding carbonation.         PHYSICAL ACTIVITY:  Type: Jules  Frequency (days per week): 3  Duration (min): 60    DIAGNOSIS:  Previous Nutrition Diagnosis: Altered gastrointestinal function related to alteration in gastrointestinal structure as evidenced by history of gastric bypass surgery.- no change    Previous goals:  (remote)    Current Nutrition Diagnosis: Altered gastrointestinal function related to alteration in gastrointestinal structure as evidenced by history of gastric bypass surgery.    INTERVENTION:   Nutrition Prescription: Eat 3 meals a day at regular intervals. Consume 60-90 grams of protein daily. Follow post-surgical vitamin and mineral protocol.  Assessed learning needs and learning preferences.    GOALS:  Aim for 1 cup of food containing 3 food groups  Be mindful of snacking habits  Restart strength training  Avoid carbonated beverages  Take Calcium consistently    Follow-Up:   Recommend annual follow up visits to assist with lifestyle changes or per insurance.  Implementation: Discussed progress toward previous goals; reinforced importance of following bariatric lifestyle changes.    NUTRITION MONITORING AND EVALUATION:  Anticipated compliance: fair-good  Verbalized good understanding.    Follow up: Patient to follow up in 12 months.    TIME SPENT WITH PATIENT:  25 minutes        Diya Najera RD, LD  Clinical Dietitian

## 2023-05-30 NOTE — PATIENT INSTRUCTIONS
"Asif Fuller!        Great chatting with you today! Here's a summary of the goals we discussed:         Goals:       1. Eat 3 food groups at each meal  - 1/2c protein, 1/4c vegetable/fruit, 1/4c fruit/starch  - The meal plan provided at 3m post-op is a great template to follow    2.  Continue to eat slowly, chew well, and separate fluids and meals by 30 minutes     3. Limit \"snacks\" to milk or protein drinks   - Have up to 1 protein drink per day OR 2 cups of low-fat milk    4. Avoid caffeine, carbonation and alcohol    5. Exercise most days of the week  - Aim for 30-60 minutes of cardio most day`s, and strength training 2-3x/week      Vitamins:  Bariatric Advantage Ultra Solo  Unjury Opurity  ProCare  Bariatric Pal One A Day            Plan on following up annually. This can be scheduled via our call center at . Of course, reach out sooner with any questions or concerns. Have a great day!           Diya Najera RD, LD  Clinical Dietitian   "

## 2023-05-30 NOTE — ASSESSMENT & PLAN NOTE
Pt reports seems to have improved with weight loss. No longer snoring. Repeat sleep study ordered.

## 2023-06-27 ENCOUNTER — E-VISIT (OUTPATIENT)
Dept: URGENT CARE | Facility: CLINIC | Age: 33
End: 2023-06-27

## 2023-06-27 ENCOUNTER — LAB (OUTPATIENT)
Dept: LAB | Facility: CLINIC | Age: 33
End: 2023-06-27
Payer: COMMERCIAL

## 2023-06-27 DIAGNOSIS — N39.0 ACUTE UTI (URINARY TRACT INFECTION): Primary | ICD-10-CM

## 2023-06-27 DIAGNOSIS — Z87.440 PERSONAL HISTORY OF URINARY TRACT INFECTION: ICD-10-CM

## 2023-06-27 PROCEDURE — 87086 URINE CULTURE/COLONY COUNT: CPT

## 2023-06-27 PROCEDURE — 99207 PR NON-BILLABLE SERV PER CHARTING: CPT | Performed by: NURSE PRACTITIONER

## 2023-06-27 RX ORDER — NITROFURANTOIN 25; 75 MG/1; MG/1
100 CAPSULE ORAL 2 TIMES DAILY
Qty: 10 CAPSULE | Refills: 0 | Status: SHIPPED | OUTPATIENT
Start: 2023-06-27 | End: 2023-07-02

## 2023-06-27 NOTE — PATIENT INSTRUCTIONS
Dear Alina Thompson    After reviewing your responses, I've been able to diagnose you with a urinary tract infection, which is a common infection of the bladder with bacteria.  This is not a sexually transmitted infection, though urinating immediately after intercourse can help prevent infections.  Drinking lots of fluids is also helpful to clear your current infection and prevent the next one.      I have sent a prescription for antibiotics to your pharmacy to treat this infection.    It is important that you take all of your prescribed medication even if your symptoms are improving after a few doses.  Taking all of your medicine helps prevent the symptoms from returning.     If your symptoms worsen, you develop pain in your back or stomach, develop fevers, or are not improving in 5 days, please contact your primary care provider for an appointment or visit any of our convenient Walk-in or Urgent Care Centers to be seen, which can be found on our website here.    Thanks again for choosing us as your health care partner,    COLIN Reis CNP    Urinary Tract Infections in Women  Urinary tract infections (UTIs) are most often caused by bacteria. These bacteria enter the urinary tract. The bacteria may come from inside the body. Or they may travel from the skin outside the rectum or vagina into the urethra. Female anatomy makes it easy for bacteria from the bowel to enter a woman s urinary tract, which is the most common source of UTI. This means women develop UTIs more often than men. Pain in or around the urinary tract is a common UTI symptom. But the only way to know for sure if you have a UTI for the healthcare provider to test your urine. The two tests that may be done are the urinalysis and urine culture.    Types of UTIs    Cystitis. A bladder infection (cystitis) is the most common UTI in women. You may have urgent or frequent need to pee. You may also have pain, burning when you pee, and bloody  urine.    Urethritis. This is an inflamed urethra, which is the tube that carries urine from the bladder to outside the body. You may have lower stomach or back pain. You may also have urgent or frequent need to pee.    Pyelonephritis. This is a kidney infection. If not treated, it can be serious and damage your kidneys. In severe cases, you may need to stay in the hospital. You may have a fever and lower back pain.    Medicines to treat a UTI  Most UTIs are treated with antibiotics. These kill the bacteria. The length of time you need to take them depends on the type of infection. It may be as short as 3 days. If you have repeated UTIs, you may need a low-dose antibiotic for several months. Take antibiotics exactly as directed. Don t stop taking them until all of the medicine is gone, even if you feel better. If you stop taking the antibiotic too soon, the infection may not go away. You may also develop a resistance to the antibiotic. This can make it much harder to treat.  Lifestyle changes to treat and prevent UTIs  The lifestyle changes below will help get rid of your UTI. They may also help prevent future UTIs.    Drink plenty of fluids. This includes water, juice, or other caffeine-free drinks. Fluids help flush bacteria out of your body.    Empty your bladder. Always empty your bladder when you feel the urge to pee. And always pee before going to sleep. Urine that stays in your bladder can lead to infection. Try to pee before and after sex as well.    Practice good personal hygiene. Wipe yourself from front to back after using the toilet. This helps keep bacteria from getting into the urethra.    Wear cotton underwear. Don't wear synthetic or tight-fitting underwear that can trap moisture. Change out of wet bathing suits and workout clothing quickly.    Take showers. Showers are better than baths for preventing UTIs.    Use condoms during sex. These help prevent UTIs caused by sexually transmitted bacteria.  Also don't use spermicides during sex. These can increase the risk for UTIs. Choose other forms of birth control instead. For women who tend to get UTIs after sex, a low-dose of a preventive antibiotic may be used. Be sure to discuss this option with your healthcare provider.    Follow up with your healthcare provider as directed. They may test to make sure the infection has cleared. If needed, more treatment may be started.  [employer] last reviewed this educational content on 9/1/2021 2000-2022 [employer]. All rights reserved. This information is not intended as a substitute for professional medical care. Always follow your healthcare professional's instructions.

## 2023-06-29 LAB — BACTERIA UR CULT: NORMAL

## 2023-07-25 ENCOUNTER — OFFICE VISIT (OUTPATIENT)
Dept: FAMILY MEDICINE | Facility: CLINIC | Age: 33
End: 2023-07-25
Payer: COMMERCIAL

## 2023-07-25 VITALS
HEART RATE: 69 BPM | TEMPERATURE: 98.3 F | RESPIRATION RATE: 16 BRPM | OXYGEN SATURATION: 100 % | SYSTOLIC BLOOD PRESSURE: 142 MMHG | DIASTOLIC BLOOD PRESSURE: 90 MMHG

## 2023-07-25 DIAGNOSIS — R30.0 DYSURIA: ICD-10-CM

## 2023-07-25 DIAGNOSIS — N30.00 ACUTE CYSTITIS WITHOUT HEMATURIA: Primary | ICD-10-CM

## 2023-07-25 LAB
ALBUMIN UR-MCNC: NEGATIVE MG/DL
APPEARANCE UR: CLEAR
BACTERIA #/AREA URNS HPF: ABNORMAL /HPF
BILIRUB UR QL STRIP: NEGATIVE
COLOR UR AUTO: YELLOW
GLUCOSE UR STRIP-MCNC: NEGATIVE MG/DL
HGB UR QL STRIP: NEGATIVE
KETONES UR STRIP-MCNC: NEGATIVE MG/DL
LEUKOCYTE ESTERASE UR QL STRIP: ABNORMAL
NITRATE UR QL: NEGATIVE
PH UR STRIP: 5.5 [PH] (ref 5–8)
RBC #/AREA URNS AUTO: ABNORMAL /HPF
SP GR UR STRIP: 1.01 (ref 1–1.03)
SQUAMOUS #/AREA URNS AUTO: ABNORMAL /LPF
UROBILINOGEN UR STRIP-ACNC: 0.2 E.U./DL
WBC #/AREA URNS AUTO: ABNORMAL /HPF

## 2023-07-25 PROCEDURE — 87086 URINE CULTURE/COLONY COUNT: CPT | Performed by: PHYSICIAN ASSISTANT

## 2023-07-25 PROCEDURE — 81001 URINALYSIS AUTO W/SCOPE: CPT | Performed by: PHYSICIAN ASSISTANT

## 2023-07-25 PROCEDURE — 99214 OFFICE O/P EST MOD 30 MIN: CPT | Performed by: PHYSICIAN ASSISTANT

## 2023-07-25 RX ORDER — NITROFURANTOIN 25; 75 MG/1; MG/1
100 CAPSULE ORAL 2 TIMES DAILY
Qty: 14 CAPSULE | Refills: 0 | Status: SHIPPED | OUTPATIENT
Start: 2023-07-25 | End: 2023-08-01

## 2023-07-25 NOTE — PATIENT INSTRUCTIONS
Increased fluids and rest.  Discussed signs and symptoms of ascending urinary tract infection symptoms to include pyelonephritis.  Antibiotic as written. Risks and benefits of medication discussed.  Follow up with primary care provider if you do not get resolution with the course of treatment.  Return to walk-in care if complication or new symptoms arise in the interim.

## 2023-07-25 NOTE — PROGRESS NOTES
Patient presents with:  UTI: Has burning urinating and burning in vaginal area  taking AZO      Clinical Decision Making:  Multiple etiologies and diagnoses were considered to include but not limited to urinary tract infection, dysuria, hyperglycemia.  Patient is treated for a short course of treatment based on her age and other inclusion criteria.  Patient is treated with antibiotic and creatinine clearance and allergies were reviewed and expected course of resolution and indication for return was gone over.        ICD-10-CM    1. Acute cystitis without hematuria  N30.00 nitroFURantoin macrocrystal-monohydrate (MACROBID) 100 MG capsule      2. Dysuria  R30.0 UA Macroscopic with reflex to Microscopic and Culture - Clinic Collect     UA Microscopic with Reflex to Culture     Urine Culture          Patient Instructions   Increased fluids and rest.  Discussed signs and symptoms of ascending urinary tract infection symptoms to include pyelonephritis.  Antibiotic as written. Risks and benefits of medication discussed.  Follow up with primary care provider if you do not get resolution with the course of treatment.  Return to walk-in care if complication or new symptoms arise in the interim.                  HPI:  Alina Thompson is a 32 year old female who presents today for evaluation of a one day history of irritative voiding symptoms to include urinary hesitancy urgency frequency and dysuria.  Patient denies gross hematuria.  Denies fever chills night sweats fatigue or other red flag symptoms to include back or flank pain and no vaginal discharge.  She has had a recent urinary tract infections does feel similar to how her other symptoms have been.    History obtained from chart review and the patient.    Problem List:  2023-05: Intertriginous dermatitis associated with moisture  2022-05: Postsurgical malabsorption  2022-05: Bariatric surgery status  2022-05: Irritant contact dermatitis due to other agents  2022-05:  Epigastric pressure  2022-05: Morbid obesity with BMI of 40.0-44.9, adult (H)  2022-04: Morbid obesity (H)  2022-04: Mild major depression, single episode (H)  2021-12: Diabetes mellitus, type 2 (H)  2021-09: Type 2 diabetes mellitus without complication, without long-  term current use of insulin (H)  2021-06: Morbid obesity (H)  2020-01: Migraine with aura and without status migrainosus, not   intractable  2019-04: Cervical spine pain  2019-04: History of elevated blood pressure while in hospital  2019-04: Type 2 diabetes mellitus with complication, without long-  term current use of insulin (H)  2017-01: SANDRA (obstructive sleep apnea)- mild (AHI 12)  2016-11: BMI 24.0-24.9, adult  2016-11: Idiopathic hypersomnia      Past Medical History:   Diagnosis Date    Diabetes mellitus, type 2 (H)     NO meds used    Migraine with aura and without status migrainosus, not intractable 01/21/2020    Obesity     Recurrent UTI     Recurrent vaginitis     BV and Yeast    Sleep apnea        Social History     Tobacco Use    Smoking status: Never     Passive exposure: Never    Smokeless tobacco: Never   Substance Use Topics    Alcohol use: Not Currently       Review of Systems  As above in HPI otherwise negative.    Vitals:    07/25/23 1109   BP: (!) 142/90   Pulse: 69   Resp: 16   Temp: 98.3  F (36.8  C)   TempSrc: Oral   SpO2: 100%       General: Patient is resting comfortably no acute distress is afebrile  HEENT: Head is normocephalic atraumatic   eyes are PERRL EOMI sclera anicteric   Abdomen: No CVA tenderness to percussion no suprapubic tenderness to palpation or induration and no abdominal pain or masses to palpation.  Skin: Without rash non-diaphoretic    Physical Exam      Labs:  Results for orders placed or performed in visit on 07/25/23   UA Macroscopic with reflex to Microscopic and Culture - Clinic Collect     Status: Abnormal    Specimen: Urine, Clean Catch   Result Value Ref Range    Color Urine Yellow Colorless,  Straw, Light Yellow, Yellow    Appearance Urine Clear Clear    Glucose Urine Negative Negative mg/dL    Bilirubin Urine Negative Negative    Ketones Urine Negative Negative mg/dL    Specific Gravity Urine 1.015 1.005 - 1.030    Blood Urine Negative Negative    pH Urine 5.5 5.0 - 8.0    Protein Albumin Urine Negative Negative mg/dL    Urobilinogen Urine 0.2 0.2, 1.0 E.U./dL    Nitrite Urine Negative Negative    Leukocyte Esterase Urine Small (A) Negative   UA Microscopic with Reflex to Culture     Status: Abnormal   Result Value Ref Range    Bacteria Urine Moderate (A) None Seen /HPF    RBC Urine 0-2 0-2 /HPF /HPF    WBC Urine 25-50 (A) 0-5 /HPF /HPF    Squamous Epithelials Urine Few (A) None Seen /LPF       At the end of the encounter, I discussed results, diagnosis, medications. Discussed red flags for immediate return to clinic/ER, as well as indications for follow up if no improvement. Patient understood and agreed to plan. Patient was stable for discharge.   How Severe Are Your Spot(S)?: mild What Type Of Note Output Would You Prefer (Optional)?: Bullet Format What Is The Reason For Today's Visit?: Full Body Skin Examination What Is The Reason For Today's Visit? (Being Monitored For X): concerning skin lesions on an annual basis

## 2023-07-26 NOTE — TELEPHONE ENCOUNTER
FUTURE VISIT INFORMATION      FUTURE VISIT INFORMATION:  Date: 10/24/23  Time: 10:00am  Location: [facility] [place]  REFERRAL INFORMATION:  Referring provider:  Debbie Nolasco PA-C   Referring providers clinic:  [facility]  Reason for visit/diagnosis  Post-Weight Loss Skin Removal     RECORDS REQUESTED FROM:       Clinic name Comments Records Status Imaging Status   [facility] OV/referral 5/30/23 EPIC

## 2023-07-27 LAB — BACTERIA UR CULT: NO GROWTH

## 2023-08-02 ENCOUNTER — E-VISIT (OUTPATIENT)
Dept: URGENT CARE | Facility: CLINIC | Age: 33
End: 2023-08-02
Payer: COMMERCIAL

## 2023-08-02 ENCOUNTER — MYC MEDICAL ADVICE (OUTPATIENT)
Dept: FAMILY MEDICINE | Facility: CLINIC | Age: 33
End: 2023-08-02
Payer: COMMERCIAL

## 2023-08-02 DIAGNOSIS — N89.8 VAGINAL DISCHARGE: Primary | ICD-10-CM

## 2023-08-02 PROCEDURE — 99207 PR NON-BILLABLE SERV PER CHARTING: CPT | Performed by: PREVENTIVE MEDICINE

## 2023-08-02 NOTE — PATIENT INSTRUCTIONS
Thank you for choosing us for your care. Given your symptoms, I would like you to do a lab-only visit to determine what is causing them.  I have placed the orders.  Please schedule an appointment with the lab right here [place], or call [phone].  I will let you know when the results are back and next steps to take.

## 2023-08-29 ASSESSMENT — ENCOUNTER SYMPTOMS
HEMATOCHEZIA: 0
HEMATURIA: 0
FEVER: 0
DYSURIA: 0
EYE PAIN: 0
DIARRHEA: 0
FREQUENCY: 0
COUGH: 0
PARESTHESIAS: 0
CONSTIPATION: 0
ABDOMINAL PAIN: 0
CHILLS: 0
PALPITATIONS: 0
HEARTBURN: 0
MYALGIAS: 0
SORE THROAT: 0
ARTHRALGIAS: 0
NERVOUS/ANXIOUS: 0
JOINT SWELLING: 0
SHORTNESS OF BREATH: 0
WEAKNESS: 0
NAUSEA: 0
HEADACHES: 0
BREAST MASS: 0
DIZZINESS: 0

## 2023-08-31 ENCOUNTER — TRANSFERRED RECORDS (OUTPATIENT)
Dept: FAMILY MEDICINE | Facility: CLINIC | Age: 33
End: 2023-08-31

## 2023-08-31 ENCOUNTER — OFFICE VISIT (OUTPATIENT)
Dept: FAMILY MEDICINE | Facility: CLINIC | Age: 33
End: 2023-08-31
Payer: COMMERCIAL

## 2023-08-31 VITALS
SYSTOLIC BLOOD PRESSURE: 108 MMHG | HEIGHT: 71 IN | RESPIRATION RATE: 14 BRPM | WEIGHT: 182.8 LBS | OXYGEN SATURATION: 99 % | HEART RATE: 73 BPM | BODY MASS INDEX: 25.59 KG/M2 | DIASTOLIC BLOOD PRESSURE: 64 MMHG | TEMPERATURE: 98.3 F

## 2023-08-31 DIAGNOSIS — E11.9 TYPE 2 DIABETES MELLITUS WITHOUT COMPLICATION, WITHOUT LONG-TERM CURRENT USE OF INSULIN (H): ICD-10-CM

## 2023-08-31 DIAGNOSIS — Z00.00 ROUTINE GENERAL MEDICAL EXAMINATION AT A HEALTH CARE FACILITY: Primary | ICD-10-CM

## 2023-08-31 DIAGNOSIS — Z98.84 BARIATRIC SURGERY STATUS: ICD-10-CM

## 2023-08-31 DIAGNOSIS — F41.1 GAD (GENERALIZED ANXIETY DISORDER): ICD-10-CM

## 2023-08-31 PROBLEM — F32.0 MILD MAJOR DEPRESSION, SINGLE EPISODE (H): Status: RESOLVED | Noted: 2022-04-20 | Resolved: 2023-08-31

## 2023-08-31 PROBLEM — R10.13 EPIGASTRIC PRESSURE: Status: RESOLVED | Noted: 2022-05-12 | Resolved: 2023-08-31

## 2023-08-31 PROCEDURE — 99214 OFFICE O/P EST MOD 30 MIN: CPT | Mod: 25 | Performed by: INTERNAL MEDICINE

## 2023-08-31 PROCEDURE — 99395 PREV VISIT EST AGE 18-39: CPT | Performed by: INTERNAL MEDICINE

## 2023-08-31 ASSESSMENT — ENCOUNTER SYMPTOMS
FEVER: 0
SORE THROAT: 0
CHILLS: 0
SHORTNESS OF BREATH: 0
DIZZINESS: 0
NERVOUS/ANXIOUS: 1
FREQUENCY: 0
HEMATOCHEZIA: 0
COUGH: 0
WEAKNESS: 0
HEMATURIA: 0
PALPITATIONS: 0
JOINT SWELLING: 0
DYSURIA: 0
PARESTHESIAS: 0
NAUSEA: 0
ABDOMINAL PAIN: 0
DIARRHEA: 0
CONSTIPATION: 0
HEADACHES: 0
ARTHRALGIAS: 0
BREAST MASS: 0
EYE PAIN: 0
HEARTBURN: 0
MYALGIAS: 0

## 2023-08-31 ASSESSMENT — PAIN SCALES - GENERAL: PAINLEVEL: NO PAIN (0)

## 2023-08-31 ASSESSMENT — PATIENT HEALTH QUESTIONNAIRE - PHQ9: SUM OF ALL RESPONSES TO PHQ QUESTIONS 1-9: 2

## 2023-08-31 NOTE — PROGRESS NOTES
Chief Complaint   Patient presents with    Physical    Diabetes    Anxiety     Stopped Prozac about 6 months ago    Stopped Fluoxetine when Rx ran out; now feels med was effective and desires to go back on it.      SUBJECTIVE:   CC: Alina is an 32 year old who presents for preventive health visit.       8/31/2023     2:51 PM   Additional Questions   Roomed by Mela RODRIGUEZ LPN       Healthy Habits:     Getting at least 3 servings of Calcium per day:  NO    Bi-annual eye exam:  Yes    Dental care twice a year:  Yes    Sleep apnea or symptoms of sleep apnea:  None    Diet:  Other    Frequency of exercise:  None    Taking medications regularly:  No    Barriers to taking medications:  Problems remembering to take them    Medication side effects:  None    Additional concerns today:  Yes          Diabetes Follow-up    Dx [facility]  How often are you checking your blood sugar? Not at all  What concerns do you have today about your diabetes? None   Do you have any of these symptoms? (Select all that apply)  No numbness or tingling in feet.  No redness, sores or blisters on feet.  No complaints of excessive thirst.  No reports of blurry vision.  No significant changes to weight.  Have you had a diabetic eye exam in the last 12 months? She has her eye exam scheduled for tomorrow         BP Readings from Last 2 Encounters:   08/31/23 108/64   07/25/23 (!) 142/90     Hemoglobin A1C (%)   Date Value   05/25/2023 5.0   08/26/2022 4.8   06/01/2021 6.7 (H)   05/26/2021 PER PATIENT     LDL Cholesterol Calculated (mg/dL)   Date Value   08/26/2022 77   03/23/2022 110 (H)   05/26/2021 103 (H)   05/06/2019 106 (H)             Anxiety Follow-Up  How are you doing with your anxiety since your last visit? Worsened over the past few months.  She stopped taking her medication about 6 months ago and started having symptoms a few months ago and is wondering about going back on the Prozac   Are you having other symptoms that  "might be associated with anxiety? Yes:  \"lump in the throat feeling\"   Have you had a significant life event? No   Are you feeling depressed? No  Do you have any concerns with your use of alcohol or other drugs? No    Social History     Tobacco Use    Smoking status: Never     Passive exposure: Never    Smokeless tobacco: Never   Vaping Use    Vaping Use: Never used   Substance Use Topics    Alcohol use: Not Currently    Drug use: Yes     Types: Marijuana     Comment: 1-2 per month (Edibles)         7/1/2021     9:56 AM 9/17/2021    10:38 AM 3/23/2022     9:12 AM   BINDU-7 SCORE   Total Score   7 (mild anxiety)   Total Score 3 2 7         5/13/2022    10:56 AM 3/15/2023     2:45 PM 8/31/2023     2:58 PM   PHQ   PHQ-9 Total Score 1 2 2   Q9: Thoughts of better off dead/self-harm past 2 weeks Not at all Not at all Not at all         8/31/2023     2:58 PM   Last PHQ-9   1.  Little interest or pleasure in doing things 0   2.  Feeling down, depressed, or hopeless 0   3.  Trouble falling or staying asleep, or sleeping too much 1   4.  Feeling tired or having little energy 1   5.  Poor appetite or overeating 0   6.  Feeling bad about yourself 0   7.  Trouble concentrating 0   8.  Moving slowly or restless 0   Q9: Thoughts of better off dead/self-harm past 2 weeks 0   PHQ-9 Total Score 2   Difficulty at work, home, or with people Not difficult at all         3/23/2022     9:12 AM   BINDU-7    1. Feeling nervous, anxious, or on edge 2   2. Not being able to stop or control worrying 1   3. Worrying too much about different things 2   4. Trouble relaxing 0   5. Being so restless that it is hard to sit still 0   6. Becoming easily annoyed or irritable 1   7. Feeling afraid, as if something awful might happen 1   BINDU-7 Total Score 7         Social History     Tobacco Use    Smoking status: Never     Passive exposure: Never    Smokeless tobacco: Never   Substance Use Topics    Alcohol use: Not Currently             8/29/2023     1:00 " PM   Alcohol Use   Prescreen: >3 drinks/day or >7 drinks/week? No     Reviewed orders with patient.  Reviewed health maintenance and updated orders accordingly - Yes  Labs reviewed in EPIC  BP Readings from Last 3 Encounters:   08/31/23 108/64   07/25/23 (!) 142/90   05/30/23 114/70    Wt Readings from Last 3 Encounters:   08/31/23 82.9 kg (182 lb 12.8 oz)   05/30/23 80.7 kg (178 lb)   05/30/23 80.7 kg (178 lb)                  Patient Active Problem List   Diagnosis    Idiopathic hypersomnia    BMI 24.0-24.9, adult    SANDRA (obstructive sleep apnea)- mild (AHI 12)    History of elevated blood pressure while in hospital    Cervical spine pain    Migraine with aura and without status migrainosus, not intractable    Type 2 diabetes mellitus without complication, without long-term current use of insulin (H)    Postsurgical malabsorption    Bariatric surgery status    Irritant contact dermatitis due to other agents    Intertriginous dermatitis associated with moisture     Past Surgical History:   Procedure Laterality Date    ABDOMEN SURGERY  5/4/22    Gastric Bypass    CHOLECYSTECTOMY      DAVINCI BYPASS GASTRIC VIRA-EN-Y N/A 05/04/2022    Procedure: Robotic assisted laparoscopic Vira-en-Y gastric bypass;  Surgeon: Jean-Claude Lin MD;  Location: [facility] OR    ENT SURGERY      ear tubes    TONSILLECTOMY         Social History     Tobacco Use    Smoking status: Never     Passive exposure: Never    Smokeless tobacco: Never   Substance Use Topics    Alcohol use: Not Currently     Family History   Problem Relation Age of Onset    Sleep Apnea Other         Grandmother    Obesity Mother     Diabetes Mother         Had before her gastric bypass    Obesity Father     Diabetes Maternal Grandmother     Heart Disease Maternal Grandmother     Obesity Maternal Grandmother     Other Cancer Maternal Grandmother         Uterine or Ovarian Cancer    Obesity Maternal Grandfather     Diabetes Paternal Grandmother     Genetic Disorder  Paternal Grandmother     Obesity Paternal Grandmother     Other Cancer Paternal Grandmother         Uterine or Ovarian Cancer    Thyroid Disease Paternal Grandmother         Parathyroid removed    Liver Cancer Paternal Grandfather     Obesity Sister          Current Outpatient Medications   Medication Sig Dispense Refill    CALCIUM PO Take 2 capsules by mouth daily 600mg breakfast and lunch      cyanocobalamin (VITAMIN B-12) 100 MCG tablet Take 100 mcg by mouth daily      desogestrel-ethinyl estradiol (APRI) 0.15-30 MG-MCG tablet Take 1 tablet by mouth daily Continuously 112 tablet 3    ferrous fumarate 65 mg, [place]. FE,-Vitamin C 125 mg (VITRON C)  MG TABS tablet Take 1 tablet by mouth daily      FLUoxetine (PROZAC) 20 MG capsule Take 1 capsule (20 mg) by mouth daily 90 capsule 2    fluticasone (FLONASE) 50 MCG/ACT nasal spray Spray 1 spray into both nostrils daily 16 g 3    meclizine (ANTIVERT) 25 MG tablet Take 1 tablet (25 mg) by mouth 3 times daily as needed for dizziness 15 tablet 0    Multiple Vitamin (MULTIVITAMIN ADULT PO) Take 1 tablet by mouth daily      nystatin (MYCOSTATIN) 597841 UNIT/GM external cream Apply topically 2 times daily as needed for dry skin Apply as needed for rash. 30 g 3    SUMAtriptan (IMITREX) 25 MG tablet Take 1 tablet (25 mg) by mouth at onset of headache for migraine May repeat in 2 hours. Max 8 tablets/24 hours. 30 tablet 0    Thiamine Mononitrate (VITAMIN B-1) 100 MG TABS       triamcinolone (KENALOG) 0.1 % external ointment Apply topically 2 times daily as needed for irritation 80 g 1    VITAMIN D (CHOLECALCIFEROL) PO Take 1 tablet by mouth daily       Allergies   Allergen Reactions    Asa [Aspirin]      Gastric Bypass     Nsaids      Gastric Bypass    Sulfa Antibiotics     Wellbutrin [Bupropion] Other (See Comments)     Dizziness     Recent Labs   Lab Test 05/25/23  0956 03/15/23  1709 01/09/23  1931 08/26/22  1319 05/04/22  0603 03/23/22  1029 06/01/21  1327  05/26/21  1023 05/06/19  0859 04/05/19  1517   A1C 5.0  --   --  4.8  --  6.6*   < > PER PATIENT   < > 7.6*   LDL  --   --   --  77  --  110*  --  103*   < >  --    HDL  --   --   --  53  --  62  --  60   < >  --    TRIG  --   --   --  130  --  88  --  127   < >  --    ALT  --  21 18 44  --   --    < >  --   --  51*   CR  --  0.61 0.56 0.54   < > 0.58  --  0.63  --  0.59   GFRESTIMATED  --  >90 >90 >90   < > >90  --  >90  --  >90   GFRESTBLACK  --   --   --   --   --   --   --  >90  --  >90   POTASSIUM  --  3.8 3.8 4.1   < > 4.0  --  4.2  --  3.9   TSH  --  2.10  --  1.94  --   --   --   --    < >  --     < > = values in this interval not displayed.        Breast Cancer Screening:    FHS-7:       9/17/2021    10:03 AM 4/15/2022    12:16 PM 8/29/2023     1:02 PM   Breast CA Risk Assessment (FHS-7)   Did any of your first-degree relatives have breast or ovarian cancer? Yes No No   Did any of your relatives have bilateral breast cancer? Unknown No No   Did any man in your family have breast cancer? No No Unknown   Did any woman in your family have breast and ovarian cancer? Unknown No No   Did any woman in your family have breast cancer before age 50 y? Unknown No No   Do you have 2 or more relatives with breast and/or ovarian cancer? Unknown No    Do you have 2 or more relatives with breast and/or bowel cancer? No No No       History of abnormal Pap smear: NO - age 30-65 PAP every 5 years with negative HPV co-testing recommended      Latest Ref Rng & Units 8/26/2022     2:12 PM 4/5/2019     3:01 PM 10/24/2016     3:21 PM   PAP / HPV   PAP  Negative for Intraepithelial Lesion or Malignancy (NILM)   Negative for squamous intraepithelial lesion or malignancy  Electronically signed by Rebecca Morales CT (ASCP) on 10/31/2016 at 10:50 AM      PAP (Historical)   NIL     HPV 16 DNA Negative Negative      HPV 18 DNA Negative Negative      Other HR HPV Negative Negative        Reviewed and updated as needed this visit by  clinical staff   Tobacco  Allergies  Meds  Problems  Med Hx  Surg Hx  Fam Hx          Reviewed and updated as needed this visit by Provider   Tobacco  Allergies  Meds  Problems  Med Hx  Surg Hx  Fam Hx         Past Medical History:   Diagnosis Date    Diabetes mellitus, type 2 (H)     NO meds used    Migraine with aura and without status migrainosus, not intractable 01/21/2020    Obesity     Recurrent UTI     Recurrent vaginitis     BV and Yeast    Sleep apnea       Past Surgical History:   Procedure Laterality Date    ABDOMEN SURGERY  5/4/22    Gastric Bypass    CHOLECYSTECTOMY      DAVINCI BYPASS GASTRIC IVRA-EN-Y N/A 05/04/2022    Procedure: Robotic assisted laparoscopic Vira-en-Y gastric bypass;  Surgeon: Jean-Claude Lin MD;  Location:  OR    ENT SURGERY      ear tubes    TONSILLECTOMY         Review of Systems   Constitutional:  Negative for chills and fever.   HENT:  Negative for congestion, ear pain, hearing loss and sore throat.    Eyes:  Negative for pain and visual disturbance.   Respiratory:  Negative for cough and shortness of breath.    Cardiovascular:  Negative for chest pain, palpitations and peripheral edema.   Gastrointestinal:  Negative for abdominal pain, constipation, diarrhea, heartburn, hematochezia and nausea.   Breasts:  Negative for tenderness, breast mass and discharge.   Genitourinary:  Negative for dysuria, frequency, genital sores, hematuria, pelvic pain, urgency, vaginal bleeding and vaginal discharge.   Musculoskeletal:  Negative for arthralgias, joint swelling and myalgias.   Skin:  Negative for rash.   Neurological:  Negative for dizziness, weakness, headaches and paresthesias.   Psychiatric/Behavioral:  Negative for mood changes. The patient is nervous/anxious.      Stopped Fluoxetine when Rx ran out; now feels med was effective and desires to go back on it.   #160 weight loss following Gastric bypass; she plans to meet with cosmetic surgery about considering  "Panniculectomy and other skin excess removal. Weight loss has been maintained over a year.      OBJECTIVE:   /64   Pulse 73   Temp 98.3  F (36.8  C) (Oral)   Resp 14   Ht 1.791 m (5' 10.5\")   Wt 82.9 kg (182 lb 12.8 oz)   LMP 08/20/2023 (Exact Date)   SpO2 99%   BMI 25.86 kg/m    Physical Exam  GENERAL: healthy, alert and no distress  NECK: no adenopathy, no asymmetry, masses, or scars and thyroid normal to palpation  RESP: lungs clear to auscultation - no rales, rhonchi or wheezes  CV: regular rates and rhythm, normal S1 S2, no S3 or S4, no murmur, click or rub, peripheral pulses strong, and no peripheral edema  ABDOMEN: soft, nontender and bowel sounds normal  MS: no gross musculoskeletal defects noted, no edema  SKIN: excess skin  NEURO: Normal strength and tone, mentation intact and speech normal  PSYCH: mentation appears normal, affect normal/bright    Diagnostic Test Results:  Labs reviewed in Epic            ASSESSMENT/PLAN:   (Z00.00) Routine general medical examination at a health care facility  (primary encounter diagnosis)  Comment: HEALTH CARE MAINTENANCE reviewed  Plan:     (E11.9) Type 2 diabetes mellitus without complication, without long-term current use of insulin (H)  Comment: several years ago, was dx with DM, not currently on any DM medication  Plan: Lipid panel reflex to direct LDL Non-fasting,         Albumin Random Urine Quantitative with Creat         Ratio, Hemoglobin A1c, Comprehensive metabolic         panel (BMP + Alb, Alk Phos, ALT, AST, Total.         Bili, TP)          (Z98.84) Bariatric surgery status  Comment: 5/4/2022 preop weight was 260#; she was initially over 300# and was able to work with medical weight loss team to get weight to 260#  Plan: she is aware of need to follow up with bariatric team yearly, lifelong to monitor protein, vitamins, absorption, etc.  She is also dealing with excess skin ( she feels may be an extra 12# +and plans to meet with cosmetic " "surgery team to consider surgical removal of excess skin since bariatric surgery 5/4/2023;     (F41.1) BINDU (generalized anxiety disorder)  Comment: triggers reviewed; goals of therapy reviewed; she is interested in resuming Fluoxetine  Plan: FLUoxetine (PROZAC) 20 MG capsule            Patient has been advised of split billing requirements and indicates understanding: Yes      COUNSELING:  Reviewed preventive health counseling, as reflected in patient instructions       Regular exercise       Healthy diet/nutrition      BMI:   Estimated body mass index is 25.86 kg/m  as calculated from the following:    Height as of this encounter: 1.791 m (5' 10.5\").    Weight as of this encounter: 82.9 kg (182 lb 12.8 oz).   Weight management plan: Discussed healthy diet and exercise guidelines      She reports that she has never smoked. She has never been exposed to tobacco smoke. She has never used smokeless tobacco.            Prudence Laughlin MD  Internal Medicine  [facility]    30 minutes in addition to HEALTH CARE MAINTENANCE are spent with patient, over 50% of that time spent providing counselling, discussing and reviewing medical conditions/concerns, meds and potential side effects.   "

## 2023-09-01 ENCOUNTER — TRANSFERRED RECORDS (OUTPATIENT)
Dept: FAMILY MEDICINE | Facility: CLINIC | Age: 33
End: 2023-09-01
Payer: COMMERCIAL

## 2023-09-01 LAB — RETINOPATHY: NEGATIVE

## 2023-09-19 ENCOUNTER — VIRTUAL VISIT (OUTPATIENT)
Dept: UROLOGY | Facility: CLINIC | Age: 33
End: 2023-09-19
Attending: OBSTETRICS & GYNECOLOGY
Payer: COMMERCIAL

## 2023-09-19 VITALS — WEIGHT: 180 LBS | BODY MASS INDEX: 25.2 KG/M2 | HEIGHT: 71 IN

## 2023-09-19 DIAGNOSIS — Z98.84 BARIATRIC SURGERY STATUS: ICD-10-CM

## 2023-09-19 DIAGNOSIS — N89.8 VAGINAL DRYNESS: ICD-10-CM

## 2023-09-19 DIAGNOSIS — N94.10 DYSPAREUNIA IN FEMALE: ICD-10-CM

## 2023-09-19 DIAGNOSIS — Z87.440 HISTORY OF UTI: Primary | ICD-10-CM

## 2023-09-19 DIAGNOSIS — R39.89 URETHRAL PAIN: ICD-10-CM

## 2023-09-19 PROCEDURE — 99204 OFFICE O/P NEW MOD 45 MIN: CPT | Mod: 95 | Performed by: PHYSICIAN ASSISTANT

## 2023-09-19 NOTE — NURSING NOTE
Is the patient currently in the state of [place]? YES    Visit mode:VIDEO    If the visit is dropped, the patient can be reconnected by: VIDEO VISIT: Text to cell phone:   Telephone Information:   Mobile [phone]       Will anyone else be joining the visit? NO  (If patient encounters technical issues they should call [phone]    How would you like to obtain your AVS? MyChart    Are changes needed to the allergy or medication list? No    Reason for visit: Consult    Bobbilynn Grossaint VVF

## 2023-09-19 NOTE — PROGRESS NOTES
Virtual Visit Details    Type of service:  Video Visit     Originating Location (pt. Location): Home in [place]    Distant Location (provider location):  On-site  Platform used for Video Visit: AmWell  Start 1:26PM  End 1:43PM      Urology Clinic    Name: Alina Thompson    MRN: [id number]   YOB: 1990  Accompanied at today's visit by:self              Assessment and Plan:   32 year old female with hx of UTIs, urethral pain, is s/p gastric bypass surgery, dyspareunia since bypass surgery, vaginal dryness    - Do not think patient is having true UTIs. Recommend IC diet.   - Can try AZO prior to intercourse and sitz bath after intercourse.  - Discussed vulvar hygiene.   - Think would benefit from PFPT; have patient followup for exam.   - Recommend good clean love as lubricant for intercourse.   - Contact clinic if develops s/s of UTI in the future. Given mixed cyril on Ucs, recommend cathed UA/Ucs in the future.   - Encourage drinking more water during the day.   - consider valium suppositories vs lidocaine gel  - follow-up next available time slot for exam, PVR and UA.   - Encourage BC for controlling menses. Discussed how menses can potentially affect bladder/pelvic floor symptoms.  - After discussing the assessment and plan with patient, patient verbalized understanding and agreed to the above plan. All questions answered.     28 minutes were spent today on the date of the encounter in reviewing the EMR, direct patient care, coordination of care and documentation.    Marilou Gaytan PA-C  September 19, 2023    Patient Care Team:  Prudence Laughlin MD as PCP - General (Internal Medicine)  Melanie Chow PA-C as Physician Assistant (Dermatology)  Cameron Ibarra DO as Referring Physician (Family Medicine)  Abida Sorenson MD as Assigned OBGYN Provider  Prudence Laughlin MD as Assigned PCP  Pelon Miller PA-C as Assigned Surgical Provider  Marilou Gaytan PA-C as  Physician Assistant  JAMES BUCHANAN          Chief Complaint:   Hx of UTIs          History of Present Illness:   2023    HISTORY: Alina Thompson is a 32 year old  female as a new consultation for concerns of hx of UTIs. Has undergone many E-visits for UTI symptoms. Per EMR has had 3 Ucs this past year showing mixed cyril or no growth. Notices her UTI symptoms flare up after intercourse. Does void post coitus. Wearing cotton underwear. Typical UTI symptoms include urinary frequency, voiding small amounts, burning with urination along the urethra and after voiding. Denies gross hematuria or back/flank pain. Reports AZO helps symptoms. Has never seen urology team in the past. When not having UTI symptoms voiding 4-5x/day and 0-1x/night. Hx of mild SANDRA prior to bypas surgery. Denies urinary incontinence. Feels like they empty completely after voiding. Drinks carbonated beverages and coffee once per day. Denies hx of stones. Since her gastric bypass about 1 year ago, her menses has become more regular. Currently on BC. Would like to become pregnant in the future but is not actively trying currently. Denies prolapse symptoms. Sexually active. Reports pain with intercourse. Describes pain with penetration even with lubrication, which is new since gastric bypass . Denies hx of abnormal pap smears recently. Struggles with staying hydrated during the day.  Bowels are regular. PMH is significant for Type II DM howeer no longer DM since bypass surgery. PSH cholecystectomy,  x1. Denies history of smoking.. Patient voices no other concerns at this time.          Past Medical History:     Past Medical History:   Diagnosis Date    Diabetes mellitus, type 2 (H)     NO meds used    Migraine with aura and without status migrainosus, not intractable 2020    Obesity     Recurrent UTI     Recurrent vaginitis     BV and Yeast    Sleep apnea           Past Surgical History:     Past Surgical  History:   Procedure Laterality Date    ABDOMEN SURGERY  5/4/22    Gastric Bypass    CHOLECYSTECTOMY      DAVINCI BYPASS GASTRIC VIRA-EN-Y N/A 05/04/2022    Procedure: Robotic assisted laparoscopic Vira-en-Y gastric bypass;  Surgeon: Jean-Claude Lin MD;  Location: [facility] OR    ENT SURGERY      ear tubes    TONSILLECTOMY              Social History:     Social History     Tobacco Use    Smoking status: Never     Passive exposure: Never    Smokeless tobacco: Never   Substance Use Topics    Alcohol use: Not Currently            Family History:     Family History   Problem Relation Age of Onset    Sleep Apnea Other         Grandmother    Obesity Mother     Diabetes Mother         Had before her gastric bypass    Obesity Father     Diabetes Maternal Grandmother     Heart Disease Maternal Grandmother     Obesity Maternal Grandmother     Other Cancer Maternal Grandmother         Uterine or Ovarian Cancer    Obesity Maternal Grandfather     Diabetes Paternal Grandmother     Genetic Disorder Paternal Grandmother     Obesity Paternal Grandmother     Other Cancer Paternal Grandmother         Uterine or Ovarian Cancer    Thyroid Disease Paternal Grandmother         Parathyroid removed    Liver Cancer Paternal Grandfather     Obesity Sister               Allergies:     Allergies   Allergen Reactions    Asa [Aspirin]      Gastric Bypass     Nsaids      Gastric Bypass    Sulfa Antibiotics     Wellbutrin [Bupropion] Other (See Comments)     Dizziness            Medications:     Current Outpatient Medications   Medication Sig    CALCIUM PO Take 2 capsules by mouth daily 600mg breakfast and lunch    cyanocobalamin (VITAMIN B-12) 100 MCG tablet Take 100 mcg by mouth daily    desogestrel-ethinyl estradiol (APRI) 0.15-30 MG-MCG tablet Take 1 tablet by mouth daily Continuously    ferrous fumarate 65 mg, [place]. FE,-Vitamin C 125 mg (VITRON C)  MG TABS tablet Take 1 tablet by mouth daily    FLUoxetine (PROZAC) 20 MG capsule Take 1  "capsule (20 mg) by mouth daily    fluticasone (FLONASE) 50 MCG/ACT nasal spray Spray 1 spray into both nostrils daily    meclizine (ANTIVERT) 25 MG tablet Take 1 tablet (25 mg) by mouth 3 times daily as needed for dizziness    Multiple Vitamin (MULTIVITAMIN ADULT PO) Take 1 tablet by mouth daily    nystatin (MYCOSTATIN) 370024 UNIT/GM external cream Apply topically 2 times daily as needed for dry skin Apply as needed for rash.    SUMAtriptan (IMITREX) 25 MG tablet Take 1 tablet (25 mg) by mouth at onset of headache for migraine May repeat in 2 hours. Max 8 tablets/24 hours.    Thiamine Mononitrate (VITAMIN B-1) 100 MG TABS     triamcinolone (KENALOG) 0.1 % external ointment Apply topically 2 times daily as needed for irritation    VITAMIN D (CHOLECALCIFEROL) PO Take 1 tablet by mouth daily     No current facility-administered medications for this visit.             Review of Systems:    ROS: 14 point ROS neg other than the symptoms noted above in the HPI.          Physical Exam:   Height 1.803 m (5' 11\"), weight 81.6 kg (180 lb), last menstrual period 08/20/2023, not currently breastfeeding.  5' 11\", Body mass index is 25.1 kg/m ., 180 lbs 0 oz  Gen appearance: Age-appropriate appearing female in NAD.   HEENT:  EOMI, conjunctiva clear/white. Normal ROM of neck for age.   Psych:  alert , In no acute distress.  Neuro:  A&Ox3  Resp:  Normal respiratory effort; not in acute respiratory distress.          Data:    Labs:  UC  7/25/23 no growth  6/27/23 mixed cyril  1/9/23 mixed cyril    Office Visit on 07/25/2023   Component Date Value Ref Range Status    Color Urine 07/25/2023 Yellow  Colorless, Straw, Light Yellow, Yellow Final    Appearance Urine 07/25/2023 Clear  Clear Final    Glucose Urine 07/25/2023 Negative  Negative mg/dL Final    Bilirubin Urine 07/25/2023 Negative  Negative Final    Ketones Urine 07/25/2023 Negative  Negative mg/dL Final    Specific Gravity Urine 07/25/2023 1.015  1.005 - 1.030 Final    Blood " Urine 07/25/2023 Negative  Negative Final    pH Urine 07/25/2023 5.5  5.0 - 8.0 Final    Protein Albumin Urine 07/25/2023 Negative  Negative mg/dL Final    Urobilinogen Urine 07/25/2023 0.2  0.2, 1.0 E.U./dL Final    Nitrite Urine 07/25/2023 Negative  Negative Final    Leukocyte Esterase Urine 07/25/2023 Small (A)  Negative Final    Bacteria Urine 07/25/2023 Moderate (A)  None Seen /HPF Final    RBC Urine 07/25/2023 0-2  0-2 /HPF /HPF Final    WBC Urine 07/25/2023 25-50 (A)  0-5 /HPF /HPF Final    Squamous Epithelials Urine 07/25/2023 Few (A)  None Seen /LPF Final    Culture 07/25/2023 No Growth   Final

## 2023-09-19 NOTE — PATIENT INSTRUCTIONS
Can try good clean love for lubrication during intercourse.    Can try over the counter Replens twice weekly for vaginal dryness.    Contact clinic if develop UTI symptoms in the future    Try AZO prior to intercourse, then voiding after intercourse and sitz bath after intercourse.    Recommend latex free and unflavored condoms       Dietary recommendations:    Foods/Beverages to Avoid:  caffeinated beverages, carbonated beverages, coffee, decaffeinated coffee, tea, caffeine free tea, soda, alcoholic beverages, grapefruit, lemon, oranges, pineapple, cranberry juice, grapefruit juice, orange juice, pineapple juice, tomato or tomato based products, red dyed products, spicy foods, chili, horseradish, vinegar, MSG, artificial sweeteners, Mexican food, Wilbert food,  food, sugar, strawberries, cranberries, chocolate, mocha, key lime, salsa, spicy/hot chips/crackers, chili pepper flakes, hot/spicey wing sauces, jalapeno, citric acid, citrus fruits, soy, oil & vinegar salad dressings, Vitamin C & B6.    Foods/beverages that are least irritating:  water, milk, bananas, blueberries, honeydew melon, peers, raisins, watermelon, broccoli, [place] sprouts, cabbage, carrots, cauliflower, celery, cucumber, mushrooms, peas, radishes, squash, zucchini, white potatoes, sweet potatoes/yams, chicken, eggs, turkey, beef, pork, lamb, shrimp, tunafish, salmon, oat, rice, pretzels, popcorn, applesauce, apricots, artichokes, asparagus, avocado, basil, beans, fresh beats, bell peppers, nonprocessed or overly spiced cheeses, plain chips, corn, oatmeal, cottage cheese, garlic, basil, dill, mushrooms, creamers without soybean oil, nuts, oils, black olives, oregano, parsley, non-chocolate or fruit pastries, non-soy protein powders, pumpkin, quinoa, radish, fresh rhubarb, rice, rosemary, mitra, ranch dressing, table salt, non-chocolate puddings, honey, thyme, corn or flour tortillas, vanilla, Vitamins (A, B1, B2, B12, D, E, K), flour, cool  whjosé manuel.   ___________________________________________________________     UTI Prevention:  - Recommend cranberry supplement 1gm twice-daily or Vitamin C 1 gm twice daily.  Do NOT recommend cranberry juice or orange juice as juice can make symptoms worse.   - AZO as needed; if symptoms do not improve after 24-48 hours after taking AZO as needed advise contacting clinic.   - Probiotic daily; recommend for Florajen 3   - Make sure you stay hydrated with about 60-80oz of water per day.   - Void after sexual intercourse.   - Recommend good vulvar hygiene such as wearing loose cotton underwear and avoiding scented hygenic products/wipes/soaps or detergents. Wipe front to back after voiding/defecation. Avoid sitting in soiled clothing and keeping vulva dry.   - If you develop symptoms of UTI, please contact clinic. However, if you develop fevers  greater than 100.4 degrees fahrenheit, flank pain or blood in your urine, recommend going to urgent care or ER.   - Make sure to drink plenty of water each day and to really push fluids when have symptoms of a UTI.  - D-mannose 2gm daily.     _______________________________________________

## 2023-09-19 NOTE — LETTER
9/19/2023       RE: Alina Thompson  [address] [facility] [address]     Dear Colleague,    Thank you for referring your patient, Alina Thompson, to the [facility] at [facility]. Please see a copy of my visit note below.    Virtual Visit Details    Type of service:  Video Visit     Originating Location (pt. Location): Home in [place]    Distant Location (provider location):  On-site  Platform used for Video Visit: Social[employer]  Start 1:26PM  End 1:43PM      Urology Clinic    Name: Alina Thompson    MRN: [id number]   YOB: 1990  Accompanied at today's visit by:self              Assessment and Plan:   32 year old female with hx of UTIs, urethral pain, is s/p gastric bypass surgery, dyspareunia since bypass surgery, vaginal dryness    - Do not think patient is having true UTIs. Recommend IC diet.   - Can try AZO prior to intercourse and sitz bath after intercourse.  - Discussed vulvar hygiene.   - Think would benefit from PFPT; have patient followup for exam.   - Recommend good clean love as lubricant for intercourse.   - Contact clinic if develops s/s of UTI in the future. Given mixed cyril on Ucs, recommend cathed UA/Ucs in the future.   - Encourage drinking more water during the day.   - consider valium suppositories vs lidocaine gel  - follow-up next available time slot for exam, PVR and UA.   - Encourage BC for controlling menses. Discussed how menses can potentially affect bladder/pelvic floor symptoms.  - After discussing the assessment and plan with patient, patient verbalized understanding and agreed to the above plan. All questions answered.     28 minutes were spent today on the date of the encounter in reviewing the EMR, direct patient care, coordination of care and documentation.    Marilou Gaytan PA-C  September 19, 2023    Patient Care Team:  Prudence Laughlin MD as PCP - General (Internal  Medicine)  Melanie Chow PA-C as Physician Assistant (Dermatology)  Cameron Ibarra DO as Referring Physician (Family Medicine)  Abida Buchanan MD as Assigned OBGYN Provider  Prudence Laughlin MD as Assigned PCP  Pelon Miller PA-C as Assigned Surgical Provider  Marilou Gaytan PA-C as Physician Assistant  ABIDA BUCHANAN          Chief Complaint:   Hx of UTIs          History of Present Illness:   2023    HISTORY: Alina Thompson is a 32 year old  female as a new consultation for concerns of hx of UTIs. Has undergone many E-visits for UTI symptoms. Per EMR has had 3 Ucs this past year showing mixed cyril or no growth. Notices her UTI symptoms flare up after intercourse. Does void post coitus. Wearing cotton underwear. Typical UTI symptoms include urinary frequency, voiding small amounts, burning with urination along the urethra and after voiding. Denies gross hematuria or back/flank pain. Reports AZO helps symptoms. Has never seen urology team in the past. When not having UTI symptoms voiding 4-5x/day and 0-1x/night. Hx of mild SANDRA prior to bypas surgery. Denies urinary incontinence. Feels like they empty completely after voiding. Drinks carbonated beverages and coffee once per day. Denies hx of stones. Since her gastric bypass about 1 year ago, her menses has become more regular. Currently on BC. Would like to become pregnant in the future but is not actively trying currently. Denies prolapse symptoms. Sexually active. Reports pain with intercourse. Describes pain with penetration even with lubrication, which is new since gastric bypass . Denies hx of abnormal pap smears recently. Struggles with staying hydrated during the day.  Bowels are regular. PMH is significant for Type II DM howeer no longer DM since bypass surgery. PSH cholecystectomy,  x1. Denies history of smoking.. Patient voices no other concerns at this time.          Past Medical  History:     Past Medical History:   Diagnosis Date    Diabetes mellitus, type 2 (H)     NO meds used    Migraine with aura and without status migrainosus, not intractable 01/21/2020    Obesity     Recurrent UTI     Recurrent vaginitis     BV and Yeast    Sleep apnea           Past Surgical History:     Past Surgical History:   Procedure Laterality Date    ABDOMEN SURGERY  5/4/22    Gastric Bypass    CHOLECYSTECTOMY      DAVINCI BYPASS GASTRIC VIRA-EN-Y N/A 05/04/2022    Procedure: Robotic assisted laparoscopic Vira-en-Y gastric bypass;  Surgeon: Jean-Claude Lin MD;  Location: [facility] OR    ENT SURGERY      ear tubes    TONSILLECTOMY              Social History:     Social History     Tobacco Use    Smoking status: Never     Passive exposure: Never    Smokeless tobacco: Never   Substance Use Topics    Alcohol use: Not Currently            Family History:     Family History   Problem Relation Age of Onset    Sleep Apnea Other         Grandmother    Obesity Mother     Diabetes Mother         Had before her gastric bypass    Obesity Father     Diabetes Maternal Grandmother     Heart Disease Maternal Grandmother     Obesity Maternal Grandmother     Other Cancer Maternal Grandmother         Uterine or Ovarian Cancer    Obesity Maternal Grandfather     Diabetes Paternal Grandmother     Genetic Disorder Paternal Grandmother     Obesity Paternal Grandmother     Other Cancer Paternal Grandmother         Uterine or Ovarian Cancer    Thyroid Disease Paternal Grandmother         Parathyroid removed    Liver Cancer Paternal Grandfather     Obesity Sister               Allergies:     Allergies   Allergen Reactions    Asa [Aspirin]      Gastric Bypass     Nsaids      Gastric Bypass    Sulfa Antibiotics     Wellbutrin [Bupropion] Other (See Comments)     Dizziness            Medications:     Current Outpatient Medications   Medication Sig    CALCIUM PO Take 2 capsules by mouth daily 600mg breakfast and lunch    cyanocobalamin  "(VITAMIN B-12) 100 MCG tablet Take 100 mcg by mouth daily    desogestrel-ethinyl estradiol (APRI) 0.15-30 MG-MCG tablet Take 1 tablet by mouth daily Continuously    ferrous fumarate 65 mg, [place]. FE,-Vitamin C 125 mg (VITRON C)  MG TABS tablet Take 1 tablet by mouth daily    FLUoxetine (PROZAC) 20 MG capsule Take 1 capsule (20 mg) by mouth daily    fluticasone (FLONASE) 50 MCG/ACT nasal spray Spray 1 spray into both nostrils daily    meclizine (ANTIVERT) 25 MG tablet Take 1 tablet (25 mg) by mouth 3 times daily as needed for dizziness    Multiple Vitamin (MULTIVITAMIN ADULT PO) Take 1 tablet by mouth daily    nystatin (MYCOSTATIN) 208775 UNIT/GM external cream Apply topically 2 times daily as needed for dry skin Apply as needed for rash.    SUMAtriptan (IMITREX) 25 MG tablet Take 1 tablet (25 mg) by mouth at onset of headache for migraine May repeat in 2 hours. Max 8 tablets/24 hours.    Thiamine Mononitrate (VITAMIN B-1) 100 MG TABS     triamcinolone (KENALOG) 0.1 % external ointment Apply topically 2 times daily as needed for irritation    VITAMIN D (CHOLECALCIFEROL) PO Take 1 tablet by mouth daily     No current facility-administered medications for this visit.             Review of Systems:    ROS: 14 point ROS neg other than the symptoms noted above in the HPI.          Physical Exam:   Height 1.803 m (5' 11\"), weight 81.6 kg (180 lb), last menstrual period 08/20/2023, not currently breastfeeding.  5' 11\", Body mass index is 25.1 kg/m ., 180 lbs 0 oz  Gen appearance: Age-appropriate appearing female in NAD.   HEENT:  EOMI, conjunctiva clear/white. Normal ROM of neck for age.   Psych:  alert , In no acute distress.  Neuro:  A&Ox3  Resp:  Normal respiratory effort; not in acute respiratory distress.          Data:    Labs:  UC  7/25/23 no growth  6/27/23 mixed cyril  1/9/23 mixed cyril    Office Visit on 07/25/2023   Component Date Value Ref Range Status    Color Urine 07/25/2023 Yellow  Colorless, Straw, " Light Yellow, Yellow Final    Appearance Urine 07/25/2023 Clear  Clear Final    Glucose Urine 07/25/2023 Negative  Negative mg/dL Final    Bilirubin Urine 07/25/2023 Negative  Negative Final    Ketones Urine 07/25/2023 Negative  Negative mg/dL Final    Specific Gravity Urine 07/25/2023 1.015  1.005 - 1.030 Final    Blood Urine 07/25/2023 Negative  Negative Final    pH Urine 07/25/2023 5.5  5.0 - 8.0 Final    Protein Albumin Urine 07/25/2023 Negative  Negative mg/dL Final    Urobilinogen Urine 07/25/2023 0.2  0.2, 1.0 E.U./dL Final    Nitrite Urine 07/25/2023 Negative  Negative Final    Leukocyte Esterase Urine 07/25/2023 Small (A)  Negative Final    Bacteria Urine 07/25/2023 Moderate (A)  None Seen /HPF Final    RBC Urine 07/25/2023 0-2  0-2 /HPF /HPF Final    WBC Urine 07/25/2023 25-50 (A)  0-5 /HPF /HPF Final    Squamous Epithelials Urine 07/25/2023 Few (A)  None Seen /LPF Final    Culture 07/25/2023 No Growth   Final

## 2023-09-25 ENCOUNTER — TELEPHONE (OUTPATIENT)
Dept: UROLOGY | Facility: CLINIC | Age: 33
End: 2023-09-25
Payer: COMMERCIAL

## 2023-09-25 NOTE — TELEPHONE ENCOUNTER
----- Message from Johnna Warner sent at 9/19/2023  2:40 PM CDT -----  Regarding: Next available  follow-up next available time slot for exam, PVR and UA.    ALEJA  9/19/23

## 2023-10-17 DIAGNOSIS — Z30.41 SURVEILLANCE OF PREVIOUSLY PRESCRIBED CONTRACEPTIVE PILL: ICD-10-CM

## 2023-10-17 RX ORDER — DESOGESTREL AND ETHINYL ESTRADIOL 0.15-0.03
1 KIT ORAL DAILY
Qty: 112 TABLET | Refills: 0 | Status: SHIPPED | OUTPATIENT
Start: 2023-10-17 | End: 2023-11-07

## 2023-10-17 NOTE — TELEPHONE ENCOUNTER
"Requested Prescriptions   Pending Prescriptions Disp Refills    desogestrel-ethinyl estradiol (APRI) 0.15-30 MG-MCG tablet 112 tablet 3     Sig: Take 1 tablet by mouth daily Continuously       Contraceptives Protocol Failed - 10/17/2023  8:21 AM        Failed - Recent (12 mo) or future (30 days) visit within the authorizing provider's specialty     Patient has had an office visit with the authorizing provider or a provider within the authorizing providers department within the previous 12 mos or has a future within next 30 days. See \"Patient Info\" tab in inbasket, or \"Choose Columns\" in Meds & Orders section of the refill encounter.              Passed - Patient is not a current smoker if age is 35 or older        Passed - Medication is active on med list        Passed - No active pregnancy on record        Passed - No positive pregnancy test in past 12 months           Has upcoming appt. Filled.    Kimberly CHILD RN BSN      "

## 2023-10-17 NOTE — TELEPHONE ENCOUNTER
Apri 28 day tablet      Last Written Prescription Date:  8/26/2022  Last Fill Quantity: 112,   # refills: 3  Last Office Visit: 8/26/2022  Future Office visit:       Deborah Rahman CMA

## 2023-10-20 ENCOUNTER — OFFICE VISIT (OUTPATIENT)
Dept: UROLOGY | Facility: CLINIC | Age: 33
End: 2023-10-20
Payer: COMMERCIAL

## 2023-10-20 VITALS
HEIGHT: 71 IN | DIASTOLIC BLOOD PRESSURE: 78 MMHG | BODY MASS INDEX: 25.34 KG/M2 | SYSTOLIC BLOOD PRESSURE: 104 MMHG | WEIGHT: 181 LBS

## 2023-10-20 DIAGNOSIS — M62.89 HIGH-TONE PELVIC FLOOR DYSFUNCTION: ICD-10-CM

## 2023-10-20 DIAGNOSIS — N89.8 VAGINAL DRYNESS: ICD-10-CM

## 2023-10-20 DIAGNOSIS — N94.10 DYSPAREUNIA IN FEMALE: ICD-10-CM

## 2023-10-20 DIAGNOSIS — Z87.440 HISTORY OF UTI: Primary | ICD-10-CM

## 2023-10-20 LAB
ALBUMIN UR-MCNC: 100 MG/DL
APPEARANCE UR: ABNORMAL
BILIRUB UR QL STRIP: ABNORMAL
COLOR UR AUTO: ABNORMAL
GLUCOSE UR STRIP-MCNC: 100 MG/DL
HGB UR QL STRIP: ABNORMAL
KETONES UR STRIP-MCNC: ABNORMAL MG/DL
LEUKOCYTE ESTERASE UR QL STRIP: ABNORMAL
NITRATE UR QL: NEGATIVE
PH UR STRIP: 6.5 [PH] (ref 5–7)
RESIDUAL VOLUME (RV) (EXTERNAL): 20
SP GR UR STRIP: >=1.03 (ref 1–1.03)
UROBILINOGEN UR STRIP-ACNC: 1 E.U./DL

## 2023-10-20 PROCEDURE — 99214 OFFICE O/P EST MOD 30 MIN: CPT | Mod: 25 | Performed by: PHYSICIAN ASSISTANT

## 2023-10-20 PROCEDURE — 87086 URINE CULTURE/COLONY COUNT: CPT | Performed by: PHYSICIAN ASSISTANT

## 2023-10-20 PROCEDURE — 81003 URINALYSIS AUTO W/O SCOPE: CPT | Mod: QW | Performed by: PHYSICIAN ASSISTANT

## 2023-10-20 PROCEDURE — 51798 US URINE CAPACITY MEASURE: CPT | Performed by: PHYSICIAN ASSISTANT

## 2023-10-20 ASSESSMENT — PAIN SCALES - GENERAL: PAINLEVEL: MILD PAIN (2)

## 2023-10-20 NOTE — LETTER
10/20/2023       RE: Alina Thompson  [address] [facility] [address]     Dear Colleague,    Thank you for referring your patient, Alina Thompson, to the [facility] at [facility]. Please see a copy of my visit note below.    Urology Clinic      Name: Alina Thompson    MRN: [id number]   YOB: 1990  Accompanied at today's visit by:self                 Chief Complaint:   Hx of UTI          History of Present Illness:   October 20, 2023    HISTORY:   We have been following 32 year old Alina Thompson for hx of UTIs, urethral pain, is s/p gastric bypass surgery, dyspareunia since bypass surgery, vaginal dryness. Typical UTI symptoms include urinary frequency, voiding small amounts, burning with urination along the urethra and after voiding. Last seen on 9/19/23 via [facility] and did not think was having true UTIs and recommended IC diet and AZO prn. Recommended cathed UA/Ucs in the future, given UCs in past showed mixed cyril. Here for exam. Hx of mild SANDRA prior to bypas surgery. Denies urinary incontinence. has not been sexually active since last encounter. Hx of dyspareunia and vaginal dryness. Denies any s/s of UTI today or having recent UTIs. Currently mensurating. Currently on BC. Patient voices no other concerns at this time.            Allergies:     Allergies   Allergen Reactions    Asa [Aspirin]      Gastric Bypass     Nsaids      Gastric Bypass    Sulfa Antibiotics     Wellbutrin [Bupropion] Other (See Comments)     Dizziness            Medications:     Current Outpatient Medications   Medication Sig    CALCIUM PO Take 2 capsules by mouth daily 600mg breakfast and lunch    cyanocobalamin (VITAMIN B-12) 100 MCG tablet Take 100 mcg by mouth daily    desogestrel-ethinyl estradiol (APRI) 0.15-30 MG-MCG tablet Take 1 tablet by mouth daily Continuously    ferrous fumarate 65 mg, [place]. FE,-Vitamin C 125 mg  "(VITRON C)  MG TABS tablet Take 1 tablet by mouth daily    FLUoxetine (PROZAC) 20 MG capsule Take 1 capsule (20 mg) by mouth daily    fluticasone (FLONASE) 50 MCG/ACT nasal spray Spray 1 spray into both nostrils daily    meclizine (ANTIVERT) 25 MG tablet Take 1 tablet (25 mg) by mouth 3 times daily as needed for dizziness    Multiple Vitamin (MULTIVITAMIN ADULT PO) Take 1 tablet by mouth daily    nystatin (MYCOSTATIN) 307089 UNIT/GM external cream Apply topically 2 times daily as needed for dry skin Apply as needed for rash.    SUMAtriptan (IMITREX) 25 MG tablet Take 1 tablet (25 mg) by mouth at onset of headache for migraine May repeat in 2 hours. Max 8 tablets/24 hours.    Thiamine Mononitrate (VITAMIN B-1) 100 MG TABS     triamcinolone (KENALOG) 0.1 % external ointment Apply topically 2 times daily as needed for irritation    VITAMIN D (CHOLECALCIFEROL) PO Take 1 tablet by mouth daily     No current facility-administered medications for this visit.               Past  Surgical History:     Past Surgical History:   Procedure Laterality Date    ABDOMEN SURGERY  5/4/22    Gastric Bypass    CHOLECYSTECTOMY      DAVINCI BYPASS GASTRIC VIRA-EN-Y N/A 05/04/2022    Procedure: Robotic assisted laparoscopic Vira-en-Y gastric bypass;  Surgeon: Jean-Claude Lin MD;  Location:  OR    ENT SURGERY      ear tubes    TONSILLECTOMY               Physical Exam:     Vitals:    10/20/23 1102   BP: 104/78   Weight: 82.1 kg (181 lb)   Height: 1.803 m (5' 10.98\")     PSYCH: NAD  EYES: EOMI  NEURO: AAO x3    LABS:   PVR 20mL    UA RESULTS:  Recent Labs   Lab Test 10/20/23  1109 07/25/23  1111   COLOR Dark Yellow* Yellow   APPEARANCE Bloody* Clear   URINEGLC 100* Negative   URINEBILI Small* Negative   URINEKETONE Trace* Negative   SG >=1.030 1.015   UBLD Large* Negative   URINEPH 6.5 5.5   PROTEIN 100* Negative   UROBILINOGEN 1.0 0.2   NITRITE Negative Negative   LEUKEST Small* Small*   RBCU  --  0-2   WBCU  --  25-50* "       Creatinine   Date Value Ref Range Status   03/15/2023 0.61 0.51 - 0.95 mg/dL Final   05/26/2021 0.63 0.52 - 1.04 mg/dL Final          Assessment and Plan:   32 year old is a pleasant female who has UTIs, urethral pain, is s/p gastric bypass surgery, dyspareunia since bypass surgery, vaginal dryness    Plan:  -  PVR WNL.  - UA reviewed; will send for culture.   - Recommend cathed UA/Ucs in the future.    - referral to PFPT.  - order of valium vaginal suppositories. Discussed risks/benefits and potential side effects. Do not drive or drink alcohol after placing as can cause drowsiness. Do not use if become pregnant. Notify PCP of starting valium vaginal suppositories.   - consider lidocaine gel for urethra if symptoms persist.   - try sitz bath  - recommend starting OTC supplements for UTI prevention.   - consider sexual medicine in the future.  - Discussed how anxiety/stress can affect pelvic floor/bladder symptoms.   - Follow-up in 3-4 months  - After discussing the assessment and plan with patient, patient verbalizes understanding and agrees to the above plan. All questions answered.     Other orders as below:  Orders Placed This Encounter   Procedures    MEASURE POST-VOID RESIDUAL URINE/BLADDER CAPACITY, US NON-IMAGING (67502)    UA without Microscopic [[id number]]     29 minutes spent on the date of the encounter doing chart review, review of test results, interpretation of tests, patient visit and documentation, exam, referral to PFPT, ordering valium.     Marilou Gaytan PA-C  Urology  October 20, 2023    Patient Care Team:  Prudence Laughlin MD as PCP - General (Internal Medicine)  Melanie Chow PA-C as Physician Assistant (Dermatology)  Cameron Ibarra DO as Referring Physician (Family Medicine)  Abida Sroenson MD as Assigned OBGYN Provider  Prudence Laughlin MD as Assigned PCP  Pelon Miller PA-C as Assigned Surgical Provider  Marilou Gaytan PA-C as Physician  Assistant

## 2023-10-20 NOTE — NURSING NOTE
Chief Complaint   Patient presents with    UTI     Here for UA,PVR and exam.    post void residual by bladder scan 20 ml  Sofiya Nowak LPN

## 2023-10-20 NOTE — PATIENT INSTRUCTIONS
Follow-up in 3-4 months or sooner if needed    Referral placed to PT; they will call you to schedule.    Will order valium vaginal suppositories. Place one suppository once nightly for pelvic pain. Can cause drowsiness so do not drive or drink alcohol after taking. Notify your primary care doctor as well that you will be starting this medication. Stop if become pregnant in the future.       Instant [place] Bath:  Over the counter Instant Ocean baths can be used for chronic bladder pain, urethral pain, vulvar pain and pelvic pain. Fill your clean tub half-full with warm water and add 2 cups of instant ocean salts. Allow the salt to dissolve. Soak for about 10 minutes (if tolerated). After bathing, rinse off body with fresh water and gently pat dry with a towel. You can do instant ocean baths 2-3 times per day or as needed.    If unable to obtain Instant [place] Bath, can also try a bath using over the counter epsom salt.   __________________________________________________   You have been referred to Pelvic Floor Physical Therapy. They will call you to schedule this appointment  Locations for Pelvic Floor Physical Therapy:  [facility]   [facility]:   [facility]  [place]  [place]  [facility] [place]  [place]    [facility] [place] [facility] [place]   [facility]    _________________________________________________

## 2023-10-20 NOTE — PROGRESS NOTES
Urology Clinic      Name: Alina Thompson    MRN: [id number]   YOB: 1990  Accompanied at today's visit by:self                 Chief Complaint:   Hx of UTI          History of Present Illness:   October 20, 2023    HISTORY:   We have been following 32 year old Alina Thompson for hx of UTIs, urethral pain, is s/p gastric bypass surgery, dyspareunia since bypass surgery, vaginal dryness. Typical UTI symptoms include urinary frequency, voiding small amounts, burning with urination along the urethra and after voiding. Last seen on 9/19/23 via [facility] and did not think was having true UTIs and recommended IC diet and AZO prn. Recommended cathed UA/Ucs in the future, given UCs in past showed mixed cyril. Here for exam. Hx of mild SANDRA prior to bypas surgery. Denies urinary incontinence. has not been sexually active since last encounter. Hx of dyspareunia and vaginal dryness. Denies any s/s of UTI today or having recent UTIs. Currently mensurating. Currently on BC. Patient voices no other concerns at this time.            Allergies:     Allergies   Allergen Reactions    Asa [Aspirin]      Gastric Bypass     Nsaids      Gastric Bypass    Sulfa Antibiotics     Wellbutrin [Bupropion] Other (See Comments)     Dizziness            Medications:     Current Outpatient Medications   Medication Sig    CALCIUM PO Take 2 capsules by mouth daily 600mg breakfast and lunch    cyanocobalamin (VITAMIN B-12) 100 MCG tablet Take 100 mcg by mouth daily    desogestrel-ethinyl estradiol (APRI) 0.15-30 MG-MCG tablet Take 1 tablet by mouth daily Continuously    ferrous fumarate 65 mg, [place]. FE,-Vitamin C 125 mg (VITRON C)  MG TABS tablet Take 1 tablet by mouth daily    FLUoxetine (PROZAC) 20 MG capsule Take 1 capsule (20 mg) by mouth daily    fluticasone (FLONASE) 50 MCG/ACT nasal spray Spray 1 spray into both nostrils daily    meclizine (ANTIVERT) 25 MG tablet Take 1 tablet (25 mg) by mouth 3 times daily as needed for  "dizziness    Multiple Vitamin (MULTIVITAMIN ADULT PO) Take 1 tablet by mouth daily    nystatin (MYCOSTATIN) 180252 UNIT/GM external cream Apply topically 2 times daily as needed for dry skin Apply as needed for rash.    SUMAtriptan (IMITREX) 25 MG tablet Take 1 tablet (25 mg) by mouth at onset of headache for migraine May repeat in 2 hours. Max 8 tablets/24 hours.    Thiamine Mononitrate (VITAMIN B-1) 100 MG TABS     triamcinolone (KENALOG) 0.1 % external ointment Apply topically 2 times daily as needed for irritation    VITAMIN D (CHOLECALCIFEROL) PO Take 1 tablet by mouth daily     No current facility-administered medications for this visit.               Past  Surgical History:     Past Surgical History:   Procedure Laterality Date    ABDOMEN SURGERY  5/4/22    Gastric Bypass    CHOLECYSTECTOMY      DAVINCI BYPASS GASTRIC VIRA-EN-Y N/A 05/04/2022    Procedure: Robotic assisted laparoscopic Vira-en-Y gastric bypass;  Surgeon: Jean-Claude Lin MD;  Location:  OR    ENT SURGERY      ear tubes    TONSILLECTOMY               Physical Exam:     Vitals:    10/20/23 1102   BP: 104/78   Weight: 82.1 kg (181 lb)   Height: 1.803 m (5' 10.98\")     PSYCH: NAD  EYES: EOMI  NEURO: AAO x3    LABS:   PVR 20mL    UA RESULTS:  Recent Labs   Lab Test 10/20/23  1109 07/25/23  1111   COLOR Dark Yellow* Yellow   APPEARANCE Bloody* Clear   URINEGLC 100* Negative   URINEBILI Small* Negative   URINEKETONE Trace* Negative   SG >=1.030 1.015   UBLD Large* Negative   URINEPH 6.5 5.5   PROTEIN 100* Negative   UROBILINOGEN 1.0 0.2   NITRITE Negative Negative   LEUKEST Small* Small*   RBCU  --  0-2   WBCU  --  25-50*       Creatinine   Date Value Ref Range Status   03/15/2023 0.61 0.51 - 0.95 mg/dL Final   05/26/2021 0.63 0.52 - 1.04 mg/dL Final            Assessment and Plan:   32 year old is a pleasant female who has UTIs, urethral pain, is s/p gastric bypass surgery, dyspareunia since bypass surgery, vaginal dryness    Plan:  -  " PVR WNL.  - UA reviewed; will send for culture.   - Recommend cathed UA/Ucs in the future.    - referral to PFPT.  - order of valium vaginal suppositories. Discussed risks/benefits and potential side effects. Do not drive or drink alcohol after placing as can cause drowsiness. Do not use if become pregnant. Notify PCP of starting valium vaginal suppositories.   - consider lidocaine gel for urethra if symptoms persist.   - try sitz bath  - recommend starting OTC supplements for UTI prevention.   - consider sexual medicine in the future.  - Discussed how anxiety/stress can affect pelvic floor/bladder symptoms.   - Follow-up in 3-4 months  - After discussing the assessment and plan with patient, patient verbalizes understanding and agrees to the above plan. All questions answered.     Other orders as below:  Orders Placed This Encounter   Procedures    MEASURE POST-VOID RESIDUAL URINE/BLADDER CAPACITY, US NON-IMAGING (01401)    UA without Microscopic [[id number]]     29 minutes spent on the date of the encounter doing chart review, review of test results, interpretation of tests, patient visit and documentation, exam, referral to PFPT, ordering valium.     Marilou Gaytan PA-C  Urology  October 20, 2023      Patient Care Team:  Prudence Laughlin MD as PCP - General (Internal Medicine)  Melanie Chow PA-C as Physician Assistant (Dermatology)  Cameron Ibarra DO as Referring Physician (Family Medicine)  Abida Sorenson MD as Assigned OBGYN Provider  Prudence Laughlin MD as Assigned PCP  Pelon Miller PA-C as Assigned Surgical Provider  Marilou Gaytan PA-C as Physician Assistant

## 2023-10-22 LAB — BACTERIA UR CULT: NO GROWTH

## 2023-10-23 NOTE — PROGRESS NOTES
PLASTIC SURGERY HISTORY AND PHYSICAL    Chief Complaint: excess skin  Referring Provider: Debbie Nolasco      HPI: Alina is a 32 year old female who presents s/p bariatric surgery 5/2022 with significant weight loss, now with excess skin and panniculitis, desiring skin removal.     Highest weight prior to surgery 306lbs, current weight 180lbs. Has been stable for 6 months. Concerned about skin to arms, legs, stomach, back, butt and mons. Most concerned with legs. Bothered that she can't wear a swimsuit. Occasional rashes and acne under her abdominal skin, was prescribed nystatin cream recently. Rashes have been intermittent the past year.     Recent A1c 5, DM has resolved with weight loss. Migraines also infrequent. Sleep apnea seems to be resolved with weight loss as well, is following up with sleep medicine next week.     Mentions that she may become pregnant at some time, does not currently have kids.     PMH:   Past Medical History:   Diagnosis Date    Diabetes mellitus, type 2 (H)     NO meds used    Migraine with aura and without status migrainosus, not intractable 01/21/2020    Obesity     Recurrent UTI     Recurrent vaginitis     BV and Yeast    Sleep apnea    anxiety    PSH:   Past Surgical History:   Procedure Laterality Date    ABDOMEN SURGERY  5/4/22    CHOLECYSTECTOMY      DAVINCI BYPASS GASTRIC MARTHA-EN-Y N/A 05/04/2022    ENT SURGERY      TONSILLECTOMY         FH:   Family History   Problem Relation Age of Onset    Sleep Apnea Other         Grandmother    Obesity Mother     Diabetes Mother         Had before her gastric bypass    Obesity Father     Diabetes Maternal Grandmother     Heart Disease Maternal Grandmother     Obesity Maternal Grandmother     Other Cancer Maternal Grandmother         Uterine or Ovarian Cancer    Obesity Maternal Grandfather     Diabetes Paternal Grandmother     Genetic Disorder Paternal Grandmother     Obesity Paternal Grandmother     Other Cancer Paternal Grandmother          Uterine or Ovarian Cancer    Thyroid Disease Paternal Grandmother         Parathyroid removed    Liver Cancer Paternal Grandfather     Obesity Sister         SH:   Social History     Tobacco Use    Smoking status: Never     Passive exposure: Never    Smokeless tobacco: Never   Vaping Use    Vaping Use: Never used   Substance Use Topics    Alcohol use: Not Currently    Drug use: Yes     Types: Marijuana     Comment: 1-2 per month (Edibles)   Vapes weed once a month    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    MEDS:     Current Outpatient Medications:     CALCIUM PO, Take 2 capsules by mouth daily 600mg breakfast and lunch, Disp: , Rfl:     COMPOUND CONTAINING CONTROLLED SUBSTANCE (CMPD RX) - PHARMACY TO MIX COMPOUNDED MEDICATION, Insert one 5mg valium vaginal suppository once nightly as needed for pelvic pain, Disp: 10 suppository, Rfl: 0    cyanocobalamin (VITAMIN B-12) 100 MCG tablet, Take 100 mcg by mouth daily, Disp: , Rfl:     desogestrel-ethinyl estradiol (APRI) 0.15-30 MG-MCG tablet, Take 1 tablet by mouth daily Continuously, Disp: 112 tablet, Rfl: 0    ferrous fumarate 65 mg, [place]. FE,-Vitamin C 125 mg (VITRON C)  MG TABS tablet, Take 1 tablet by mouth daily, Disp: , Rfl:     FLUoxetine (PROZAC) 20 MG capsule, Take 1 capsule (20 mg) by mouth daily, Disp: 90 capsule, Rfl: 2    fluticasone (FLONASE) 50 MCG/ACT nasal spray, Spray 1 spray into both nostrils daily, Disp: 16 g, Rfl: 3    meclizine (ANTIVERT) 25 MG tablet, Take 1 tablet (25 mg) by mouth 3 times daily as needed for dizziness, Disp: 15 tablet, Rfl: 0    Multiple Vitamin (MULTIVITAMIN ADULT PO), Take 1 tablet by mouth daily, Disp: , Rfl:     nystatin (MYCOSTATIN) 856038 UNIT/GM external cream, Apply topically 2 times daily as needed for dry skin Apply as needed for rash., Disp: 30 g, Rfl: 3    SUMAtriptan (IMITREX) 25 MG tablet, Take 1 tablet (25 mg) by mouth at onset of headache for migraine May repeat in 2 hours. Max 8 tablets/24  "hours., Disp: 30 tablet, Rfl: 0    Thiamine Mononitrate (VITAMIN B-1) 100 MG TABS, , Disp: , Rfl:     triamcinolone (KENALOG) 0.1 % external ointment, Apply topically 2 times daily as needed for irritation, Disp: 80 g, Rfl: 1    VITAMIN D (CHOLECALCIFEROL) PO, Take 1 tablet by mouth daily, Disp: , Rfl:        ALLERGIES:     Allergies   Allergen Reactions    Asa [Aspirin]      Gastric Bypass     Nsaids      Gastric Bypass    Sulfa Antibiotics     Wellbutrin [Bupropion] Other (See Comments)     Dizziness   Sulfa-hives     ROS: Denies chest pain, shortness of breath, MI, CVA, DVT, PE, and bleeding disorders.     PHYSICAL EXAMINATION:   /78 (BP Location: Right arm, Patient Position: Sitting, Cuff Size: Adult Large)   Pulse 81   Ht 1.803 m (5' 11\")   Wt 82.9 kg (182 lb 11.2 oz)   LMP 08/20/2023 (Exact Date)   SpO2 96%   BMI 25.48 kg/m     BMI: Body mass index is 25.48 kg/m .  General: NAD  Chest: bilateral breasts with atrophy, loss of upper pole fullness. Grade II ptosis. Loose skin to lateral chest.   Abdomen: excess skin above and below umbilicus. Small panniculus, grade I ptosis.   Back: no significant lower back skin/rolls.   Arms: excess skin hanging 3-4 cm.   Legs: excess skin to upper thighs. No significant fullness to medial knees.      ASSESSMENT: 33 yo F PMH MO s/p gastric bypass 5/2022 now with significant weight loss and excess skin with panniculitis desiring body contouring.      PLAN:     I think she is a good candidate for staged body contouring. I do not think it is likely that insurance will cover these. I gave her the names of procedure as well as some codes so she can check with them.     Would like quotes for brachioplasty (with extension lateral chest rolls), mastopexy, abdominoplasty and medial thigh lift. Considering doing thighs first and potential abdomen/breasts after having kids.    She is going to have consult as some purely cosmetic practices as well. If interested in pursuing " with us will come back for consult with Dr. Frey prior to scheduling     50 minutes spent on the date of the encounter doing chart review, history and physical, dressing changes, documentation and further activity as noted above.

## 2023-10-24 ENCOUNTER — PRE VISIT (OUTPATIENT)
Dept: PLASTIC SURGERY | Facility: CLINIC | Age: 33
End: 2023-10-24

## 2023-10-24 ENCOUNTER — OFFICE VISIT (OUTPATIENT)
Dept: PLASTIC SURGERY | Facility: CLINIC | Age: 33
End: 2023-10-24
Attending: PHYSICIAN ASSISTANT
Payer: COMMERCIAL

## 2023-10-24 VITALS
DIASTOLIC BLOOD PRESSURE: 78 MMHG | HEART RATE: 81 BPM | BODY MASS INDEX: 25.58 KG/M2 | OXYGEN SATURATION: 96 % | WEIGHT: 182.7 LBS | SYSTOLIC BLOOD PRESSURE: 112 MMHG | HEIGHT: 71 IN

## 2023-10-24 DIAGNOSIS — L30.4 INTERTRIGINOUS DERMATITIS ASSOCIATED WITH MOISTURE: ICD-10-CM

## 2023-10-24 DIAGNOSIS — L98.7 EXCESS SKIN: Primary | ICD-10-CM

## 2023-10-24 DIAGNOSIS — Z98.84 BARIATRIC SURGERY STATUS: ICD-10-CM

## 2023-10-24 PROCEDURE — 99204 OFFICE O/P NEW MOD 45 MIN: CPT | Performed by: PHYSICIAN ASSISTANT

## 2023-10-24 ASSESSMENT — PAIN SCALES - GENERAL: PAINLEVEL: NO PAIN (0)

## 2023-10-24 NOTE — LETTER
10/24/2023       RE: Alina Thompson  [address] [facility] [address]     Dear Colleague,    Thank you for referring your patient, Alina Thompson, to the [facility] [place] at [facility]. Please see a copy of my visit note below.    PLASTIC SURGERY HISTORY AND PHYSICAL    Chief Complaint: excess skin  Referring Provider: Debbie Nolasco      HPI: Alina is a 32 year old female who presents s/p bariatric surgery 5/2022 with significant weight loss, now with excess skin and panniculitis, desiring skin removal.     Highest weight prior to surgery 306lbs, current weight 180lbs. Has been stable for 6 months. Concerned about skin to arms, legs, stomach, back, butt and mons. Most concerned with legs. Bothered that she can't wear a swimsuit. Occasional rashes and acne under her abdominal skin, was prescribed nystatin cream recently. Rashes have been intermittent the past year.     Recent A1c 5, DM has resolved with weight loss. Migraines also infrequent. Sleep apnea seems to be resolved with weight loss as well, is following up with sleep medicine next week.     Mentions that she may become pregnant at some time, does not currently have kids.     PMH:   Past Medical History:   Diagnosis Date    Diabetes mellitus, type 2 (H)     NO meds used    Migraine with aura and without status migrainosus, not intractable 01/21/2020    Obesity     Recurrent UTI     Recurrent vaginitis     BV and Yeast    Sleep apnea    anxiety    PSH:   Past Surgical History:   Procedure Laterality Date    ABDOMEN SURGERY  5/4/22    CHOLECYSTECTOMY      DAVINCI BYPASS GASTRIC MARTHA-EN-Y N/A 05/04/2022    ENT SURGERY      TONSILLECTOMY         FH:   Family History   Problem Relation Age of Onset    Sleep Apnea Other         Grandmother    Obesity Mother     Diabetes Mother         Had before her gastric bypass    Obesity Father      Diabetes Maternal Grandmother     Heart Disease Maternal Grandmother     Obesity Maternal Grandmother     Other Cancer Maternal Grandmother         Uterine or Ovarian Cancer    Obesity Maternal Grandfather     Diabetes Paternal Grandmother     Genetic Disorder Paternal Grandmother     Obesity Paternal Grandmother     Other Cancer Paternal Grandmother         Uterine or Ovarian Cancer    Thyroid Disease Paternal Grandmother         Parathyroid removed    Liver Cancer Paternal Grandfather     Obesity Sister         SH:   Social History     Tobacco Use    Smoking status: Never     Passive exposure: Never    Smokeless tobacco: Never   Vaping Use    Vaping Use: Never used   Substance Use Topics    Alcohol use: Not Currently    Drug use: Yes     Types: Marijuana     Comment: 1-2 per month (Edibles)   Vapes weed once a month    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    MEDS:     Current Outpatient Medications:     CALCIUM PO, Take 2 capsules by mouth daily 600mg breakfast and lunch, Disp: , Rfl:     COMPOUND CONTAINING CONTROLLED SUBSTANCE (CMPD RX) - PHARMACY TO MIX COMPOUNDED MEDICATION, Insert one 5mg valium vaginal suppository once nightly as needed for pelvic pain, Disp: 10 suppository, Rfl: 0    cyanocobalamin (VITAMIN B-12) 100 MCG tablet, Take 100 mcg by mouth daily, Disp: , Rfl:     desogestrel-ethinyl estradiol (APRI) 0.15-30 MG-MCG tablet, Take 1 tablet by mouth daily Continuously, Disp: 112 tablet, Rfl: 0    ferrous fumarate 65 mg, [place]. FE,-Vitamin C 125 mg (VITRON C)  MG TABS tablet, Take 1 tablet by mouth daily, Disp: , Rfl:     FLUoxetine (PROZAC) 20 MG capsule, Take 1 capsule (20 mg) by mouth daily, Disp: 90 capsule, Rfl: 2    fluticasone (FLONASE) 50 MCG/ACT nasal spray, Spray 1 spray into both nostrils daily, Disp: 16 g, Rfl: 3    meclizine (ANTIVERT) 25 MG tablet, Take 1 tablet (25 mg) by mouth 3 times daily as needed for dizziness, Disp: 15 tablet, Rfl: 0    Multiple Vitamin  "(MULTIVITAMIN ADULT PO), Take 1 tablet by mouth daily, Disp: , Rfl:     nystatin (MYCOSTATIN) 682140 UNIT/GM external cream, Apply topically 2 times daily as needed for dry skin Apply as needed for rash., Disp: 30 g, Rfl: 3    SUMAtriptan (IMITREX) 25 MG tablet, Take 1 tablet (25 mg) by mouth at onset of headache for migraine May repeat in 2 hours. Max 8 tablets/24 hours., Disp: 30 tablet, Rfl: 0    Thiamine Mononitrate (VITAMIN B-1) 100 MG TABS, , Disp: , Rfl:     triamcinolone (KENALOG) 0.1 % external ointment, Apply topically 2 times daily as needed for irritation, Disp: 80 g, Rfl: 1    VITAMIN D (CHOLECALCIFEROL) PO, Take 1 tablet by mouth daily, Disp: , Rfl:        ALLERGIES:     Allergies   Allergen Reactions    Asa [Aspirin]      Gastric Bypass     Nsaids      Gastric Bypass    Sulfa Antibiotics     Wellbutrin [Bupropion] Other (See Comments)     Dizziness   Sulfa-hives     ROS: Denies chest pain, shortness of breath, MI, CVA, DVT, PE, and bleeding disorders.     PHYSICAL EXAMINATION:   /78 (BP Location: Right arm, Patient Position: Sitting, Cuff Size: Adult Large)   Pulse 81   Ht 1.803 m (5' 11\")   Wt 82.9 kg (182 lb 11.2 oz)   LMP 08/20/2023 (Exact Date)   SpO2 96%   BMI 25.48 kg/m     BMI: Body mass index is 25.48 kg/m .  General: NAD  Chest: bilateral breasts with atrophy, loss of upper pole fullness. Grade II ptosis. Loose skin to lateral chest.   Abdomen: excess skin above and below umbilicus. Small panniculus, grade I ptosis.   Back: no significant lower back skin/rolls.   Arms: excess skin hanging 3-4 cm.   Legs: excess skin to upper thighs. No significant fullness to medial knees.      ASSESSMENT: 31 yo F PMH MO s/p gastric bypass 5/2022 now with significant weight loss and excess skin with panniculitis desiring body contouring.      PLAN:   I think she is a good candidate for staged body contouring. I do not think it is likely that insurance will cover these. I gave her the names of " procedure as well as some codes so she can check with them.     Would like quotes for brachioplasty (with extension lateral chest rolls), mastopexy, abdominoplasty and medial thigh lift. Considering doing thighs first and potential abdomen/breasts after having kids.    She is going to have consult as some purely cosmetic practices as well. If interested in pursuing with us will come back for consult with Dr. Frey prior to scheduling     50 minutes spent on the date of the encounter doing chart review, history and physical, dressing changes, documentation and further activity as noted above.        Again, thank you for allowing me to participate in the care of your patient.      Sincerely,    Celi Spears PA-C

## 2023-10-24 NOTE — NURSING NOTE
"Chief Complaint   Patient presents with    Consult     Alina is being seen today for a consult regarding post-weight loss skin removal, per patient.       Vitals:    10/24/23 1002   BP: 112/78   BP Location: Right arm   Patient Position: Sitting   Cuff Size: Adult Large   Pulse: 81   SpO2: 96%   Weight: 182 lb 11.2 oz   Height: 5' 11\"       Body mass index is 25.48 kg/m .    Emil Funez EMT    "

## 2023-10-24 NOTE — PATIENT INSTRUCTIONS
Abdominoplasty: code 68881- Excision, excessive skin and subcutaneous tissue (includes lipectomy), abdomen (eg, abdominoplasty) (includes umbilical transposition and fascial plication)     Brachioplasty (arms): code 49061-18  Excision, excessive skin and subcutaneous tissue (includes lipectomy); arm     Medial thigh lift

## 2023-10-25 ASSESSMENT — SLEEP AND FATIGUE QUESTIONNAIRES
HOW LIKELY ARE YOU TO NOD OFF OR FALL ASLEEP WHILE SITTING QUIETLY AFTER LUNCH WITHOUT ALCOHOL: SLIGHT CHANCE OF DOZING
HOW LIKELY ARE YOU TO NOD OFF OR FALL ASLEEP IN A CAR, WHILE STOPPED FOR A FEW MINUTES IN TRAFFIC: WOULD NEVER DOZE
HOW LIKELY ARE YOU TO NOD OFF OR FALL ASLEEP WHILE WATCHING TV: SLIGHT CHANCE OF DOZING
HOW LIKELY ARE YOU TO NOD OFF OR FALL ASLEEP WHILE LYING DOWN TO REST IN THE AFTERNOON WHEN CIRCUMSTANCES PERMIT: MODERATE CHANCE OF DOZING
HOW LIKELY ARE YOU TO NOD OFF OR FALL ASLEEP WHEN YOU ARE A PASSENGER IN A CAR FOR AN HOUR WITHOUT A BREAK: SLIGHT CHANCE OF DOZING
HOW LIKELY ARE YOU TO NOD OFF OR FALL ASLEEP WHILE SITTING AND READING: SLIGHT CHANCE OF DOZING
HOW LIKELY ARE YOU TO NOD OFF OR FALL ASLEEP WHILE SITTING AND TALKING TO SOMEONE: WOULD NEVER DOZE
HOW LIKELY ARE YOU TO NOD OFF OR FALL ASLEEP WHILE SITTING INACTIVE IN A PUBLIC PLACE: WOULD NEVER DOZE

## 2023-10-29 NOTE — PROGRESS NOTES
Virtual Visit Details    Type of service:  Video Visit   Start Time: 1:58 PM   End Time: 2:20 PM    Originating Location (pt. Location): Home    Distant Location (provider location):  Off-site  Platform used for Video Visit: Livevol    Sleep Follow-Up Visit:    Date on this visit: 10/30/2023    Alina Thompson comes in today for follow-up of her previously diagnosed mild SANDRA. Alina Thompson was initially seen for follow-up of their mild sleep apnea, managed with CPAP.  She anticipates bariatric surgery in the next few months.      Patient was initially diagnosed with narcolepsy by Dr. Persaud at [facility] after repeatedly falling sleep in class. Interpretation of the study (done in 2007) was acquired and showed no significant sleep disordered breathing. MSLT the following day showed MSL of 10.4 with REM present on 3 of the naps.      Alina had a repeat sleep study on 10/8/2012. She was off all stimulant medications.   Summary of the sleep study interpreted by Dr. Torey Persaud:  Weight: 257#  Actigraphy x1 week-  Adequate total sleep prior to PSG  Polysomnogram-  Total sleep time 434. Sleep efficiency was 79.7%. Latency to sleep 13.5 minutes. Latency to .0 minutes. N1 was 6.2%, N2 64.3%, N3 7.6%, N4 21.9%. No significant sleep disordered breathing with only rare hypopneas, RDI was 0.7 per hour, oxygen remained above 92%, No CO2 retention.   MSLT revealed a mean latency of 8.75 minutes on the first 4 naps with sleep present in nap1, 2 and 4 and REM sleep present on nap 4. The fifth nap was 20 minutes and was not included in the MSL.      The patient had been on Methylphenidate 10 mg and Concerta 18 and 54 mg both in the morning. Both helpful but left her with increased anxiety. She stopped taking stimulants in 2014.     Study Date: 1/09/2017- (300.0 lbs) apnea/hypopnea index was 12.2 events per hour. The REM AHI was 32.3 events per hour. The supine AHI was 51.3 events per hour. The RERA index was  2.1 events per hour. The RDI was 14.3 events per hour.  Lowest oxygen saturation was 70.1%. Time spent less than or equal to 88% was 4.7 minutes. The PLM index was 5.2 movements per hour.    As of Today:   She was on auto CPAP 7-15 cm but has had bariatric surgery. She would like to demonstrate that she no longer needs CPAP. She has also discontinued all of her stimulant medication. She has not taken methylphenidate for 10 years.     She has gotten her weight down from 300# to 180# with gastric bypass. Her partner says she no longer snores. She only wakes about once a night for the restroom. She is trying to get hypersomnia and sleep apnea off of her list of diagnoses.     Her sleep schedule is 10-11 PM to 7:30 AM. She falls asleep in 15 minutes or less. She may wake 1-2 times either for the restroom or external stimuli. She usually gets back to sleep in 15 minutes or less. On weekends, she may sleep 11 PM to 8-9 AM. She may nap 1-2 times per week for <30 min. She gets tired working from home around 3-4 PM. If she needs to stay awake, she can. She has 1-2 caffeinated beverages no later than 5 PM. Her ESS is normal at 6/24.    She had previously had [place] paperwork completed due to being late for work. That was related to an earlier work start time with a long commute. That is no longer an issue.       She is taking iron (Vitron C) daily since bariatric surgery. She denies restless legs symptoms.     Past medical/surgical history, family history, social history, medications and allergies were reviewed.      Problem List:  Patient Active Problem List    Diagnosis Date Noted    Intertriginous dermatitis associated with moisture 05/30/2023     Priority: Medium    Postsurgical malabsorption 05/12/2022     Priority: Medium    Bariatric surgery status 05/12/2022     Priority: Medium     5/4/2022 Robotic RYGBS BRP      Irritant contact dermatitis due to other agents 05/12/2022     Priority: Medium    Type 2 diabetes mellitus  without complication, without long-term current use of insulin (H) 09/15/2021     Priority: Medium    Migraine with aura and without status migrainosus, not intractable 01/21/2020     Priority: Medium    Cervical spine pain 04/17/2019     Priority: Medium    History of elevated blood pressure while in hospital 04/12/2019     Priority: Medium    SANDRA (obstructive sleep apnea)- mild (AHI 12) 01/16/2017     Priority: Medium     Study Date: 1/09/2017- (300.0 lbs) apnea/hypopnea index was 12.2 events per hour.  The REM AHI was 32.3 events per hour.  The supine AHI was 51.3 events per hour.  The RERA index was 2.1 events per hour.   The RDI was 14.3 events per hour. . Lowest oxygen saturation was 70.1%.  Time spent less than or equal to 88% was 4.7 minutes. The PLM index was 5.2 movements per hour.       BMI 24.0-24.9, adult 11/14/2016     Priority: Medium    Idiopathic hypersomnia 11/11/2016     Priority: Medium     2007 MSL of 10.4 with REM present on 3 of the naps  10/8/2012. She was off all stimulant medications. Actigraphy x1 week- Adequate total sleep. Polysomnogram-Total sleep time 434. Sleep efficiency was 79.7%. Latency to sleep 13.5 minutes. Latency to .0 minutes. No significant sleep disordered breathing with only rare hypopneas, RDI was 0.7 per hour, oxygen remained above 92%, No CO2 retention.   MSLT revealed a mean latency of 8.75 minutes on the first 4 naps with sleep present in nap 1, 2 and 4 and REM sleep present on nap 4. The fifth nap was 20 minutes and was not included in the MSL.          Impression/Plan:    (G47.33) SANDRA (obstructive sleep apnea)- mild (AHI 12)  (primary encounter diagnosis)  Comment: Alina has previously diagnosed mild SANDRA in 2017. She has lost 120# and is no longer symptomatic. She is not observed to snore or have pauses in breathing. She is no longer sleepy in the day. She would like to verify that her apnea is resolved and have that diagnosis, and the hypersomnia  diagnosis, removed from her problem list. She has previous diagnosis of narcolepsy which was subsequently changed to idiopathic hypersomnia. She was on methylphenidate for that, but has been off of it for 10 years. At the time of her prior studies, her sleep schedule was erratic and she had more depression, which she feels made her want to sleep all of the time. She no longer has concerns of sleepiness in the daytime and her ESS is normal at 6/24. [place]  shows no prescriptions for stimulants in the last year. She denies being at a job that would require a 's license.   Plan: Comprehensive Sleep Study        I resolved the hypersomnia diagnosis from her problem list. I considered an MWT to document resolution of hypersomnia, but it is not very reliable at predicting ability to maintain wakefulness in other settings.   I have ordered an in lab PSG to verify resolution of sleep apnea.     She will follow up with me in about 2 week(s) after her sleep study.     37 minutes were spent on the date of the encounter doing chart review, history and exam, documentation and further activities as noted above.     Bennett Goltz, PA-C    CC: Debbie Nolasco

## 2023-10-30 ENCOUNTER — VIRTUAL VISIT (OUTPATIENT)
Dept: SLEEP MEDICINE | Facility: CLINIC | Age: 33
End: 2023-10-30
Attending: PHYSICIAN ASSISTANT
Payer: COMMERCIAL

## 2023-10-30 VITALS — HEIGHT: 71 IN | WEIGHT: 180 LBS | BODY MASS INDEX: 25.2 KG/M2

## 2023-10-30 DIAGNOSIS — G47.33 OSA (OBSTRUCTIVE SLEEP APNEA): Primary | Chronic | ICD-10-CM

## 2023-10-30 PROCEDURE — 99214 OFFICE O/P EST MOD 30 MIN: CPT | Mod: 95 | Performed by: PHYSICIAN ASSISTANT

## 2023-10-30 ASSESSMENT — PAIN SCALES - GENERAL: PAINLEVEL: NO PAIN (0)

## 2023-10-30 NOTE — NURSING NOTE
Is the patient currently in the state of [place]? YES    Visit mode:VIDEO    If the visit is dropped, the patient can be reconnected by: VIDEO VISIT: Send to e-mail at: [email or web address]    Will anyone else be joining the visit? NO  (If patient encounters technical issues they should call [phone]    How would you like to obtain your AVS? MyChart    Are changes needed to the allergy or medication list? Pt stated no changes to allergies and Pt stated no med changes    Reason for visit: Consult  Has patient had flu shot for current/most recent flu season? If so, when? No    Celi DAN

## 2023-11-07 ENCOUNTER — OFFICE VISIT (OUTPATIENT)
Dept: OBGYN | Facility: CLINIC | Age: 33
End: 2023-11-07
Payer: COMMERCIAL

## 2023-11-07 VITALS
BODY MASS INDEX: 25.34 KG/M2 | WEIGHT: 181 LBS | HEIGHT: 71 IN | DIASTOLIC BLOOD PRESSURE: 70 MMHG | SYSTOLIC BLOOD PRESSURE: 108 MMHG

## 2023-11-07 DIAGNOSIS — Z01.419 ENCOUNTER FOR BREAST AND PELVIC EXAMINATION: Primary | ICD-10-CM

## 2023-11-07 DIAGNOSIS — E11.9 TYPE 2 DIABETES MELLITUS WITHOUT COMPLICATION, WITHOUT LONG-TERM CURRENT USE OF INSULIN (H): ICD-10-CM

## 2023-11-07 DIAGNOSIS — M62.89 HIGH-TONE PELVIC FLOOR DYSFUNCTION: ICD-10-CM

## 2023-11-07 DIAGNOSIS — Z30.41 SURVEILLANCE OF PREVIOUSLY PRESCRIBED CONTRACEPTIVE PILL: ICD-10-CM

## 2023-11-07 LAB
ALBUMIN SERPL BCG-MCNC: 4.2 G/DL (ref 3.5–5.2)
ALP SERPL-CCNC: 78 U/L (ref 35–104)
ALT SERPL W P-5'-P-CCNC: 16 U/L (ref 0–50)
ANION GAP SERPL CALCULATED.3IONS-SCNC: 12 MMOL/L (ref 7–15)
AST SERPL W P-5'-P-CCNC: 25 U/L (ref 0–45)
BILIRUB SERPL-MCNC: 0.4 MG/DL
BUN SERPL-MCNC: 9.8 MG/DL (ref 6–20)
CALCIUM SERPL-MCNC: 9.2 MG/DL (ref 8.6–10)
CHLORIDE SERPL-SCNC: 104 MMOL/L (ref 98–107)
CHOLEST SERPL-MCNC: 150 MG/DL
CREAT SERPL-MCNC: 0.6 MG/DL (ref 0.51–0.95)
DEPRECATED HCO3 PLAS-SCNC: 23 MMOL/L (ref 22–29)
EGFRCR SERPLBLD CKD-EPI 2021: >90 ML/MIN/1.73M2
GLUCOSE SERPL-MCNC: 82 MG/DL (ref 70–99)
HBA1C MFR BLD: 5.1 % (ref 0–5.6)
HDLC SERPL-MCNC: 69 MG/DL
LDLC SERPL CALC-MCNC: 65 MG/DL
NONHDLC SERPL-MCNC: 81 MG/DL
POTASSIUM SERPL-SCNC: 3.9 MMOL/L (ref 3.4–5.3)
PROT SERPL-MCNC: 7.1 G/DL (ref 6.4–8.3)
SODIUM SERPL-SCNC: 139 MMOL/L (ref 135–145)
TRIGL SERPL-MCNC: 79 MG/DL

## 2023-11-07 PROCEDURE — 99395 PREV VISIT EST AGE 18-39: CPT | Performed by: OBSTETRICS & GYNECOLOGY

## 2023-11-07 PROCEDURE — 83036 HEMOGLOBIN GLYCOSYLATED A1C: CPT | Performed by: OBSTETRICS & GYNECOLOGY

## 2023-11-07 PROCEDURE — 80053 COMPREHEN METABOLIC PANEL: CPT | Performed by: OBSTETRICS & GYNECOLOGY

## 2023-11-07 PROCEDURE — 80061 LIPID PANEL: CPT | Performed by: OBSTETRICS & GYNECOLOGY

## 2023-11-07 PROCEDURE — 36415 COLL VENOUS BLD VENIPUNCTURE: CPT | Performed by: OBSTETRICS & GYNECOLOGY

## 2023-11-07 RX ORDER — ESTRADIOL 0.1 MG/G
2 CREAM VAGINAL
Qty: 42.5 G | Refills: 3 | Status: SHIPPED | OUTPATIENT
Start: 2023-11-09

## 2023-11-07 RX ORDER — DESOGESTREL AND ETHINYL ESTRADIOL 0.15-0.03
1 KIT ORAL DAILY
Qty: 112 TABLET | Refills: 3 | Status: SHIPPED | OUTPATIENT
Start: 2023-11-07

## 2023-11-07 NOTE — PROGRESS NOTES
Alina is a 32 year old  female who presents for annual exam.     Menses are regular q 28-30 days and normal lasting 4 days.  Menses flow: normal.  Patient's last menstrual period was 10/24/2023 (approximate).. Using oral contraceptives for contraception.  She is not currently considering pregnancy.  Besides routine health maintenance, she has no other health concerns today .  Saw urology, diagnosed with high tone pelvic floor dysfuction.  Doing pelvic PT.  Radley is painful.   Did weight loss surgery, now lost a bunch of weight and interested in removing extra skin, maybe doing a breast lift and tummy tuck.  Still thinking about a pregnancy in the next few years.   GYNECOLOGIC HISTORY:    Alina is sexually active with 1 male partner(s) and is currently in monogamous relationship with .    History sexually transmitted infections:No STD history  STI testing offered?  Declined  History of abnormal Pap smear: NO - age 30-65 PAP every 5 years with negative HPV co-testing recommended  Family history of breast CA: No  Family history of uterine/ovarian CA: No      HEALTH MAINTENANCE:  .  TSH:   TSH   Date Value Ref Range Status   03/15/2023 2.10 0.30 - 4.20 uIU/mL Final   2022 1.94 0.40 - 4.00 mU/L Final   2019 1.86 0.40 - 4.00 mU/L Final     Pap; , Nil. Neg HPV  Lab Results   Component Value Date    PAP NIL 2019      PHQ2: 0      2023    10:50 AM 10/30/2023     1:41 PM   PHQ-2 (  [employer])   Q1: Little interest or pleasure in doing things 0 0   Q2: Feeling down, depressed or hopeless 0 0   PHQ-2 Score 0 0         HISTORY:  OB History    Para Term  AB Living   1 0 0 0 1 0   SAB IAB Ectopic Multiple Live Births   0 0 0 0 0      # Outcome Date GA Lbr Hugh/2nd Weight Sex Delivery Anes PTL Lv   1 AB              Past Medical History:   Diagnosis Date    Diabetes mellitus, type 2 (H)     NO meds used    Migraine with aura and without status migrainosus, not  intractable 01/21/2020    Obesity     Recurrent UTI     Recurrent vaginitis     BV and Yeast    Sleep apnea      Past Surgical History:   Procedure Laterality Date    ABDOMEN SURGERY  5/4/22    Gastric Bypass    CHOLECYSTECTOMY      DAVINCI BYPASS GASTRIC VIRA-EN-Y N/A 05/04/2022    Procedure: Robotic assisted laparoscopic Vira-en-Y gastric bypass;  Surgeon: Jean-Claude Lin MD;  Location: [facility] OR    ENT SURGERY      ear tubes    TONSILLECTOMY       Family History   Problem Relation Age of Onset    Obesity Mother     Diabetes Mother         Had before her gastric bypass    Obesity Father     Sleep Apnea Maternal Grandmother     Diabetes Maternal Grandmother     Heart Disease Maternal Grandmother     Obesity Maternal Grandmother     Other Cancer Maternal Grandmother         Uterine or Ovarian Cancer    Obesity Maternal Grandfather     Diabetes Paternal Grandmother     Genetic Disorder Paternal Grandmother     Obesity Paternal Grandmother     Other Cancer Paternal Grandmother         Uterine or Ovarian Cancer    Thyroid Disease Paternal Grandmother         Parathyroid removed    Liver Cancer Paternal Grandfather     Obesity Sister     Sleep Apnea Other         Grandmother     Social History     Socioeconomic History    Marital status: Single     Spouse name: None    Number of children: 0    Years of education: None    Highest education level: None   Occupational History    Occupation: Academic  Adviser   Tobacco Use    Smoking status: Never     Passive exposure: Never    Smokeless tobacco: Never   Vaping Use    Vaping Use: Never used   Substance and Sexual Activity    Alcohol use: Yes     Comment: infrequent    Drug use: Yes     Types: Marijuana     Comment: 1-2 per month (Edibles)    Sexual activity: Yes     Partners: Male     Birth control/protection: Condom, Pill   Other Topics Concern    Parent/sibling w/ CABG, MI or angioplasty before 65F 55M? No   Social History Narrative    From [place]    Mother in  [place]    Works at [employer] in [place] [place] [facility]    Enjoys movies, outdoor activities, animal rescue, board games       Current Outpatient Medications:     CALCIUM PO, Take 2 capsules by mouth daily 600mg breakfast and lunch, Disp: , Rfl:     COMPOUND CONTAINING CONTROLLED SUBSTANCE (CMPD RX) - PHARMACY TO MIX COMPOUNDED MEDICATION, Insert one 5mg valium vaginal suppository once nightly as needed for pelvic pain, Disp: 10 suppository, Rfl: 0    cyanocobalamin (VITAMIN B-12) 100 MCG tablet, Take 100 mcg by mouth daily, Disp: , Rfl:     desogestrel-ethinyl estradiol (APRI) 0.15-30 MG-MCG tablet, Take 1 tablet by mouth daily Continuously, Disp: 112 tablet, Rfl: 0    ferrous fumarate 65 mg, [place]. FE,-Vitamin C 125 mg (VITRON C)  MG TABS tablet, Take 1 tablet by mouth daily, Disp: , Rfl:     FLUoxetine (PROZAC) 20 MG capsule, Take 1 capsule (20 mg) by mouth daily, Disp: 90 capsule, Rfl: 2    fluticasone (FLONASE) 50 MCG/ACT nasal spray, Spray 1 spray into both nostrils daily, Disp: 16 g, Rfl: 3    meclizine (ANTIVERT) 25 MG tablet, Take 1 tablet (25 mg) by mouth 3 times daily as needed for dizziness, Disp: 15 tablet, Rfl: 0    Multiple Vitamin (MULTIVITAMIN ADULT PO), Take 1 tablet by mouth daily, Disp: , Rfl:     nystatin (MYCOSTATIN) 408853 UNIT/GM external cream, Apply topically 2 times daily as needed for dry skin Apply as needed for rash., Disp: 30 g, Rfl: 3    SUMAtriptan (IMITREX) 25 MG tablet, Take 1 tablet (25 mg) by mouth at onset of headache for migraine May repeat in 2 hours. Max 8 tablets/24 hours., Disp: 30 tablet, Rfl: 0    Thiamine Mononitrate (VITAMIN B-1) 100 MG TABS, , Disp: , Rfl:     triamcinolone (KENALOG) 0.1 % external ointment, Apply topically 2 times daily as needed for irritation, Disp: 80 g, Rfl: 1    VITAMIN D (CHOLECALCIFEROL) PO, Take 1 tablet by mouth daily, Disp: , Rfl:      Allergies   Allergen Reactions    Asa [Aspirin]      Gastric Bypass     Nsaids       "Gastric Bypass    Sulfa Antibiotics     Wellbutrin [Bupropion] Other (See Comments)     Dizziness       Past medical, surgical, social and family history were reviewed and updated in EPIC.    EXAM:  /70 (BP Location: Left arm, Patient Position: Chair, Cuff Size: Adult Regular)   Ht 1.803 m (5' 11\")   Wt 82.1 kg (181 lb)   LMP 10/24/2023 (Approximate)   Breastfeeding No   BMI 25.24 kg/m     BMI: Body mass index is 25.24 kg/m .  Constitutional: healthy, alert and no distress  Breast: No nodularity, asymmetry or nipple discharge bilaterally.  Gastrointestinal: Abdomen soft, non-tender, non-distended. No masses, organomegaly.  :  Vulva:  No external lesions, normal female hair distribution, no inguinal adenopathy.    Urethra:  Midline, non-tender, well supported, no discharge  Vagina:  Moist, pink, no abnormal discharge, no lesions  Uterus:  Normal size , non-tender, freely mobile  Ovaries:  No masses appreciated, non-tender, mobile  Rectal Exam: deferred  Musculoskeletal: extremities normal  Skin: no suspicious lesions or rashes  Psychiatric: Affect appropriate, cooperative,mentation appears normal.   High tone pelvic floor muscles, TTP bilaterally  Skin mildly atrophic    COUNSELING:   Reviewed preventive health counseling, as reflected in patient instructions   reports that she has never smoked. She has never been exposed to tobacco smoke. She has never used smokeless tobacco.    Body mass index is 25.24 kg/m .    FRAX Risk Assessment    ASSESSMENT:  32 year old female with satisfactory annual exam     1. Encounter for breast and pelvic examination  Reviewed preconception counseling, PNV when stopping OCP etc.     2. Surveillance of previously prescribed contraceptive pill  - desogestrel-ethinyl estradiol (APRI) 0.15-30 MG-MCG tablet; Take 1 tablet by mouth daily Continuously  Dispense: 112 tablet; Refill: 3    3. High-tone pelvic floor dysfunction with mild atrophic vaginitis  - estradiol (ESTRACE) 0.1 " MG/GM vaginal cream; Place 2 g vaginally twice a week  Dispense: 42.5 g; Refill: 3      Abida Sorenson MD

## 2023-11-07 NOTE — NURSING NOTE
"Chief Complaint   Patient presents with    Gyn Exam       Initial /70 (BP Location: Left arm, Patient Position: Chair, Cuff Size: Adult Regular)   Ht 1.803 m (5' 11\")   Wt 82.1 kg (181 lb)   LMP 10/24/2023 (Approximate)   Breastfeeding No   BMI 25.24 kg/m   Estimated body mass index is 25.24 kg/m  as calculated from the following:    Height as of this encounter: 1.803 m (5' 11\").    Weight as of this encounter: 82.1 kg (181 lb).  BP completed using cuff size: regular    Questioned patient about current smoking habits.  Pt. has never smoked.          The following HM Due: NONE  Amber Kowalski CMA    "

## 2023-11-22 NOTE — NURSING NOTE
Writer sent sleep study information via [employer].     Patient stated she was told she did not have to schedule a follow up, unless they discover SANDRA or concerns.

## 2023-11-27 ENCOUNTER — THERAPY VISIT (OUTPATIENT)
Dept: PHYSICAL THERAPY | Facility: CLINIC | Age: 33
End: 2023-11-27
Attending: PHYSICIAN ASSISTANT
Payer: COMMERCIAL

## 2023-11-27 DIAGNOSIS — M62.89 HIGH-TONE PELVIC FLOOR DYSFUNCTION: ICD-10-CM

## 2023-11-27 PROCEDURE — 97530 THERAPEUTIC ACTIVITIES: CPT | Mod: GP

## 2023-11-27 PROCEDURE — 97161 PT EVAL LOW COMPLEX 20 MIN: CPT | Mod: GP

## 2023-11-27 PROCEDURE — 97110 THERAPEUTIC EXERCISES: CPT | Mod: GP

## 2023-11-27 PROCEDURE — 97535 SELF CARE MNGMENT TRAINING: CPT | Mod: GP

## 2023-11-27 NOTE — PROGRESS NOTES
PHYSICAL THERAPY EVALUATION  Type of Visit: Evaluation    See electronic medical record for Abuse and Falls Screening details.    Subjective       Presenting condition or subjective complaint: Hypertonic Pelvic Floor  UTI sx started recurring in 2022, pain with intercourse started 8-9 months ago but not positive in lab, sx flare up after intercourse and period. Valium suppositories, estrogen cream, epsom salt baths. Pain with intercourse - less with pt positioned on top. Had to use small speculum at last visit. Pt not currently exercising. Pt was primarily resistance training with bodyweight 3x/week, walking 3x/week.  Date of onset: 02/27/23    Relevant medical history: Bladder or bowel problems; Concussions; Diabetes; Migraines or headaches   Dates & types of surgery: Tonsils and adnoids as a child, gall bladder removal (approx 2015), gastric bypass ( 05/2022)    Prior diagnostic imaging/testing results: Other Just a pelvic exam and a bladder scan by a urologist   Prior therapy history for the same diagnosis, illness or injury: No        Living Environment  Social support: With a significant other or spouse   Type of home: [facility]; 2-story   Stairs to enter the home: No       Ramp: No   Stairs inside the home: Yes 14 Is there a railing: Yes   Help at home: None  Equipment owned:       Employment: Yes Vocational Rehabilitation Counselor - sitting   Hobbies/Interests: Board Games, video games, shopping    Patient goals for therapy: Have comfortable sex, not have symptoms after sex and my period    Pain assessment:  some burning at vaginal opening     Objective      PELVIC EVALUATION  ADDITIONAL HISTORY:  Sex assigned at birth: Female  Gender identity: Female    Pronouns: She/Her Hers      Bladder History:  Feels bladder filling: No  Triggers for feeling of inability to wait to go to the bathroom: Yes Sex and my period trigger uti like symptoms  How long can you wait to urinate: I usually can wait if need be but try  to go within 30 minutes  Gets up at night to urinate: Yes 1  Can stop the flow of urine when urinating: Yes  Volume of urine usually released: Average   Other issues: Bladder infections  Number of bladder infections in last 12 months: Uti like synptoms, have been negative for actual infection  Fluid intake per day: ? I dont know 8 oz   - maybe up to 24oz  Medications taken for bladder: No     Activities causing urine leak:      Amount of urine typically leaked: I dont leak  Pads used to help with leaking: No        Bowel History:  Frequency of bowel movement: Once a day  Consistency of stool: Soft-formed    Ignores the urge to defecate: No  Other bowel issues: Straining to have bowel movement  Length of time spent trying to have a bowel movement: Maybe 15 minutes at times    Sexual Function History:  Sexual orientation: Straight    Sexually active: Yes  Lubrication used: Yes Yes  Pelvic pain: Initial penetration (rectal or vaginal); Deep penetration (rectal or vaginal); Pelvic exams    Pain or difficulty with orgasms/erection/ejaculation: No    State of menopause:    Hormone medications: No   - estrogen cream    Are you currently pregnant: No, Number of previous pregnancies: 1, Number of deliveries: 0, Have you been diagnosed with pelvic prolapse or abdominal separation: No, Do you get regular exercise: No, Have you tried pelvic floor strengthening exercises for 4 weeks: No, Do you have any history of trauma that is relevant to your care that you d like to share: No    Discussed reason for referral regarding pelvic health needs and external/internal pelvic floor muscle examination with patient/guardian.  Opportunity provided to ask questions and verbal consent for assessment and intervention was given.      PELVIC EXAM  External Visual Inspection:  At rest: slight elevation  With voluntary pelvic floor contraction: Perineal elevation  Relaxation of PFM: Yes  With intra-abdominal pressure: Bearing down as defecation:  No change    Integumentary:   Introitus: Atrophy, labia minora    External Digital Palpation per Perineum:   Levator ani: Tenderness, R side      Internal Digital Palpation:  Per Vagina:  Tenderness  Digital Muscle Performance: P (Power): 2/5  Tender to layer 1 posterior and to L, L obturator internus      BIOFEEDBACK:  Position: Supine  Surface Electrodes: Perineal    Perianals:   Baseline muscle activity: 1.5 microvolts      Assessment & Plan   CLINICAL IMPRESSIONS  Medical Diagnosis: high tone pelvic floor    Treatment Diagnosis: pelvic pain   Impression/Assessment: Patient is a 32 year old female with pelvic pain complaints.  The following significant findings have been identified: Pain, Decreased strength, Impaired muscle performance, and Decreased activity tolerance. These impairments interfere with their ability to perform self care tasks, work tasks, and recreational activities as compared to previous level of function.     Clinical Decision Making (Complexity):  Clinical Presentation: Stable/Uncomplicated  Clinical Presentation Rationale: based on medical and personal factors listed in PT evaluation  Clinical Decision Making (Complexity): Low complexity    PLAN OF CARE  Treatment Interventions:  Modalities: Biofeedback, Hot Pack, Ultrasound  Interventions: Manual Therapy, Neuromuscular Re-education, Therapeutic Activity, Therapeutic Exercise, Self-Care/Home Management    Long Term Goals     PT Goal 1  Goal Identifier: pelvic pain intercourse  Goal Description: patient will reduce pelvic pain during penetration, erection, orgasm, and ejaculation to <2/10 and/or frequency of less than 1x/month.  Rationale: to maximize safety and independence with performance of ADLs and functional tasks  Target Date: 02/19/24  PT Goal 2  Goal Identifier: pelvic pain self management  Goal Description: patient will learn and practice self-release techniques with appropriate tools, with aim to reduce their pelvic pain by 75%  while regularly incorporating self-release techniques into their self-care routine, as assessed through regular follow-up appointments and pain level tracking  Rationale: to maximize safety and independence with performance of ADLs and functional tasks  Target Date: 02/19/24      Frequency of Treatment: 1x every 4 weeks  Duration of Treatment: 12 weeks    Recommended Referrals to Other Professionals: Physical Therapy  Education Assessment:   Learner/Method: Patient    Risks and benefits of evaluation/treatment have been explained.   Patient/Family/caregiver agrees with Plan of Care.     Evaluation Time:     PT Eval, Low Complexity Minutes (21898): 20       Signing Clinician: Brooke Moreno PT

## 2024-02-01 ASSESSMENT — SLEEP AND FATIGUE QUESTIONNAIRES
HOW LIKELY ARE YOU TO NOD OFF OR FALL ASLEEP WHILE SITTING QUIETLY AFTER LUNCH WITHOUT ALCOHOL: SLIGHT CHANCE OF DOZING
HOW LIKELY ARE YOU TO NOD OFF OR FALL ASLEEP WHILE SITTING AND READING: SLIGHT CHANCE OF DOZING
HOW LIKELY ARE YOU TO NOD OFF OR FALL ASLEEP WHILE SITTING INACTIVE IN A PUBLIC PLACE: WOULD NEVER DOZE
HOW LIKELY ARE YOU TO NOD OFF OR FALL ASLEEP WHILE WATCHING TV: SLIGHT CHANCE OF DOZING
HOW LIKELY ARE YOU TO NOD OFF OR FALL ASLEEP WHILE LYING DOWN TO REST IN THE AFTERNOON WHEN CIRCUMSTANCES PERMIT: MODERATE CHANCE OF DOZING
HOW LIKELY ARE YOU TO NOD OFF OR FALL ASLEEP WHEN YOU ARE A PASSENGER IN A CAR FOR AN HOUR WITHOUT A BREAK: SLIGHT CHANCE OF DOZING
HOW LIKELY ARE YOU TO NOD OFF OR FALL ASLEEP WHILE SITTING AND TALKING TO SOMEONE: WOULD NEVER DOZE
HOW LIKELY ARE YOU TO NOD OFF OR FALL ASLEEP IN A CAR, WHILE STOPPED FOR A FEW MINUTES IN TRAFFIC: WOULD NEVER DOZE

## 2024-02-06 ENCOUNTER — THERAPY VISIT (OUTPATIENT)
Dept: SLEEP MEDICINE | Facility: CLINIC | Age: 34
End: 2024-02-06
Attending: PHYSICIAN ASSISTANT
Payer: COMMERCIAL

## 2024-02-06 DIAGNOSIS — G47.33 OSA (OBSTRUCTIVE SLEEP APNEA): Chronic | ICD-10-CM

## 2024-02-06 PROCEDURE — 95810 POLYSOM 6/> YRS 4/> PARAM: CPT | Performed by: INTERNAL MEDICINE

## 2024-02-07 NOTE — PATIENT INSTRUCTIONS
[facility]    1. Your sleep study will be reviewed by a sleep physician within the next few days.     2. Please follow up in the sleep clinic as scheduled, or, make an appointment with your sleep provider to be seen within two weeks to discuss the results of the sleep study.    3. If you have any questions or problems with your treatment plan, please contact your sleep clinic provider at [phone] to further manage your condition.    4. Please review your attached medication list, and, at your follow-up appointment advise your sleep clinic provider about any changes.    5. Go to [email or web address] for more information about your sleep problems.    Orlando Morel, PSGT  February 7, 2024

## 2024-02-08 NOTE — PROGRESS NOTES
"SLEEP STUDY INTERPRETATION  DIAGNOSTIC POLYSOMNOGRAPHY REPORT      Patient: ZULEIKA NICOLAS  YOB: 1990  Study Date: 2/6/2024  MRN: [id number]  Referring Provider: -  Ordering Provider: -    Indications for Polysomnography: The patient is a 33 year old Female who is 5' 11\" and weighs 180.0 lbs. Her BMI is 25.2, Kent sleepiness scale 6 and neck circumference is - cm. A diagnostic polysomnogram was performed to evaluate for sleep apnea.    Polysomnogram Data: A full night polysomnogram recorded the standard physiologic parameters including EEG, EOG, EMG, ECG, nasal and oral airflow. Respiratory parameters of chest and abdominal movements were recorded with respiratory inductance plethysmography. Oxygen saturation was recorded by pulse oximetry. Hypopnea scoring rule used: 1B 4%.    Sleep Architecture: Fragmented sleep.   The total recording time of the polysomnogram was 462.6 minutes. The total sleep time was 403.0 minutes. Sleep latency was increased at 30.6 minutes without the use of a sleep aid. REM latency was 91.5 minutes. Arousal index was mildly increased at 18.6 arousals per hour. Sleep efficiency was normal at 87.1%. Wake after sleep onset was 28.5 minutes. The patient spent 3.1% of total sleep time in Stage N1, 62.9% in Stage N2, 10.5% in Stage N3, and 23.4% in REM. Time in REM supine was 15.0 minutes.    Respiration: Negative for sleep apnea.   Events ? The polysomnogram revealed a presence of - obstructive, - central, and - mixed apneas resulting in an apnea index of - events per hour. There were - obstructive hypopneas and - central hypopneas resulting in an obstructive hypopnea index of - and central hypopnea index of - events per hour. The combined apnea/hypopnea index was - events per hour (central apnea/hypopnea index was - events per hour). The REM AHI was - events per hour. The supine AHI was - events per hour. The RERA index was - events per hour.  The RDI was - events per " hour.  Snoring - was reported as mild to moderate.  Respiratory rate and pattern - was notable for normal respiratory rate and pattern.  Sustained Sleep Associated Hypoventilation - Transcutaneous carbon dioxide monitoring was not used; however significant hypoventilation was not suggested by oximetry.  Sleep Associated Hypoxemia - (Greater than 5 minutes O2 sat at or below 88%) was/was not present. Baseline oxygen saturation was 95.2%. Lowest oxygen saturation was 91.0%. Time spent less than or equal to 88% was 0 minutes. Time spent less than or equal to 89% was 0 minutes.    Movement Activity: Negative for significant movement abnormalities.   Periodic Limb Activity - There were 39 PLMs during the entire study. The PLM index was 5.8 movements per hour. The PLM Arousal Index was 2.4 per hour.  REM EMG Activity - Excessive transient/sustained muscle activity was not present.  Nocturnal Behavior - Abnormal sleep related behaviors were not noted during/arising out of NREM / REM sleep.   Bruxism - None apparent.    Cardiac Summary: Sinus rhythm.   The average pulse rate was 75.5 bpm. The minimum pulse rate was 60.0 bpm while the maximum pulse rate was 110.0 bpm.  Arrhythmias were not noted.    Assessment:   This sleep study is negative for sleep apnea, hypoxemia or movement abnormalities. Supine REM was brief but was captured during the test. A thorough review did not reveal any sleep apnea events during this test, except for a rare respiratory effort related arousal.   Sleep efficiency was in the normal range. Marginal degree of non-specific spontaneous arousals was noted, which is clinically insignificant. REM latency was within normal range.     Recommendations:  Patient can be reassured regarding absence of sleep apnea.    Diagnostic Codes:   Repetitive Intrusions Into Sleep F51.8    2/6/2024 [facility] Study (180.0 lbs) - AHI -, RDI -, Supine AHI -, REM AHI -, Low O2 91.0%, Time Spent ?88% 0 minutes  / Time Spent ?89% 0 minutes.    _____________________________________   Electronically Signed By: Mckinley Lam MD 02/07/2024

## 2024-02-16 ENCOUNTER — OFFICE VISIT (OUTPATIENT)
Dept: UROLOGY | Facility: CLINIC | Age: 34
End: 2024-02-16
Payer: COMMERCIAL

## 2024-02-16 VITALS
HEIGHT: 71 IN | SYSTOLIC BLOOD PRESSURE: 122 MMHG | WEIGHT: 190 LBS | DIASTOLIC BLOOD PRESSURE: 82 MMHG | BODY MASS INDEX: 26.6 KG/M2

## 2024-02-16 DIAGNOSIS — M62.89 HIGH-TONE PELVIC FLOOR DYSFUNCTION: ICD-10-CM

## 2024-02-16 DIAGNOSIS — Z87.440 HISTORY OF UTI: ICD-10-CM

## 2024-02-16 DIAGNOSIS — R39.89 URETHRAL PAIN: ICD-10-CM

## 2024-02-16 DIAGNOSIS — N89.8 VAGINAL DRYNESS: ICD-10-CM

## 2024-02-16 DIAGNOSIS — R10.2 PELVIC PAIN IN FEMALE: ICD-10-CM

## 2024-02-16 DIAGNOSIS — R39.15 URGENCY OF URINATION: Primary | ICD-10-CM

## 2024-02-16 DIAGNOSIS — N94.10 DYSPAREUNIA IN FEMALE: ICD-10-CM

## 2024-02-16 LAB
ALBUMIN UR-MCNC: NEGATIVE MG/DL
APPEARANCE UR: CLEAR
BILIRUB UR QL STRIP: NEGATIVE
COLOR UR AUTO: YELLOW
GLUCOSE UR STRIP-MCNC: NEGATIVE MG/DL
HGB UR QL STRIP: NEGATIVE
KETONES UR STRIP-MCNC: NEGATIVE MG/DL
LEUKOCYTE ESTERASE UR QL STRIP: NEGATIVE
NITRATE UR QL: NEGATIVE
PH UR STRIP: 6 [PH] (ref 5–7)
SP GR UR STRIP: 1.02 (ref 1–1.03)
UROBILINOGEN UR STRIP-ACNC: 0.2 E.U./DL

## 2024-02-16 PROCEDURE — 99214 OFFICE O/P EST MOD 30 MIN: CPT | Performed by: PHYSICIAN ASSISTANT

## 2024-02-16 PROCEDURE — 81003 URINALYSIS AUTO W/O SCOPE: CPT | Mod: QW | Performed by: PHYSICIAN ASSISTANT

## 2024-02-16 RX ORDER — LIDOCAINE 50 MG/G
OINTMENT TOPICAL PRN
Qty: 50 G | Refills: 1 | Status: SHIPPED | OUTPATIENT
Start: 2024-02-16

## 2024-02-16 ASSESSMENT — PAIN SCALES - GENERAL: PAINLEVEL: NO PAIN (0)

## 2024-02-16 NOTE — PROGRESS NOTES
Urology Clinic      Name: Alina Thompson    MRN: [id number]   YOB: 1990  Accompanied at today's visit by:self                 Chief Complaint:   Urethral pain          History of Present Illness:   February 16, 2024    HISTORY:   We have been following 33 year old Alina Thompson for hx of UTIs, urethral pain, is s/p gastric bypass surgery, dyspareunia since bypass surgery, vaginal dryness. Last seen on 10/20/23 and PVR was WNL and referred to PFPT. Ordered valium vaginal suppositories and encouraged sitz baths. Discussed UTI prevention at that time as well. Here today for followup. Has been prescribed estradiol cream vaginally by OB/GYN but not consistent with this. AZO does help with burning with urination. Reports some burning along the introitus today and increase in urinary urgency. No incontinence. Did go to PFPT but has not been consistent in going or doing exercises. Has not recently been using valium vaginal suppositories. Did have sleep study and negative for sleep apnea. Having regular menses monthly. Patient voices no other concerns at this time.            Allergies:     Allergies   Allergen Reactions    Asa [Aspirin]      Gastric Bypass     Nsaids      Gastric Bypass    Sulfa Antibiotics     Wellbutrin [Bupropion] Other (See Comments)     Dizziness            Medications:     Current Outpatient Medications   Medication Sig    CALCIUM PO Take 2 capsules by mouth daily 600mg breakfast and lunch    COMPOUND CONTAINING CONTROLLED SUBSTANCE (CMPD RX) - PHARMACY TO MIX COMPOUNDED MEDICATION Insert one 5mg valium vaginal suppository once nightly as needed for pelvic pain    cyanocobalamin (VITAMIN B-12) 100 MCG tablet Take 100 mcg by mouth daily    desogestrel-ethinyl estradiol (APRI) 0.15-30 MG-MCG tablet Take 1 tablet by mouth daily Continuously    estradiol (ESTRACE) 0.1 MG/GM vaginal cream Place 2 g vaginally twice a week    ferrous fumarate 65 mg, [place]. FE,-Vitamin C 125 mg (VITRON  "C)  MG TABS tablet Take 1 tablet by mouth daily    FLUoxetine (PROZAC) 20 MG capsule Take 1 capsule (20 mg) by mouth daily    fluticasone (FLONASE) 50 MCG/ACT nasal spray Spray 1 spray into both nostrils daily    meclizine (ANTIVERT) 25 MG tablet Take 1 tablet (25 mg) by mouth 3 times daily as needed for dizziness    Multiple Vitamin (MULTIVITAMIN ADULT PO) Take 1 tablet by mouth daily    nystatin (MYCOSTATIN) 645069 UNIT/GM external cream Apply topically 2 times daily as needed for dry skin Apply as needed for rash.    SUMAtriptan (IMITREX) 25 MG tablet Take 1 tablet (25 mg) by mouth at onset of headache for migraine May repeat in 2 hours. Max 8 tablets/24 hours.    Thiamine Mononitrate (VITAMIN B-1) 100 MG TABS     triamcinolone (KENALOG) 0.1 % external ointment Apply topically 2 times daily as needed for irritation    VITAMIN D (CHOLECALCIFEROL) PO Take 1 tablet by mouth daily     No current facility-administered medications for this visit.               Past  Surgical History:     Past Surgical History:   Procedure Laterality Date    ABDOMEN SURGERY  5/4/22    Gastric Bypass    CHOLECYSTECTOMY      DAVINCI BYPASS GASTRIC VIRA-EN-Y N/A 05/04/2022    Procedure: Robotic assisted laparoscopic Vira-en-Y gastric bypass;  Surgeon: Jean-Claude Lin MD;  Location:  OR    ENT SURGERY      ear tubes    TONSILLECTOMY               Physical Exam:     Vitals:    02/16/24 0823   BP: 122/82   Weight: 86.2 kg (190 lb)   Height: 1.803 m (5' 10.98\")     PSYCH: NAD  EYES: EOMI  NEURO: AAO x3    LABS:   UA RESULTS:  Recent Labs   Lab Test 02/16/24  0828 10/20/23  1109 07/25/23  1111   COLOR Yellow   < > Yellow   APPEARANCE Clear   < > Clear   URINEGLC Negative   < > Negative   URINEBILI Negative   < > Negative   URINEKETONE Negative   < > Negative   SG 1.025   < > 1.015   UBLD Negative   < > Negative   URINEPH 6.0   < > 5.5   PROTEIN Negative   < > Negative   UROBILINOGEN 0.2   < > 0.2   NITRITE Negative   < > " Negative   LEUKEST Negative   < > Small*   RBCU  --   --  0-2   WBCU  --   --  25-50*    < > = values in this interval not displayed.       Creatinine   Date Value Ref Range Status   11/07/2023 0.60 0.51 - 0.95 mg/dL Final   05/26/2021 0.63 0.52 - 1.04 mg/dL Final            Assessment and Plan:   33 year old is a pleasant female who has hx of UTIs, urethral pain, is s/p gastric bypass surgery, dyspareunia since bypass surgery, vaginal dryness    Plan:  -  UA negative.  - lidocaine ordered to apply to introitus  - try estrogen cream 2x/week for 3months. Then consider prn after. Advise to continue to follow with OB/Gyn in regards to estrogen cream and birth control. Consider hyalogyn in the future.   - Avoid bladder irritants  - continue AZO  - try prelief for social events.  - encourage going to PFPT and doing exercises.  - recommend sitz bath and can do exercises along introitus in bath to help with relaxing muscles.  - advise using lubrication and trying valium vaginal suppository after intercourse.   - After discussing the assessment and plan with patient, patient verbalizes understanding and agrees to the above plan. All questions answered.     Other orders as below:  Orders Placed This Encounter   Procedures    UA without Microscopic [[id number]]     30 minutes spent on the date of the encounter doing chart review, review of outside records, review of test results, interpretation of tests, patient visit and documentation.      Marilou Gaytan PA-C  Urology  February 16, 2024      Patient Care Team:  Prudence Laughlin MD as PCP - General (Internal Medicine)  Melanie Chow PA-C as Physician Assistant (Dermatology)  Cameron Ibarra DO as Referring Physician (Family Medicine)  Abida Sorenson MD as Assigned OBGYN Provider  Prudence Laughlin MD as Assigned PCP  Marilou Gaytan PA-C as Physician Assistant  Goltz, Bennett Ezra, PA-C as Assigned Neuroscience Provider  Marilou Gaytan PA-C  as Assigned Surgical Provider

## 2024-02-16 NOTE — PATIENT INSTRUCTIONS
Lidocaine ordered. Apply to cotton ball then to affected area 10 minutes prior.     Can try prelief 30minutes prior to social event.    Followup in 6 months

## 2024-02-16 NOTE — LETTER
2/16/2024       RE: Alina Thompson  [address] [address]     Dear Colleague,    Thank you for referring your patient, Alina Thompson, to the [facility] at [facility]. Please see a copy of my visit note below.    Urology Clinic      Name: Alina Thompson    MRN: [id number]   YOB: 1990  Accompanied at today's visit by:self                 Chief Complaint:   Urethral pain          History of Present Illness:   February 16, 2024    HISTORY:   We have been following 33 year old Alina Thompson for hx of UTIs, urethral pain, is s/p gastric bypass surgery, dyspareunia since bypass surgery, vaginal dryness. Last seen on 10/20/23 and PVR was WNL and referred to PFPT. Ordered valium vaginal suppositories and encouraged sitz baths. Discussed UTI prevention at that time as well. Here today for followup. Has been prescribed estradiol cream vaginally by OB/GYN but not consistent with this. AZO does help with burning with urination. Reports some burning along the introitus today and increase in urinary urgency. No incontinence. Did go to PFPT but has not been consistent in going or doing exercises. Has not recently been using valium vaginal suppositories. Did have sleep study and negative for sleep apnea. Having regular menses monthly. Patient voices no other concerns at this time.            Allergies:     Allergies   Allergen Reactions    Asa [Aspirin]      Gastric Bypass     Nsaids      Gastric Bypass    Sulfa Antibiotics     Wellbutrin [Bupropion] Other (See Comments)     Dizziness            Medications:     Current Outpatient Medications   Medication Sig    CALCIUM PO Take 2 capsules by mouth daily 600mg breakfast and lunch    COMPOUND CONTAINING CONTROLLED SUBSTANCE (CMPD RX) - PHARMACY TO MIX COMPOUNDED MEDICATION Insert one 5mg valium vaginal suppository once nightly as needed for pelvic pain     "cyanocobalamin (VITAMIN B-12) 100 MCG tablet Take 100 mcg by mouth daily    desogestrel-ethinyl estradiol (APRI) 0.15-30 MG-MCG tablet Take 1 tablet by mouth daily Continuously    estradiol (ESTRACE) 0.1 MG/GM vaginal cream Place 2 g vaginally twice a week    ferrous fumarate 65 mg, [place]. FE,-Vitamin C 125 mg (VITRON C)  MG TABS tablet Take 1 tablet by mouth daily    FLUoxetine (PROZAC) 20 MG capsule Take 1 capsule (20 mg) by mouth daily    fluticasone (FLONASE) 50 MCG/ACT nasal spray Spray 1 spray into both nostrils daily    meclizine (ANTIVERT) 25 MG tablet Take 1 tablet (25 mg) by mouth 3 times daily as needed for dizziness    Multiple Vitamin (MULTIVITAMIN ADULT PO) Take 1 tablet by mouth daily    nystatin (MYCOSTATIN) 632115 UNIT/GM external cream Apply topically 2 times daily as needed for dry skin Apply as needed for rash.    SUMAtriptan (IMITREX) 25 MG tablet Take 1 tablet (25 mg) by mouth at onset of headache for migraine May repeat in 2 hours. Max 8 tablets/24 hours.    Thiamine Mononitrate (VITAMIN B-1) 100 MG TABS     triamcinolone (KENALOG) 0.1 % external ointment Apply topically 2 times daily as needed for irritation    VITAMIN D (CHOLECALCIFEROL) PO Take 1 tablet by mouth daily     No current facility-administered medications for this visit.               Past  Surgical History:     Past Surgical History:   Procedure Laterality Date    ABDOMEN SURGERY  5/4/22    Gastric Bypass    CHOLECYSTECTOMY      DAVINCI BYPASS GASTRIC VIRA-EN-Y N/A 05/04/2022    Procedure: Robotic assisted laparoscopic Vira-en-Y gastric bypass;  Surgeon: Jean-Claude Lin MD;  Location:  OR    ENT SURGERY      ear tubes    TONSILLECTOMY               Physical Exam:     Vitals:    02/16/24 0823   BP: 122/82   Weight: 86.2 kg (190 lb)   Height: 1.803 m (5' 10.98\")     PSYCH: NAD  EYES: EOMI  NEURO: AAO x3    LABS:   UA RESULTS:  Recent Labs   Lab Test 02/16/24  0828 10/20/23  1109 07/25/23  1111   COLOR Yellow   < > " Yellow   APPEARANCE Clear   < > Clear   URINEGLC Negative   < > Negative   URINEBILI Negative   < > Negative   URINEKETONE Negative   < > Negative   SG 1.025   < > 1.015   UBLD Negative   < > Negative   URINEPH 6.0   < > 5.5   PROTEIN Negative   < > Negative   UROBILINOGEN 0.2   < > 0.2   NITRITE Negative   < > Negative   LEUKEST Negative   < > Small*   RBCU  --   --  0-2   WBCU  --   --  25-50*    < > = values in this interval not displayed.       Creatinine   Date Value Ref Range Status   11/07/2023 0.60 0.51 - 0.95 mg/dL Final   05/26/2021 0.63 0.52 - 1.04 mg/dL Final            Assessment and Plan:   33 year old is a pleasant female who has hx of UTIs, urethral pain, is s/p gastric bypass surgery, dyspareunia since bypass surgery, vaginal dryness    Plan:  -  UA negative.  - lidocaine ordered to apply to introitus  - try estrogen cream 2x/week for 3months. Then consider prn after. Advise to continue to follow with OB/Gyn in regards to estrogen cream and birth control. Consider hyalogyn in the future.   - Avoid bladder irritants  - continue AZO  - try prelief for social events.  - encourage going to PFPT and doing exercises.  - recommend sitz bath and can do exercises along introitus in bath to help with relaxing muscles.  - advise using lubrication and trying valium vaginal suppository after intercourse.   - After discussing the assessment and plan with patient, patient verbalizes understanding and agrees to the above plan. All questions answered.     Other orders as below:  Orders Placed This Encounter   Procedures    UA without Microscopic [[id number]]     30 minutes spent on the date of the encounter doing chart review, review of outside records, review of test results, interpretation of tests, patient visit and documentation.      Marilou Gaytan PA-C  Urology  February 16, 2024      Patient Care Team:  Prudence Laughlin MD as PCP - General (Internal Medicine)  Melanie Chow PA-C as Physician Assistant  (Dermatology)  Cameron Ibarra DO as Referring Physician (Family Medicine)  Abida Sorenson MD as Assigned OBGYN Provider  Prudence Laughlin MD as Assigned PCP  Marilou Gaytan PA-C as Physician Assistant  Goltz, Bennett Ezra, PA-C as Assigned Neuroscience Provider  Marilou Gaytan PA-C as Assigned Surgical Provider

## 2024-02-23 ENCOUNTER — TELEPHONE (OUTPATIENT)
Dept: UROLOGY | Facility: CLINIC | Age: 34
End: 2024-02-23
Payer: COMMERCIAL

## 2024-02-23 NOTE — TELEPHONE ENCOUNTER
----- Message from Johnna Warner sent at 2024  2:59 PM CST -----  Regardin month follow up  Return in about 6 months (around 2024) for with me.    ALEJA  24

## 2024-02-25 LAB — SLPCOMP: NORMAL

## 2024-03-20 ENCOUNTER — IMMUNIZATION (OUTPATIENT)
Dept: FAMILY MEDICINE | Facility: CLINIC | Age: 34
End: 2024-03-20
Payer: COMMERCIAL

## 2024-03-20 DIAGNOSIS — Z23 ENCOUNTER FOR IMMUNIZATION: Primary | ICD-10-CM

## 2024-03-20 PROCEDURE — 99207 PR NO CHARGE NURSE ONLY: CPT

## 2024-03-20 PROCEDURE — 90471 IMMUNIZATION ADMIN: CPT

## 2024-03-20 PROCEDURE — 91320 SARSCV2 VAC 30MCG TRS-SUC IM: CPT

## 2024-03-20 PROCEDURE — 90686 IIV4 VACC NO PRSV 0.5 ML IM: CPT

## 2024-03-20 PROCEDURE — 90480 ADMN SARSCOV2 VAC 1/ONLY CMP: CPT

## 2024-03-20 NOTE — PROGRESS NOTES
Prior to immunization administration, verified patients identity using patient s name and date of birth. Please see Immunization Activity for additional information.     Screening Questionnaire for Adult Immunization    Are you sick today?   No   Do you have allergies to medications, food, a vaccine component or latex?   No   Have you ever had a serious reaction after receiving a vaccination?   No   Do you have a long-term health problem with heart, lung, kidney, or metabolic disease (e.g., diabetes), asthma, a blood disorder, no spleen, complement component deficiency, a cochlear implant, or a spinal fluid leak?  Are you on long-term aspirin therapy?   No   Do you have cancer, leukemia, HIV/AIDS, or any other immune system problem?   No   Do you have a parent, brother, or sister with an immune system problem?   No   In the past 3 months, have you taken medications that affect  your immune system, such as prednisone, other steroids, or anticancer drugs; drugs for the treatment of rheumatoid arthritis, Crohn s disease, or psoriasis; or have you had radiation treatments?   No   Have you had a seizure, or a brain or other nervous system problem?   No   During the past year, have you received a transfusion of blood or blood    products, or been given immune (gamma) globulin or antiviral drug?   No   For women: Are you pregnant or is there a chance you could become       pregnant during the next month?   No   Have you received any vaccinations in the past 4 weeks?   No     Immunization questionnaire answers were all negative.    I have reviewed the following standing orders:   This patient is due and qualifies for the Covid-19 vaccine.     Click here for COVID-19 Standing Order    I have reviewed the vaccines inclusion and exclusion criteria; No concerns regarding eligibility.     This patient is due and qualifies for the Influenza vaccine.    Click here for Influenza Vaccine Standing Order    I have reviewed the  vaccines inclusion and exclusion criteria; No concerns regarding eligibility.     Patient instructed to remain in clinic for 15 minutes afterwards, and to report any adverse reactions.     Screening performed by Ben Galloway MA on 3/20/2024 at 4:27 PM.

## 2024-04-09 ASSESSMENT — PATIENT HEALTH QUESTIONNAIRE - PHQ9
SUM OF ALL RESPONSES TO PHQ QUESTIONS 1-9: 3
10. IF YOU CHECKED OFF ANY PROBLEMS, HOW DIFFICULT HAVE THESE PROBLEMS MADE IT FOR YOU TO DO YOUR WORK, TAKE CARE OF THINGS AT HOME, OR GET ALONG WITH OTHER PEOPLE: NOT DIFFICULT AT ALL
SUM OF ALL RESPONSES TO PHQ QUESTIONS 1-9: 3

## 2024-04-10 ENCOUNTER — OFFICE VISIT (OUTPATIENT)
Dept: FAMILY MEDICINE | Facility: CLINIC | Age: 34
End: 2024-04-10
Payer: COMMERCIAL

## 2024-04-10 VITALS
DIASTOLIC BLOOD PRESSURE: 82 MMHG | WEIGHT: 188.8 LBS | BODY MASS INDEX: 26.43 KG/M2 | SYSTOLIC BLOOD PRESSURE: 100 MMHG | RESPIRATION RATE: 16 BRPM | TEMPERATURE: 98.2 F | HEIGHT: 71 IN | OXYGEN SATURATION: 96 % | HEART RATE: 92 BPM

## 2024-04-10 DIAGNOSIS — K59.00 CONSTIPATION, UNSPECIFIED CONSTIPATION TYPE: ICD-10-CM

## 2024-04-10 DIAGNOSIS — R19.7 DIARRHEA, UNSPECIFIED TYPE: ICD-10-CM

## 2024-04-10 DIAGNOSIS — R10.11 ABDOMINAL PAIN, RIGHT UPPER QUADRANT: Primary | ICD-10-CM

## 2024-04-10 LAB
BASOPHILS # BLD AUTO: 0 10E3/UL (ref 0–0.2)
BASOPHILS NFR BLD AUTO: 0 %
EOSINOPHIL # BLD AUTO: 0.3 10E3/UL (ref 0–0.7)
EOSINOPHIL NFR BLD AUTO: 6 %
ERYTHROCYTE [DISTWIDTH] IN BLOOD BY AUTOMATED COUNT: 12.1 % (ref 10–15)
HCT VFR BLD AUTO: 40.2 % (ref 35–47)
HGB BLD-MCNC: 13.4 G/DL (ref 11.7–15.7)
IMM GRANULOCYTES # BLD: 0 10E3/UL
IMM GRANULOCYTES NFR BLD: 0 %
LYMPHOCYTES # BLD AUTO: 2.1 10E3/UL (ref 0.8–5.3)
LYMPHOCYTES NFR BLD AUTO: 38 %
MCH RBC QN AUTO: 30 PG (ref 26.5–33)
MCHC RBC AUTO-ENTMCNC: 33.3 G/DL (ref 31.5–36.5)
MCV RBC AUTO: 90 FL (ref 78–100)
MONOCYTES # BLD AUTO: 0.6 10E3/UL (ref 0–1.3)
MONOCYTES NFR BLD AUTO: 10 %
NEUTROPHILS # BLD AUTO: 2.6 10E3/UL (ref 1.6–8.3)
NEUTROPHILS NFR BLD AUTO: 46 %
PLATELET # BLD AUTO: 253 10E3/UL (ref 150–450)
RBC # BLD AUTO: 4.46 10E6/UL (ref 3.8–5.2)
WBC # BLD AUTO: 5.7 10E3/UL (ref 4–11)

## 2024-04-10 PROCEDURE — 99214 OFFICE O/P EST MOD 30 MIN: CPT | Performed by: FAMILY MEDICINE

## 2024-04-10 PROCEDURE — 85025 COMPLETE CBC W/AUTO DIFF WBC: CPT | Performed by: FAMILY MEDICINE

## 2024-04-10 PROCEDURE — 36415 COLL VENOUS BLD VENIPUNCTURE: CPT | Performed by: FAMILY MEDICINE

## 2024-04-10 PROCEDURE — 80053 COMPREHEN METABOLIC PANEL: CPT | Performed by: FAMILY MEDICINE

## 2024-04-10 ASSESSMENT — PAIN SCALES - GENERAL: PAINLEVEL: NO PAIN (0)

## 2024-04-10 NOTE — PROGRESS NOTES
"  Assessment & Plan     (R10.11) Abdominal pain, right upper quadrant  (primary encounter diagnosis)  Comment: pt has RUQ abd pain for 3 days. Constant mild discomfort, walking, movement and apply pressure on it make it worse.  She had constipation 4 days ago and she tried otc laxative for constipation, then 3 days ago she passed large amount of stool, then in past two days she had diarrhea, 2-3 stool. No bm today. No blood in stool. No abnormal vaginal discharge. No dysuria.   Pt had bariatric surgery and gallbladder removal. She state often she has gas. Denies alcohol and spicy diet. No sick contact  Exam: generalized abd mild tenderness. More tenderness on RUQ. Otherwise not remarkable. Wbc normal.   Likely she has gastritis, enteritis or gas    Plan: CBC with platelets and differential,         Comprehensive metabolic panel (BMP + Alb, Alk         Phos, ALT, AST, Total. Bili, TP)        Advise rest and push fluid and diet fiber. Brat diet as tolerate. Hopefully pain will gradually subside. If pain worse or persists she will call me.     (K59.00) Constipation, unspecified constipation type  Comment: pt has chronic constipation. She has bm 1-2 times a week.   Plan: strongly advise to push water and diet fiber to avoid constipation.     (R19.7) Diarrhea, unspecified type  Comment: pt had diarrhea after took laxative. No diarrhea today. No fever, sick contact. Likely due to side effect of laxative  Plan: brat diet as tolerated.          BMI  Estimated body mass index is 26.71 kg/m  as calculated from the following:    Height as of this encounter: 1.791 m (5' 10.5\").    Weight as of this encounter: 85.6 kg (188 lb 12.8 oz).   Weight management plan: Discussed healthy diet and exercise guidelines      See Patient Instructions    Danny Fuller is a 33 year old, presenting for the following health issues:  Abdominal Pain (Sharp LRQ pain into back, hard to pass gas, constipation, diarrhea- pain was intermittent, " "but is becoming more constant)      4/10/2024     3:10 PM   Additional Questions   Roomed by Brielle Breen CMA     History of Present Illness       Reason for visit:  Abdominal pain  Symptom onset:  More than a month  Symptoms include:  Sharp side pain, back pain, hard to pass gas, constipation, diarrahea  Symptom intensity:  Moderate  Symptom progression:  Staying the same  Had these symptoms before:  Yes  Has tried/received treatment for these symptoms:  No  What makes it worse:  Not sure  What makes it better:  Not really,  it comes and goes    She eats 0-1 servings of fruits and vegetables daily.She consumes 0 sweetened beverage(s) daily.She exercises with enough effort to increase her heart rate 9 or less minutes per day.  She exercises with enough effort to increase her heart rate 3 or less days per week. She is missing 2 dose(s) of medications per week.  She is not taking prescribed medications regularly due to remembering to take.          Review of Systems  Constitutional, HEENT, cardiovascular, pulmonary, gi and gu systems are negative, except as otherwise noted.      Objective    /82 (BP Location: Right arm, Patient Position: Sitting, Cuff Size: Adult Regular)   Pulse 92   Temp 98.2  F (36.8  C) (Oral)   Resp 16   Ht 1.791 m (5' 10.5\")   Wt 85.6 kg (188 lb 12.8 oz)   LMP 04/05/2024 (Approximate)   SpO2 96%   BMI 26.71 kg/m    Body mass index is 26.71 kg/m .  Physical Exam   GENERAL: alert and no distress  NECK: no adenopathy, no asymmetry, masses, or scars  RESP: lungs clear to auscultation - no rales, rhonchi or wheezes  CV: regular rate and rhythm, normal S1 S2, no S3 or S4, no murmur, click or rub, no peripheral edema  ABDOMEN: soft,  , no hepatosplenomegaly, no masses and bowel sounds normal. Generalized mild tenderness, with more tenderness on RUQ.   MS: no gross musculoskeletal defects noted, no edema           Signed Electronically by: Ellyn Maldonado MD    "

## 2024-04-11 ENCOUNTER — TELEPHONE (OUTPATIENT)
Dept: FAMILY MEDICINE | Facility: CLINIC | Age: 34
End: 2024-04-11
Payer: COMMERCIAL

## 2024-04-11 LAB
ALBUMIN SERPL BCG-MCNC: 4.1 G/DL (ref 3.5–5.2)
ALP SERPL-CCNC: 163 U/L (ref 40–150)
ALT SERPL W P-5'-P-CCNC: 58 U/L (ref 0–50)
ANION GAP SERPL CALCULATED.3IONS-SCNC: 12 MMOL/L (ref 7–15)
AST SERPL W P-5'-P-CCNC: 65 U/L (ref 0–45)
BILIRUB SERPL-MCNC: <0.2 MG/DL
BUN SERPL-MCNC: 13.6 MG/DL (ref 6–20)
CALCIUM SERPL-MCNC: 8.9 MG/DL (ref 8.6–10)
CHLORIDE SERPL-SCNC: 106 MMOL/L (ref 98–107)
CREAT SERPL-MCNC: 0.57 MG/DL (ref 0.51–0.95)
DEPRECATED HCO3 PLAS-SCNC: 23 MMOL/L (ref 22–29)
EGFRCR SERPLBLD CKD-EPI 2021: >90 ML/MIN/1.73M2
GLUCOSE SERPL-MCNC: 87 MG/DL (ref 70–99)
POTASSIUM SERPL-SCNC: 4.1 MMOL/L (ref 3.4–5.3)
PROT SERPL-MCNC: 7.1 G/DL (ref 6.4–8.3)
SODIUM SERPL-SCNC: 141 MMOL/L (ref 135–145)

## 2024-04-11 NOTE — TELEPHONE ENCOUNTER
Natalio Walls RN called Alina on 4/11/24 and left a message to return call to clinic. If patient calls back, please route to [facility].     TC please route to RN.     Natalio Walls RN  [facility]

## 2024-04-11 NOTE — TELEPHONE ENCOUNTER
Patient called back and was given the message from Dr. Maldonado and patient verbalized understanding and is working on establishing care with a new primary at the [facility] and will schedule a follow up at that location.     Natalio Walls RN  [facility]

## 2024-04-11 NOTE — TELEPHONE ENCOUNTER
----- Message from Ellyn Maldonado MD sent at 4/11/2024  7:33 AM CDT -----  Please call pt for results and help her to make follow-up visit in one week with me or her pcp.      1) elevated liver ast, alt and alkaline phosphatase. Unknown etiology. 5 month ago liver function was normal. Advise to have office follow-up visit in one week.   2) normal kidney function, blood glucose and electrolytes. Normal blood cell count.     Ellyn Maldonado MD on 4/11/2024 at 7:32 AM;

## 2024-04-17 ENCOUNTER — OFFICE VISIT (OUTPATIENT)
Dept: FAMILY MEDICINE | Facility: CLINIC | Age: 34
End: 2024-04-17
Payer: COMMERCIAL

## 2024-04-17 VITALS
WEIGHT: 194.1 LBS | DIASTOLIC BLOOD PRESSURE: 60 MMHG | TEMPERATURE: 98.1 F | BODY MASS INDEX: 27.17 KG/M2 | RESPIRATION RATE: 16 BRPM | SYSTOLIC BLOOD PRESSURE: 102 MMHG | HEART RATE: 75 BPM | OXYGEN SATURATION: 100 % | HEIGHT: 71 IN

## 2024-04-17 DIAGNOSIS — R10.9 NONSPECIFIC ABDOMINAL PAIN: Primary | ICD-10-CM

## 2024-04-17 DIAGNOSIS — L30.9 ECZEMA, UNSPECIFIED TYPE: ICD-10-CM

## 2024-04-17 DIAGNOSIS — R74.01 TRANSAMINITIS: ICD-10-CM

## 2024-04-17 PROCEDURE — 82977 ASSAY OF GGT: CPT | Performed by: GENERAL PRACTICE

## 2024-04-17 PROCEDURE — 99213 OFFICE O/P EST LOW 20 MIN: CPT | Performed by: GENERAL PRACTICE

## 2024-04-17 PROCEDURE — 36415 COLL VENOUS BLD VENIPUNCTURE: CPT | Performed by: GENERAL PRACTICE

## 2024-04-17 PROCEDURE — 80053 COMPREHEN METABOLIC PANEL: CPT | Performed by: GENERAL PRACTICE

## 2024-04-17 RX ORDER — HYDROCORTISONE 2.5 %
CREAM (GRAM) TOPICAL DAILY PRN
Qty: 30 G | Refills: 0 | Status: SHIPPED | OUTPATIENT
Start: 2024-04-17 | End: 2024-05-01

## 2024-04-17 RX ORDER — HYDROCORTISONE 2.5 %
CREAM (GRAM) TOPICAL DAILY PRN
Qty: 30 G | Refills: 1 | Status: SHIPPED | OUTPATIENT
Start: 2024-04-17 | End: 2024-04-17

## 2024-04-17 ASSESSMENT — ENCOUNTER SYMPTOMS: ABDOMINAL PAIN: 1

## 2024-04-17 ASSESSMENT — PAIN SCALES - GENERAL: PAINLEVEL: NO PAIN (0)

## 2024-04-17 NOTE — PROGRESS NOTES
"  Assessment & Plan     Nonspecific abdominal pain  Improving  Has been avoiding popcorn    Transaminitis  Will repeat labs, it still abnormal can proceed with abd. Ultrasound next  - Comprehensive metabolic panel (BMP + Alb, Alk Phos, ALT, AST, Total. Bili, TP); Future  - GGT; Future  - Comprehensive metabolic panel (BMP + Alb, Alk Phos, ALT, AST, Total. Bili, TP)  - GGT    Eczema, unspecified type  mild  - hydrocortisone 2.5 % cream; Apply topically daily as needed for itching or rash  -Can apply up to 2 weeks  -Use moisturizer                  Danny Fuller is a 33 year old, presenting for the following health issues:  Abdominal Pain, Results (Go over results), and Establish Care        4/17/2024     1:52 PM   Additional Questions   Roomed by Evonne LOREDO MA   Accompanied by self         4/17/2024     1:52 PM   Patient Reported Additional Medications   Patient reports taking the following new medications none       Follow-up abdominal pain    Pain is improving and has been avoiding popcorn.     Elevated liver enzymes    Eczema   Right arm and left hip      Abdominal Pain      Follow up for abdominal pain and establish care, as well with liver readings.              Review of Systems  Constitutional, HEENT, cardiovascular, pulmonary, gi and gu systems are negative, except as otherwise noted.      Objective    /60 (BP Location: Right arm, Patient Position: Sitting, Cuff Size: Adult Regular)   Pulse 75   Temp 98.1  F (36.7  C) (Oral)   Resp 16   Ht 1.803 m (5' 11\")   Wt 88 kg (194 lb 1.6 oz)   LMP 04/05/2024 (Approximate)   SpO2 100%   BMI 27.07 kg/m    Body mass index is 27.07 kg/m .  Physical Exam  Constitutional:       Appearance: Normal appearance.   Eyes:      Extraocular Movements: Extraocular movements intact.   Cardiovascular:      Rate and Rhythm: Normal rate and regular rhythm.   Pulmonary:      Effort: Pulmonary effort is normal.      Breath sounds: Normal breath sounds.   Abdominal:      " Palpations: Abdomen is soft.      Comments: Soft, +BS    Mild rash on the left hip   Musculoskeletal:         General: Normal range of motion.      Cervical back: Normal range of motion.   Skin:     General: Skin is warm.   Neurological:      General: No focal deficit present.      Mental Status: She is alert and oriented to person, place, and time. Mental status is at baseline.   Psychiatric:         Mood and Affect: Mood normal.         Behavior: Behavior normal.         Thought Content: Thought content normal.         Judgment: Judgment normal.                    Signed Electronically by: Iliana Fontenot MD

## 2024-04-19 LAB
ALBUMIN SERPL BCG-MCNC: 4 G/DL (ref 3.5–5.2)
ALP SERPL-CCNC: 104 U/L (ref 40–150)
ALT SERPL W P-5'-P-CCNC: 33 U/L (ref 0–50)
ANION GAP SERPL CALCULATED.3IONS-SCNC: 10 MMOL/L (ref 7–15)
AST SERPL W P-5'-P-CCNC: 28 U/L (ref 0–45)
BILIRUB SERPL-MCNC: 0.2 MG/DL
BUN SERPL-MCNC: 8 MG/DL (ref 6–20)
CALCIUM SERPL-MCNC: 8.9 MG/DL (ref 8.6–10)
CHLORIDE SERPL-SCNC: 105 MMOL/L (ref 98–107)
CREAT SERPL-MCNC: 0.57 MG/DL (ref 0.51–0.95)
DEPRECATED HCO3 PLAS-SCNC: 26 MMOL/L (ref 22–29)
EGFRCR SERPLBLD CKD-EPI 2021: >90 ML/MIN/1.73M2
GGT SERPL-CCNC: 15 U/L (ref 5–36)
GLUCOSE SERPL-MCNC: 80 MG/DL (ref 70–99)
POTASSIUM SERPL-SCNC: 4.1 MMOL/L (ref 3.4–5.3)
PROT SERPL-MCNC: 6.8 G/DL (ref 6.4–8.3)
SODIUM SERPL-SCNC: 141 MMOL/L (ref 135–145)

## 2024-04-29 PROBLEM — M62.89 HIGH-TONE PELVIC FLOOR DYSFUNCTION: Status: RESOLVED | Noted: 2023-11-27 | Resolved: 2024-04-29

## 2024-04-29 NOTE — PROGRESS NOTES
11/27/23 0500   Appointment Info   Signing clinician's name / credentials Brooke Moreno, PT, DPT   Total/Authorized Visits E+T   Visits Used 1   Medical Diagnosis high tone pelvic floor   PT Tx Diagnosis pelvic pain   Progress Note/Certification   Onset of illness/injury or Date of Surgery 02/27/23   Therapy Frequency 1x every 4 weeks   Predicted Duration 12 weeks   Progress Note Due Date 02/19/24   Progress Note Completed Date 11/27/23   GOALS   PT Goals 2   PT Goal 1   Goal Identifier pelvic pain intercourse   Goal Description patient will reduce pelvic pain during penetration, erection, orgasm, and ejaculation to <2/10 and/or frequency of less than 1x/month.   Rationale to maximize safety and independence with performance of ADLs and functional tasks   Target Date 02/19/24   PT Goal 2   Goal Identifier pelvic pain self management   Goal Description patient will learn and practice self-release techniques with appropriate tools, with aim to reduce their pelvic pain by 75% while regularly incorporating self-release techniques into their self-care routine, as assessed through regular follow-up appointments and pain level tracking   Rationale to maximize safety and independence with performance of ADLs and functional tasks   Target Date 02/19/24   Subjective Report   Subjective Report see eval   Treatment Interventions (PT)   Interventions Therapeutic Procedure/Exercise;Therapeutic Activity;Self Care/Home Management   Therapeutic Procedure/Exercise   PTRx Ther Proc 1 Pelvic Floor Muscle Strengthening Elevator    PTRx Ther Proc 1 - Details working on pelvic floor bulge and relaxation   Therapeutic Procedures: strength, endurance, ROM, flexibility minutes (85143) 10   Skilled Intervention cueing for dosing and technique   Patient Response/Progress pt verbalized understanding   Therapeutic Activity   PTRx Ther Act 1 Pelvic Myofascial Release   PTRx Ther Act 1 - Details self manually, 3-7x/week   PTRx Ther Act 2  Perineal Massage and Stretching   PTRx Ther Act 2 - Details self manually, 3-7x/week   Therapeutic Activities: dynamic activities to improve functional performance minutes (82657) 15   Skilled Intervention cueing for dosing and technique   Patient Response/Progress pt verbalized understanding   Self Care/home Management   ADL/Home Mgmt Training (80692) 25   Self Care Self Care 2;Self Care 3;Self Care 4   Self Care 1 education in pelvic floor anatomy prior to examination   Self Care 1 - Details use of pelvic model and pictures to educate on pelvic muscle function and anatomy. options for pelvic floor assessment including external visualization and or palpation, internal exam, biofeedback, pt offerred ability to consent or defer, pt educated on normal fluid intake and how that can affect pelvic floor   Skilled Intervention Education in bladder function, muscle function, patient consent, and self care techniques   Patient Response/Progress pt verbalized understanding   Self Care 2 estrogen   Self Care 2 - Details pt educated on how estrogen can affect the pelvic floor including tissue atrophy, risk of UTIs, pain   Eval/Assessments   PT Eval, Low Complexity Minutes (87331) 20   Education   Learner/Method Patient   Plan   Home program ptrx phone   Plan for next session how is pain, how is perineal release going, is estrogen helping?, possible OI release   Total Session Time   Timed Code Treatment Minutes 50   Total Treatment Time (sum of timed and untimed services) 70           DISCHARGE  Reason for Discharge: Discharged due to lack of pt follow up.    Equipment Issued: HEP    Discharge Plan: Patient to continue home program.    Referring Provider:  Marilou Gaytan

## 2024-05-24 NOTE — PROGRESS NOTES
Return Bariatric Surgery Note    RE: Alina Thompson  MR#: [id number]  : 1990  VISIT DATE: 2024    Dear Iliana Fontenot,    I had the pleasure of seeing your patient, Alina Thompson, in my post-bariatric surgery assessment clinic.    Assessment & Plan   Problem List Items Addressed This Visit       Postsurgical malabsorption     2022 Bypass Lin  Restart taking recommended post-op vitamins.  Labs ordered per protocol - ordered extra d/t not taking vitamins. Discussed risk of permanent complications w/some deficiencies.           Relevant Orders    CBC with platelets    Vitamin B12    Vitamin D Screen    Parathyroid Hormone Intact    Iron and Iron Binding Capacity    Ferritin    Vitamin A    Copper level    Selenium    Zinc    Folate    Vitamin B1 whole blood    Vitamin B6    Bariatric surgery status - Primary    Relevant Medications    phentermine (LOMAIRA) 8 MG tablet    Other Relevant Orders    CBC with platelets    Vitamin B12    Vitamin D Screen    Parathyroid Hormone Intact    Iron and Iron Binding Capacity    Ferritin    Vitamin B6    Overweight     Discussed adding phentermine - denies cardiac hx or hx of glaucoma. Will monitor anxiety and check BP and pulse after starting. Discussed it's a controlled substance and also needs to restart vitamins as well.         Relevant Medications    phentermine (LOMAIRA) 8 MG tablet    Hx of obesity    Relevant Medications    phentermine (LOMAIRA) 8 MG tablet      AOM Considerations:  Phentermine:  Caution anxiety  Topiramate: oral birth control consideration - discussed and recommended back up method. Denies hx of glaucoma/kidneys stones.   GLP-1:      Naltrexone:    Wellbutrin: hasn't tolerated previously   Metformin:    Contrave:  Qsymia:         PATIENT INSTRUCTIONS:  Plan:  Labs ordered. Call [phone] to schedule.  Restart your bariatric vitamins:  2 Complete multivitamins with minerals (at different times than calcium)  Vitamin D 5000 Int  Units/125 mg daily   Calcium 600 mg twice daily or 500 mg three times daily   Vitamin B12: 500 mcg sl daily or 1000 mcg Inj monthly  B complex daily or Thiamine 100 mg weekly  1 Iron/Vit C. Daily for females who menstruate and/or as directed        Recommend pursing regular exercise. 5 minutes of exercise every other day or every 2 days is a great place to start with aiming for 30 minutes 5 times a week as an end goal.  If you can, try to get some strength training twice weekly while losing weight to help preserve your muscle!      Start Phentermine  Take 1 tablet in the morning.  Please get blood pressure/pulse checked in 2 weeks.  Send result through [employer] or by nursing visit.     Can get BP/Pulse checked at [facility] [email or web address], at a [facility] Primary care office (nurse visit) [phone], or with a home machine.           FOLLOW-UP:  Call [phone] to schedule next visit 3-4 months    37 minutes spent on the date of the encounter doing chart review, history and exam, result review, counseling, developing plan of care, documentation, and further activities as noted        CHIEF COMPLAINT: Post-bariatric surgery follow-up here for her annual follow-up    HISTORY OF PRESENT ILLNESS:      5/24/2024    11:47 AM   Questions Regarding Prior Weight Loss Surgery Reviewed With Patient   I had the following weight loss procedure Vira-en-y Gastric Bypass   What year was your surgery? 2022   How has your weight changed since your last visit? I have gained weight   Do you currently have any of the following None of the above   Do you have any concerns today? My appetite is back to normal, I'm able to eat most things without difficulty. Rarely any pain with overeating.   Discussed weight changes and increase, weight from 5/23/2023 178 pounds. Around 190 for last 3-4 months.     Feels has fallen back into bad habits of snacking/larger portions. Feels some of this poor planning, very  stressed currently and older habits while bored.     Starting therapy June 8th as well to work on some CBT.     Weight History:      5/24/2024    11:47 AM   --   What is your highest lifetime weight? 306   What is your lowest weight since surgery? (In pounds) 170     Initial Weight (lbs): 298.2 lbs  Weight: 194 lb (88 kg)  Last Visits Weight: 178 lb (80.7 kg)  Cumulative weight loss (lbs): 104.2  Weight Loss Percentage: 34.94%    Patient is taking the following bariatric postoperative vitamins:  2 Complete multivitamins with minerals (at different times than calcium)  5000 Int Units of Vitamin D daily   500 mg of calcium  mg of calcium BID  500 mcg of Vitamin B12 sl daily  1000 mcg Vitamin B12 injection monthly  B complex daily   Thiamine weekly  1 Iron/Vit C. Daily        Not taking any vitamins currently.   Gets regular menses     No unusual symptoms, no N/V, no tingling or other issues.         5/24/2024    11:47 AM   Questions Regarding Co-Morbidities and Health Concerns Reviewed With Patient   Pre-diabetes Gone away   Diabetes II Gone away   High Blood Pressure Never   High cholesterol Never   Heartburn/Reflux Gone away   Sleep apnea Gone away   PCOS Never   Back pain Never   Joint pain Never   Lower leg swelling Never           5/24/2024    11:47 AM   Eating Habits   How many meals do you eat per day? 3   Do you snack between meals? Yes   How much food are you eating at each meal? Greater than 1 cup   Are you able to separate your meals and liquids by at least 30 minutes? Sometimes   Are you able to avoid liquid calories? Yes           5/24/2024    11:47 AM   Exercise Questions Reviewed With Patient   How often do you exercise? Never   What keeps you from being more active? I should be more active but I just have not gotten around to it    Lack of Time    Too tired       Social History:      5/24/2024    11:47 AM   --   Are you smoking? No   Are you drinking alcohol? No       Medications:  Current  Outpatient Medications   Medication Sig Dispense Refill    COMPOUND CONTAINING CONTROLLED SUBSTANCE (CMPD RX) - PHARMACY TO MIX COMPOUNDED MEDICATION Insert one 5mg valium vaginal suppository once nightly as needed for pelvic pain 10 suppository 0    cyanocobalamin (VITAMIN B-12) 100 MCG tablet Take 100 mcg by mouth daily      desogestrel-ethinyl estradiol (APRI) 0.15-30 MG-MCG tablet Take 1 tablet by mouth daily Continuously 112 tablet 3    estradiol (ESTRACE) 0.1 MG/GM vaginal cream Place 2 g vaginally twice a week 42.5 g 3    ferrous fumarate 65 mg, [place]. FE,-Vitamin C 125 mg (VITRON C)  MG TABS tablet Take 1 tablet by mouth daily      FLUoxetine (PROZAC) 20 MG capsule Take 1 capsule (20 mg) by mouth daily 90 capsule 2    fluticasone (FLONASE) 50 MCG/ACT nasal spray Spray 1 spray into both nostrils daily 16 g 3    lidocaine (XYLOCAINE) 5 % external ointment Apply topically as needed for moderate pain Apply on cotton ball and apply to affected area. 50 g 1    Multiple Vitamin (MULTIVITAMIN ADULT PO) Take 1 tablet by mouth daily      nystatin (MYCOSTATIN) 081608 UNIT/GM external cream Apply topically 2 times daily as needed for dry skin Apply as needed for rash. 30 g 3    phentermine (LOMAIRA) 8 MG tablet Take 1 tablet (8 mg) by mouth every morning (before breakfast) 30 tablet 2    Thiamine Mononitrate (VITAMIN B-1) 100 MG TABS       VITAMIN D (CHOLECALCIFEROL) PO Take 1 tablet by mouth daily      CALCIUM PO Take 2 capsules by mouth daily 600mg breakfast and lunch (Patient not taking: Reported on 5/29/2024)       No current facility-administered medications for this visit.         5/24/2024    11:47 AM   --   Do you avoid NSAIDs such as (Ibuprofen, Aleve, Naproxen, Advil)? Yes       ROS:  GI:       5/24/2024    11:47 AM   --   Vomiting No   Diarrhea No   Constipation No   Swallowing trouble No   Abdominal pain No   Heartburn No     Skin:       5/24/2024    11:47 AM   BAR RBS ROS - SKIN   Rash in skin folds  "Yes   Mostly center skin fold getting more acne/eczema - did get a cream as well. Using a deoderant/antiperspirant cream. Already asked insurance about coverage.     Psych:       5/24/2024    11:47 AM   --   Depression Yes   Anxiety Yes   Starting up a therapist - with some medications as well.     Female Only:       5/24/2024    11:47 AM   BAR RBS ROS -    Female only Irregular menstrual cycles    Birth control   Stress urinary incontinence No       LABS/IMAGING/MEDICAL RECORDS REVIEW:   Reviewed    Age less than 45, no DEXA indicated.    PHYSICAL EXAMINATION:  /60   Pulse 82   Ht 5' 11\" (1.803 m)   Wt 194 lb (88 kg)   LMP 04/05/2024 (Approximate)   SpO2 100%   BMI 27.06 kg/m    GENERAL: Healthy, alert and no distress  EYES: Eyes grossly normal to inspection.    RESP: No audible wheeze, cough, or visible cyanosis.  No increased work of breathing. Lungs clear to auscultation  Heart: regular rate and rhythm. No significant murmurs, rubs, or gallops  SKIN: Visible skin clear.   NEURO: Mentation and speech appropriate for age.  PSYCH: Mentation appears normal, affect normal/bright, judgement and insight intact, normal speech and appearance well-groomed.        COUNSELING PROVIDED in AVS:  We reviewed the important post op bariatric recommendations:  eating 3 meals daily  eating protein first, getting >60gm protein daily  eating slowly, chewing food well  avoiding/limiting calorie containing beverages  avoiding fluids 30 minutes before, during, and after meals  limiting restaurant or cafeteria eating to twice a week or less  Pt reminded to avoid marginal ulcers she should avoid tobacco at all, alcohol in excess, caffeine, and NSAIDS (unless indicated for cardioprotection or otherwise and opposed by a PPI).  Pt encouraged to establish and maintain a consistent physical activity routine, 6-8 hours of restorative sleep each night and optimal stress management.  Pt counseled on the importance of life long " vitamin supplementation and life long follow up.

## 2024-05-28 NOTE — PATIENT INSTRUCTIONS
"Asif Fuller!      It was great chatting with you again today! Here's a summary of the goals we discussed:    1. Bariatric Specialty Multivitamins:   - Bariatric Pal - One a Day  - Bariatric Advantage Ultra Solo  - Unjury - Opurity  - ProCare 45mg iron     Would then need:  - Calcium: 500mg 3x/day OR 600mg 2x/day (take at least 2 hours apart from your multivitamin  - Vitamin B12: 500mcg sublingual daily OR 5000mcg sublingual weekly     2. Breakfast options:  - yogurt OR eggs + fruit + 1 slice of toast     3. Lunch:  - have partner prepare extra servings of dinner for leftovers  - see \"easy foods list\"     4. Avoid sipping or drinking during all meals and snacks, as well as 30 minutes after      5. Include protein with all meals AND snacks      Plan on following up in 2-3 months. This can be scheduled via our call center at . Reach out sooner with any questions or concerns. Have a great day!      Diya Najera RD, LD  Clinical Dietitian     "

## 2024-05-28 NOTE — PROGRESS NOTES
"NUTRITION POST OP APPOINTMENT  DATE OF VISIT: May 28, 2024    Alina Thompson  1990  female  [id number]  33 year old     ASSESSMENT:    REASON FOR VISIT:  Alina is a 33 year old year old female presents today for 2 year PO nutrition follow-up appointment. Patient is accompanied by self.    DIAGNOSIS:  Status post gastric bypass surgery.  Overweight BMI 25-29.9     ANTHROPOMETRICS:  Initial Weight: 273 lbs   Height: 5' 11\"  Current Weight: 194 lbs 0 oz     BMI: Body mass index is 27.06 kg/m .    VITAMINS AND MINERALS:  None currently    WEIGHT LOSS MEDICATIONS:  Rx for Lomaira provided today     NUTRITION HISTORY:  Breakfast: [wakes at 7:30-8am, eats by 9am] toast w/butter and peanut butter or jam (2 slices; english muffin bread)  yogurt  eggs  Lunch: [noon-1pm] bagel w/cream cheese  skillet chicken/vegetable/pasta  chicken wrap  sambusa (2)  Supper: [partner cooks; 6-7pm] meat (chicken or steak) + vegetables (broccoli or asparagus) +/- squash or sweet potatoes   Restaurants: take out meals for ~1/3 dinners each week   Snacks: [morning] rare  [afternoon] fruit, cheese (string or baby bell)  [evenings] popsicles, fruit, cheese, popcorn (skinny pop), dried fruit   Fluids consumed: Water (32oz), coffee (8oz, SF creamer), diet soda (12oz)  Consuming liquid calories: No  Protein intake: 50-70 grams/day  Tolerate regular texture food: Yes  Any foods not tolerated details: Yes  If any food not tolerated: large amounts of bread, rice   Portion size: 1 cup or more  Take 20-30 minutes to consume each meal: Yes   Eat protein foods first: Yes  Fluids and meals separate by at least 30 minutes: Yes  Chew foods thoroughly: Yes  Tolerating diet: Yes  Drinking high protein supplements: No  Consuming snacks per day: markos  Additional Information: Pt shares she's become off track with lifestyle guidelines and with subsequent weight gain. Feels ready to get back on track. Reviewed vitamins and lifestyle guidelines. Pt " prescribed Lomaira today and also scheduled to start therapy.         PHYSICAL ACTIVITY:  None     DIAGNOSIS:  Previous Nutrition Diagnosis: Altered gastrointestinal function related to alteration in gastrointestinal structure as evidenced by history of gastric bypass surgery.- no change    Previous goals:  Aim for 1 cup of food containing 3 food groups - not met  Be mindful of snacking habits - not met  Restart strength training - not met  Avoid carbonated beverages - not met  Take Calcium consistently -  not met    Current Nutrition Diagnosis: Altered gastrointestinal function related to alteration in gastrointestinal structure as evidenced by history of gastric bypass surgery.    INTERVENTION:   Nutrition Prescription: Eat 3 meals a day at regular intervals. Consume 60-90 grams of protein daily. Follow post-surgical vitamin and mineral protocol.  Assessed learning needs and learning preferences.    GOALS:  Take all vitamins/minerals per guidelines  Include 3 food groups at each meal  Have a protein supplement regularly  Include protein with all meals and snacks  Separate fluids from all meals and snacks    Follow-Up:   Recommend q3-4m follow up visits to assist with lifestyle changes or per insurance.  Implementation: Discussed progress toward previous goals; reinforced importance of following bariatric lifestyle changes.    NUTRITION MONITORING AND EVALUATION:  Anticipated compliance: fair-good  Verbalized good understanding.    Follow up: Patient to follow up in 3-4 months.    TIME SPENT WITH PATIENT:  35 minutes      Diya Najera RD, LD  Clinical Dietitian

## 2024-05-29 ENCOUNTER — OFFICE VISIT (OUTPATIENT)
Dept: SURGERY | Facility: CLINIC | Age: 34
End: 2024-05-29
Payer: COMMERCIAL

## 2024-05-29 VITALS — HEIGHT: 71 IN | BODY MASS INDEX: 27.16 KG/M2 | WEIGHT: 194 LBS

## 2024-05-29 VITALS
BODY MASS INDEX: 27.16 KG/M2 | DIASTOLIC BLOOD PRESSURE: 60 MMHG | OXYGEN SATURATION: 100 % | WEIGHT: 194 LBS | HEIGHT: 71 IN | HEART RATE: 82 BPM | SYSTOLIC BLOOD PRESSURE: 100 MMHG

## 2024-05-29 DIAGNOSIS — Z86.39 HX OF OBESITY: ICD-10-CM

## 2024-05-29 DIAGNOSIS — Z98.84 BARIATRIC SURGERY STATUS: Primary | ICD-10-CM

## 2024-05-29 DIAGNOSIS — K91.2 POSTSURGICAL MALABSORPTION: ICD-10-CM

## 2024-05-29 DIAGNOSIS — E66.3 OVERWEIGHT: ICD-10-CM

## 2024-05-29 PROCEDURE — 99214 OFFICE O/P EST MOD 30 MIN: CPT | Performed by: PHYSICIAN ASSISTANT

## 2024-05-29 PROCEDURE — 97803 MED NUTRITION INDIV SUBSEQ: CPT

## 2024-05-29 NOTE — ASSESSMENT & PLAN NOTE
Discussed adding phentermine - denies cardiac hx or hx of glaucoma. Will monitor anxiety and check BP and pulse after starting. Discussed it's a controlled substance and also needs to restart vitamins as well.

## 2024-05-29 NOTE — ASSESSMENT & PLAN NOTE
5/4/2022 Bypass Lin  Restart taking recommended post-op vitamins.  Labs ordered per protocol - ordered extra d/t not taking vitamins. Discussed risk of permanent complications w/some deficiencies.

## 2024-05-29 NOTE — PATIENT INSTRUCTIONS
Nice to talk with you today.  Below is the plan discussed.-  . Debbie Nolasco PA-C    Plan:  Labs ordered. Call [phone] to schedule.  Restart your bariatric vitamins:  2 Complete multivitamins with minerals (at different times than calcium)  Vitamin D 5000 Int Units/125 mg daily   Calcium 600 mg twice daily or 500 mg three times daily   Vitamin B12: 500 mcg sl daily or 1000 mcg Inj monthly  B complex daily or Thiamine 100 mg weekly  1 Iron/Vit C. Daily for females who menstruate and/or as directed        Recommend pursing regular exercise. 5 minutes of exercise every other day or every 2 days is a great place to start with aiming for 30 minutes 5 times a week as an end goal.  If you can, try to get some strength training twice weekly while losing weight to help preserve your muscle!      Start Phentermine  Take 1 tablet in the morning.  Please get blood pressure/pulse checked in 2 weeks.  Send result through [facility] or by nursing visit.     Can get BP/Pulse checked at [facility] [email or web address], at a [facility] Primary care office (nurse visit) [phone], or with a home machine.           FOLLOW-UP:  Call [phone] to schedule next visit 3-4 months    Bariatric Post Op Guidelines  General:  Follow up annually lifelong.   Obesity is a chronic disease.  Weight gain can be expected. The goal of follow-up visits is to ensure adequate vitamin and protein absorption, evaluate food intake behavior, review exercise/activity level, and assist with weight regain.    To avoid marginal ulcers avoid all forms of tobacco, alcohol in excess, caffeine, and NSAIDS     Exercise is key for weight loss and weight maintenance. Aim for 30-60 minutes of physical activity most days.  Include cardiovascular and strength training.    Continue lifelong vitamins supplementation and annual lab follow up.  All patients should supplement with the following bariatric postoperative vitamins:  2 Complete  multivitamins with minerals (at different times than calcium)  Vitamin D 5000 Int Units/125 mg daily   Calcium 600 mg twice daily or 500 mg three times daily   Vitamin B12: 500 mcg sl daily or 1000 mcg Inj monthly  B complex daily or Thiamine 100 mg weekly  1 Iron/Vit C. Daily for females who menstruate and/or as directed    Relay and GI symptoms which can be a sign of complications. Inability to tolerate solid food (chicken, steak, fish) should by need to be evaluated.    There is a 10% increase of Alcohol Use Disorder in patients with bariatric surgery.   Most often occurring around 2 years post op.  Call if you feel alcohol is interfering in your daily life.  We can help.     Nutritional:  Eat 3 meals per day  (No snacks between meals.)  Do not skip meals.  This can cause overeating at the next meal and will prevent adequate protein and nutritional intake.    Aim for 60-80 grams of protein per day.  Always eat your protein first. This assists with optimal nutrition and helps you stay full longer.    Eat your protein first, and then follow with fiber.    Add fiber by including fruits, vegetables, whole grains, and beans.     Portions should be 1 cup per meal.   Continue to use saucer/salad plates, infant/toddler silverware to keep portion sizes small and take small bites.  Make each meal last 20-30 minutes. Always stop eating when satisfied.    Aim for 64 oz. of calorie-free fluids daily.    Avoid drinking 30 min before, during, and 30 min after meal    Avoid high sugar and high fat foods to prevent high calorie intake.   Check nutrition labels for less than 10 grams of sugar and less than 10 grams of fat per serving.       PHENTERMINE    We are starting Phentermine.  Our patients on Phentermine find that they:    >feel less hunger    >find it easier to push the plate away   >have an easier time eating less    For some of our patients, these feelings are very real and immediate. For other patients, the feelings  are less obvious. They don't feel much of a change but find they've lost weight. Like all weight loss medications, Phentermine  works best when you help it work. This means:  1. Having less tempting high calorie (fattening) food around the house or office. (For people with strong cravings this is very important.)   2. Staying away from situations or people that may trigger your cravings .   3. Eating out only one time or less each week.  4. Eating your meals at a table with the TV or computer off.    Side-effects. Phentermine is generally well tolerated. The most common side effects are dry mouth and constipation. Because it is a stimulant, pts can have feelings of racing pulse or rapid heart beat. Some people can get an elevated blood pressure. Because of this we ask you to get your blood pressure and pulse checked within 1-2 weeks of starting the medication.     For any questions or concerns please send a [facility] message to our team or call our weight management call center at [phone] during regular business hours. For questions during evenings or weekends your messages will be addressed during the next business day.  For emergencies please call 911 or seek immediate medical care.

## 2024-06-21 ENCOUNTER — ALLIED HEALTH/NURSE VISIT (OUTPATIENT)
Dept: FAMILY MEDICINE | Facility: CLINIC | Age: 34
End: 2024-06-21
Payer: COMMERCIAL

## 2024-06-21 ENCOUNTER — LAB (OUTPATIENT)
Dept: LAB | Facility: CLINIC | Age: 34
End: 2024-06-21
Payer: COMMERCIAL

## 2024-06-21 VITALS — DIASTOLIC BLOOD PRESSURE: 70 MMHG | SYSTOLIC BLOOD PRESSURE: 118 MMHG

## 2024-06-21 DIAGNOSIS — Z98.84 BARIATRIC SURGERY STATUS: ICD-10-CM

## 2024-06-21 DIAGNOSIS — Z01.30 BLOOD PRESSURE CHECK: Primary | ICD-10-CM

## 2024-06-21 DIAGNOSIS — K91.2 POSTSURGICAL MALABSORPTION: ICD-10-CM

## 2024-06-21 LAB
ERYTHROCYTE [DISTWIDTH] IN BLOOD BY AUTOMATED COUNT: 12 % (ref 10–15)
FERRITIN SERPL-MCNC: 17 NG/ML (ref 6–175)
FOLATE SERPL-MCNC: 8.8 NG/ML (ref 4.6–34.8)
HCT VFR BLD AUTO: 38.5 % (ref 35–47)
HGB BLD-MCNC: 13.1 G/DL (ref 11.7–15.7)
IRON BINDING CAPACITY (ROCHE): 345 UG/DL (ref 240–430)
IRON SATN MFR SERPL: 11 % (ref 15–46)
IRON SERPL-MCNC: 37 UG/DL (ref 37–145)
MCH RBC QN AUTO: 30.3 PG (ref 26.5–33)
MCHC RBC AUTO-ENTMCNC: 34 G/DL (ref 31.5–36.5)
MCV RBC AUTO: 89 FL (ref 78–100)
PLATELET # BLD AUTO: 299 10E3/UL (ref 150–450)
PTH-INTACT SERPL-MCNC: 46 PG/ML (ref 15–65)
RBC # BLD AUTO: 4.33 10E6/UL (ref 3.8–5.2)
VIT B12 SERPL-MCNC: 213 PG/ML (ref 232–1245)
VIT D+METAB SERPL-MCNC: 20 NG/ML (ref 20–50)
WBC # BLD AUTO: 7.4 10E3/UL (ref 4–11)

## 2024-06-21 PROCEDURE — 84255 ASSAY OF SELENIUM: CPT | Mod: 90

## 2024-06-21 PROCEDURE — 83540 ASSAY OF IRON: CPT

## 2024-06-21 PROCEDURE — 83970 ASSAY OF PARATHORMONE: CPT

## 2024-06-21 PROCEDURE — 84590 ASSAY OF VITAMIN A: CPT | Mod: 90

## 2024-06-21 PROCEDURE — 85027 COMPLETE CBC AUTOMATED: CPT

## 2024-06-21 PROCEDURE — 36415 COLL VENOUS BLD VENIPUNCTURE: CPT

## 2024-06-21 PROCEDURE — 82525 ASSAY OF COPPER: CPT | Mod: 90

## 2024-06-21 PROCEDURE — 82306 VITAMIN D 25 HYDROXY: CPT

## 2024-06-21 PROCEDURE — 83550 IRON BINDING TEST: CPT

## 2024-06-21 PROCEDURE — 82607 VITAMIN B-12: CPT

## 2024-06-21 PROCEDURE — 84630 ASSAY OF ZINC: CPT | Mod: 90

## 2024-06-21 PROCEDURE — 82728 ASSAY OF FERRITIN: CPT

## 2024-06-21 PROCEDURE — 84425 ASSAY OF VITAMIN B-1: CPT | Mod: 90

## 2024-06-21 PROCEDURE — 82746 ASSAY OF FOLIC ACID SERUM: CPT

## 2024-06-21 PROCEDURE — 84207 ASSAY OF VITAMIN B-6: CPT | Mod: 90

## 2024-06-21 PROCEDURE — 99207 PR NO CHARGE NURSE ONLY: CPT

## 2024-06-21 PROCEDURE — 99000 SPECIMEN HANDLING OFFICE-LAB: CPT

## 2024-06-23 LAB
COPPER SERPL-MCNC: 110.1 UG/DL
SELENIUM SERPL-MCNC: 141.2 UG/L
ZINC SERPL-MCNC: 60 UG/DL

## 2024-06-24 DIAGNOSIS — R79.0 LOW IRON STORES: ICD-10-CM

## 2024-06-24 DIAGNOSIS — R79.89 LOW VITAMIN B12 LEVEL: Primary | ICD-10-CM

## 2024-06-24 LAB
ANNOTATION COMMENT IMP: NORMAL
PYRIDOXAL PHOS SERPL-SCNC: 22.1 NMOL/L
RETINYL PALMITATE SERPL-MCNC: <0.02 MG/L
VIT A SERPL-MCNC: 0.44 MG/L

## 2024-06-26 LAB — VIT B1 PYROPHOSHATE BLD-SCNC: 149 NMOL/L

## 2024-07-08 NOTE — PROGRESS NOTES
Alina Thompson is a 33 year old patient who comes in today for a Blood Pressure check.  Initial BP:  /70      Data Unavailable  Disposition: results routed to provider

## 2024-09-24 ENCOUNTER — MYC MEDICAL ADVICE (OUTPATIENT)
Dept: FAMILY MEDICINE | Facility: CLINIC | Age: 34
End: 2024-09-24
Payer: COMMERCIAL

## 2024-09-24 ENCOUNTER — E-VISIT (OUTPATIENT)
Dept: FAMILY MEDICINE | Facility: CLINIC | Age: 34
End: 2024-09-24
Payer: COMMERCIAL

## 2024-09-24 DIAGNOSIS — M54.2 CERVICAL SPINE PAIN: Primary | ICD-10-CM

## 2024-09-24 PROCEDURE — 99207 PR NON-BILLABLE SERV PER CHARTING: CPT | Performed by: GENERAL PRACTICE

## 2024-09-24 NOTE — TELEPHONE ENCOUNTER
Pt has only seen Dr. Fontenot once.  It does not look like neck, back or TMJ were discussed.   Those are not on her problem list either.  Will advise an e-visit.

## 2024-09-25 ENCOUNTER — LAB (OUTPATIENT)
Dept: LAB | Facility: CLINIC | Age: 34
End: 2024-09-25
Payer: COMMERCIAL

## 2024-09-25 DIAGNOSIS — R79.0 LOW IRON STORES: ICD-10-CM

## 2024-09-25 DIAGNOSIS — R79.89 LOW VITAMIN B12 LEVEL: ICD-10-CM

## 2024-09-25 LAB
FERRITIN SERPL-MCNC: 17 NG/ML (ref 6–175)
IRON BINDING CAPACITY (ROCHE): 350 UG/DL (ref 240–430)
IRON SATN MFR SERPL: 6 % (ref 15–46)
IRON SERPL-MCNC: 20 UG/DL (ref 37–145)
VIT B12 SERPL-MCNC: 283 PG/ML (ref 232–1245)

## 2024-09-25 PROCEDURE — 82607 VITAMIN B-12: CPT

## 2024-09-25 PROCEDURE — 83540 ASSAY OF IRON: CPT

## 2024-09-25 PROCEDURE — 83550 IRON BINDING TEST: CPT

## 2024-09-25 PROCEDURE — 36415 COLL VENOUS BLD VENIPUNCTURE: CPT

## 2024-09-25 PROCEDURE — 82728 ASSAY OF FERRITIN: CPT

## 2024-10-02 DIAGNOSIS — R79.89 LOW VITAMIN B12 LEVEL: ICD-10-CM

## 2024-10-02 DIAGNOSIS — R79.0 LOW IRON STORES: Primary | ICD-10-CM

## 2024-10-02 DIAGNOSIS — Z98.84 BARIATRIC SURGERY STATUS: ICD-10-CM

## 2024-10-08 ENCOUNTER — PATIENT OUTREACH (OUTPATIENT)
Dept: CARE COORDINATION | Facility: CLINIC | Age: 34
End: 2024-10-08
Payer: COMMERCIAL

## 2024-10-09 DIAGNOSIS — F41.1 GAD (GENERALIZED ANXIETY DISORDER): ICD-10-CM

## 2024-10-09 NOTE — TELEPHONE ENCOUNTER
Sent MyChart x1 attempt.  Needs physical for further refills.      Annita VUONG, - [facility]  [facility]- [place] [facility]

## 2024-10-10 NOTE — TELEPHONE ENCOUNTER
LVM requesting a call back for an appt. One more attempt will be made.     Laurence Montelongo   [facility]

## 2024-10-11 NOTE — TELEPHONE ENCOUNTER
LVM and Sent MCM as final attempt to schedule.     Laurence Montelongo   [facility] [place]

## 2024-12-23 SDOH — HEALTH STABILITY: PHYSICAL HEALTH: ON AVERAGE, HOW MANY DAYS PER WEEK DO YOU ENGAGE IN MODERATE TO STRENUOUS EXERCISE (LIKE A BRISK WALK)?: 0 DAYS

## 2024-12-23 SDOH — HEALTH STABILITY: PHYSICAL HEALTH: ON AVERAGE, HOW MANY MINUTES DO YOU ENGAGE IN EXERCISE AT THIS LEVEL?: 0 MIN

## 2024-12-23 ASSESSMENT — SOCIAL DETERMINANTS OF HEALTH (SDOH): HOW OFTEN DO YOU GET TOGETHER WITH FRIENDS OR RELATIVES?: TWICE A WEEK

## 2024-12-23 ASSESSMENT — PATIENT HEALTH QUESTIONNAIRE - PHQ9: SUM OF ALL RESPONSES TO PHQ QUESTIONS 1-9: 4

## 2025-01-30 SDOH — HEALTH STABILITY: PHYSICAL HEALTH: ON AVERAGE, HOW MANY MINUTES DO YOU ENGAGE IN EXERCISE AT THIS LEVEL?: 0 MIN

## 2025-01-30 SDOH — HEALTH STABILITY: PHYSICAL HEALTH: ON AVERAGE, HOW MANY DAYS PER WEEK DO YOU ENGAGE IN MODERATE TO STRENUOUS EXERCISE (LIKE A BRISK WALK)?: 0 DAYS

## 2025-01-30 ASSESSMENT — SOCIAL DETERMINANTS OF HEALTH (SDOH): HOW OFTEN DO YOU GET TOGETHER WITH FRIENDS OR RELATIVES?: THREE TIMES A WEEK

## 2025-01-30 ASSESSMENT — PATIENT HEALTH QUESTIONNAIRE - PHQ9
SUM OF ALL RESPONSES TO PHQ QUESTIONS 1-9: 4
10. IF YOU CHECKED OFF ANY PROBLEMS, HOW DIFFICULT HAVE THESE PROBLEMS MADE IT FOR YOU TO DO YOUR WORK, TAKE CARE OF THINGS AT HOME, OR GET ALONG WITH OTHER PEOPLE: SOMEWHAT DIFFICULT

## 2025-02-04 ENCOUNTER — OFFICE VISIT (OUTPATIENT)
Dept: FAMILY MEDICINE | Facility: CLINIC | Age: 35
End: 2025-02-04
Payer: COMMERCIAL

## 2025-02-04 VITALS
OXYGEN SATURATION: 100 % | TEMPERATURE: 97.8 F | HEART RATE: 66 BPM | HEIGHT: 71 IN | RESPIRATION RATE: 16 BRPM | BODY MASS INDEX: 28.81 KG/M2 | DIASTOLIC BLOOD PRESSURE: 74 MMHG | WEIGHT: 205.8 LBS | SYSTOLIC BLOOD PRESSURE: 124 MMHG

## 2025-02-04 DIAGNOSIS — R79.89 LOW VITAMIN B12 LEVEL: ICD-10-CM

## 2025-02-04 DIAGNOSIS — Z23 NEED FOR TETANUS BOOSTER: ICD-10-CM

## 2025-02-04 DIAGNOSIS — R79.0 LOW IRON STORES: ICD-10-CM

## 2025-02-04 DIAGNOSIS — Z98.84 BARIATRIC SURGERY STATUS: ICD-10-CM

## 2025-02-04 DIAGNOSIS — Z00.00 ROUTINE GENERAL MEDICAL EXAMINATION AT A HEALTH CARE FACILITY: Primary | ICD-10-CM

## 2025-02-04 DIAGNOSIS — F41.1 GAD (GENERALIZED ANXIETY DISORDER): ICD-10-CM

## 2025-02-04 DIAGNOSIS — F90.0 ATTENTION DEFICIT HYPERACTIVITY DISORDER (ADHD), PREDOMINANTLY INATTENTIVE TYPE: ICD-10-CM

## 2025-02-04 PROBLEM — G47.33 OSA (OBSTRUCTIVE SLEEP APNEA): Chronic | Status: RESOLVED | Noted: 2017-01-16 | Resolved: 2025-02-04

## 2025-02-04 PROBLEM — E11.9 TYPE 2 DIABETES MELLITUS WITHOUT COMPLICATION, WITHOUT LONG-TERM CURRENT USE OF INSULIN (H): Status: RESOLVED | Noted: 2021-09-15 | Resolved: 2025-02-04

## 2025-02-04 PROCEDURE — 90480 ADMN SARSCOV2 VAC 1/ONLY CMP: CPT | Performed by: GENERAL PRACTICE

## 2025-02-04 PROCEDURE — 83550 IRON BINDING TEST: CPT | Performed by: GENERAL PRACTICE

## 2025-02-04 PROCEDURE — 82728 ASSAY OF FERRITIN: CPT | Performed by: GENERAL PRACTICE

## 2025-02-04 PROCEDURE — 80053 COMPREHEN METABOLIC PANEL: CPT | Performed by: GENERAL PRACTICE

## 2025-02-04 PROCEDURE — 83540 ASSAY OF IRON: CPT | Performed by: GENERAL PRACTICE

## 2025-02-04 PROCEDURE — 99214 OFFICE O/P EST MOD 30 MIN: CPT | Mod: 25 | Performed by: GENERAL PRACTICE

## 2025-02-04 PROCEDURE — 90472 IMMUNIZATION ADMIN EACH ADD: CPT | Performed by: GENERAL PRACTICE

## 2025-02-04 PROCEDURE — 99395 PREV VISIT EST AGE 18-39: CPT | Mod: 25 | Performed by: GENERAL PRACTICE

## 2025-02-04 PROCEDURE — 36415 COLL VENOUS BLD VENIPUNCTURE: CPT | Performed by: GENERAL PRACTICE

## 2025-02-04 PROCEDURE — 91320 SARSCV2 VAC 30MCG TRS-SUC IM: CPT | Performed by: GENERAL PRACTICE

## 2025-02-04 PROCEDURE — 87340 HEPATITIS B SURFACE AG IA: CPT | Performed by: GENERAL PRACTICE

## 2025-02-04 PROCEDURE — 86704 HEP B CORE ANTIBODY TOTAL: CPT | Performed by: GENERAL PRACTICE

## 2025-02-04 PROCEDURE — 82607 VITAMIN B-12: CPT | Performed by: GENERAL PRACTICE

## 2025-02-04 PROCEDURE — 90471 IMMUNIZATION ADMIN: CPT | Performed by: GENERAL PRACTICE

## 2025-02-04 PROCEDURE — 90656 IIV3 VACC NO PRSV 0.5 ML IM: CPT | Performed by: GENERAL PRACTICE

## 2025-02-04 PROCEDURE — 80061 LIPID PANEL: CPT | Performed by: GENERAL PRACTICE

## 2025-02-04 PROCEDURE — 90715 TDAP VACCINE 7 YRS/> IM: CPT | Performed by: GENERAL PRACTICE

## 2025-02-04 PROCEDURE — 86706 HEP B SURFACE ANTIBODY: CPT | Performed by: GENERAL PRACTICE

## 2025-02-04 ASSESSMENT — PAIN SCALES - GENERAL: PAINLEVEL_OUTOF10: NO PAIN (0)

## 2025-02-04 ASSESSMENT — ANXIETY QUESTIONNAIRES
6. BECOMING EASILY ANNOYED OR IRRITABLE: SEVERAL DAYS
3. WORRYING TOO MUCH ABOUT DIFFERENT THINGS: SEVERAL DAYS
1. FEELING NERVOUS, ANXIOUS, OR ON EDGE: SEVERAL DAYS
GAD7 TOTAL SCORE: 6
IF YOU CHECKED OFF ANY PROBLEMS ON THIS QUESTIONNAIRE, HOW DIFFICULT HAVE THESE PROBLEMS MADE IT FOR YOU TO DO YOUR WORK, TAKE CARE OF THINGS AT HOME, OR GET ALONG WITH OTHER PEOPLE: SOMEWHAT DIFFICULT
5. BEING SO RESTLESS THAT IT IS HARD TO SIT STILL: NOT AT ALL
GAD7 TOTAL SCORE: 6
2. NOT BEING ABLE TO STOP OR CONTROL WORRYING: SEVERAL DAYS
7. FEELING AFRAID AS IF SOMETHING AWFUL MIGHT HAPPEN: SEVERAL DAYS

## 2025-02-04 ASSESSMENT — PATIENT HEALTH QUESTIONNAIRE - PHQ9: 5. POOR APPETITE OR OVEREATING: SEVERAL DAYS

## 2025-02-04 NOTE — PROGRESS NOTES
Preventive Care Visit  [facility] JOAQUINMOHEIDI Fontenot MD, Internal Medicine  Feb 4, 2025  {Provider  Link to SmartSet :090966}    {PROVIDER CHARTING PREFERENCE:028176}    Danny Fuller is a 34 year old, presenting for the following:  Physical        2/4/2025     3:11 PM   Additional Questions   Roomed by Lisa Magill, CMA   Accompanied by self         2/4/2025     3:11 PM   Patient Reported Additional Medications   Patient reports taking the following new medications none            Physical Exam      Has not been taking prozac.  Will try to take them.           Anxiety meds  Referral for ADHD testing   Flu shot   Covid injection    {MA/LPN/RN Pre-Provider Visit Orders- hCG/UA/Strep (Optional):194161}  {SUPERLIST (Optional):656132}  {additonal problems for provider to add (Optional):908500}  Health Care Directive  Patient has a Health Care Directive on file  {(AWV REQUIRED) Advance Care Planning Reviewed:092685}      1/30/2025   General Health   How would you rate your overall physical health? Good         1/30/2025   Nutrition   Three or more servings of calcium each day? (!) NO   Diet: Other   If other, please elaborate: Gastric Bypass Patient/ Caloric Deficient Diet   How many servings of fruit and vegetables per day? (!) 2-3   How many sweetened beverages each day? 0-1         1/30/2025   Exercise   Days per week of moderate/strenous exercise 0 days   Average minutes spent exercising at this level 0 min   (!) EXERCISE CONCERN      1/30/2025   Social Factors   Frequency of gathering with friends or relatives Three times a week   Worry food won't last until get money to buy more No   Food not last or not have enough money for food? No   Do you have housing? (Housing is defined as stable permanent housing and does not include staying ouside in a car, in a tent, in an abandoned building, in an overnight shelter, or couch-surfing.) Yes   Are you worried about losing your housing? No   Lack  of transportation? No   Unable to get utilities (heat,electricity)? No         1/30/2025   Dental   Dentist two times every year? Yes         12/23/2024   TB Screening   Were you born outside of the [place]? No       Today's PHQ-9 Score:       12/23/2024    11:46 PM   PHQ-9 SCORE   PHQ-9 Total Score MyChart 4 (Minimal depression)   PHQ-9 Total Score 4        Patient-reported         1/30/2025   Substance Use   Alcohol more than 3/day or more than 7/wk No   Do you use any other substances recreationally? (!) CANNABIS PRODUCTS     Social History     Tobacco Use    Smoking status: Never     Passive exposure: Never    Smokeless tobacco: Never   Vaping Use    Vaping status: Never Used   Substance Use Topics    Alcohol use: Yes     Comment: infrequent    Drug use: Yes     Types: Marijuana     Comment: 1-2 per month (Edibles)     {Provider  If there are gaps in the social history shown above, please follow the link to update and then refresh the note Link to Social and Substance History :644265}      8/29/2023   LAST FHS-7 RESULTS   1st degree relative breast or ovarian cancer No   Any relative bilateral breast cancer No   Any male have breast cancer Unknown   Any ONE woman have BOTH breast AND ovarian cancer No   Any woman with breast cancer before 50yrs No   2 or more relatives with breast AND/OR bowel cancer No     {If any of the questions to the FHS7 are answered yes, consider referral for genetic counseling.    Additional indications for genetic referral include personal history of breast or ovarian cancer, genetic mutation in 1st degree relative which increases risk of breast cancer including BRCA1, BRCA2, CAROLYNN, PALB 2, TP53, CHEK2, PTEN, CDH1, STK11 (per ACS) and/or 1st degree relative with history of pancreatic or high-risk prostate cancer (per NCCN):171442}   {Mammogram Decision Support (Optional):894478}        1/30/2025   STI Screening   New sexual partner(s) since last STI/HIV test? No     History of abnormal Pap  "smear: { :071430}        Latest Ref Rng & Units 8/26/2022     2:12 PM 4/5/2019     3:01 PM 10/24/2016     3:21 PM   PAP / HPV   PAP  Negative for Intraepithelial Lesion or Malignancy (NILM)   Negative for squamous intraepithelial lesion or malignancy  Electronically signed by Rebecca Morales CT (ASCP) on 10/31/2016 at 10:50 AM      PAP (Historical)   NIL     HPV 16 DNA Negative Negative      HPV 18 DNA Negative Negative      Other HR HPV Negative Negative              1/30/2025   Contraception/Family Planning   Questions about contraception or family planning No     {Provider  REQUIRED FOR AWV Use the storyboard to review patient history, after sections have been marked as reviewed, refresh note to capture documentation:431252}   Reviewed and updated as needed this visit by Provider                    {HISTORY OPTIONS (Optional):368950}    {ROS Picklists (Optional):969101}     Objective    Exam  /74 (BP Location: Right arm, Patient Position: Sitting, Cuff Size: Adult Large)   Pulse 66   Temp 97.8  F (36.6  C) (Oral)   Resp 16   Ht 1.791 m (5' 10.5\")   Wt 93.4 kg (205 lb 12.8 oz)   LMP  (LMP Unknown)   SpO2 100%   BMI 29.11 kg/m     Estimated body mass index is 29.11 kg/m  as calculated from the following:    Height as of this encounter: 1.791 m (5' 10.5\").    Weight as of this encounter: 93.4 kg (205 lb 12.8 oz).    Physical Exam  {Exam Choices (Optional):821910}        Signed Electronically by: Iliana Fontenot MD  {Email feedback regarding this note to [email or web address]   :095100}  Answers submitted by the patient for this visit:  Patient Health Questionnaire (Submitted on 1/30/2025)  If you checked off any problems, how difficult have these problems made it for you to do your work, take care of things at home, or get along with other people?: Somewhat difficult  PHQ9 TOTAL SCORE: 4    " "LMP  (LMP Unknown)   SpO2 100%   BMI 29.11 kg/m     Estimated body mass index is 29.11 kg/m  as calculated from the following:    Height as of this encounter: 1.791 m (5' 10.5\").    Weight as of this encounter: 93.4 kg (205 lb 12.8 oz).    Physical Exam  Constitutional:       Appearance: Normal appearance.   HENT:      Head: Normocephalic and atraumatic.      Right Ear: Tympanic membrane normal.      Left Ear: Tympanic membrane normal.      Nose: Nose normal.      Mouth/Throat:      Mouth: Mucous membranes are moist.      Pharynx: Oropharynx is clear.   Eyes:      Extraocular Movements: Extraocular movements intact.      Conjunctiva/sclera: Conjunctivae normal.   Cardiovascular:      Rate and Rhythm: Normal rate and regular rhythm.      Heart sounds: Normal heart sounds.   Pulmonary:      Effort: Pulmonary effort is normal.      Breath sounds: Normal breath sounds.   Abdominal:      General: Abdomen is flat.      Palpations: Abdomen is soft.   Musculoskeletal:         General: Normal range of motion.      Cervical back: Normal range of motion and neck supple.   Skin:     General: Skin is warm.   Neurological:      General: No focal deficit present.      Mental Status: She is alert and oriented to person, place, and time. Mental status is at baseline.   Psychiatric:         Mood and Affect: Mood and affect normal.         Speech: Speech normal.         Behavior: Behavior normal. Behavior is cooperative.         Thought Content: Thought content normal.         Cognition and Memory: Cognition normal.         Judgment: Judgment normal.               Signed Electronically by: Iliana Fontenot MD    Answers submitted by the patient for this visit:  Patient Health Questionnaire (Submitted on 1/30/2025)  If you checked off any problems, how difficult have these problems made it for you to do your work, take care of things at home, or get along with other people?: Somewhat difficult  PHQ9 TOTAL SCORE: 4    "

## 2025-02-04 NOTE — PATIENT INSTRUCTIONS
Pap is due 8/2027.    Patient Education   Preventive Care Advice   This is general advice given by our system to help you stay healthy. However, your care team may have specific advice just for you. Please talk to your care team about your preventive care needs.  Nutrition  Eat 5 or more servings of fruits and vegetables each day.  Try wheat bread, brown rice and whole grain pasta (instead of white bread, rice, and pasta).  Get enough calcium and vitamin D. Check the label on foods and aim for 100% of the RDA (recommended daily allowance).  Lifestyle  Exercise at least 150 minutes each week  (30 minutes a day, 5 days a week).  Do muscle strengthening activities 2 days a week. These help control your weight and prevent disease.  No smoking.  Wear sunscreen to prevent skin cancer.  Have a dental exam and cleaning every 6 months.  Yearly exams  See your health care team every year to talk about:  Any changes in your health.  Any medicines your care team has prescribed.  Preventive care, family planning, and ways to prevent chronic diseases.  Shots (vaccines)   HPV shots (up to age 26), if you've never had them before.  Hepatitis B shots (up to age 59), if you've never had them before.  COVID-19 shot: Get this shot when it's due.  Flu shot: Get a flu shot every year.  Tetanus shot: Get a tetanus shot every 10 years.  Pneumococcal, hepatitis A, and RSV shots: Ask your care team if you need these based on your risk.  Shingles shot (for age 50 and up)  General health tests  Diabetes screening:  Starting at age 35, Get screened for diabetes at least every 3 years.  If you are younger than age 35, ask your care team if you should be screened for diabetes.  Cholesterol test: At age 39, start having a cholesterol test every 5 years, or more often if advised.  Bone density scan (DEXA): At age 50, ask your care team if you should have this scan for osteoporosis (brittle bones).  Hepatitis C: Get tested at least once in your  life.  STIs (sexually transmitted infections)  Before age 24: Ask your care team if you should be screened for STIs.  After age 24: Get screened for STIs if you're at risk. You are at risk for STIs (including HIV) if:  You are sexually active with more than one person.  You don't use condoms every time.  You or a partner was diagnosed with a sexually transmitted infection.  If you are at risk for HIV, ask about PrEP medicine to prevent HIV.  Get tested for HIV at least once in your life, whether you are at risk for HIV or not.  Cancer screening tests  Cervical cancer screening: If you have a cervix, begin getting regular cervical cancer screening tests starting at age 21.  Breast cancer scan (mammogram): If you've ever had breasts, begin having regular mammograms starting at age 40. This is a scan to check for breast cancer.  Colon cancer screening: It is important to start screening for colon cancer at age 45.  Have a colonoscopy test every 10 years (or more often if you're at risk) Or, ask your provider about stool tests like a FIT test every year or Cologuard test every 3 years.  To learn more about your testing options, visit:   .  For help making a decision, visit:   https://bit.ly/fn33099.  Prostate cancer screening test: If you have a prostate, ask your care team if a prostate cancer screening test (PSA) at age 55 is right for you.  Lung cancer screening: If you are a current or former smoker ages 50 to 80, ask your care team if ongoing lung cancer screenings are right for you.  For informational purposes only. Not to replace the advice of your health care provider. Copyright   2023 [facility]. All rights reserved. Clinically reviewed by the [facility] Transitions Program. [employer] 231926 - REV 01/24.  Substance Use Disorder: Care Instructions  Overview     You can improve your life and health by stopping your use of alcohol or drugs. When you don't drink or use drugs, you may feel and  sleep better. You may get along better with your family, friends, and coworkers. There are medicines and programs that can help with substance use disorder.  How can you care for yourself at home?  Here are some ways to help you stay sober and prevent relapse.  If you have been given medicine to help keep you sober or reduce your cravings, be sure to take it exactly as prescribed.  Talk to your doctor about programs that can help you stop using drugs or drinking alcohol.  Do not keep alcohol or drugs in your home.  Plan ahead. Think about what you'll say if other people ask you to drink or use drugs. Try not to spend time with people who drink or use drugs.  Use the time and money spent on drinking or drugs to do something that's important to you.  Preventing a relapse  Have a plan to deal with relapse. Learn to recognize changes in your thinking that lead you to drink or use drugs. Get help before you start to drink or use drugs again.  Try to stay away from situations, friends, or places that may lead you to drink or use drugs.  If you feel the need to drink alcohol or use drugs again, seek help right away. Call a trusted friend or family member. Some people get support from organizations such as Narcotics Anonymous or [employer] or from treatment facilities.  If you relapse, get help as soon as you can. Some people make a plan with another person that outlines what they want that person to do for them if they relapse. The plan usually includes how to handle the relapse and who to notify in case of relapse.  Don't give up. Remember that a relapse doesn't mean that you have failed. Use the experience to learn the triggers that lead you to drink or use drugs. Then quit again. Recovery is a lifelong process. Many people have several relapses before they are able to quit for good.  Follow-up care is a key part of your treatment and safety. Be sure to make and go to all appointments, and call your doctor if you are  "having problems. It's also a good idea to know your test results and keep a list of the medicines you take.  When should you call for help?   Call 911  anytime you think you may need emergency care. For example, call if you or someone else:    Has overdosed or has withdrawal signs. Be sure to tell the emergency workers that you are or someone else is using or trying to quit using drugs. Overdose or withdrawal signs may include:  Losing consciousness.  Seizure.  Seeing or hearing things that aren't there (hallucinations).     Is thinking or talking about suicide or harming others.   Where to get help 24 hours a day, 7 days a week   If you or someone you know talks about suicide, self-harm, a mental health crisis, a substance use crisis, or any other kind of emotional distress, get help right away. You can:    Call the Suicide and Crisis Lifeline at 988.     Call 3-609-085-TALK (1-759.745.3184).     Text HOME to 701409 to access the Crisis Text Line.   Consider saving these numbers in your phone.  Go to [employer] for more information or to chat online.  Call your doctor now or seek immediate medical care if:    You are having withdrawal symptoms. These may include nausea or vomiting, sweating, shakiness, and anxiety.   Watch closely for changes in your health, and be sure to contact your doctor if:    You have a relapse.     You need more help or support to stop.   Where can you learn more?  Go to https://www."[employer].net/patiented  Enter H573 in the search box to learn more about \"Substance Use Disorder: Care Instructions.\"  Current as of: November 15, 2023  Content Version: 14.3    2024 [employer].   Care instructions adapted under license by your healthcare professional. If you have questions about a medical condition or this instruction, always ask your healthcare professional. [employer] disclaims any warranty or liability for your use of this information.       "

## 2025-02-04 NOTE — NURSING NOTE
"Chief Complaint   Patient presents with    Physical     Initial /74 (BP Location: Right arm, Patient Position: Sitting, Cuff Size: Adult Large)   Pulse 66   Temp 97.8  F (36.6  C) (Oral)   Resp 16   Ht 1.791 m (5' 10.5\")   Wt 93.4 kg (205 lb 12.8 oz)   LMP  (LMP Unknown)   SpO2 100%   BMI 29.11 kg/m   Estimated body mass index is 29.11 kg/m  as calculated from the following:    Height as of this encounter: 1.791 m (5' 10.5\").    Weight as of this encounter: 93.4 kg (205 lb 12.8 oz).  BP completed using cuff size large right arm    Lisa Magill, CMA    "

## 2025-02-05 LAB
ALBUMIN SERPL BCG-MCNC: 4.3 G/DL (ref 3.5–5.2)
ALP SERPL-CCNC: 78 U/L (ref 40–150)
ALT SERPL W P-5'-P-CCNC: 37 U/L (ref 0–50)
ANION GAP SERPL CALCULATED.3IONS-SCNC: 13 MMOL/L (ref 7–15)
AST SERPL W P-5'-P-CCNC: 36 U/L (ref 0–45)
BILIRUB SERPL-MCNC: 0.2 MG/DL
BUN SERPL-MCNC: 12.5 MG/DL (ref 6–20)
CALCIUM SERPL-MCNC: 9.6 MG/DL (ref 8.8–10.4)
CHLORIDE SERPL-SCNC: 103 MMOL/L (ref 98–107)
CHOLEST SERPL-MCNC: 170 MG/DL
CREAT SERPL-MCNC: 0.55 MG/DL (ref 0.51–0.95)
EGFRCR SERPLBLD CKD-EPI 2021: >90 ML/MIN/1.73M2
FASTING STATUS PATIENT QL REPORTED: NO
FASTING STATUS PATIENT QL REPORTED: NO
FERRITIN SERPL-MCNC: 15 NG/ML (ref 6–175)
GLUCOSE SERPL-MCNC: 87 MG/DL (ref 70–99)
HBV CORE AB SERPL QL IA: NONREACTIVE
HBV SURFACE AB SERPL IA-ACNC: 154 M[IU]/ML
HBV SURFACE AB SERPL IA-ACNC: REACTIVE M[IU]/ML
HBV SURFACE AG SERPL QL IA: NONREACTIVE
HCO3 SERPL-SCNC: 24 MMOL/L (ref 22–29)
HDLC SERPL-MCNC: 75 MG/DL
IRON BINDING CAPACITY (ROCHE): 422 UG/DL (ref 240–430)
IRON SATN MFR SERPL: 6 % (ref 15–46)
IRON SERPL-MCNC: 27 UG/DL (ref 37–145)
LDLC SERPL CALC-MCNC: 86 MG/DL
NONHDLC SERPL-MCNC: 95 MG/DL
POTASSIUM SERPL-SCNC: 4.5 MMOL/L (ref 3.4–5.3)
PROT SERPL-MCNC: 7.2 G/DL (ref 6.4–8.3)
SODIUM SERPL-SCNC: 140 MMOL/L (ref 135–145)
TRIGL SERPL-MCNC: 47 MG/DL
VIT B12 SERPL-MCNC: 268 PG/ML (ref 232–1245)

## 2025-02-06 DIAGNOSIS — K91.2 POSTSURGICAL MALABSORPTION: Primary | ICD-10-CM

## 2025-03-13 ENCOUNTER — OFFICE VISIT (OUTPATIENT)
Dept: FAMILY MEDICINE | Facility: CLINIC | Age: 35
End: 2025-03-13
Payer: COMMERCIAL

## 2025-03-13 VITALS
HEART RATE: 78 BPM | WEIGHT: 202.2 LBS | SYSTOLIC BLOOD PRESSURE: 115 MMHG | RESPIRATION RATE: 16 BRPM | OXYGEN SATURATION: 100 % | DIASTOLIC BLOOD PRESSURE: 70 MMHG | HEIGHT: 71 IN | BODY MASS INDEX: 28.31 KG/M2 | TEMPERATURE: 97.8 F

## 2025-03-13 DIAGNOSIS — R35.0 URINARY FREQUENCY: Primary | ICD-10-CM

## 2025-03-13 DIAGNOSIS — L20.82 FLEXURAL ECZEMA: ICD-10-CM

## 2025-03-13 LAB
BACTERIA #/AREA URNS HPF: ABNORMAL /HPF
RBC #/AREA URNS AUTO: ABNORMAL /HPF
SQUAMOUS #/AREA URNS AUTO: ABNORMAL /LPF
WBC #/AREA URNS AUTO: ABNORMAL /HPF
WBC CLUMPS #/AREA URNS HPF: PRESENT /HPF

## 2025-03-13 RX ORDER — TRIAMCINOLONE ACETONIDE 1 MG/G
OINTMENT TOPICAL 2 TIMES DAILY
Qty: 15 G | Refills: 0 | Status: SHIPPED | OUTPATIENT
Start: 2025-03-13

## 2025-03-13 RX ORDER — NITROFURANTOIN 25; 75 MG/1; MG/1
100 CAPSULE ORAL 2 TIMES DAILY
Qty: 10 CAPSULE | Refills: 0 | Status: SHIPPED | OUTPATIENT
Start: 2025-03-13 | End: 2025-03-18

## 2025-03-13 ASSESSMENT — PAIN SCALES - GENERAL: PAINLEVEL_OUTOF10: NO PAIN (0)

## 2025-03-13 NOTE — PROGRESS NOTES
"  Assessment & Plan     Urinary frequency  Await UCx  - UA Macroscopic with reflex to Microscopic and Culture - Lab Collect; Future  - UA Microscopic with Reflex to Culture  - Urine Culture  - nitroFURantoin macrocrystal-monohydrate (MACROBID) 100 MG capsule; Take 1 capsule (100 mg) by mouth 2 times daily for 5 days.    Flexural eczema  Use sparingly  Reviewed SE   - triamcinolone (KENALOG) 0.1 % external ointment; Apply topically 2 times daily.          BMI  Estimated body mass index is 28.6 kg/m  as calculated from the following:    Height as of this encounter: 1.791 m (5' 10.5\").    Weight as of this encounter: 91.7 kg (202 lb 3.2 oz).             Danny Fuller is a 34 year old, presenting for the following health issues:  UTI        3/13/2025    10:19 AM   Additional Questions   Roomed by Evonne LOREDO MA   Accompanied by self         3/13/2025    10:19 AM   Patient Reported Additional Medications   Patient reports taking the following new medications none     History of Present Illness       Reason for visit:  UTI symptoms  Symptom onset:  1-3 days ago  Symptoms include:  Frequent urination, strong smelling urine, burning while peeing, burning/irritated urethra  Symptom intensity:  Moderate  Symptom progression:  Worsening  Had these symptoms before:  Yes  Has tried/received treatment for these symptoms:  Yes  Previous treatment was successful:  Yes  Prior treatment description:  Antibiotics  What makes it worse:  I dont know  What makes it better:  I take AZO She is missing 7 dose(s) of medications per week.  She is not taking prescribed medications regularly due to remembering to take.          Genitourinary - Female  Onset/Duration: 3 days   Description:   Painful urination (Dysuria): YES           Frequency: YES  Blood in urine (Hematuria): No  Delay in urine (Hesitency): YES  Intensity: moderate  Progression of Symptoms:  same  Accompanying Signs & Symptoms:  Fever/chills: No  Flank pain: No  Nausea and " "vomiting: No  Vaginal symptoms: odor, burning   Abdominal/Pelvic Pain: No  History:   History of frequent UTI s: YES  History of kidney stones: No  Sexually Active: YES  Possibility of pregnancy: No  Precipitating or alleviating factors: None  Therapies tried and outcome: Azo  and OTC advil or tylenol         Review of Systems  Constitutional, HEENT, cardiovascular, pulmonary, gi and gu systems are negative, except as otherwise noted.      Objective    /70 (BP Location: Right arm, Patient Position: Sitting, Cuff Size: Adult Large)   Pulse 78   Temp 97.8  F (36.6  C) (Oral)   Resp 16   Ht 1.791 m (5' 10.5\")   Wt 91.7 kg (202 lb 3.2 oz)   LMP  (LMP Unknown)   SpO2 100%   BMI 28.60 kg/m    Body mass index is 28.6 kg/m .  Physical Exam  Constitutional:       Appearance: Normal appearance.   Eyes:      Extraocular Movements: Extraocular movements intact.   Cardiovascular:      Rate and Rhythm: Normal rate and regular rhythm.   Pulmonary:      Effort: Pulmonary effort is normal.      Breath sounds: Normal breath sounds.   Abdominal:      Palpations: Abdomen is soft.      Comments: No tenderness   Musculoskeletal:         General: Normal range of motion.      Cervical back: Normal range of motion.   Skin:     General: Skin is warm.   Neurological:      General: No focal deficit present.      Mental Status: She is alert and oriented to person, place, and time. Mental status is at baseline.   Psychiatric:         Mood and Affect: Mood normal.         Behavior: Behavior normal.         Thought Content: Thought content normal.         Judgment: Judgment normal.                    Signed Electronically by: Iliana Fontenot MD    "

## 2025-03-14 ENCOUNTER — MYC MEDICAL ADVICE (OUTPATIENT)
Dept: UROLOGY | Facility: CLINIC | Age: 35
End: 2025-03-14
Payer: COMMERCIAL

## 2025-03-15 ENCOUNTER — ANCILLARY PROCEDURE (OUTPATIENT)
Dept: GENERAL RADIOLOGY | Facility: CLINIC | Age: 35
End: 2025-03-15
Attending: NURSE PRACTITIONER
Payer: COMMERCIAL

## 2025-03-15 ENCOUNTER — OFFICE VISIT (OUTPATIENT)
Dept: URGENT CARE | Facility: URGENT CARE | Age: 35
End: 2025-03-15
Payer: COMMERCIAL

## 2025-03-15 ENCOUNTER — MYC MEDICAL ADVICE (OUTPATIENT)
Dept: FAMILY MEDICINE | Facility: CLINIC | Age: 35
End: 2025-03-15

## 2025-03-15 VITALS
OXYGEN SATURATION: 97 % | TEMPERATURE: 98.7 F | BODY MASS INDEX: 28.72 KG/M2 | DIASTOLIC BLOOD PRESSURE: 62 MMHG | RESPIRATION RATE: 18 BRPM | HEART RATE: 105 BPM | WEIGHT: 203 LBS | SYSTOLIC BLOOD PRESSURE: 120 MMHG

## 2025-03-15 DIAGNOSIS — R30.0 DYSURIA: ICD-10-CM

## 2025-03-15 DIAGNOSIS — R30.0 DYSURIA: Primary | ICD-10-CM

## 2025-03-15 LAB
BACTERIA #/AREA URNS HPF: ABNORMAL /HPF
CLUE CELLS: ABNORMAL
MUCOUS THREADS #/AREA URNS LPF: PRESENT /LPF
RBC #/AREA URNS AUTO: ABNORMAL /HPF
SQUAMOUS #/AREA URNS AUTO: ABNORMAL /LPF
TRICHOMONAS, WET PREP: ABNORMAL
WBC #/AREA URNS AUTO: ABNORMAL /HPF
WBC'S/HIGH POWER FIELD, WET PREP: ABNORMAL
YEAST, WET PREP: ABNORMAL

## 2025-03-15 PROCEDURE — 99213 OFFICE O/P EST LOW 20 MIN: CPT | Performed by: NURSE PRACTITIONER

## 2025-03-15 PROCEDURE — 81015 MICROSCOPIC EXAM OF URINE: CPT

## 2025-03-15 PROCEDURE — 87210 SMEAR WET MOUNT SALINE/INK: CPT

## 2025-03-15 PROCEDURE — 74018 RADEX ABDOMEN 1 VIEW: CPT | Mod: TC | Performed by: RADIOLOGY

## 2025-03-15 NOTE — PROGRESS NOTES
Assessment & Plan     Dysuria  - UA Macroscopic with reflex to Microscopic and Culture - Lab Collect  - Wet prep - lab collect  - Wet prep - lab collect  - UA Microscopic with Reflex to Culture  - XR KUB     Patient Instructions     Results for orders placed or performed in visit on 03/15/25   UA Microscopic with Reflex to Culture     Status: Abnormal   Result Value Ref Range    Bacteria Urine Moderate (A) None Seen /HPF    RBC Urine 2-5 (A) 0-2 /HPF /HPF    WBC Urine 0-5 0-5 /HPF /HPF    Squamous Epithelials Urine Few (A) None Seen /LPF    Mucus Urine Present (A) None Seen /LPF    Narrative    Urine Culture not indicated   Wet prep - lab collect     Status: Abnormal    Specimen: Vagina; Swab   Result Value Ref Range    Trichomonas Absent Absent    Yeast Absent Absent    Clue Cells Absent Absent    WBCs/high power field 3+ (A) None     UA unremarkable.    UC not indicated.    UC from 2 days ago is negative.    Push fluids  Pyridium PRN - urine will be dark orange   F/u in 1 week if persists or 3 days if worsening.         Return in about 1 week (around 3/22/2025) for with regular provider if symptoms persist.    COLIN Galvin [facility] [place] is [place] 34 year old female who presents to clinic today for the following health issues:  Chief Complaint   Patient presents with    Urgent Care     Pt states she was in on Thursday and did urine it was negative. The symptoms is burning in the vaginal area constant. Started Tuesday.     HPI    UTI    Onset of symptoms was 4day(s).  Course of illness is worsening  Severity moderately severe  Current and associated symptoms dysuria, frequency, urgency, and burning  Treatment and measures tried Uristat (Pyridium), OTC advil or tylenol , and Antibiotics  Predisposing factors include history of frequent UTI's  Patient denies rigors, flank pain, temperature > 101 degrees F., vaginal discharge, vaginal odor, and vaginal  itching        Review of Systems  Constitutional, HEENT, cardiovascular, pulmonary, gi and gu systems are negative, except as otherwise noted.      Objective    /62   Pulse 105   Temp 98.7  F (37.1  C) (Tympanic)   Resp 18   Wt 92.1 kg (203 lb)   LMP  (LMP Unknown)   SpO2 97%   BMI 28.72 kg/m    Physical Exam   GENERAL: alert and no distress  RESP: lungs clear to auscultation - no rales, rhonchi or wheezes  CV: regular rate and rhythm, normal S1 S2, no S3 or S4, no murmur, click or rub, no peripheral edema  MS: no gross musculoskeletal defects noted, no edema  BACK: no CVA tenderness, no paralumbar tenderness

## 2025-03-15 NOTE — PATIENT INSTRUCTIONS
Results for orders placed or performed in visit on 03/15/25   UA Microscopic with Reflex to Culture     Status: Abnormal   Result Value Ref Range    Bacteria Urine Moderate (A) None Seen /HPF    RBC Urine 2-5 (A) 0-2 /HPF /HPF    WBC Urine 0-5 0-5 /HPF /HPF    Squamous Epithelials Urine Few (A) None Seen /LPF    Mucus Urine Present (A) None Seen /LPF    Narrative    Urine Culture not indicated   Wet prep - lab collect     Status: Abnormal    Specimen: Vagina; Swab   Result Value Ref Range    Trichomonas Absent Absent    Yeast Absent Absent    Clue Cells Absent Absent    WBCs/high power field 3+ (A) None     UA unremarkable.    UC not indicated.    UC from 2 days ago is negative.    Push fluids  Pyridium PRN - urine will be dark orange   F/u in 1 week if persists or 3 days if worsening.

## 2025-05-22 ENCOUNTER — TELEPHONE (OUTPATIENT)
Dept: SURGERY | Facility: CLINIC | Age: 35
End: 2025-05-22
Payer: COMMERCIAL

## 2025-05-22 NOTE — TELEPHONE ENCOUNTER
General Call    Contacts       Contact Date/Time Type Contact Phone/Fax    05/22/2025 11:42 AM CDT Phone (Incoming) Alina Thompson (Self) [phone] (M)              What are your questions or concerns:  pt states she was asked to move her 1:30 appt to 2 and she is ok with that    [facility] or would you prefer to receive a phone call?:   No preference   Okay to leave a detailed message?: Yes at Cell number on file:    Telephone Information:   Mobile [phone]

## 2025-05-28 ENCOUNTER — OFFICE VISIT (OUTPATIENT)
Dept: SURGERY | Facility: CLINIC | Age: 35
End: 2025-05-28
Payer: COMMERCIAL

## 2025-05-28 VITALS
HEIGHT: 71 IN | OXYGEN SATURATION: 100 % | SYSTOLIC BLOOD PRESSURE: 118 MMHG | WEIGHT: 206 LBS | BODY MASS INDEX: 28.84 KG/M2 | HEART RATE: 67 BPM | DIASTOLIC BLOOD PRESSURE: 80 MMHG

## 2025-05-28 VITALS — HEIGHT: 71 IN | BODY MASS INDEX: 28.84 KG/M2 | WEIGHT: 206 LBS

## 2025-05-28 DIAGNOSIS — R21 RASH AND NONSPECIFIC SKIN ERUPTION: ICD-10-CM

## 2025-05-28 DIAGNOSIS — Z98.84 BARIATRIC SURGERY STATUS: ICD-10-CM

## 2025-05-28 DIAGNOSIS — Z86.39 HX OF OBESITY: ICD-10-CM

## 2025-05-28 DIAGNOSIS — K91.2 POSTSURGICAL MALABSORPTION: ICD-10-CM

## 2025-05-28 DIAGNOSIS — E66.3 OVERWEIGHT: ICD-10-CM

## 2025-05-28 DIAGNOSIS — Z98.84 BARIATRIC SURGERY STATUS: Primary | ICD-10-CM

## 2025-05-28 DIAGNOSIS — Z86.39 HX OF OBESITY: Primary | ICD-10-CM

## 2025-05-28 PROCEDURE — 99214 OFFICE O/P EST MOD 30 MIN: CPT | Performed by: PHYSICIAN ASSISTANT

## 2025-05-28 PROCEDURE — G2211 COMPLEX E/M VISIT ADD ON: HCPCS | Performed by: PHYSICIAN ASSISTANT

## 2025-05-28 PROCEDURE — 97803 MED NUTRITION INDIV SUBSEQ: CPT | Performed by: DIETITIAN, REGISTERED

## 2025-05-28 RX ORDER — NYSTATIN 100000 U/G
CREAM TOPICAL 2 TIMES DAILY PRN
Qty: 30 G | Refills: 3 | Status: SHIPPED | OUTPATIENT
Start: 2025-05-28

## 2025-05-28 NOTE — PROGRESS NOTES
Return Bariatric Surgery Note    RE: Alina Thompson  MR#: [id number]  : 1990  VISIT DATE: 2025    Dear Iliana Fontenot,    I had the pleasure of seeing your patient, Alina Thompson, in my post-bariatric surgery assessment clinic.    Assessment & Plan   Problem List Items Addressed This Visit       Postsurgical malabsorption    2022 Bypass Lin  Seeing RD today.  We reviewed recommendations for muscle conservation including eating three meals daily, good protein intake, and strength training.  Labs ordered from prior to check         Bariatric surgery status    Relevant Medications    nystatin (MYCOSTATIN) 627297 UNIT/GM external cream    Overweight    Relevant Medications    phentermine (LOMAIRA) 8 MG tablet    Other Relevant Orders    Hemoglobin A1c    Hx of obesity - Primary    Noting some weight gain and now back to overweight range. Will restart phentermine w/vitals monitoring. Added hgA1c screening as reports prior hx of DM         Relevant Medications    phentermine (LOMAIRA) 8 MG tablet    Other Relevant Orders    Hemoglobin A1c     Other Visit Diagnoses         Rash and nonspecific skin eruption        Relevant Medications    nystatin (MYCOSTATIN) 286531 UNIT/GM external cream          AOM Considerations:  Phentermine:   Caution anxiety, prescription sent 2024 - wasn't taking vitamins/meds consistently so stopped. Denies new heart/cardiac/glaucoma/seizures concerns  - restarted lomaira  Topiramate: oral birth control consideration - discussed and recommended back up method. Denies hx of glaucoma/kidneys stones.   GLP-1:                Naltrexone:        Wellbutrin: hasn't tolerated previously   Metformin:   reviewed option to off label counteract weight potential of fluoxetine - reviewed SE, declines at this time d/t IBS hx and loose stool concern      Contrave:  Qsymia:       PATIENT INSTRUCTIONS:  Plan:  Labs ordered. Call [phone] to schedule - orders from prior active    Continue your bariatric vitamins     I recommend eating breakfast within an hour of waking up and eating every 4-6 hours during the day and try minimize snacking between meals. Prioritizing veggies and proteins for food choices is also recommended as medically appropriate.    Can use Nystatin ointment as needed for rash in skin folds for antifungal/yeast.    Start Lomaira (8 mg phentermine) in the mornings. Please repeat heart rate/blood pressure 1-2 weeks after starting. You can schedule a nursing visit for this and ask them to forward the results to us. Keep in mind being intentional for good nutrition/not skipping meals!    Recommend pursing regular exercise. 5 minutes of exercise every other day or every 2 days is a great place to start with aiming for 30 minutes 5 times a week as an end goal.  If you can, try to get some strength training twice weekly while losing weight to help preserve your muscle!        FOLLOW-UP:  Call [phone] to schedule next visit in 3 months      32 minutes spent on the date of the encounter doing chart review, history and exam, result review, counseling, developing plan of care, documentation, and further activities as noted        CHIEF COMPLAINT: Post-bariatric surgery follow-up for annual follow-up 5/4/2022 Bypass Lin and at that time was using phentermine as well for medical management.    Recommended to restart bariatric vitamins.    Surgical labs 2/6/2025 still needed and plan to recheck 1 to 2 months after starting vitamins.    HISTORY OF PRESENT ILLNESS:      5/21/2025     5:30 PM   Questions Regarding Prior Weight Loss Surgery Reviewed With Patient   I had the following weight loss procedure Vira-en-y Gastric Bypass   What year was your surgery? 2022   How has your weight changed since your last visit? I have gained weight   Do you currently have any of the following None of the above   Do you have any concerns today? Weight loss. Would like to discuss hunger  supressants or the GLP1 shot.       Weight History:      5/21/2025     5:30 PM   --   What is your highest lifetime weight? 306   What is your lowest weight since surgery? (In pounds) 180     BMI 28.73    Initial Weight (lbs): 298.2 lbs  Weight: 206 lb (93.4 kg)  Last Visits Weight: 194 lb (88 kg)  Cumulative weight loss (lbs): 92.2  Weight Loss Percentage: 30.92%    Wt Readings from Last 10 Encounters:   05/28/25 206 lb (93.4 kg)   03/15/25 203 lb (92.1 kg)   03/13/25 202 lb 3.2 oz (91.7 kg)   02/04/25 205 lb 12.8 oz (93.4 kg)   05/29/24 194 lb (88 kg)   05/29/24 194 lb (88 kg)   04/17/24 194 lb 1.6 oz (88 kg)   04/10/24 188 lb 12.8 oz (85.6 kg)   02/16/24 190 lb (86.2 kg)   11/07/23 181 lb (82.1 kg)        Patient is taking the following bariatric postoperative vitamins:  Multivitamin: bariatricPal One with 45 mg iron  No separate vitamin D (multi has 75 mcg vitamin D)  600 mg calcium BID (noon and night) (800 international unit(s) daily total of vit D w/it)  B12 5000 mcg once weekly    Fish oil        Gets menses        5/21/2025     5:30 PM   Questions Regarding Co-Morbidities and Health Concerns Reviewed With Patient   Pre-diabetes Gone away   Diabetes II Gone away   High Blood Pressure Never   High cholesterol Never   Heartburn/Reflux Gone away   Sleep apnea Gone away   PCOS Never   Back pain Never   Joint pain Never   Lower leg swelling Never           5/21/2025     5:30 PM   Eating Habits   How many meals do you eat per day? 2   Do you snack between meals? Yes   How much food are you eating at each meal? Greater than 1 cup   Are you able to separate your meals and liquids by at least 30 minutes? Sometimes   Are you able to avoid liquid calories? Yes     Doing Optivia - 6 times eating during the day. Good amount of protein in that program.         5/21/2025     5:30 PM   Exercise Questions Reviewed With Patient   How often do you exercise? Never   What keeps you from being more active? I should be more active  but I just have not gotten around to it       Social History:      5/21/2025     5:30 PM   --   Are you smoking? No   Are you drinking alcohol? Yes   How much alcohol? Very seldom       Medications:  Current Outpatient Medications   Medication Sig Dispense Refill    FLUoxetine (PROZAC) 20 MG capsule Take 1 capsule (20 mg) by mouth daily. 90 capsule 3    nystatin (MYCOSTATIN) 132790 UNIT/GM external cream Apply topically 2 times daily as needed for dry skin. Apply as needed for rash. 30 g 3    phentermine (LOMAIRA) 8 MG tablet Take 1 tablet (8 mg) by mouth every morning (before breakfast). 30 tablet 2    triamcinolone (KENALOG) 0.1 % external ointment Apply topically 2 times daily. 15 g 0     No current facility-administered medications for this visit.         5/21/2025     5:30 PM   --   Do you avoid NSAIDs such as (Ibuprofen, Aleve, Naproxen, Advil)? Yes       ROS:  GI:       5/21/2025     5:30 PM   --   Vomiting No   Diarrhea No   Constipation No   Swallowing trouble No   Abdominal pain No   Heartburn No     Skin:       5/21/2025     5:30 PM   BAR RBS ROS - SKIN   Rash in skin folds Yes     Will happen on the tummy fold - uses an ointment if needed for eczema. Reviewed will lets us know if having cream concerns.     Psych:       5/21/2025     5:30 PM   --   Depression No   Anxiety Yes     States no new concerns.     Female Only:       5/21/2025     5:30 PM   BAR RBS ROS -    Female only Regular menstrual cycles   Stress urinary incontinence No       LABS/IMAGING/MEDICAL RECORDS REVIEW:   Hemoglobin A1C   Date Value Ref Range Status   11/07/2023 5.1 0.0 - 5.6 % Final     Comment:     Normal <5.7%   Prediabetes 5.7-6.4%    Diabetes 6.5% or higher     Note: Adopted from ADA consensus guidelines.     Vitamin D, Total (25-Hydroxy)   Date Value Ref Range Status   06/21/2024 20 20 - 50 ng/mL Final     Comment:     optimum levels     Parathyroid Hormone Intact   Date Value Ref Range Status   06/21/2024 46 15 - 65 pg/mL  "Final     Vitamin B12   Date Value Ref Range Status   02/04/2025 268 232 - 1,245 pg/mL Final     Hemoglobin   Date Value Ref Range Status   06/21/2024 13.1 11.7 - 15.7 g/dL Final     Ferritin   Date Value Ref Range Status   02/04/2025 15 6 - 175 ng/mL Final     Iron   Date Value Ref Range Status   02/04/2025 27 (L) 37 - 145 ug/dL Final     Iron Binding Capacity   Date Value Ref Range Status   02/04/2025 422 240 - 430 ug/dL Final     Iron Sat Index   Date Value Ref Range Status   02/04/2025 6 (L) 15 - 46 % Final   09/25/2024 6 (L) 15 - 46 % Final   06/21/2024 11 (L) 15 - 46 % Final     Vitamin A   Date Value Ref Range Status   06/21/2024 0.44 0.30 - 1.20 mg/L Final     Creatinine   Date Value Ref Range Status   02/04/2025 0.55 0.51 - 0.95 mg/dL Final     Urea Nitrogen   Date Value Ref Range Status   02/04/2025 12.5 6.0 - 20.0 mg/dL Final     ALT   Date Value Ref Range Status   02/04/2025 37 0 - 50 U/L Final     AST   Date Value Ref Range Status   02/04/2025 36 0 - 45 U/L Final        BP Readings from Last 6 Encounters:   05/28/25 118/80   03/15/25 120/62   03/13/25 115/70   02/04/25 124/74   06/21/24 118/70   05/29/24 100/60       Pulse Readings from Last 6 Encounters:   05/28/25 67   03/15/25 105   03/13/25 78   02/04/25 66   05/29/24 82   04/17/24 75         PHYSICAL EXAMINATION:  /80   Pulse 67   Ht 5' 11\" (1.803 m)   Wt 206 lb (93.4 kg)   SpO2 100%   BMI 28.73 kg/m    GENERAL: Healthy, alert and no distress  EYES: Eyes grossly normal to inspection.    RESP: No audible wheeze, cough, or visible cyanosis.  No increased work of breathing. Lungs clear to auscultation  Heart: regular rate and rhythm. No significant murmurs, rubs, or gallops  SKIN: Visible skin clear.   NEURO: Mentation and speech appropriate for age.  PSYCH: Mentation appears normal, affect normal/bright, judgement and insight intact, normal speech and appearance well-groomed.        Sincerely,      Debbie Nolasco PA-C       "

## 2025-05-28 NOTE — PATIENT INSTRUCTIONS
Nice to talk with you today.  Below is the plan discussed.-  . Debbie Nolasco PA-C    Plan:  Labs ordered. Call [phone] to schedule - orders from prior active   Continue your bariatric vitamins     I recommend eating breakfast within an hour of waking up and eating every 4-6 hours during the day and try minimize snacking between meals. Prioritizing veggies and proteins for food choices is also recommended as medically appropriate.    Can use Nystatin ointment as needed for rash in skin folds for antifungal/yeast.    Start Lomaira (8 mg phentermine) in the mornings. Please repeat heart rate/blood pressure 1-2 weeks after starting. You can schedule a nursing visit for this and ask them to forward the results to us. Keep in mind being intentional for good nutrition/not skipping meals!    Recommend pursing regular exercise. 5 minutes of exercise every other day or every 2 days is a great place to start with aiming for 30 minutes 5 times a week as an end goal.  If you can, try to get some strength training twice weekly while losing weight to help preserve your muscle!        FOLLOW-UP:  Call [phone] to schedule next visit in 3 months    Bariatric Post Op Guidelines  General:  Follow up annually lifelong.   Obesity is a chronic disease.  Weight gain can be expected. The goal of follow-up visits is to ensure adequate vitamin and protein absorption, evaluate food intake behavior, review exercise/activity level, and assist with weight regain.    To avoid marginal ulcers avoid all forms of tobacco, alcohol in excess, caffeine, and NSAIDS     Exercise is key for weight loss and weight maintenance. Aim for 30-60 minutes of physical activity most days.  Include cardiovascular and strength training.    Continue lifelong vitamins supplementation and annual lab follow up.  All patients should supplement with the following bariatric postoperative vitamins:  2 Complete multivitamins with minerals (at different times than  calcium)  Vitamin D 5000 Int Units/125 mg daily   Calcium 600 mg twice daily or 500 mg three times daily   Vitamin B12: 500 mcg sl daily or 1000 mcg Inj monthly  B complex daily or Thiamine 100 mg weekly  1 Iron/Vit C. Daily for females who menstruate and/or as directed    Relay GI symptoms which can be a sign of complications. Inability to tolerate solid food (chicken, steak, fish) should by need to be evaluated.    There is a 10% increase of Alcohol Use Disorder in patients with bariatric surgery.   Most often occurring around 2 years post op.  Call if you feel alcohol is interfering in your daily life.  We can help.     Nutritional:  Eat 3 meals per day  (No snacks between meals.)  Do not skip meals.  This can cause overeating at the next meal and will prevent adequate protein and nutritional intake.    Aim for 60-80 grams of protein per day.  Always eat your protein first. This assists with optimal nutrition and helps you stay full longer.    Eat your protein first, and then follow with fiber.    Add fiber by including fruits, vegetables, whole grains, and beans.     Portions should be 1 cup per meal.   Continue to use saucer/salad plates, infant/toddler silverware to keep portion sizes small and take small bites.  Make each meal last 20-30 minutes. Always stop eating when satisfied.    Aim for 64 oz. of calorie-free fluids daily.    Avoid drinking 30 min before, during, and 30 min after meal    Avoid high sugar and high fat foods to prevent high calorie intake.   Check nutrition labels for less than 10 grams of sugar and less than 10 grams of fat per serving.    PHENTERMINE    We are starting Phentermine.  Our patients on Phentermine find that they:    >feel less hunger    >find it easier to push the plate away   >have an easier time eating less    For some of our patients, these feelings are very real and immediate. For other patients, the feelings are less obvious. They don't feel much of a change but find  they've lost weight. Like all weight loss medications, Phentermine  works best when you help it work. This means:  1. Having less tempting high calorie (fattening) food around the house or office. (For people with strong cravings this is very important.)   2. Staying away from situations or people that may trigger your cravings .   3. Eating out only one time or less each week.  4. Eating your meals at a table with the TV or computer off.    Side-effects. Phentermine is generally well tolerated. The most common side effects are dry mouth and constipation. Because it is a stimulant, pts can have feelings of racing pulse or rapid heart beat. Some people can get an elevated blood pressure. Because of this we ask you to get your blood pressure and pulse checked within 1-2 weeks of starting the medication.     For any questions or concerns please send a [employer] message to our team or call our weight management call center at [phone] during regular business hours. For questions during evenings or weekends your messages will be addressed during the next business day.  For emergencies please call 911 or seek immediate medical care.

## 2025-05-28 NOTE — PROGRESS NOTES
"NUTRITION POST OP APPOINTMENT  DATE OF VISIT: May 28, 2025    Alina Thompson  1990  female  [id number]  34 year old     ASSESSMENT:    REASON FOR VISIT:  Alina is a 34 year old year old female presents today for 3 year PO nutrition follow-up appointment. Patient is accompanied by self.    DIAGNOSIS:  Status post gastric bypass surgery.  Overweight BMI 25-29.9     ANTHROPOMETRICS:  Initial Weight: 273 lbs   Height: 5' 11\"    Current Weight: 206 lbs 0 oz    BMI: Body mass index is 28.73 kg/m .    VITAMINS AND MINERALS:  1 Multivitamin with Minerals (Bariatric Pal ONE; morning)  600 mg Calcium With Vitamin D (BID; lunch + evening)  5000 mcg Vitamin B-12 sublingual (weekly)  Fish Oil BID    *MVT sufficient to meet Iron, Vitamin D and Vitamin B1 needs    NUTRITION HISTORY:  Breakfast: [within 1h of waking] Optivia cereal/oatmeal   Lunch: [11:30] Optivia protein bar   Supper: [5:30-6pm] \"Lean and Green\" meal from Optivia +/- starch   Snacks: [morning] none [afternoon; 2:30pm] protein shake or Optivia muffin or protein bar  [evenings; 8-8:30pm] Optivia product (ie cereal), protein drink  [evenings; 10:30am] Optivia pudding  Grazing: popcorn (skinny pop), nuts   Fluids consumed: water (40oz), coffee (8oz; SF creamer), diet coke (12-24oz), protein drink (daily)   Consuming liquid calories: No  Protein intake: 60-90 grams/day  Tolerate regular texture food: Yes  Portion size: 1 cup or more  Take 20-30 minutes to consume each meal: Yes/usually   Eat protein foods first: Yes  Fluids and meals separate by at least 30 minutes: No  Chew foods thoroughly: Yes  Tolerating diet: Yes  Drinking high protein supplements: Yes  Consuming snacks per day: grazing  Additional Information: Pt reports a stressful year, including breakup with long-term boyfriend and subsequent change in living situation, as well as layoffs at work. Has intermittently used Optivia plan with good success but struggles with consistency. She is " restarting Phentermine today and has recently put in a large Optivia order. She'd like to commit to this program for the next 2-3 months then follow up to discuss long-term/sustainability in eating. Of note, pt mentions she plans to be tested for ADHD, which she feels impacts her ability to stay consistent in healthcare recommendations.         PHYSICAL ACTIVITY:  Inconsistent   Pt states she prefers enjoyable activities over structured exercise regimens; ie bought some [employer] books and also may look into classes for non-traditional forms of exercise.       DIAGNOSIS:  Previous Nutrition Diagnosis: Altered gastrointestinal function related to alteration in gastrointestinal structure as evidenced by history of gastric bypass surgery.- no change    Previous goals:  Take all vitamins/minerals per guidelines - met  Include 3 food groups at each meal - not met  Have a protein supplement regularly - met  Include protein with all meals and snacks - met  Separate fluids from all meals and snacks - not met     Current Nutrition Diagnosis: Altered gastrointestinal function related to alteration in gastrointestinal structure as evidenced by history of gastric bypass surgery.    INTERVENTION:   Nutrition Prescription: Eat 3 meals a day at regular intervals. Consume 60-90 grams of protein daily. Follow post-surgical vitamin and mineral protocol.  Assessed learning needs and learning preferences.    GOALS:  Separate fluids from meals by 30 minutes  Drink at least 48oz water  Add exercises as able     Follow-Up:   Recommend follow up visit in 3-4 months to assist with lifestyle changes or per insurance.  Implementation: Discussed progress toward previous goals; reinforced importance of following bariatric lifestyle changes.    NUTRITION MONITORING AND EVALUATION:  Anticipated compliance: fair-good  Verbalized good understanding.    Follow up: Patient to follow up in September    TIME SPENT WITH PATIENT:  25 minutes      Diya  Dania MARTINEZ, LD, CSOWM  Clinical Dietitian

## 2025-05-28 NOTE — ASSESSMENT & PLAN NOTE
5/4/2022 Bypass Riley  Seeing RD today.  We reviewed recommendations for muscle conservation including eating three meals daily, good protein intake, and strength training.  Labs ordered from prior to check

## 2025-05-28 NOTE — ASSESSMENT & PLAN NOTE
Noting some weight gain and now back to overweight range. Will restart phentermine w/vitals monitoring. Added hgA1c screening as reports prior hx of DM

## 2025-05-28 NOTE — PATIENT INSTRUCTIONS
Asif Fuller!      It was great chatting with you again today! Here's a summary of the goals we discussed:    1. Avoid sipping/drinking during all meals/snacks, as well as for 30 minutes after    2. Increase water to at least 48oz (ideally, push for 64oz)    3. Start exercising  - Emphasize strength training to support metabolism   - Explore options for classes       Plan on following up in 3 months. This can be scheduled via our call center at . Reach out sooner with any questions or concerns. Have a great day!      Diya Najera, RD, LD, [facility]  Clinical Dietitian       Easy Foods (by group)  Meals: pick 1 item from 3-4 food groups  Snacks: pick 1 item from 1-2 food groups    Protein:  Yogurt or yogurt drinks  cottage cheese  low-fat cheese sticks/string cheese  lean beef/turkey sticks  lean deli meat (ie: chicken, turkey or low-fat ham)  pre-cooked grilled chicken breast or rotisserie  hard boiled eggs  nuts/seeds (in moderation - up to 1 serving [1/4c] per day)  protein shakes/bars (ie:  Premier Protein )  low-fat milk   chickpeas  edamame  tuna/chicken packets      Vegetables:  baby carrots  celery sticks  sugar snap peas  cherry tomatoes  sliced bell peppers  sliced cucumber  chopped broccoli or cauliflower  raw string beans    Fruit:  grapes  berries  apples  pears  bananas  peaches  nectarines  plums  kiwi  pineapple      Starches:  whole wheat chelsea bread ( Ken's )  high-fiber tortillas ( [place] Carb Balance )  Bean-based tortilla chips ( Beanitos )  Whole wheat crackers ( Triscuits )  Whole wheat bread  Dried/roasted beans/lentils ( Bada Bean Bada Boom  dried Elaine beans - available online or in some stores)  roasted chickpeas  high-fiber cereal (Fiber One, Kashi, Shredded Wheat, Grape Nuts, etc)

## 2025-06-02 ENCOUNTER — RESULTS FOLLOW-UP (OUTPATIENT)
Dept: SURGERY | Facility: CLINIC | Age: 35
End: 2025-06-02

## 2025-06-04 DIAGNOSIS — D50.9 IRON DEFICIENCY ANEMIA, UNSPECIFIED IRON DEFICIENCY ANEMIA TYPE: Primary | ICD-10-CM

## 2025-06-05 ENCOUNTER — PATIENT OUTREACH (OUTPATIENT)
Dept: ONCOLOGY | Facility: CLINIC | Age: 35
End: 2025-06-05
Payer: COMMERCIAL

## 2025-06-05 NOTE — PROGRESS NOTES
New Patient Oncology Nurse Navigator Note     Referral Received: 06/05/25      Referring provider: Debbie Nolasco PA-C     Referring Clinic/Organization: [facility]     Referred to: Benign Hematology    Requested provider (if applicable): First available - did not specify      Evaluation for :   Diagnosis   D50.9 (ICD-10-CM) - Iron deficiency anemia, unspecified iron deficiency anemia type      Clinical History (per Nurse review of records provided):       Latest Reference Range & Units 06/21/24 15:04 09/25/24 14:56 02/04/25 16:23 05/30/25 13:48   Ferritin 6 - 175 ng/mL 17 17 15 11   Folate 4.6 - 34.8 ng/mL 8.8   10.4   Iron 37 - 145 ug/dL 37 20 (L) 27 (L) 17 (L)   Iron Binding Capacity 240 - 430 ug/dL 345 350 422 394   Iron Sat Index 15 - 46 % 11 (L) 6 (L) 6 (L) 4 (L)   Vitamin B12 232 - 1,245 pg/mL 213 (L) 283 268 397   WBC 4.0 - 11.0 10e3/uL 7.4   7.9   Hemoglobin 11.7 - 15.7 g/dL 13.1   10.7 (L)   Hematocrit 35.0 - 47.0 % 38.5   33.4 (L)   Platelet Count 150 - 450 10e3/uL 299   365   RBC Count 3.80 - 5.20 10e6/uL 4.33   4.11   MCV 78 - 100 fL 89   81   MCH 26.5 - 33.0 pg 30.3   26.0 (L)   MCHC 31.5 - 36.5 g/dL 34.0   32.0   RDW 10.0 - 15.0 % 12.0   15.5 (H)   (L): Data is abnormally low  (H): Data is abnormally high    Records Location: [facility]     Additional testing needed prior to consult:     ?    Referral updates and Plan:     06/05/2025 9:45 AM -  Referral received and reviewed. Pt aware of referral via result note/MyChart message.  Sent to NPS to schedule next available.     Kathleen Putnam, SHILAN, RN, OCN  Hematology/Oncology Nurse Navigator  [facility]  [phone] / [phone]

## 2025-06-23 NOTE — TELEPHONE ENCOUNTER
RECORDS STATUS - ALL OTHER DIAGNOSIS      RECORDS RECEIVED FROM: [facility]   NOTES STATUS DETAILS   OFFICE NOTE from referring provider Epic 5/28/2025 - Debbie Nolasco PA-C   MEDICATION LIST [facility]    LABS     PATHOLOGY REPORTS     ANYTHING RELATED TO DIAGNOSIS Epic 5/30/2025

## 2025-06-30 ENCOUNTER — ALLIED HEALTH/NURSE VISIT (OUTPATIENT)
Dept: FAMILY MEDICINE | Facility: CLINIC | Age: 35
End: 2025-06-30
Payer: COMMERCIAL

## 2025-06-30 ENCOUNTER — TELEPHONE (OUTPATIENT)
Dept: SURGERY | Facility: CLINIC | Age: 35
End: 2025-06-30

## 2025-06-30 VITALS — HEART RATE: 80 BPM | SYSTOLIC BLOOD PRESSURE: 122 MMHG | DIASTOLIC BLOOD PRESSURE: 80 MMHG

## 2025-06-30 DIAGNOSIS — E66.3 OVERWEIGHT: Primary | ICD-10-CM

## 2025-06-30 DIAGNOSIS — Z86.39 HX OF OBESITY: ICD-10-CM

## 2025-06-30 NOTE — CONFIDENTIAL NOTE
Patient came in for a NURSE ONLY visit today.     6/30/2025  2:29 PM   Vital Signs    Systolic 122    Diastolic 80    Pulse 80      Lisa Magill, CMA

## 2025-06-30 NOTE — PROGRESS NOTES
Alina Thompson is a 34 year old patient who comes in today for a Blood Pressure check.  Initial BP:  /80 (BP Location: Right arm, Patient Position: Sitting, Cuff Size: Adult Regular)   Pulse 80        Disposition: results routed to provider    Lisa Magill, CMA

## 2025-07-02 ENCOUNTER — VIRTUAL VISIT (OUTPATIENT)
Dept: ONCOLOGY | Facility: CLINIC | Age: 35
End: 2025-07-02
Attending: PHYSICIAN ASSISTANT
Payer: COMMERCIAL

## 2025-07-02 ENCOUNTER — PRE VISIT (OUTPATIENT)
Dept: ONCOLOGY | Facility: CLINIC | Age: 35
End: 2025-07-02
Payer: COMMERCIAL

## 2025-07-02 VITALS — WEIGHT: 204 LBS | BODY MASS INDEX: 28.56 KG/M2 | HEIGHT: 71 IN

## 2025-07-02 DIAGNOSIS — D50.9 IRON DEFICIENCY ANEMIA, UNSPECIFIED IRON DEFICIENCY ANEMIA TYPE: ICD-10-CM

## 2025-07-02 DIAGNOSIS — Z98.84 BARIATRIC SURGERY STATUS: Primary | ICD-10-CM

## 2025-07-02 PROCEDURE — G2211 COMPLEX E/M VISIT ADD ON: HCPCS | Performed by: INTERNAL MEDICINE

## 2025-07-02 PROCEDURE — 98003 SYNCH AUDIO-VIDEO NEW HI 60: CPT | Performed by: INTERNAL MEDICINE

## 2025-07-02 RX ORDER — ALBUTEROL SULFATE 0.83 MG/ML
2.5 SOLUTION RESPIRATORY (INHALATION)
OUTPATIENT
Start: 2025-07-09

## 2025-07-02 RX ORDER — EPINEPHRINE 1 MG/ML
0.3 INJECTION, SOLUTION INTRAMUSCULAR; SUBCUTANEOUS EVERY 5 MIN PRN
OUTPATIENT
Start: 2025-07-09

## 2025-07-02 RX ORDER — ALBUTEROL SULFATE 90 UG/1
1-2 INHALANT RESPIRATORY (INHALATION)
Start: 2025-07-09

## 2025-07-02 RX ORDER — METHYLPREDNISOLONE SODIUM SUCCINATE 40 MG/ML
40 INJECTION INTRAMUSCULAR; INTRAVENOUS
Start: 2025-07-09

## 2025-07-02 RX ORDER — MEPERIDINE HYDROCHLORIDE 25 MG/ML
25 INJECTION INTRAMUSCULAR; INTRAVENOUS; SUBCUTANEOUS
OUTPATIENT
Start: 2025-07-09

## 2025-07-02 RX ORDER — HEPARIN SODIUM,PORCINE 10 UNIT/ML
5-20 VIAL (ML) INTRAVENOUS DAILY PRN
OUTPATIENT
Start: 2025-07-09

## 2025-07-02 RX ORDER — DIPHENHYDRAMINE HYDROCHLORIDE 50 MG/ML
25 INJECTION, SOLUTION INTRAMUSCULAR; INTRAVENOUS
Start: 2025-07-09

## 2025-07-02 RX ORDER — HEPARIN SODIUM (PORCINE) LOCK FLUSH IV SOLN 100 UNIT/ML 100 UNIT/ML
5 SOLUTION INTRAVENOUS
OUTPATIENT
Start: 2025-07-09

## 2025-07-02 RX ORDER — DIPHENHYDRAMINE HYDROCHLORIDE 50 MG/ML
50 INJECTION, SOLUTION INTRAMUSCULAR; INTRAVENOUS
Start: 2025-07-09

## 2025-07-02 ASSESSMENT — PAIN SCALES - GENERAL: PAINLEVEL_OUTOF10: NO PAIN (0)

## 2025-07-02 NOTE — NURSING NOTE
Patient confirms medications and allergies are accurate via patients echeck in completion, and or denies any changes since last reviewed/verified.     Current patient location: [facility] [address] [place] [id number]    Is the patient currently in the state of [place]? YES    Visit mode: VIDEO    If the visit is dropped, the patient can be reconnected by:VIDEO VISIT: Text to cell phone:   Telephone Information:   Mobile [phone]       Will anyone else be joining the visit? NO  (If patient encounters technical issues they should call [phone])    Are changes needed to the allergy or medication list? No    Are refills needed on medications prescribed by this physician? NO    Rooming Documentation:  Questionnaire(s) not done per department protocol    Reason for visit: Consult    Belgica PETERSF

## 2025-07-02 NOTE — LETTER
7/2/2025      Alina Thompson  [address] [place] [facility] [address]      Dear Colleague,    Thank you for referring your patient, Alina Thompson, to the [facility]. Please see a copy of my visit note below.    Virtual Visit Details    Type of service:  Video Visit     Originating Location (pt. Location): Home    Distant Location (provider location):  On-site  Platform used for Video Visit: [facility]    Hematology/Oncology Consult Note    Date of Consult:  07/02/25   Reason for Consult: Iron def anemia      ASSESSMENT AND RECOMMENDATIONS:      Alina Thompson is a 34 year old patient with gastric bypass in 2022 on MVI and iron deficiency anemia noted on labs in 2025.    We discussed the work up of SHAZIA (iron def anemia). I believe she has SHAZIA the setting of menstrual bleeding and gastric bypass- her ferritin has dropped slowly over the last 2 years. She has symptoms from anemia with PICa, cold intolerance and fatigue.     Recommend   2 IV dose of Venofer 300 mg (please schedule all future visits at [facility])  Repeat blood work and MD visit in 3 months +/- the third dose of Venofer.  IF iron deficiency persists, I would suggest a GI referral to r/o any ongoin GI iron losses.  B12- levels low normal. Continue oral replacement and Check levels with MMA on the next visit.     Problem List:    Complete gastric bypass - Bariatric surgery status: May 2022  She is on 6 different vitamins daily including MVI- with iron, calcium, B12 weekly.    Iron deficiency anemia - ferritin 44 post op when first checked in 2023 then down to 15 more recently. Iron sat <15%, Hgb dropped to 10 g/dl.     History of present illness: Alina Thompson is a 34 year old patient history of gastric bypass in 2022- she started over the last few months being fatigued and dizzy- and less strength. Chewing Ice. She also has raynaud that is new.   She has regular period- she uses  "period underwear.    Review of Systems: See interval hx. Denies fevers, chills, HA, dizziness, n/t, changes in vision, cough, sore throat, CP, SOB, abdominal pain, N/V, diarrhea, changes in urination, bleeding, bruising, rash.     PMHx and Social Hx reviewed per [facility].    Social History: Non smoker  Family History: no family history of GI maligiancies    Medications:  Current Outpatient Medications   Medication Sig Dispense Refill     FLUoxetine (PROZAC) 20 MG capsule Take 1 capsule (20 mg) by mouth daily. 90 capsule 3     nystatin (MYCOSTATIN) 878463 UNIT/GM external cream Apply topically 2 times daily as needed for dry skin. Apply as needed for rash. 30 g 3     phentermine (LOMAIRA) 8 MG tablet Take 1 tablet (8 mg) by mouth every morning (before breakfast). 30 tablet 2     triamcinolone (KENALOG) 0.1 % external ointment Apply topically 2 times daily. 15 g 0       Allergies   Allergen Reactions     Asa [Aspirin]      Gastric Bypass      Nsaids      Gastric Bypass     Sulfa Antibiotics      Wellbutrin [Bupropion] Other (See Comments)     Dizziness         EXAM:    Ht 1.803 m (5' 11\")   Wt 92.5 kg (204 lb)   BMI 28.45 kg/m      GENERAL:  Female, in no acute distress.  Alert and oriented x3.   PSYCH: Calm and cooperative       Labs: CBC RESULTS:   Recent Labs   Lab Test 05/30/25  1348   WBC 7.9   RBC 4.11   HGB 10.7*   HCT 33.4*   MCV 81   MCH 26.0*   MCHC 32.0   RDW 15.5*         60 minutes spent on the date of the encounter doing chart review, review of test results, interpretation of tests, patient visit, and documentation     The longitudinal plan of care for the diagnosis(es)/condition(s) as documented were addressed during this visit. Due to the added complexity in care, I will continue to support Alina in the subsequent management and with ongoing continuity of care.    Annmarie Walker  Hematology/Oncology  [facility]               Again, thank you for allowing me to participate " in the care of your patient.        Sincerely,        Annmarie Walker MD    Electronically signed

## 2025-07-02 NOTE — PROGRESS NOTES
Virtual Visit Details    Type of service:  Video Visit     Originating Location (pt. Location): Home    Distant Location (provider location):  On-site  Platform used for Video Visit: [facility]    Hematology/Oncology Consult Note    Date of Consult:  07/02/25   Reason for Consult: Iron def anemia      ASSESSMENT AND RECOMMENDATIONS:      Alina Thompson is a 34 year old patient with gastric bypass in 2022 on MVI and iron deficiency anemia noted on labs in 2025.    We discussed the work up of SHAZIA (iron def anemia). I believe she has SHAZIA the setting of menstrual bleeding and gastric bypass- her ferritin has dropped slowly over the last 2 years. She has symptoms from anemia with PICa, cold intolerance and fatigue.     Recommend   2 IV dose of Venofer 300 mg (please schedule all future visits at [facility])  Repeat blood work and MD visit in 3 months +/- the third dose of Venofer.  IF iron deficiency persists, I would suggest a GI referral to r/o any ongoin GI iron losses.  B12- levels low normal. Continue oral replacement and Check levels with MMA on the next visit.     Problem List:    Complete gastric bypass - Bariatric surgery status: May 2022  She is on 6 different vitamins daily including MVI- with iron, calcium, B12 weekly.    Iron deficiency anemia - ferritin 44 post op when first checked in 2023 then down to 15 more recently. Iron sat <15%, Hgb dropped to 10 g/dl.     History of present illness: Alina Thompson is a 34 year old patient history of gastric bypass in 2022- she started over the last few months being fatigued and dizzy- and less strength. Chewing Ice. She also has raynaud that is new.   She has regular period- she uses period underwear.    Review of Systems: See interval hx. Denies fevers, chills, HA, dizziness, n/t, changes in vision, cough, sore throat, CP, SOB, abdominal pain, N/V, diarrhea, changes in urination, bleeding, bruising, rash.     PMHx and Social Hx  "reviewed per [facility].    Social History: Non smoker  Family History: no family history of GI maligiancies    Medications:  Current Outpatient Medications   Medication Sig Dispense Refill    FLUoxetine (PROZAC) 20 MG capsule Take 1 capsule (20 mg) by mouth daily. 90 capsule 3    nystatin (MYCOSTATIN) 953402 UNIT/GM external cream Apply topically 2 times daily as needed for dry skin. Apply as needed for rash. 30 g 3    phentermine (LOMAIRA) 8 MG tablet Take 1 tablet (8 mg) by mouth every morning (before breakfast). 30 tablet 2    triamcinolone (KENALOG) 0.1 % external ointment Apply topically 2 times daily. 15 g 0       Allergies   Allergen Reactions    Asa [Aspirin]      Gastric Bypass     Nsaids      Gastric Bypass    Sulfa Antibiotics     Wellbutrin [Bupropion] Other (See Comments)     Dizziness         EXAM:    Ht 1.803 m (5' 11\")   Wt 92.5 kg (204 lb)   BMI 28.45 kg/m      GENERAL:  Female, in no acute distress.  Alert and oriented x3.   PSYCH: Calm and cooperative       Labs: CBC RESULTS:   Recent Labs   Lab Test 05/30/25  1348   WBC 7.9   RBC 4.11   HGB 10.7*   HCT 33.4*   MCV 81   MCH 26.0*   MCHC 32.0   RDW 15.5*         60 minutes spent on the date of the encounter doing chart review, review of test results, interpretation of tests, patient visit, and documentation     The longitudinal plan of care for the diagnosis(es)/condition(s) as documented were addressed during this visit. Due to the added complexity in care, I will continue to support Alina in the subsequent management and with ongoing continuity of care.    Annmarie Walker  Hematology/Oncology  [facility]             "

## 2025-07-10 ENCOUNTER — TELEPHONE (OUTPATIENT)
Dept: SURGERY | Facility: CLINIC | Age: 35
End: 2025-07-10
Payer: COMMERCIAL

## 2025-07-10 DIAGNOSIS — E66.3 OVERWEIGHT: ICD-10-CM

## 2025-07-10 DIAGNOSIS — Z86.39 HX OF OBESITY: Primary | ICD-10-CM

## 2025-07-10 DIAGNOSIS — Z86.39 HX OF OBESITY: ICD-10-CM

## 2025-07-10 RX ORDER — PHENTERMINE HYDROCHLORIDE 15 MG/1
CAPSULE ORAL
Qty: 30 CAPSULE | Refills: 2 | Status: SHIPPED | OUTPATIENT
Start: 2025-07-10

## 2025-07-10 NOTE — TELEPHONE ENCOUNTER
PRIOR AUTHORIZATION DENIED    Medication: PHENTERMINE HCL 15 MG PO CAPS  Insurance Company: [email or web address] - Phone [phone] Fax [phone]  Denial Date: 7/10/2025  Denial Reason(s): CRITERIA NOT MET FOR COVERAGE      Appeal Information:   Patient Notified: NO

## 2025-07-14 DIAGNOSIS — Z86.39 HX OF OBESITY: ICD-10-CM

## 2025-07-14 DIAGNOSIS — E66.3 OVERWEIGHT: ICD-10-CM

## 2025-07-14 RX ORDER — PHENTERMINE HYDROCHLORIDE 15 MG/1
CAPSULE ORAL
Qty: 30 CAPSULE | Refills: 2 | Status: SHIPPED | OUTPATIENT
Start: 2025-07-14

## 2025-07-28 ENCOUNTER — INFUSION THERAPY VISIT (OUTPATIENT)
Dept: INFUSION THERAPY | Facility: CLINIC | Age: 35
End: 2025-07-28
Attending: INTERNAL MEDICINE
Payer: COMMERCIAL

## 2025-07-28 VITALS
TEMPERATURE: 98.1 F | HEART RATE: 75 BPM | SYSTOLIC BLOOD PRESSURE: 114 MMHG | OXYGEN SATURATION: 99 % | DIASTOLIC BLOOD PRESSURE: 80 MMHG

## 2025-07-28 DIAGNOSIS — Z98.84 BARIATRIC SURGERY STATUS: ICD-10-CM

## 2025-07-28 DIAGNOSIS — D50.9 IRON DEFICIENCY ANEMIA, UNSPECIFIED IRON DEFICIENCY ANEMIA TYPE: Primary | ICD-10-CM

## 2025-07-28 PROCEDURE — 250N000011 HC RX IP 250 OP 636: Performed by: INTERNAL MEDICINE

## 2025-07-28 PROCEDURE — 96365 THER/PROPH/DIAG IV INF INIT: CPT

## 2025-07-28 PROCEDURE — 258N000003 HC RX IP 258 OP 636: Performed by: INTERNAL MEDICINE

## 2025-07-28 RX ORDER — MEPERIDINE HYDROCHLORIDE 25 MG/ML
25 INJECTION INTRAMUSCULAR; INTRAVENOUS; SUBCUTANEOUS
OUTPATIENT
Start: 2025-07-30

## 2025-07-28 RX ORDER — DIPHENHYDRAMINE HYDROCHLORIDE 50 MG/ML
50 INJECTION, SOLUTION INTRAMUSCULAR; INTRAVENOUS
Start: 2025-07-30

## 2025-07-28 RX ORDER — HEPARIN SODIUM,PORCINE 10 UNIT/ML
5-20 VIAL (ML) INTRAVENOUS DAILY PRN
OUTPATIENT
Start: 2025-07-30

## 2025-07-28 RX ORDER — METHYLPREDNISOLONE SODIUM SUCCINATE 40 MG/ML
40 INJECTION INTRAMUSCULAR; INTRAVENOUS
Start: 2025-07-30

## 2025-07-28 RX ORDER — ALBUTEROL SULFATE 0.83 MG/ML
2.5 SOLUTION RESPIRATORY (INHALATION)
OUTPATIENT
Start: 2025-07-30

## 2025-07-28 RX ORDER — EPINEPHRINE 1 MG/ML
0.3 INJECTION, SOLUTION INTRAMUSCULAR; SUBCUTANEOUS EVERY 5 MIN PRN
OUTPATIENT
Start: 2025-07-30

## 2025-07-28 RX ORDER — DIPHENHYDRAMINE HYDROCHLORIDE 50 MG/ML
25 INJECTION, SOLUTION INTRAMUSCULAR; INTRAVENOUS
Start: 2025-07-30

## 2025-07-28 RX ORDER — HEPARIN SODIUM (PORCINE) LOCK FLUSH IV SOLN 100 UNIT/ML 100 UNIT/ML
5 SOLUTION INTRAVENOUS
OUTPATIENT
Start: 2025-07-30

## 2025-07-28 RX ORDER — ALBUTEROL SULFATE 90 UG/1
1-2 INHALANT RESPIRATORY (INHALATION)
Start: 2025-07-30

## 2025-07-28 RX ADMIN — IRON SUCROSE 300 MG: 20 INJECTION, SOLUTION INTRAVENOUS at 10:18

## 2025-07-28 RX ADMIN — SODIUM CHLORIDE 250 ML: 0.9 INJECTION, SOLUTION INTRAVENOUS at 10:18

## 2025-07-28 NOTE — PROGRESS NOTES
.Infusion Nursing Note:  Alina Thompson presents today for Venofer #1  Patient seen by provider today: No   present during visit today: Not Applicable.    Note: Alina states she was taking po iron but was not absorbing due to her history of gastric bypass.    Symptomatic with significant fatigue (requiring 2-3 naps/day), pica, and cold intolerance    Plan is for two doses of Venofer with possible third dose following labs/MD visit in 3 months      Intravenous Access:  Peripheral IV placed.    Treatment Conditions:  Results reviewed, labs MET treatment parameters, ok to proceed with treatment.  Iron studies/CBC noted from 5/30/25.      Post Infusion Assessment:  Patient tolerated infusion without incident.  Blood return noted pre and post infusion.  Site patent and intact, free from redness, edema or discomfort.  No evidence of extravasations.  Access discontinued per protocol.       Discharge Plan:   AVS to patient via MYCHART.  Patient will return 8/4/25 for Venofer #2   Patient discharged in stable condition accompanied by: self.  Departure Mode: Ambulatory.      Teetee Pederson RN

## 2025-08-04 ENCOUNTER — INFUSION THERAPY VISIT (OUTPATIENT)
Dept: INFUSION THERAPY | Facility: CLINIC | Age: 35
End: 2025-08-04
Attending: INTERNAL MEDICINE
Payer: COMMERCIAL

## 2025-08-04 VITALS
DIASTOLIC BLOOD PRESSURE: 71 MMHG | RESPIRATION RATE: 18 BRPM | OXYGEN SATURATION: 100 % | TEMPERATURE: 98.7 F | HEART RATE: 91 BPM | SYSTOLIC BLOOD PRESSURE: 120 MMHG

## 2025-08-04 DIAGNOSIS — D50.9 IRON DEFICIENCY ANEMIA, UNSPECIFIED IRON DEFICIENCY ANEMIA TYPE: Primary | ICD-10-CM

## 2025-08-04 DIAGNOSIS — Z98.84 BARIATRIC SURGERY STATUS: ICD-10-CM

## 2025-08-04 PROCEDURE — 250N000011 HC RX IP 250 OP 636: Performed by: INTERNAL MEDICINE

## 2025-08-04 PROCEDURE — 258N000003 HC RX IP 258 OP 636: Performed by: INTERNAL MEDICINE

## 2025-08-04 PROCEDURE — 96365 THER/PROPH/DIAG IV INF INIT: CPT

## 2025-08-04 RX ORDER — HEPARIN SODIUM,PORCINE 10 UNIT/ML
5-20 VIAL (ML) INTRAVENOUS DAILY PRN
OUTPATIENT
Start: 2025-08-05

## 2025-08-04 RX ORDER — DIPHENHYDRAMINE HYDROCHLORIDE 50 MG/ML
50 INJECTION, SOLUTION INTRAMUSCULAR; INTRAVENOUS
Start: 2025-08-05

## 2025-08-04 RX ORDER — MEPERIDINE HYDROCHLORIDE 25 MG/ML
25 INJECTION INTRAMUSCULAR; INTRAVENOUS; SUBCUTANEOUS
OUTPATIENT
Start: 2025-08-05

## 2025-08-04 RX ORDER — DIPHENHYDRAMINE HYDROCHLORIDE 50 MG/ML
25 INJECTION, SOLUTION INTRAMUSCULAR; INTRAVENOUS
Start: 2025-08-05

## 2025-08-04 RX ORDER — ALBUTEROL SULFATE 0.83 MG/ML
2.5 SOLUTION RESPIRATORY (INHALATION)
OUTPATIENT
Start: 2025-08-05

## 2025-08-04 RX ORDER — HEPARIN SODIUM (PORCINE) LOCK FLUSH IV SOLN 100 UNIT/ML 100 UNIT/ML
5 SOLUTION INTRAVENOUS
OUTPATIENT
Start: 2025-08-05

## 2025-08-04 RX ORDER — METHYLPREDNISOLONE SODIUM SUCCINATE 40 MG/ML
40 INJECTION INTRAMUSCULAR; INTRAVENOUS
Start: 2025-08-05

## 2025-08-04 RX ORDER — ALBUTEROL SULFATE 90 UG/1
1-2 INHALANT RESPIRATORY (INHALATION)
Start: 2025-08-05

## 2025-08-04 RX ORDER — EPINEPHRINE 1 MG/ML
0.3 INJECTION, SOLUTION INTRAMUSCULAR; SUBCUTANEOUS EVERY 5 MIN PRN
OUTPATIENT
Start: 2025-08-05

## 2025-08-04 RX ADMIN — SODIUM CHLORIDE 250 ML: 0.9 INJECTION, SOLUTION INTRAVENOUS at 13:45

## 2025-08-04 RX ADMIN — IRON SUCROSE 300 MG: 20 INJECTION, SOLUTION INTRAVENOUS at 13:45

## 2025-08-05 ENCOUNTER — OFFICE VISIT (OUTPATIENT)
Dept: FAMILY MEDICINE | Facility: CLINIC | Age: 35
End: 2025-08-05
Payer: COMMERCIAL

## 2025-08-05 VITALS
HEIGHT: 71 IN | OXYGEN SATURATION: 100 % | SYSTOLIC BLOOD PRESSURE: 119 MMHG | HEART RATE: 70 BPM | BODY MASS INDEX: 29.78 KG/M2 | TEMPERATURE: 97.7 F | RESPIRATION RATE: 16 BRPM | WEIGHT: 212.7 LBS | DIASTOLIC BLOOD PRESSURE: 83 MMHG

## 2025-08-05 DIAGNOSIS — R30.0 DYSURIA: Primary | ICD-10-CM

## 2025-08-05 LAB
ALBUMIN UR-MCNC: NEGATIVE MG/DL
APPEARANCE UR: CLEAR
BACTERIA #/AREA URNS HPF: ABNORMAL /HPF
BILIRUB UR QL STRIP: NEGATIVE
COLOR UR AUTO: YELLOW
GLUCOSE UR STRIP-MCNC: NEGATIVE MG/DL
HGB UR QL STRIP: NEGATIVE
KETONES UR STRIP-MCNC: NEGATIVE MG/DL
LEUKOCYTE ESTERASE UR QL STRIP: ABNORMAL
NITRATE UR QL: NEGATIVE
PH UR STRIP: 7 [PH] (ref 5–7)
RBC #/AREA URNS AUTO: ABNORMAL /HPF
SP GR UR STRIP: 1.01 (ref 1–1.03)
UROBILINOGEN UR STRIP-ACNC: 0.2 E.U./DL
WBC #/AREA URNS AUTO: ABNORMAL /HPF

## 2025-08-05 PROCEDURE — 99213 OFFICE O/P EST LOW 20 MIN: CPT | Performed by: GENERAL PRACTICE

## 2025-08-05 PROCEDURE — 87086 URINE CULTURE/COLONY COUNT: CPT | Performed by: GENERAL PRACTICE

## 2025-08-05 PROCEDURE — 81001 URINALYSIS AUTO W/SCOPE: CPT | Performed by: GENERAL PRACTICE

## 2025-08-05 RX ORDER — NITROFURANTOIN 25; 75 MG/1; MG/1
100 CAPSULE ORAL 2 TIMES DAILY
Qty: 10 CAPSULE | Refills: 0 | Status: SHIPPED | OUTPATIENT
Start: 2025-08-05 | End: 2025-08-10

## 2025-08-05 ASSESSMENT — PAIN SCALES - GENERAL: PAINLEVEL_OUTOF10: SEVERE PAIN (7)

## 2025-08-06 LAB — BACTERIA UR CULT: NORMAL

## 2025-09-03 ENCOUNTER — VIRTUAL VISIT (OUTPATIENT)
Dept: SURGERY | Facility: CLINIC | Age: 35
End: 2025-09-03
Payer: COMMERCIAL

## (undated) DEVICE — GLOVE PROTEXIS W/NEU-THERA 8.0  2D73TE80

## (undated) DEVICE — SOL NACL 0.9% INJ 1000ML BAG 2B1324X

## (undated) DEVICE — DAVINCI XI GRASPER FENESTRATED TIP UP 8MM 470347

## (undated) DEVICE — ENDO TROCAR FIRST ENTRY KII FIOS Z-THRD 11X100MM CTF33

## (undated) DEVICE — LIGHT HANDLE X2

## (undated) DEVICE — DRAPE BREAST/CHEST 29420

## (undated) DEVICE — PREP CHLORAPREP 26ML TINTED ORANGE  260815

## (undated) DEVICE — SYR 10ML FINGER CONTROL W/O NDL 309695

## (undated) DEVICE — PACK LAP CHOLE SLC15LCFSD

## (undated) DEVICE — DAVINCI XI SEAL UNIVERSAL 5-8MM 470361

## (undated) DEVICE — DAVINCI HOT SHEARS TIP COVER  400180

## (undated) DEVICE — SU VICRYL 4-0 PS-2 18" UND J496H

## (undated) DEVICE — DAVINCI XI FCP CADIERE 470049

## (undated) DEVICE — DAVINCI SEAL CANNULA AND STPL 12MM 470380

## (undated) DEVICE — ANTIFOG SOLUTION W/FOAM PAD CF-1001

## (undated) DEVICE — SUCTION CANISTER MEDIVAC LINER 3000ML W/LID 65651-530

## (undated) DEVICE — DAVINCI XI DRAPE ARM 470015

## (undated) DEVICE — SUCTION IRR STRYKERFLOW II W/TIP 250-070-520

## (undated) DEVICE — DRSG BANDAID 1X3" FABRIC CURITY LATEX FREE KC44101

## (undated) DEVICE — SOL WATER IRRIG 1000ML BOTTLE 2F7114

## (undated) DEVICE — STPL DAVINCI SUREFORM 60MM STR 480460

## (undated) DEVICE — SU VICRYL 0 TIE 12X18" J906G

## (undated) DEVICE — LINEN TOWEL PACK X5 5464

## (undated) DEVICE — SYSTEM LAPAROVUE VISIBILITY LAPVUE10

## (undated) DEVICE — ESU GROUND PAD UNIVERSAL W/O CORD

## (undated) DEVICE — SU STRATAFIX PDS PLUS 2-0 SPIRAL VIOLET SPXX1B415

## (undated) DEVICE — DAVINCI XI OBTURATOR BLADELESS 8MM 470359

## (undated) DEVICE — DAVINCI XI DRAPE COLUMN 470341

## (undated) DEVICE — GOWN IMPERVIOUS SPECIALTY XLG/XLONG 32474

## (undated) DEVICE — LUBRICANT INST ELECTROLUBE EL101

## (undated) DEVICE — NDL 19GA 1.5"

## (undated) DEVICE — DRAPE SHEET REV FOLD 3/4 9349

## (undated) DEVICE — SYR 10ML LL W/O NDL 302995

## (undated) DEVICE — SOL NACL 0.9% IRRIG 1000ML BOTTLE 2F7124

## (undated) DEVICE — GASTRECTOMY SLEEVE STABILIZATION SYSTEM VISIGI 3D 36FR 5236B

## (undated) DEVICE — DAVINCI XI REDUCER 8-12MM 470381

## (undated) DEVICE — DAVINCI XI HANDPIECE ESU VESSEL SEALER 8MM EXT 480422

## (undated) DEVICE — Device

## (undated) DEVICE — ENDO TROCAR FIRST ENTRY KII FIOS Z-THRD 05X100MM CTF03

## (undated) DEVICE — STPL DAVINCI SUREFORM 60MM RELOAD BLUE 48360B

## (undated) DEVICE — BNDG ABDOMINAL BINDER 9X62-84" 79-89210

## (undated) DEVICE — DAVINCI XI NDL DRIVER LARGE 470006

## (undated) RX ORDER — ONDANSETRON 2 MG/ML
INJECTION INTRAMUSCULAR; INTRAVENOUS
Status: DISPENSED
Start: 2022-05-04

## (undated) RX ORDER — LIDOCAINE HYDROCHLORIDE 20 MG/ML
INJECTION, SOLUTION EPIDURAL; INFILTRATION; INTRACAUDAL; PERINEURAL
Status: DISPENSED
Start: 2022-05-04

## (undated) RX ORDER — DEXAMETHASONE SODIUM PHOSPHATE 4 MG/ML
INJECTION, SOLUTION INTRA-ARTICULAR; INTRALESIONAL; INTRAMUSCULAR; INTRAVENOUS; SOFT TISSUE
Status: DISPENSED
Start: 2022-05-04

## (undated) RX ORDER — CEFAZOLIN SODIUM/WATER 2 G/20 ML
SYRINGE (ML) INTRAVENOUS
Status: DISPENSED
Start: 2022-05-04

## (undated) RX ORDER — VECURONIUM BROMIDE 1 MG/ML
INJECTION, POWDER, LYOPHILIZED, FOR SOLUTION INTRAVENOUS
Status: DISPENSED
Start: 2022-05-04

## (undated) RX ORDER — INDOCYANINE GREEN AND WATER 25 MG
KIT INJECTION
Status: DISPENSED
Start: 2022-05-04

## (undated) RX ORDER — BUPIVACAINE HYDROCHLORIDE AND EPINEPHRINE 2.5; 5 MG/ML; UG/ML
INJECTION, SOLUTION EPIDURAL; INFILTRATION; INTRACAUDAL; PERINEURAL
Status: DISPENSED
Start: 2022-05-04

## (undated) RX ORDER — HYDROMORPHONE HCL IN WATER/PF 6 MG/30 ML
PATIENT CONTROLLED ANALGESIA SYRINGE INTRAVENOUS
Status: DISPENSED
Start: 2022-05-04

## (undated) RX ORDER — PROPOFOL 10 MG/ML
INJECTION, EMULSION INTRAVENOUS
Status: DISPENSED
Start: 2022-05-04

## (undated) RX ORDER — HYDROMORPHONE HYDROCHLORIDE 1 MG/ML
INJECTION, SOLUTION INTRAMUSCULAR; INTRAVENOUS; SUBCUTANEOUS
Status: DISPENSED
Start: 2022-05-04

## (undated) RX ORDER — ACETAMINOPHEN 325 MG/1
TABLET ORAL
Status: DISPENSED
Start: 2022-05-04

## (undated) RX ORDER — FENTANYL CITRATE 0.05 MG/ML
INJECTION, SOLUTION INTRAMUSCULAR; INTRAVENOUS
Status: DISPENSED
Start: 2022-05-04

## (undated) RX ORDER — FENTANYL CITRATE 50 UG/ML
INJECTION, SOLUTION INTRAMUSCULAR; INTRAVENOUS
Status: DISPENSED
Start: 2022-05-04

## (undated) RX ORDER — HEPARIN SODIUM 5000 [USP'U]/.5ML
INJECTION, SOLUTION INTRAVENOUS; SUBCUTANEOUS
Status: DISPENSED
Start: 2022-05-04